# Patient Record
Sex: FEMALE | Race: ASIAN | NOT HISPANIC OR LATINO | Employment: FULL TIME | URBAN - METROPOLITAN AREA
[De-identification: names, ages, dates, MRNs, and addresses within clinical notes are randomized per-mention and may not be internally consistent; named-entity substitution may affect disease eponyms.]

---

## 2020-11-13 ENCOUNTER — HOSPITAL ENCOUNTER (INPATIENT)
Dept: INTENSIVE CARE | Facility: HOSPITAL | Age: 60
Discharge: NURSING, REHAB OR LONG-TERM CARE | End: 2020-12-29
Attending: INTERNAL MEDICINE | Admitting: INTERNAL MEDICINE

## 2020-11-13 DIAGNOSIS — U07.1 PNEUMONIA DUE TO 2019 NOVEL CORONAVIRUS: ICD-10-CM

## 2020-11-13 DIAGNOSIS — R09.02 HYPOXIA: ICD-10-CM

## 2020-11-13 DIAGNOSIS — J18.9 MULTIFOCAL PNEUMONIA: ICD-10-CM

## 2020-11-13 DIAGNOSIS — J12.82 PNEUMONIA DUE TO 2019 NOVEL CORONAVIRUS: ICD-10-CM

## 2020-11-13 DIAGNOSIS — R63.30 FEEDING DIFFICULTIES: ICD-10-CM

## 2020-11-13 DIAGNOSIS — J18.9 PNEUMONIA DUE TO INFECTIOUS ORGANISM, UNSPECIFIED LATERALITY, UNSPECIFIED PART OF LUNG: ICD-10-CM

## 2020-11-13 DIAGNOSIS — Z91.89 AT HIGH RISK FOR IMPAIRED SKIN INTEGRITY: ICD-10-CM

## 2020-11-13 DIAGNOSIS — E43 SEVERE PROTEIN-CALORIE MALNUTRITION (H): ICD-10-CM

## 2020-11-13 DIAGNOSIS — J96.01 ACUTE RESPIRATORY FAILURE WITH HYPOXIA (H): ICD-10-CM

## 2020-11-13 LAB
ABO/RH(D): NORMAL
ANION GAP SERPL CALCULATED.3IONS-SCNC: 12 MMOL/L (ref 5–18)
APTT PPP: 31 SECONDS (ref 24–37)
BASOPHILS # BLD AUTO: 0 THOU/UL (ref 0–0.2)
BASOPHILS NFR BLD AUTO: 0 % (ref 0–2)
BNP SERPL-MCNC: <10 PG/ML (ref 0–93)
BUN SERPL-MCNC: 14 MG/DL (ref 8–22)
C REACTIVE PROTEIN LHE: 6.9 MG/DL (ref 0–0.8)
CALCIUM SERPL-MCNC: 8.4 MG/DL (ref 8.5–10.5)
CHLORIDE BLD-SCNC: 100 MMOL/L (ref 98–107)
CK SERPL-CCNC: 120 U/L (ref 30–190)
CO2 SERPL-SCNC: 28 MMOL/L (ref 22–31)
CREAT SERPL-MCNC: 0.64 MG/DL (ref 0.6–1.1)
D DIMER PPP FEU-MCNC: 0.98 FEU UG/ML
EOSINOPHIL # BLD AUTO: 0 THOU/UL (ref 0–0.4)
EOSINOPHIL NFR BLD AUTO: 0 % (ref 0–6)
ERYTHROCYTE [DISTWIDTH] IN BLOOD BY AUTOMATED COUNT: 13 % (ref 11–14.5)
FIBRINOGEN PPP-MCNC: 667 MG/DL (ref 170–410)
GFR SERPL CREATININE-BSD FRML MDRD: >60 ML/MIN/1.73M2
GLUCOSE BLD-MCNC: 114 MG/DL (ref 70–125)
HCT VFR BLD AUTO: 47.4 % (ref 35–47)
HGB BLD-MCNC: 15.6 G/DL (ref 12–16)
IMM GRANULOCYTES # BLD: 0 THOU/UL
IMM GRANULOCYTES NFR BLD: 0 %
INR PPP: 0.95 (ref 0.9–1.1)
LDH SERPL L TO P-CCNC: 611 U/L (ref 125–220)
LYMPHOCYTES # BLD AUTO: 0.7 THOU/UL (ref 0.8–4.4)
LYMPHOCYTES NFR BLD AUTO: 13 % (ref 20–40)
MAGNESIUM SERPL-MCNC: 2 MG/DL (ref 1.8–2.6)
MCH RBC QN AUTO: 28.5 PG (ref 27–34)
MCHC RBC AUTO-ENTMCNC: 32.9 G/DL (ref 32–36)
MCV RBC AUTO: 87 FL (ref 80–100)
MONOCYTES # BLD AUTO: 0.1 THOU/UL (ref 0–0.9)
MONOCYTES NFR BLD AUTO: 2 % (ref 2–10)
NEUTROPHILS # BLD AUTO: 4.6 THOU/UL (ref 2–7.7)
NEUTROPHILS NFR BLD AUTO: 84 % (ref 50–70)
PLATELET # BLD AUTO: 258 THOU/UL (ref 140–440)
PMV BLD AUTO: 9.7 FL (ref 8.5–12.5)
POTASSIUM BLD-SCNC: 3.6 MMOL/L (ref 3.5–5)
PROCALCITONIN SERPL-MCNC: 0.26 NG/ML (ref 0–0.49)
RBC # BLD AUTO: 5.47 MILL/UL (ref 3.8–5.4)
RETICS # AUTO: 0.04 MILL/UL (ref 0.01–0.11)
RETICS/RBC NFR AUTO: 0.82 % (ref 0.8–2.7)
SODIUM SERPL-SCNC: 140 MMOL/L (ref 136–145)
TROPONIN I SERPL-MCNC: <0.01 NG/ML (ref 0–0.29)
WBC: 5.5 THOU/UL (ref 4–11)

## 2020-11-13 ASSESSMENT — MIFFLIN-ST. JEOR: SCORE: 1225.86

## 2020-11-14 LAB
ALBUMIN SERPL-MCNC: 2.7 G/DL (ref 3.5–5)
ALP SERPL-CCNC: 99 U/L (ref 45–120)
ALT SERPL W P-5'-P-CCNC: 43 U/L (ref 0–45)
ANION GAP SERPL CALCULATED.3IONS-SCNC: 12 MMOL/L (ref 5–18)
AST SERPL W P-5'-P-CCNC: 121 U/L (ref 0–40)
ATRIAL RATE - MUSE: 88 BPM
BASOPHILS # BLD AUTO: 0 THOU/UL (ref 0–0.2)
BASOPHILS NFR BLD AUTO: 0 % (ref 0–2)
BILIRUB DIRECT SERPL-MCNC: 0.1 MG/DL
BILIRUB SERPL-MCNC: 0.3 MG/DL (ref 0–1)
BUN SERPL-MCNC: 12 MG/DL (ref 8–22)
C DIFF TOX B STL QL: NEGATIVE
C REACTIVE PROTEIN LHE: 6.5 MG/DL (ref 0–0.8)
CALCIUM SERPL-MCNC: 8.1 MG/DL (ref 8.5–10.5)
CHLORIDE BLD-SCNC: 104 MMOL/L (ref 98–107)
CO2 SERPL-SCNC: 24 MMOL/L (ref 22–31)
CREAT SERPL-MCNC: 0.55 MG/DL (ref 0.6–1.1)
D DIMER PPP FEU-MCNC: 1.09 FEU UG/ML
DIASTOLIC BLOOD PRESSURE - MUSE: 71 MMHG
EOSINOPHIL # BLD AUTO: 0 THOU/UL (ref 0–0.4)
EOSINOPHIL NFR BLD AUTO: 0 % (ref 0–6)
ERYTHROCYTE [DISTWIDTH] IN BLOOD BY AUTOMATED COUNT: 13.1 % (ref 11–14.5)
FERRITIN SERPL-MCNC: 2663 NG/ML (ref 10–130)
FIBRINOGEN PPP-MCNC: 658 MG/DL (ref 170–410)
GFR SERPL CREATININE-BSD FRML MDRD: >60 ML/MIN/1.73M2
GLUCOSE BLD-MCNC: 141 MG/DL (ref 70–125)
GLUCOSE BLDC GLUCOMTR-MCNC: 133 MG/DL (ref 70–139)
GLUCOSE BLDC GLUCOMTR-MCNC: 142 MG/DL (ref 70–139)
GLUCOSE BLDC GLUCOMTR-MCNC: 161 MG/DL (ref 70–139)
GLUCOSE BLDC GLUCOMTR-MCNC: 176 MG/DL (ref 70–139)
HBA1C MFR BLD: 5.8 %
HCT VFR BLD AUTO: 46.2 % (ref 35–47)
HGB BLD-MCNC: 15.1 G/DL (ref 12–16)
IMM GRANULOCYTES # BLD: 0 THOU/UL
IMM GRANULOCYTES NFR BLD: 0 %
INR PPP: 0.92 (ref 0.9–1.1)
INTERPRETATION ECG - MUSE: NORMAL
LACTATE SERPL-SCNC: 2.8 MMOL/L (ref 0.7–2)
LACTATE SERPL-SCNC: 3.3 MMOL/L (ref 0.7–2)
LDH SERPL L TO P-CCNC: 638 U/L (ref 125–220)
LYMPHOCYTES # BLD AUTO: 0.5 THOU/UL (ref 0.8–4.4)
LYMPHOCYTES NFR BLD AUTO: 14 % (ref 20–40)
MCH RBC QN AUTO: 28.3 PG (ref 27–34)
MCHC RBC AUTO-ENTMCNC: 32.7 G/DL (ref 32–36)
MCV RBC AUTO: 87 FL (ref 80–100)
MONOCYTES # BLD AUTO: 0.1 THOU/UL (ref 0–0.9)
MONOCYTES NFR BLD AUTO: 2 % (ref 2–10)
NEUTROPHILS # BLD AUTO: 3.3 THOU/UL (ref 2–7.7)
NEUTROPHILS NFR BLD AUTO: 84 % (ref 50–70)
P AXIS - MUSE: 56 DEGREES
PLATELET # BLD AUTO: 281 THOU/UL (ref 140–440)
PMV BLD AUTO: 9.6 FL (ref 8.5–12.5)
POTASSIUM BLD-SCNC: 3.6 MMOL/L (ref 3.5–5)
PR INTERVAL - MUSE: 156 MS
PROT SERPL-MCNC: 6.3 G/DL (ref 6–8)
QRS DURATION - MUSE: 82 MS
QT - MUSE: 374 MS
QTC - MUSE: 452 MS
R AXIS - MUSE: 5 DEGREES
RBC # BLD AUTO: 5.33 MILL/UL (ref 3.8–5.4)
RETICS # AUTO: 0.03 MILL/UL (ref 0.01–0.11)
RETICS/RBC NFR AUTO: 0.56 % (ref 0.8–2.7)
RIBOTYPE 027/NAP1/BI: NORMAL
SARS-COV-2 PCR COMMENT: ABNORMAL
SARS-COV-2 RNA SPEC QL NAA+PROBE: POSITIVE
SARS-COV-2 VIRUS SPECIMEN SOURCE: ABNORMAL
SODIUM SERPL-SCNC: 140 MMOL/L (ref 136–145)
SYSTOLIC BLOOD PRESSURE - MUSE: 110 MMHG
T AXIS - MUSE: 65 DEGREES
TROPONIN I SERPL-MCNC: <0.01 NG/ML (ref 0–0.29)
VENTRICULAR RATE- MUSE: 88 BPM
WBC: 3.9 THOU/UL (ref 4–11)

## 2020-11-15 LAB
ANION GAP SERPL CALCULATED.3IONS-SCNC: 11 MMOL/L (ref 5–18)
AT III ACT/NOR PPP CHRO: 109 % (ref 85–135)
BASE EXCESS BLDA CALC-SCNC: 5.3 MMOL/L
BASOPHILS # BLD AUTO: 0 THOU/UL (ref 0–0.2)
BASOPHILS NFR BLD AUTO: 0 % (ref 0–2)
BUN SERPL-MCNC: 13 MG/DL (ref 8–22)
C REACTIVE PROTEIN LHE: 2.5 MG/DL (ref 0–0.8)
CALCIUM SERPL-MCNC: 7.9 MG/DL (ref 8.5–10.5)
CHLORIDE BLD-SCNC: 105 MMOL/L (ref 98–107)
CO2 SERPL-SCNC: 27 MMOL/L (ref 22–31)
COHGB MFR BLD: 92.3 % (ref 96–97)
CREAT SERPL-MCNC: 0.48 MG/DL (ref 0.6–1.1)
D DIMER PPP FEU-MCNC: 1.33 FEU UG/ML
EOSINOPHIL # BLD AUTO: 0 THOU/UL (ref 0–0.4)
EOSINOPHIL NFR BLD AUTO: 0 % (ref 0–6)
ERYTHROCYTE [DISTWIDTH] IN BLOOD BY AUTOMATED COUNT: 13.1 % (ref 11–14.5)
FIBRINOGEN PPP-MCNC: 875 MG/DL (ref 170–410)
FLOW: 15 LPM
GFR SERPL CREATININE-BSD FRML MDRD: >60 ML/MIN/1.73M2
GLUCOSE BLD-MCNC: 96 MG/DL (ref 70–125)
GLUCOSE BLDC GLUCOMTR-MCNC: 102 MG/DL (ref 70–139)
GLUCOSE BLDC GLUCOMTR-MCNC: 113 MG/DL (ref 70–139)
GLUCOSE BLDC GLUCOMTR-MCNC: 116 MG/DL (ref 70–139)
GLUCOSE BLDC GLUCOMTR-MCNC: 125 MG/DL (ref 70–139)
HCO3, ARTERIAL CALC - HISTORICAL: 28.6 MMOL/L (ref 23–29)
HCT VFR BLD AUTO: 42.7 % (ref 35–47)
HGB BLD-MCNC: 13.8 G/DL (ref 12–16)
IMM GRANULOCYTES # BLD: 0.1 THOU/UL
IMM GRANULOCYTES NFR BLD: 1 %
INR PPP: 0.91 (ref 0.9–1.1)
INTERLEUKIN 6 BLOOD: 23.13 PG/ML
LDH SERPL L TO P-CCNC: 895 U/L (ref 125–220)
LYMPHOCYTES # BLD AUTO: 1 THOU/UL (ref 0.8–4.4)
LYMPHOCYTES NFR BLD AUTO: 11 % (ref 20–40)
MAGNESIUM SERPL-MCNC: 2 MG/DL (ref 1.8–2.6)
MAGNESIUM SERPL-MCNC: 2.1 MG/DL (ref 1.8–2.6)
MCH RBC QN AUTO: 28.3 PG (ref 27–34)
MCHC RBC AUTO-ENTMCNC: 32.3 G/DL (ref 32–36)
MCV RBC AUTO: 88 FL (ref 80–100)
MONOCYTES # BLD AUTO: 0.3 THOU/UL (ref 0–0.9)
MONOCYTES NFR BLD AUTO: 4 % (ref 2–10)
NEUTROPHILS # BLD AUTO: 8.1 THOU/UL (ref 2–7.7)
NEUTROPHILS NFR BLD AUTO: 85 % (ref 50–70)
OXYHEMOGLOBIN - HISTORICAL: 92.3 % (ref 96–97)
PCO2 BLD: 40 MM HG (ref 35–45)
PH BLD: 7.47 [PH] (ref 7.37–7.44)
PLATELET # BLD AUTO: 302 THOU/UL (ref 140–440)
PMV BLD AUTO: 10 FL (ref 8.5–12.5)
PO2 BLD: 65 MM HG (ref 80–90)
POTASSIUM BLD-SCNC: 2.9 MMOL/L (ref 3.5–5)
POTASSIUM BLD-SCNC: 4.2 MMOL/L (ref 3.5–5)
RBC # BLD AUTO: 4.88 MILL/UL (ref 3.8–5.4)
RETICS # AUTO: 0.03 MILL/UL (ref 0.01–0.11)
RETICS/RBC NFR AUTO: 0.61 % (ref 0.8–2.7)
SODIUM SERPL-SCNC: 143 MMOL/L (ref 136–145)
TEMPERATURE: 37 DEGREES C
VENTILATION MODE: ABNORMAL
WBC: 9.5 THOU/UL (ref 4–11)

## 2020-11-16 ENCOUNTER — COMMUNICATION - HEALTHEAST (OUTPATIENT)
Dept: SCHEDULING | Facility: CLINIC | Age: 60
End: 2020-11-16

## 2020-11-16 LAB
ANION GAP SERPL CALCULATED.3IONS-SCNC: 13 MMOL/L (ref 5–18)
BASOPHILS # BLD AUTO: 0 THOU/UL (ref 0–0.2)
BASOPHILS NFR BLD AUTO: 0 % (ref 0–2)
BUN SERPL-MCNC: 14 MG/DL (ref 8–22)
C REACTIVE PROTEIN LHE: 1.5 MG/DL (ref 0–0.8)
CALCIUM SERPL-MCNC: 7.9 MG/DL (ref 8.5–10.5)
CHLORIDE BLD-SCNC: 103 MMOL/L (ref 98–107)
CO2 SERPL-SCNC: 27 MMOL/L (ref 22–31)
CREAT SERPL-MCNC: 0.53 MG/DL (ref 0.6–1.1)
D DIMER PPP FEU-MCNC: 1.33 FEU UG/ML
EOSINOPHIL COUNT (ABSOLUTE): 0 THOU/UL (ref 0–0.4)
EOSINOPHIL NFR BLD AUTO: 0 % (ref 0–6)
ERYTHROCYTE [DISTWIDTH] IN BLOOD BY AUTOMATED COUNT: 13 % (ref 11–14.5)
FIBRINOGEN PPP-MCNC: 505 MG/DL (ref 170–410)
GFR SERPL CREATININE-BSD FRML MDRD: >60 ML/MIN/1.73M2
GLUCOSE BLD-MCNC: 104 MG/DL (ref 70–125)
GLUCOSE BLDC GLUCOMTR-MCNC: 110 MG/DL (ref 70–139)
GLUCOSE BLDC GLUCOMTR-MCNC: 113 MG/DL (ref 70–139)
GLUCOSE BLDC GLUCOMTR-MCNC: 113 MG/DL (ref 70–139)
GLUCOSE BLDC GLUCOMTR-MCNC: 114 MG/DL (ref 70–139)
HCT VFR BLD AUTO: 43 % (ref 35–47)
HGB BLD-MCNC: 14.1 G/DL (ref 12–16)
IMMATURE GRANULOCYTE % - MAN (DIFF): 0 %
IMMATURE GRANULOCYTE ABSOLUTE - MAN (DIFF): 0 THOU/UL
INR PPP: 0.95 (ref 0.9–1.1)
LDH SERPL L TO P-CCNC: 812 U/L (ref 125–220)
LYMPHOCYTES # BLD AUTO: 0.6 THOU/UL (ref 0.8–4.4)
LYMPHOCYTES NFR BLD AUTO: 7 % (ref 20–40)
MAGNESIUM SERPL-MCNC: 2.1 MG/DL (ref 1.8–2.6)
MCH RBC QN AUTO: 28.5 PG (ref 27–34)
MCHC RBC AUTO-ENTMCNC: 32.8 G/DL (ref 32–36)
MCV RBC AUTO: 87 FL (ref 80–100)
MONOCYTES # BLD AUTO: 0.2 THOU/UL (ref 0–0.9)
MONOCYTES NFR BLD AUTO: 3 % (ref 2–10)
PLAT MORPH BLD: NORMAL
PLATELET # BLD AUTO: 324 THOU/UL (ref 140–440)
PMV BLD AUTO: 9.5 FL (ref 8.5–12.5)
POTASSIUM BLD-SCNC: 3.7 MMOL/L (ref 3.5–5)
RBC # BLD AUTO: 4.95 MILL/UL (ref 3.8–5.4)
REACTIVE LYMPHS: ABNORMAL
RETICS # AUTO: 0.04 MILL/UL (ref 0.01–0.11)
RETICS/RBC NFR AUTO: 0.84 % (ref 0.8–2.7)
SODIUM SERPL-SCNC: 143 MMOL/L (ref 136–145)
TOTAL NEUTROPHILS-ABS(DIFF): 7.3 THOU/UL (ref 2–7.7)
TOTAL NEUTROPHILS-REL(DIFF): 90 % (ref 50–70)
TROPONIN I SERPL-MCNC: <0.01 NG/ML (ref 0–0.29)
WBC: 8.1 THOU/UL (ref 4–11)

## 2020-11-17 LAB
ANION GAP SERPL CALCULATED.3IONS-SCNC: 9 MMOL/L (ref 5–18)
BASOPHILS # BLD AUTO: 0 THOU/UL (ref 0–0.2)
BASOPHILS NFR BLD AUTO: 0 % (ref 0–2)
BUN SERPL-MCNC: 20 MG/DL (ref 8–22)
C REACTIVE PROTEIN LHE: 0.6 MG/DL (ref 0–0.8)
CALCIUM SERPL-MCNC: 8 MG/DL (ref 8.5–10.5)
CHLORIDE BLD-SCNC: 105 MMOL/L (ref 98–107)
CO2 SERPL-SCNC: 28 MMOL/L (ref 22–31)
CREAT SERPL-MCNC: 0.56 MG/DL (ref 0.6–1.1)
D DIMER PPP FEU-MCNC: 1.41 FEU UG/ML
EOSINOPHIL COUNT (ABSOLUTE): 0 THOU/UL (ref 0–0.4)
EOSINOPHIL NFR BLD AUTO: 0 % (ref 0–6)
ERYTHROCYTE [DISTWIDTH] IN BLOOD BY AUTOMATED COUNT: 13.1 % (ref 11–14.5)
FIBRINOGEN PPP-MCNC: 445 MG/DL (ref 170–410)
GFR SERPL CREATININE-BSD FRML MDRD: >60 ML/MIN/1.73M2
GLUCOSE BLD-MCNC: 99 MG/DL (ref 70–125)
GLUCOSE BLDC GLUCOMTR-MCNC: 106 MG/DL (ref 70–139)
GLUCOSE BLDC GLUCOMTR-MCNC: 115 MG/DL (ref 70–139)
GLUCOSE BLDC GLUCOMTR-MCNC: 115 MG/DL (ref 70–139)
GLUCOSE BLDC GLUCOMTR-MCNC: 155 MG/DL (ref 70–139)
GLUCOSE BLDC GLUCOMTR-MCNC: 93 MG/DL (ref 70–139)
HCT VFR BLD AUTO: 43.9 % (ref 35–47)
HGB BLD-MCNC: 14.3 G/DL (ref 12–16)
IMMATURE GRANULOCYTE % - MAN (DIFF): 1 %
IMMATURE GRANULOCYTE ABSOLUTE - MAN (DIFF): 0.1 THOU/UL
INR PPP: 0.96 (ref 0.9–1.1)
LDH SERPL L TO P-CCNC: 820 U/L (ref 125–220)
LYMPHOCYTES # BLD AUTO: 0.5 THOU/UL (ref 0.8–4.4)
LYMPHOCYTES NFR BLD AUTO: 6 % (ref 20–40)
MAGNESIUM SERPL-MCNC: 2.3 MG/DL (ref 1.8–2.6)
MCH RBC QN AUTO: 28.3 PG (ref 27–34)
MCHC RBC AUTO-ENTMCNC: 32.6 G/DL (ref 32–36)
MCV RBC AUTO: 87 FL (ref 80–100)
METAMYELOCYTES (ABSOLUTE): 0 THOU/UL
METAMYELOCYTES NFR BLD MANUAL: 1 %
MONOCYTES # BLD AUTO: 0.6 THOU/UL (ref 0–0.9)
MONOCYTES NFR BLD AUTO: 7 % (ref 2–10)
MYELOCYTES (ABSOLUTE): 0 THOU/UL
MYELOCYTES NFR BLD MANUAL: 1 %
PLAT MORPH BLD: NORMAL
PLATELET # BLD AUTO: 407 THOU/UL (ref 140–440)
PMV BLD AUTO: 9.8 FL (ref 8.5–12.5)
POTASSIUM BLD-SCNC: 3.7 MMOL/L (ref 3.5–5)
RBC # BLD AUTO: 5.05 MILL/UL (ref 3.8–5.4)
REACTIVE LYMPHS: ABNORMAL
RETICS # AUTO: 0.05 MILL/UL (ref 0.01–0.11)
RETICS/RBC NFR AUTO: 1.05 % (ref 0.8–2.7)
SODIUM SERPL-SCNC: 142 MMOL/L (ref 136–145)
TOTAL NEUTROPHILS-ABS(DIFF): 7.4 THOU/UL (ref 2–7.7)
TOTAL NEUTROPHILS-REL(DIFF): 87 % (ref 50–70)
WBC: 8.6 THOU/UL (ref 4–11)

## 2020-11-18 LAB
ALBUMIN SERPL-MCNC: 2.9 G/DL (ref 3.5–5)
ALP SERPL-CCNC: 101 U/L (ref 45–120)
ALT SERPL W P-5'-P-CCNC: 74 U/L (ref 0–45)
ANION GAP SERPL CALCULATED.3IONS-SCNC: 11 MMOL/L (ref 5–18)
AST SERPL W P-5'-P-CCNC: 73 U/L (ref 0–40)
BILIRUB DIRECT SERPL-MCNC: 0.2 MG/DL
BILIRUB SERPL-MCNC: 0.6 MG/DL (ref 0–1)
BUN SERPL-MCNC: 16 MG/DL (ref 8–22)
CALCIUM SERPL-MCNC: 7.9 MG/DL (ref 8.5–10.5)
CHLORIDE BLD-SCNC: 104 MMOL/L (ref 98–107)
CO2 SERPL-SCNC: 24 MMOL/L (ref 22–31)
CREAT SERPL-MCNC: 0.54 MG/DL (ref 0.6–1.1)
GFR SERPL CREATININE-BSD FRML MDRD: >60 ML/MIN/1.73M2
GLUCOSE BLD-MCNC: 104 MG/DL (ref 70–125)
GLUCOSE BLDC GLUCOMTR-MCNC: 101 MG/DL (ref 70–139)
GLUCOSE BLDC GLUCOMTR-MCNC: 109 MG/DL (ref 70–139)
GLUCOSE BLDC GLUCOMTR-MCNC: 138 MG/DL (ref 70–139)
GLUCOSE BLDC GLUCOMTR-MCNC: 159 MG/DL (ref 70–139)
MAGNESIUM SERPL-MCNC: 2.3 MG/DL (ref 1.8–2.6)
PLATELET # BLD AUTO: 396 THOU/UL (ref 140–440)
POTASSIUM BLD-SCNC: 3.7 MMOL/L (ref 3.5–5)
PROT SERPL-MCNC: 6.4 G/DL (ref 6–8)
SODIUM SERPL-SCNC: 139 MMOL/L (ref 136–145)

## 2020-11-19 LAB
AEROBIC BLOOD CULTURE BOTTLE: NO GROWTH
AEROBIC BLOOD CULTURE BOTTLE: NO GROWTH
ALBUMIN SERPL-MCNC: 2.8 G/DL (ref 3.5–5)
ALP SERPL-CCNC: 84 U/L (ref 45–120)
ALT SERPL W P-5'-P-CCNC: 49 U/L (ref 0–45)
ANAEROBIC BLOOD CULTURE BOTTLE: NO GROWTH
ANAEROBIC BLOOD CULTURE BOTTLE: NO GROWTH
ANION GAP SERPL CALCULATED.3IONS-SCNC: 7 MMOL/L (ref 5–18)
AST SERPL W P-5'-P-CCNC: 49 U/L (ref 0–40)
BILIRUB DIRECT SERPL-MCNC: 0.2 MG/DL
BILIRUB SERPL-MCNC: 0.7 MG/DL (ref 0–1)
BUN SERPL-MCNC: 14 MG/DL (ref 8–22)
CALCIUM SERPL-MCNC: 7.9 MG/DL (ref 8.5–10.5)
CHLORIDE BLD-SCNC: 105 MMOL/L (ref 98–107)
CO2 SERPL-SCNC: 28 MMOL/L (ref 22–31)
CREAT SERPL-MCNC: 0.51 MG/DL (ref 0.6–1.1)
GFR SERPL CREATININE-BSD FRML MDRD: >60 ML/MIN/1.73M2
GLUCOSE BLD-MCNC: 105 MG/DL (ref 70–125)
GLUCOSE BLDC GLUCOMTR-MCNC: 105 MG/DL (ref 70–139)
GLUCOSE BLDC GLUCOMTR-MCNC: 107 MG/DL (ref 70–139)
GLUCOSE BLDC GLUCOMTR-MCNC: 128 MG/DL (ref 70–139)
GLUCOSE BLDC GLUCOMTR-MCNC: 128 MG/DL (ref 70–139)
PLATELET # BLD AUTO: 418 THOU/UL (ref 140–440)
POTASSIUM BLD-SCNC: 3.7 MMOL/L (ref 3.5–5)
PROT SERPL-MCNC: 6 G/DL (ref 6–8)
SODIUM SERPL-SCNC: 140 MMOL/L (ref 136–145)

## 2020-11-19 ASSESSMENT — MIFFLIN-ST. JEOR: SCORE: 1209.88

## 2020-11-20 LAB
ANION GAP SERPL CALCULATED.3IONS-SCNC: 9 MMOL/L (ref 5–18)
BUN SERPL-MCNC: 16 MG/DL (ref 8–22)
CALCIUM SERPL-MCNC: 8.3 MG/DL (ref 8.5–10.5)
CHLORIDE BLD-SCNC: 104 MMOL/L (ref 98–107)
CO2 SERPL-SCNC: 28 MMOL/L (ref 22–31)
CREAT SERPL-MCNC: 0.53 MG/DL (ref 0.6–1.1)
ERYTHROCYTE [DISTWIDTH] IN BLOOD BY AUTOMATED COUNT: 12.8 % (ref 11–14.5)
GFR SERPL CREATININE-BSD FRML MDRD: >60 ML/MIN/1.73M2
GLUCOSE BLD-MCNC: 102 MG/DL (ref 70–125)
GLUCOSE BLDC GLUCOMTR-MCNC: 104 MG/DL (ref 70–139)
GLUCOSE BLDC GLUCOMTR-MCNC: 111 MG/DL (ref 70–139)
GLUCOSE BLDC GLUCOMTR-MCNC: 119 MG/DL (ref 70–139)
GLUCOSE BLDC GLUCOMTR-MCNC: 131 MG/DL (ref 70–139)
HCT VFR BLD AUTO: 43.4 % (ref 35–47)
HGB BLD-MCNC: 14.3 G/DL (ref 12–16)
MCH RBC QN AUTO: 28.4 PG (ref 27–34)
MCHC RBC AUTO-ENTMCNC: 32.9 G/DL (ref 32–36)
MCV RBC AUTO: 86 FL (ref 80–100)
PLATELET # BLD AUTO: 426 THOU/UL (ref 140–440)
PMV BLD AUTO: 9.7 FL (ref 8.5–12.5)
POTASSIUM BLD-SCNC: 4 MMOL/L (ref 3.5–5)
RBC # BLD AUTO: 5.04 MILL/UL (ref 3.8–5.4)
SODIUM SERPL-SCNC: 141 MMOL/L (ref 136–145)
WBC: 11 THOU/UL (ref 4–11)

## 2020-11-20 ASSESSMENT — MIFFLIN-ST. JEOR: SCORE: 1191.88

## 2020-11-21 LAB
ANION GAP SERPL CALCULATED.3IONS-SCNC: 9 MMOL/L (ref 5–18)
BUN SERPL-MCNC: 19 MG/DL (ref 8–22)
CALCIUM SERPL-MCNC: 8.3 MG/DL (ref 8.5–10.5)
CHLORIDE BLD-SCNC: 103 MMOL/L (ref 98–107)
CO2 SERPL-SCNC: 27 MMOL/L (ref 22–31)
CREAT SERPL-MCNC: 0.51 MG/DL (ref 0.6–1.1)
ERYTHROCYTE [DISTWIDTH] IN BLOOD BY AUTOMATED COUNT: 12.9 % (ref 11–14.5)
GFR SERPL CREATININE-BSD FRML MDRD: >60 ML/MIN/1.73M2
GLUCOSE BLD-MCNC: 102 MG/DL (ref 70–125)
GLUCOSE BLDC GLUCOMTR-MCNC: 129 MG/DL (ref 70–139)
GLUCOSE BLDC GLUCOMTR-MCNC: 131 MG/DL (ref 70–139)
GLUCOSE BLDC GLUCOMTR-MCNC: 139 MG/DL (ref 70–139)
GLUCOSE BLDC GLUCOMTR-MCNC: 76 MG/DL (ref 70–139)
GLUCOSE BLDC GLUCOMTR-MCNC: 94 MG/DL (ref 70–139)
HCT VFR BLD AUTO: 44.1 % (ref 35–47)
HGB BLD-MCNC: 14.7 G/DL (ref 12–16)
MCH RBC QN AUTO: 28.1 PG (ref 27–34)
MCHC RBC AUTO-ENTMCNC: 33.3 G/DL (ref 32–36)
MCV RBC AUTO: 84 FL (ref 80–100)
PLATELET # BLD AUTO: 432 THOU/UL (ref 140–440)
PMV BLD AUTO: 9.7 FL (ref 8.5–12.5)
POTASSIUM BLD-SCNC: 3.8 MMOL/L (ref 3.5–5)
RBC # BLD AUTO: 5.24 MILL/UL (ref 3.8–5.4)
SODIUM SERPL-SCNC: 139 MMOL/L (ref 136–145)
WBC: 13.6 THOU/UL (ref 4–11)

## 2020-11-21 ASSESSMENT — MIFFLIN-ST. JEOR: SCORE: 1190.88

## 2020-11-22 LAB
ANION GAP SERPL CALCULATED.3IONS-SCNC: 12 MMOL/L (ref 5–18)
BUN SERPL-MCNC: 18 MG/DL (ref 8–22)
CALCIUM SERPL-MCNC: 8.3 MG/DL (ref 8.5–10.5)
CHLORIDE BLD-SCNC: 102 MMOL/L (ref 98–107)
CO2 SERPL-SCNC: 25 MMOL/L (ref 22–31)
CREAT SERPL-MCNC: 0.5 MG/DL (ref 0.6–1.1)
ERYTHROCYTE [DISTWIDTH] IN BLOOD BY AUTOMATED COUNT: 12.7 % (ref 11–14.5)
GFR SERPL CREATININE-BSD FRML MDRD: >60 ML/MIN/1.73M2
GLUCOSE BLD-MCNC: 123 MG/DL (ref 70–125)
GLUCOSE BLDC GLUCOMTR-MCNC: 161 MG/DL (ref 70–139)
GLUCOSE BLDC GLUCOMTR-MCNC: 180 MG/DL (ref 70–139)
HCT VFR BLD AUTO: 42.4 % (ref 35–47)
HGB BLD-MCNC: 14 G/DL (ref 12–16)
MCH RBC QN AUTO: 27.8 PG (ref 27–34)
MCHC RBC AUTO-ENTMCNC: 33 G/DL (ref 32–36)
MCV RBC AUTO: 84 FL (ref 80–100)
PLATELET # BLD AUTO: 417 THOU/UL (ref 140–440)
PMV BLD AUTO: 9.9 FL (ref 8.5–12.5)
POTASSIUM BLD-SCNC: 4.1 MMOL/L (ref 3.5–5)
RBC # BLD AUTO: 5.03 MILL/UL (ref 3.8–5.4)
SODIUM SERPL-SCNC: 139 MMOL/L (ref 136–145)
WBC: 13.1 THOU/UL (ref 4–11)

## 2020-11-22 ASSESSMENT — MIFFLIN-ST. JEOR: SCORE: 1185.88

## 2020-11-23 LAB
ANION GAP SERPL CALCULATED.3IONS-SCNC: 10 MMOL/L (ref 5–18)
BUN SERPL-MCNC: 18 MG/DL (ref 8–22)
CALCIUM SERPL-MCNC: 8.4 MG/DL (ref 8.5–10.5)
CHLORIDE BLD-SCNC: 100 MMOL/L (ref 98–107)
CO2 SERPL-SCNC: 26 MMOL/L (ref 22–31)
CREAT SERPL-MCNC: 0.5 MG/DL (ref 0.6–1.1)
ERYTHROCYTE [DISTWIDTH] IN BLOOD BY AUTOMATED COUNT: 12.8 % (ref 11–14.5)
GFR SERPL CREATININE-BSD FRML MDRD: >60 ML/MIN/1.73M2
GLUCOSE BLD-MCNC: 101 MG/DL (ref 70–125)
HCT VFR BLD AUTO: 42.3 % (ref 35–47)
HGB BLD-MCNC: 14 G/DL (ref 12–16)
MCH RBC QN AUTO: 28.4 PG (ref 27–34)
MCHC RBC AUTO-ENTMCNC: 33.1 G/DL (ref 32–36)
MCV RBC AUTO: 86 FL (ref 80–100)
PLATELET # BLD AUTO: 320 THOU/UL (ref 140–440)
PMV BLD AUTO: 9.9 FL (ref 8.5–12.5)
POTASSIUM BLD-SCNC: 3.9 MMOL/L (ref 3.5–5)
RBC # BLD AUTO: 4.93 MILL/UL (ref 3.8–5.4)
SODIUM SERPL-SCNC: 136 MMOL/L (ref 136–145)
WBC: 16.8 THOU/UL (ref 4–11)

## 2020-11-23 ASSESSMENT — MIFFLIN-ST. JEOR: SCORE: 1189.88

## 2020-11-24 LAB
ANION GAP SERPL CALCULATED.3IONS-SCNC: 9 MMOL/L (ref 5–18)
BUN SERPL-MCNC: 15 MG/DL (ref 8–22)
CALCIUM SERPL-MCNC: 8.6 MG/DL (ref 8.5–10.5)
CHLORIDE BLD-SCNC: 101 MMOL/L (ref 98–107)
CO2 SERPL-SCNC: 28 MMOL/L (ref 22–31)
CREAT SERPL-MCNC: 0.51 MG/DL (ref 0.6–1.1)
GFR SERPL CREATININE-BSD FRML MDRD: >60 ML/MIN/1.73M2
GLUCOSE BLD-MCNC: 104 MG/DL (ref 70–125)
PLATELET # BLD AUTO: 419 THOU/UL (ref 140–440)
POTASSIUM BLD-SCNC: 3.8 MMOL/L (ref 3.5–5)
SODIUM SERPL-SCNC: 138 MMOL/L (ref 136–145)

## 2020-11-24 ASSESSMENT — MIFFLIN-ST. JEOR: SCORE: 1189.88

## 2020-11-25 LAB
ANION GAP SERPL CALCULATED.3IONS-SCNC: 8 MMOL/L (ref 5–18)
BASE EXCESS BLDA CALC-SCNC: 1.9 MMOL/L
BUN SERPL-MCNC: 13 MG/DL (ref 8–22)
CALCIUM SERPL-MCNC: 8.4 MG/DL (ref 8.5–10.5)
CHLORIDE BLD-SCNC: 101 MMOL/L (ref 98–107)
CO2 SERPL-SCNC: 26 MMOL/L (ref 22–31)
COHGB MFR BLD: 98.1 % (ref 96–97)
CREAT SERPL-MCNC: 0.48 MG/DL (ref 0.6–1.1)
FLOW: 60 LPM
GFR SERPL CREATININE-BSD FRML MDRD: >60 ML/MIN/1.73M2
GLUCOSE BLD-MCNC: 98 MG/DL (ref 70–125)
HCO3, ARTERIAL CALC - HISTORICAL: 26.1 MMOL/L (ref 23–29)
OXYHEMOGLOBIN - HISTORICAL: 97.5 % (ref 96–97)
PCO2 BLD: 38 MM HG (ref 35–45)
PH BLD: 7.44 [PH] (ref 7.37–7.44)
PLATELET # BLD AUTO: 362 THOU/UL (ref 140–440)
PO2 BLD: 154 MM HG (ref 80–90)
POTASSIUM BLD-SCNC: 3.5 MMOL/L (ref 3.5–5)
POTASSIUM BLD-SCNC: 4.2 MMOL/L (ref 3.5–5)
SODIUM SERPL-SCNC: 135 MMOL/L (ref 136–145)
TEMPERATURE: 37 DEGREES C
VENTILATION MODE: ABNORMAL

## 2020-11-26 LAB
ANION GAP SERPL CALCULATED.3IONS-SCNC: 9 MMOL/L (ref 5–18)
BUN SERPL-MCNC: 10 MG/DL (ref 8–22)
CALCIUM SERPL-MCNC: 8.7 MG/DL (ref 8.5–10.5)
CHLORIDE BLD-SCNC: 98 MMOL/L (ref 98–107)
CO2 SERPL-SCNC: 28 MMOL/L (ref 22–31)
CREAT SERPL-MCNC: 0.45 MG/DL (ref 0.6–1.1)
ERYTHROCYTE [DISTWIDTH] IN BLOOD BY AUTOMATED COUNT: 13 % (ref 11–14.5)
GFR SERPL CREATININE-BSD FRML MDRD: >60 ML/MIN/1.73M2
GLUCOSE BLD-MCNC: 103 MG/DL (ref 70–125)
HCT VFR BLD AUTO: 39.3 % (ref 35–47)
HGB BLD-MCNC: 13 G/DL (ref 12–16)
MCH RBC QN AUTO: 28.5 PG (ref 27–34)
MCHC RBC AUTO-ENTMCNC: 33.1 G/DL (ref 32–36)
MCV RBC AUTO: 86 FL (ref 80–100)
PLATELET # BLD AUTO: 266 THOU/UL (ref 140–440)
PLATELET # BLD AUTO: 266 THOU/UL (ref 140–440)
PMV BLD AUTO: 10.2 FL (ref 8.5–12.5)
POTASSIUM BLD-SCNC: 3.8 MMOL/L (ref 3.5–5)
RBC # BLD AUTO: 4.56 MILL/UL (ref 3.8–5.4)
SODIUM SERPL-SCNC: 135 MMOL/L (ref 136–145)
WBC: 15.5 THOU/UL (ref 4–11)

## 2020-11-26 ASSESSMENT — MIFFLIN-ST. JEOR
SCORE: 1184.88
SCORE: 1188.88

## 2020-11-27 LAB
ANION GAP SERPL CALCULATED.3IONS-SCNC: 11 MMOL/L (ref 5–18)
BUN SERPL-MCNC: 10 MG/DL (ref 8–22)
CALCIUM SERPL-MCNC: 7.7 MG/DL (ref 8.5–10.5)
CHLORIDE BLD-SCNC: 95 MMOL/L (ref 98–107)
CO2 SERPL-SCNC: 28 MMOL/L (ref 22–31)
CREAT SERPL-MCNC: 0.48 MG/DL (ref 0.6–1.1)
GFR SERPL CREATININE-BSD FRML MDRD: >60 ML/MIN/1.73M2
GLUCOSE BLD-MCNC: 113 MG/DL (ref 70–125)
PLATELET # BLD AUTO: 314 THOU/UL (ref 140–440)
POTASSIUM BLD-SCNC: 4.2 MMOL/L (ref 3.5–5)
POTASSIUM BLD-SCNC: 5.2 MMOL/L (ref 3.5–5)
SODIUM SERPL-SCNC: 134 MMOL/L (ref 136–145)

## 2020-11-27 ASSESSMENT — MIFFLIN-ST. JEOR: SCORE: 1184.88

## 2020-11-28 LAB
ANION GAP SERPL CALCULATED.3IONS-SCNC: 9 MMOL/L (ref 5–18)
BUN SERPL-MCNC: 7 MG/DL (ref 8–22)
CALCIUM SERPL-MCNC: 8.8 MG/DL (ref 8.5–10.5)
CHLORIDE BLD-SCNC: 96 MMOL/L (ref 98–107)
CO2 SERPL-SCNC: 29 MMOL/L (ref 22–31)
CREAT SERPL-MCNC: 0.47 MG/DL (ref 0.6–1.1)
GFR SERPL CREATININE-BSD FRML MDRD: >60 ML/MIN/1.73M2
GLUCOSE BLD-MCNC: 120 MG/DL (ref 70–125)
PLATELET # BLD AUTO: 353 THOU/UL (ref 140–440)
POTASSIUM BLD-SCNC: 3.8 MMOL/L (ref 3.5–5)
SODIUM SERPL-SCNC: 134 MMOL/L (ref 136–145)

## 2020-11-28 ASSESSMENT — MIFFLIN-ST. JEOR: SCORE: 1175.51

## 2020-11-29 LAB
ALBUMIN SERPL-MCNC: 2.3 G/DL (ref 3.5–5)
ALP SERPL-CCNC: 146 U/L (ref 45–120)
ALT SERPL W P-5'-P-CCNC: 41 U/L (ref 0–45)
ANION GAP SERPL CALCULATED.3IONS-SCNC: 9 MMOL/L (ref 5–18)
AST SERPL W P-5'-P-CCNC: 36 U/L (ref 0–40)
BILIRUB DIRECT SERPL-MCNC: 0.2 MG/DL
BILIRUB SERPL-MCNC: 0.5 MG/DL (ref 0–1)
BUN SERPL-MCNC: 9 MG/DL (ref 8–22)
CALCIUM SERPL-MCNC: 8.8 MG/DL (ref 8.5–10.5)
CHLORIDE BLD-SCNC: 97 MMOL/L (ref 98–107)
CO2 SERPL-SCNC: 30 MMOL/L (ref 22–31)
CREAT SERPL-MCNC: 0.5 MG/DL (ref 0.6–1.1)
GFR SERPL CREATININE-BSD FRML MDRD: >60 ML/MIN/1.73M2
GLUCOSE BLD-MCNC: 109 MG/DL (ref 70–125)
LACTATE SERPL-SCNC: 1 MMOL/L (ref 0.7–2)
PLATELET # BLD AUTO: 355 THOU/UL (ref 140–440)
POTASSIUM BLD-SCNC: 4.1 MMOL/L (ref 3.5–5)
PROT SERPL-MCNC: 7.1 G/DL (ref 6–8)
SODIUM SERPL-SCNC: 136 MMOL/L (ref 136–145)
WBC: 9.5 THOU/UL (ref 4–11)

## 2020-11-30 LAB
ANION GAP SERPL CALCULATED.3IONS-SCNC: 8 MMOL/L (ref 5–18)
BASE EXCESS BLDA CALC-SCNC: 6.7 MMOL/L
BUN SERPL-MCNC: 8 MG/DL (ref 8–22)
C REACTIVE PROTEIN LHE: 7.8 MG/DL (ref 0–0.8)
CALCIUM SERPL-MCNC: 8.8 MG/DL (ref 8.5–10.5)
CHLORIDE BLD-SCNC: 99 MMOL/L (ref 98–107)
CO2 SERPL-SCNC: 29 MMOL/L (ref 22–31)
COHGB MFR BLD: 98.7 % (ref 96–97)
CREAT SERPL-MCNC: 0.5 MG/DL (ref 0.6–1.1)
GFR SERPL CREATININE-BSD FRML MDRD: >60 ML/MIN/1.73M2
GLUCOSE BLD-MCNC: 108 MG/DL (ref 70–125)
HCO3, ARTERIAL CALC - HISTORICAL: 29.6 MMOL/L (ref 23–29)
HGB BLD-MCNC: 13.5 G/DL (ref 12–16)
LACTATE SERPL-SCNC: 1.4 MMOL/L (ref 0.7–2)
O2/TOTAL GAS SETTING VFR VENT: 100 %
OXYHEMOGLOBIN - HISTORICAL: 97.7 % (ref 96–97)
PCO2 BLD: 47 MM HG (ref 35–45)
PH BLD: 7.43 [PH] (ref 7.37–7.44)
PLATELET # BLD AUTO: 374 THOU/UL (ref 140–440)
PO2 BLD: 126 MM HG (ref 80–90)
POTASSIUM BLD-SCNC: 4.2 MMOL/L (ref 3.5–5)
PROCALCITONIN SERPL-MCNC: 0.08 NG/ML (ref 0–0.49)
SODIUM SERPL-SCNC: 136 MMOL/L (ref 136–145)
TEMPERATURE: 37 DEGREES C
WBC: 10.4 THOU/UL (ref 4–11)

## 2020-12-01 ENCOUNTER — RECORDS - HEALTHEAST (OUTPATIENT)
Dept: INTENSIVE CARE | Facility: HOSPITAL | Age: 60
End: 2020-12-01

## 2020-12-01 ENCOUNTER — ANESTHESIA - HEALTHEAST (OUTPATIENT)
Dept: INTENSIVE CARE | Facility: HOSPITAL | Age: 60
End: 2020-12-01

## 2020-12-01 LAB
ANION GAP SERPL CALCULATED.3IONS-SCNC: 12 MMOL/L (ref 5–18)
BASE EXCESS BLDA CALC-SCNC: 5.7 MMOL/L
BASE EXCESS BLDA CALC-SCNC: 5.8 MMOL/L
BASE EXCESS BLDA CALC-SCNC: 6 MMOL/L
BASE EXCESS BLDA CALC-SCNC: 6.3 MMOL/L
BASE EXCESS BLDA CALC-SCNC: 7.1 MMOL/L
BUN SERPL-MCNC: 12 MG/DL (ref 8–22)
CALCIUM SERPL-MCNC: 9.4 MG/DL (ref 8.5–10.5)
CHLORIDE BLD-SCNC: 96 MMOL/L (ref 98–107)
CO2 SERPL-SCNC: 29 MMOL/L (ref 22–31)
COHGB MFR BLD: 92.2 % (ref 96–97)
COHGB MFR BLD: 92.4 % (ref 96–97)
COHGB MFR BLD: 96.5 % (ref 96–97)
COHGB MFR BLD: 99.1 % (ref 96–97)
COHGB MFR BLD: 99.3 % (ref 96–97)
CREAT SERPL-MCNC: 0.54 MG/DL (ref 0.6–1.1)
ERYTHROCYTE [DISTWIDTH] IN BLOOD BY AUTOMATED COUNT: 12.8 % (ref 11–14.5)
GFR SERPL CREATININE-BSD FRML MDRD: >60 ML/MIN/1.73M2
GLUCOSE BLD-MCNC: 139 MG/DL (ref 70–125)
GLUCOSE BLDC GLUCOMTR-MCNC: 115 MG/DL (ref 70–139)
GLUCOSE BLDC GLUCOMTR-MCNC: 129 MG/DL (ref 70–139)
GLUCOSE BLDC GLUCOMTR-MCNC: 138 MG/DL (ref 70–139)
GLUCOSE BLDC GLUCOMTR-MCNC: 139 MG/DL (ref 70–139)
HCO3, ARTERIAL CALC - HISTORICAL: 27.1 MMOL/L (ref 23–29)
HCO3, ARTERIAL CALC - HISTORICAL: 28.1 MMOL/L (ref 23–29)
HCO3, ARTERIAL CALC - HISTORICAL: 28.1 MMOL/L (ref 23–29)
HCO3, ARTERIAL CALC - HISTORICAL: 28.4 MMOL/L (ref 23–29)
HCO3, ARTERIAL CALC - HISTORICAL: 29.3 MMOL/L (ref 23–29)
HCT VFR BLD AUTO: 40.1 % (ref 35–47)
HGB BLD-MCNC: 12.8 G/DL (ref 12–16)
MAGNESIUM SERPL-MCNC: 2.6 MG/DL (ref 1.8–2.6)
MCH RBC QN AUTO: 28.1 PG (ref 27–34)
MCHC RBC AUTO-ENTMCNC: 31.9 G/DL (ref 32–36)
MCV RBC AUTO: 88 FL (ref 80–100)
MRSA DNA SPEC QL NAA+PROBE: NEGATIVE
O2/TOTAL GAS SETTING VFR VENT: 100 %
OXYHEMOGLOBIN - HISTORICAL: 91.3 % (ref 96–97)
OXYHEMOGLOBIN - HISTORICAL: 91.4 % (ref 96–97)
OXYHEMOGLOBIN - HISTORICAL: 95.4 % (ref 96–97)
OXYHEMOGLOBIN - HISTORICAL: 98.3 % (ref 96–97)
OXYHEMOGLOBIN - HISTORICAL: 98.4 % (ref 96–97)
PCO2 BLD: 54 MM HG (ref 35–45)
PCO2 BLD: 56 MM HG (ref 35–45)
PCO2 BLD: 62 MM HG (ref 35–45)
PCO2 BLD: 66 MM HG (ref 35–45)
PCO2 BLD: 81 MM HG (ref 35–45)
PEEP: 10 CM H2O
PEEP: 14 CM H2O
PH BLD: 7.22 [PH] (ref 7.37–7.44)
PH BLD: 7.3 [PH] (ref 7.37–7.44)
PH BLD: 7.32 [PH] (ref 7.37–7.44)
PH BLD: 7.36 [PH] (ref 7.37–7.44)
PH BLD: 7.38 [PH] (ref 7.37–7.44)
PHOSPHATE SERPL-MCNC: 4.3 MG/DL (ref 2.5–4.5)
PLATELET # BLD AUTO: 406 THOU/UL (ref 140–440)
PLATELET # BLD AUTO: 423 THOU/UL (ref 140–440)
PMV BLD AUTO: 9.8 FL (ref 8.5–12.5)
PO2 BLD: 104 MM HG (ref 80–90)
PO2 BLD: 148 MM HG (ref 80–90)
PO2 BLD: 177 MM HG (ref 80–90)
PO2 BLD: 67 MM HG (ref 80–90)
PO2 BLD: 69 MM HG (ref 80–90)
POTASSIUM BLD-SCNC: 3.7 MMOL/L (ref 3.5–5)
RATE: 20 RR/MIN
RATE: 20 RR/MIN
RATE: 30 RR/MIN
RBC # BLD AUTO: 4.56 MILL/UL (ref 3.8–5.4)
SODIUM SERPL-SCNC: 137 MMOL/L (ref 136–145)
SPECIMEN DESCRIPTION: NORMAL
TEMPERATURE: 37 DEGREES C
VENTILATION MODE: ABNORMAL
VENTILATOR TIDAL VOLUME: 250 ML
WBC: 15.5 THOU/UL (ref 4–11)

## 2020-12-01 ASSESSMENT — MIFFLIN-ST. JEOR: SCORE: 1159.18

## 2020-12-02 LAB
ANION GAP SERPL CALCULATED.3IONS-SCNC: 10 MMOL/L (ref 5–18)
BASE EXCESS BLDA CALC-SCNC: 6.3 MMOL/L
BASE EXCESS BLDA CALC-SCNC: 7.3 MMOL/L
BASE EXCESS BLDA CALC-SCNC: 7.4 MMOL/L
BASE EXCESS BLDA CALC-SCNC: 8.9 MMOL/L
BUN SERPL-MCNC: 25 MG/DL (ref 8–22)
CALCIUM SERPL-MCNC: 8.9 MG/DL (ref 8.5–10.5)
CHLORIDE BLD-SCNC: 100 MMOL/L (ref 98–107)
CO2 SERPL-SCNC: 29 MMOL/L (ref 22–31)
COHGB MFR BLD: 96.7 % (ref 96–97)
COHGB MFR BLD: 96.8 % (ref 96–97)
COHGB MFR BLD: 98.2 % (ref 96–97)
COHGB MFR BLD: 98.4 % (ref 96–97)
CREAT SERPL-MCNC: 0.54 MG/DL (ref 0.6–1.1)
ERYTHROCYTE [DISTWIDTH] IN BLOOD BY AUTOMATED COUNT: 12.9 % (ref 11–14.5)
GFR SERPL CREATININE-BSD FRML MDRD: >60 ML/MIN/1.73M2
GLUCOSE BLD-MCNC: 129 MG/DL (ref 70–125)
GLUCOSE BLDC GLUCOMTR-MCNC: 113 MG/DL (ref 70–139)
GLUCOSE BLDC GLUCOMTR-MCNC: 147 MG/DL (ref 70–139)
GLUCOSE BLDC GLUCOMTR-MCNC: 162 MG/DL (ref 70–139)
HCO3, ARTERIAL CALC - HISTORICAL: 28.3 MMOL/L (ref 23–29)
HCO3, ARTERIAL CALC - HISTORICAL: 28.5 MMOL/L (ref 23–29)
HCO3, ARTERIAL CALC - HISTORICAL: 28.6 MMOL/L (ref 23–29)
HCO3, ARTERIAL CALC - HISTORICAL: 29.7 MMOL/L (ref 23–29)
HCT VFR BLD AUTO: 39.4 % (ref 35–47)
HGB BLD-MCNC: 12.6 G/DL (ref 12–16)
MAGNESIUM SERPL-MCNC: 2.6 MG/DL (ref 1.8–2.6)
MCH RBC QN AUTO: 28.2 PG (ref 27–34)
MCHC RBC AUTO-ENTMCNC: 32 G/DL (ref 32–36)
MCV RBC AUTO: 88 FL (ref 80–100)
O2/TOTAL GAS SETTING VFR VENT: 50 %
O2/TOTAL GAS SETTING VFR VENT: 60 %
OXYHEMOGLOBIN - HISTORICAL: 95.6 % (ref 96–97)
OXYHEMOGLOBIN - HISTORICAL: 95.7 % (ref 96–97)
OXYHEMOGLOBIN - HISTORICAL: 97 % (ref 96–97)
OXYHEMOGLOBIN - HISTORICAL: 97.4 % (ref 96–97)
PCO2 BLD: 59 MM HG (ref 35–45)
PCO2 BLD: 78 MM HG (ref 35–45)
PCO2 BLD: 81 MM HG (ref 35–45)
PCO2 BLD: 84 MM HG (ref 35–45)
PEEP: 12 CM H2O
PEEP: 14 CM H2O
PH BLD: 7.24 [PH] (ref 7.37–7.44)
PH BLD: 7.24 [PH] (ref 7.37–7.44)
PH BLD: 7.26 [PH] (ref 7.37–7.44)
PH BLD: 7.34 [PH] (ref 7.37–7.44)
PHOSPHATE SERPL-MCNC: 3.5 MG/DL (ref 2.5–4.5)
PLATELET # BLD AUTO: 477 THOU/UL (ref 140–440)
PMV BLD AUTO: 9.9 FL (ref 8.5–12.5)
PO2 BLD: 102 MM HG (ref 80–90)
PO2 BLD: 122 MM HG (ref 80–90)
PO2 BLD: 126 MM HG (ref 80–90)
PO2 BLD: 95 MM HG (ref 80–90)
POTASSIUM BLD-SCNC: 4 MMOL/L (ref 3.5–5)
RATE: 30 RR/MIN
RATE: 32 RR/MIN
RATE: 35 RR/MIN
RATE: 35 RR/MIN
RBC # BLD AUTO: 4.47 MILL/UL (ref 3.8–5.4)
SODIUM SERPL-SCNC: 139 MMOL/L (ref 136–145)
TEMPERATURE: 37 DEGREES C
VANCOMYCIN SERPL-MCNC: 7.3 UG/ML
VENTILATION MODE: ABNORMAL
VENTILATOR TIDAL VOLUME: 230 ML
VENTILATOR TIDAL VOLUME: 250 ML
WBC: 17.9 THOU/UL (ref 4–11)

## 2020-12-03 LAB
ANION GAP SERPL CALCULATED.3IONS-SCNC: 10 MMOL/L (ref 5–18)
BACTERIA SPEC CULT: NORMAL
BASE EXCESS BLDA CALC-SCNC: 12.9 MMOL/L
BASE EXCESS BLDA CALC-SCNC: 13.8 MMOL/L
BASE EXCESS BLDA CALC-SCNC: 16.8 MMOL/L
BASE EXCESS BLDA CALC-SCNC: 17.1 MMOL/L
BASE EXCESS BLDA CALC-SCNC: 19.4 MMOL/L
BUN SERPL-MCNC: 35 MG/DL (ref 8–22)
CALCIUM SERPL-MCNC: 8.9 MG/DL (ref 8.5–10.5)
CHLORIDE BLD-SCNC: 100 MMOL/L (ref 98–107)
CO2 SERPL-SCNC: 35 MMOL/L (ref 22–31)
COHGB MFR BLD: 93.5 % (ref 96–97)
COHGB MFR BLD: 95.8 % (ref 96–97)
COHGB MFR BLD: 96 % (ref 96–97)
COHGB MFR BLD: 96.6 % (ref 96–97)
COHGB MFR BLD: 96.7 % (ref 96–97)
CREAT SERPL-MCNC: 0.57 MG/DL (ref 0.6–1.1)
ERYTHROCYTE [DISTWIDTH] IN BLOOD BY AUTOMATED COUNT: 13 % (ref 11–14.5)
GFR SERPL CREATININE-BSD FRML MDRD: >60 ML/MIN/1.73M2
GLUCOSE BLD-MCNC: 179 MG/DL (ref 70–125)
GLUCOSE BLDC GLUCOMTR-MCNC: 129 MG/DL (ref 70–139)
GLUCOSE BLDC GLUCOMTR-MCNC: 138 MG/DL (ref 70–139)
GLUCOSE BLDC GLUCOMTR-MCNC: 146 MG/DL (ref 70–139)
GLUCOSE BLDC GLUCOMTR-MCNC: 148 MG/DL (ref 70–139)
GLUCOSE BLDC GLUCOMTR-MCNC: 168 MG/DL (ref 70–139)
GRAM STAIN RESULT: NORMAL
GRAM STAIN RESULT: NORMAL
HCO3, ARTERIAL CALC - HISTORICAL: 33 MMOL/L (ref 23–29)
HCO3, ARTERIAL CALC - HISTORICAL: 34.9 MMOL/L (ref 23–29)
HCO3, ARTERIAL CALC - HISTORICAL: 38 MMOL/L (ref 23–29)
HCO3, ARTERIAL CALC - HISTORICAL: 38.3 MMOL/L (ref 23–29)
HCO3, ARTERIAL CALC - HISTORICAL: 40.8 MMOL/L (ref 23–29)
HCT VFR BLD AUTO: 39.8 % (ref 35–47)
HGB BLD-MCNC: 12.2 G/DL (ref 12–16)
MAGNESIUM SERPL-MCNC: 2.7 MG/DL (ref 1.8–2.6)
MCH RBC QN AUTO: 28 PG (ref 27–34)
MCHC RBC AUTO-ENTMCNC: 30.7 G/DL (ref 32–36)
MCV RBC AUTO: 91 FL (ref 80–100)
O2/TOTAL GAS SETTING VFR VENT: 40 %
O2/TOTAL GAS SETTING VFR VENT: 50 %
OXYHEMOGLOBIN - HISTORICAL: 92.2 % (ref 96–97)
OXYHEMOGLOBIN - HISTORICAL: 94.6 % (ref 96–97)
OXYHEMOGLOBIN - HISTORICAL: 95 % (ref 96–97)
OXYHEMOGLOBIN - HISTORICAL: 95.3 % (ref 96–97)
OXYHEMOGLOBIN - HISTORICAL: 95.7 % (ref 96–97)
PCO2 BLD: 58 MM HG (ref 35–45)
PCO2 BLD: 60 MM HG (ref 35–45)
PCO2 BLD: 64 MM HG (ref 35–45)
PCO2 BLD: 67 MM HG (ref 35–45)
PCO2 BLD: 88 MM HG (ref 35–45)
PEEP: 12 CM H2O
PH BLD: 7.26 [PH] (ref 7.37–7.44)
PH BLD: 7.38 [PH] (ref 7.37–7.44)
PH BLD: 7.42 [PH] (ref 7.37–7.44)
PH BLD: 7.44 [PH] (ref 7.37–7.44)
PH BLD: 7.48 [PH] (ref 7.37–7.44)
PHOSPHATE SERPL-MCNC: 2.8 MG/DL (ref 2.5–4.5)
PLATELET # BLD AUTO: 468 THOU/UL (ref 140–440)
PMV BLD AUTO: 9.8 FL (ref 8.5–12.5)
PO2 BLD: 65 MM HG (ref 80–90)
PO2 BLD: 73 MM HG (ref 80–90)
PO2 BLD: 81 MM HG (ref 80–90)
PO2 BLD: 81 MM HG (ref 80–90)
PO2 BLD: 82 MM HG (ref 80–90)
POTASSIUM BLD-SCNC: 4.4 MMOL/L (ref 3.5–5)
RATE: 35 RR/MIN
RBC # BLD AUTO: 4.36 MILL/UL (ref 3.8–5.4)
SODIUM SERPL-SCNC: 145 MMOL/L (ref 136–145)
TEMPERATURE: 37 DEGREES C
VENTILATION MODE: ABNORMAL
VENTILATOR TIDAL VOLUME: 230 ML
VENTILATOR TIDAL VOLUME: 250 ML
WBC: 14 THOU/UL (ref 4–11)

## 2020-12-03 ASSESSMENT — MIFFLIN-ST. JEOR: SCORE: 1186.88

## 2020-12-04 LAB
ANION GAP SERPL CALCULATED.3IONS-SCNC: 8 MMOL/L (ref 5–18)
BASE EXCESS BLDA CALC-SCNC: 17.7 MMOL/L
BASE EXCESS BLDA CALC-SCNC: 18.9 MMOL/L
BUN SERPL-MCNC: 36 MG/DL (ref 8–22)
CALCIUM SERPL-MCNC: 8.4 MG/DL (ref 8.5–10.5)
CHLORIDE BLD-SCNC: 99 MMOL/L (ref 98–107)
CO2 SERPL-SCNC: 40 MMOL/L (ref 22–31)
COHGB MFR BLD: 91.4 % (ref 96–97)
COHGB MFR BLD: 97.1 % (ref 96–97)
CREAT SERPL-MCNC: 0.54 MG/DL (ref 0.6–1.1)
ERYTHROCYTE [DISTWIDTH] IN BLOOD BY AUTOMATED COUNT: 13 % (ref 11–14.5)
GFR SERPL CREATININE-BSD FRML MDRD: >60 ML/MIN/1.73M2
GLUCOSE BLD-MCNC: 149 MG/DL (ref 70–125)
GLUCOSE BLDC GLUCOMTR-MCNC: 111 MG/DL (ref 70–139)
GLUCOSE BLDC GLUCOMTR-MCNC: 114 MG/DL (ref 70–139)
GLUCOSE BLDC GLUCOMTR-MCNC: 143 MG/DL (ref 70–139)
GLUCOSE BLDC GLUCOMTR-MCNC: 156 MG/DL (ref 70–139)
HCO3, ARTERIAL CALC - HISTORICAL: 38.8 MMOL/L (ref 23–29)
HCO3, ARTERIAL CALC - HISTORICAL: 40.2 MMOL/L (ref 23–29)
HCT VFR BLD AUTO: 38.1 % (ref 35–47)
HGB BLD-MCNC: 11.8 G/DL (ref 12–16)
MAGNESIUM SERPL-MCNC: 3.7 MG/DL (ref 1.8–2.6)
MCH RBC QN AUTO: 28.4 PG (ref 27–34)
MCHC RBC AUTO-ENTMCNC: 31 G/DL (ref 32–36)
MCV RBC AUTO: 92 FL (ref 80–100)
O2/TOTAL GAS SETTING VFR VENT: 40 %
O2/TOTAL GAS SETTING VFR VENT: 40 %
OXYHEMOGLOBIN - HISTORICAL: 89.9 % (ref 96–97)
OXYHEMOGLOBIN - HISTORICAL: 97 % (ref 96–97)
PCO2 BLD: 58 MM HG (ref 35–45)
PCO2 BLD: 60 MM HG (ref 35–45)
PEEP: 12 CM H2O
PEEP: 8 CM H2O
PH BLD: 7.45 [PH] (ref 7.37–7.44)
PH BLD: 7.47 [PH] (ref 7.37–7.44)
PHOSPHATE SERPL-MCNC: 2.6 MG/DL (ref 2.5–4.5)
PLATELET # BLD AUTO: 344 THOU/UL (ref 140–440)
PMV BLD AUTO: 9.9 FL (ref 8.5–12.5)
PO2 BLD: 59 MM HG (ref 80–90)
PO2 BLD: 98 MM HG (ref 80–90)
POTASSIUM BLD-SCNC: 3.2 MMOL/L (ref 3.5–5)
POTASSIUM BLD-SCNC: 4.5 MMOL/L (ref 3.5–5)
RATE: 32 RR/MIN
RATE: 35 RR/MIN
RBC # BLD AUTO: 4.15 MILL/UL (ref 3.8–5.4)
SODIUM SERPL-SCNC: 147 MMOL/L (ref 136–145)
TEMPERATURE: 37 DEGREES C
TEMPERATURE: 37 DEGREES C
VENTILATION MODE: ABNORMAL
VENTILATION MODE: ABNORMAL
VENTILATOR TIDAL VOLUME: 250 ML
VENTILATOR TIDAL VOLUME: 250 ML
WBC: 9.7 THOU/UL (ref 4–11)

## 2020-12-04 ASSESSMENT — MIFFLIN-ST. JEOR: SCORE: 1182.88

## 2020-12-05 LAB
ANION GAP SERPL CALCULATED.3IONS-SCNC: 8 MMOL/L (ref 5–18)
BASE EXCESS BLDA CALC-SCNC: 16.8 MMOL/L
BUN SERPL-MCNC: 34 MG/DL (ref 8–22)
CALCIUM SERPL-MCNC: 8.5 MG/DL (ref 8.5–10.5)
CHLORIDE BLD-SCNC: 100 MMOL/L (ref 98–107)
CO2 SERPL-SCNC: 36 MMOL/L (ref 22–31)
COHGB MFR BLD: 96.8 % (ref 96–97)
CREAT SERPL-MCNC: 0.52 MG/DL (ref 0.6–1.1)
ERYTHROCYTE [DISTWIDTH] IN BLOOD BY AUTOMATED COUNT: 13.2 % (ref 11–14.5)
GFR SERPL CREATININE-BSD FRML MDRD: >60 ML/MIN/1.73M2
GLUCOSE BLD-MCNC: 153 MG/DL (ref 70–125)
GLUCOSE BLDC GLUCOMTR-MCNC: 117 MG/DL (ref 70–139)
GLUCOSE BLDC GLUCOMTR-MCNC: 118 MG/DL (ref 70–139)
GLUCOSE BLDC GLUCOMTR-MCNC: 124 MG/DL (ref 70–139)
HCO3, ARTERIAL CALC - HISTORICAL: 38.2 MMOL/L (ref 23–29)
HCT VFR BLD AUTO: 37.6 % (ref 35–47)
HGB BLD-MCNC: 11.5 G/DL (ref 12–16)
MAGNESIUM SERPL-MCNC: 2.4 MG/DL (ref 1.8–2.6)
MCH RBC QN AUTO: 28 PG (ref 27–34)
MCHC RBC AUTO-ENTMCNC: 30.6 G/DL (ref 32–36)
MCV RBC AUTO: 92 FL (ref 80–100)
O2/TOTAL GAS SETTING VFR VENT: 40 %
OXYHEMOGLOBIN - HISTORICAL: 95.1 % (ref 96–97)
PCO2 BLD: 58 MM HG (ref 35–45)
PEEP: 10 CM H2O
PH BLD: 7.45 [PH] (ref 7.37–7.44)
PHOSPHATE SERPL-MCNC: 3.3 MG/DL (ref 2.5–4.5)
PLATELET # BLD AUTO: 400 THOU/UL (ref 140–440)
PMV BLD AUTO: 9.7 FL (ref 8.5–12.5)
PO2 BLD: 83 MM HG (ref 80–90)
POTASSIUM BLD-SCNC: 3.3 MMOL/L (ref 3.5–5)
POTASSIUM BLD-SCNC: 4.4 MMOL/L (ref 3.5–5)
RATE: 32 RR/MIN
RBC # BLD AUTO: 4.11 MILL/UL (ref 3.8–5.4)
SODIUM SERPL-SCNC: 144 MMOL/L (ref 136–145)
TEMPERATURE: 37 DEGREES C
VENTILATOR TIDAL VOLUME: 250 ML
WBC: 12.8 THOU/UL (ref 4–11)

## 2020-12-05 ASSESSMENT — MIFFLIN-ST. JEOR: SCORE: 1184.88

## 2020-12-06 LAB
AEROBIC BLOOD CULTURE BOTTLE: NO GROWTH
AEROBIC BLOOD CULTURE BOTTLE: NO GROWTH
ANAEROBIC BLOOD CULTURE BOTTLE: NO GROWTH
ANAEROBIC BLOOD CULTURE BOTTLE: NO GROWTH
ANION GAP SERPL CALCULATED.3IONS-SCNC: 8 MMOL/L (ref 5–18)
BASE EXCESS BLDA CALC-SCNC: 16.9 MMOL/L
BASE EXCESS BLDA CALC-SCNC: 16.9 MMOL/L
BUN SERPL-MCNC: 25 MG/DL (ref 8–22)
CALCIUM SERPL-MCNC: 8.6 MG/DL (ref 8.5–10.5)
CHLORIDE BLD-SCNC: 96 MMOL/L (ref 98–107)
CO2 SERPL-SCNC: 37 MMOL/L (ref 22–31)
COHGB MFR BLD: 97.5 % (ref 96–97)
COHGB MFR BLD: 99.1 % (ref 96–97)
CREAT SERPL-MCNC: 0.46 MG/DL (ref 0.6–1.1)
ERYTHROCYTE [DISTWIDTH] IN BLOOD BY AUTOMATED COUNT: 13.2 % (ref 11–14.5)
GFR SERPL CREATININE-BSD FRML MDRD: >60 ML/MIN/1.73M2
GLUCOSE BLD-MCNC: 104 MG/DL (ref 70–125)
GLUCOSE BLDC GLUCOMTR-MCNC: 112 MG/DL (ref 70–139)
GLUCOSE BLDC GLUCOMTR-MCNC: 120 MG/DL (ref 70–139)
GLUCOSE BLDC GLUCOMTR-MCNC: 144 MG/DL (ref 70–139)
GLUCOSE BLDC GLUCOMTR-MCNC: 96 MG/DL (ref 70–139)
HCO3, ARTERIAL CALC - HISTORICAL: 38.5 MMOL/L (ref 23–29)
HCO3, ARTERIAL CALC - HISTORICAL: 39 MMOL/L (ref 23–29)
HCT VFR BLD AUTO: 35.9 % (ref 35–47)
HGB BLD-MCNC: 11.4 G/DL (ref 12–16)
MAGNESIUM SERPL-MCNC: 2.3 MG/DL (ref 1.8–2.6)
MCH RBC QN AUTO: 28.2 PG (ref 27–34)
MCHC RBC AUTO-ENTMCNC: 31.8 G/DL (ref 32–36)
MCV RBC AUTO: 89 FL (ref 80–100)
O2/TOTAL GAS SETTING VFR VENT: 40 %
O2/TOTAL GAS SETTING VFR VENT: 50 %
OXYHEMOGLOBIN - HISTORICAL: 96.3 % (ref 96–97)
OXYHEMOGLOBIN - HISTORICAL: 97.9 % (ref 96–97)
PCO2 BLD: 46 MM HG (ref 35–45)
PCO2 BLD: 58 MM HG (ref 35–45)
PEEP: 8 CM H2O
PEEP: 8 CM H2O
PH BLD: 7.46 [PH] (ref 7.37–7.44)
PH BLD: 7.54 [PH] (ref 7.37–7.44)
PHOSPHATE SERPL-MCNC: 3.4 MG/DL (ref 2.5–4.5)
PLATELET # BLD AUTO: 418 THOU/UL (ref 140–440)
PMV BLD AUTO: 9.7 FL (ref 8.5–12.5)
PO2 BLD: 131 MM HG (ref 80–90)
PO2 BLD: 84 MM HG (ref 80–90)
POTASSIUM BLD-SCNC: 3.4 MMOL/L (ref 3.5–5)
POTASSIUM BLD-SCNC: 3.6 MMOL/L (ref 3.5–5)
RATE: 24 RR/MIN
RATE: 32 RR/MIN
RBC # BLD AUTO: 4.04 MILL/UL (ref 3.8–5.4)
SODIUM SERPL-SCNC: 141 MMOL/L (ref 136–145)
TEMPERATURE: 37 DEGREES C
TEMPERATURE: 37 DEGREES C
VENTILATION MODE: ABNORMAL
VENTILATION MODE: ABNORMAL
VENTILATOR TIDAL VOLUME: 250 ML
VENTILATOR TIDAL VOLUME: 350 ML
WBC: 12.7 THOU/UL (ref 4–11)

## 2020-12-06 ASSESSMENT — MIFFLIN-ST. JEOR: SCORE: 1179.88

## 2020-12-07 LAB
ANION GAP SERPL CALCULATED.3IONS-SCNC: 7 MMOL/L (ref 5–18)
ATRIAL RATE - MUSE: 70 BPM
BASE EXCESS BLDA CALC-SCNC: 9.3 MMOL/L
BUN SERPL-MCNC: 16 MG/DL (ref 8–22)
CALCIUM SERPL-MCNC: 8.3 MG/DL (ref 8.5–10.5)
CHLORIDE BLD-SCNC: 102 MMOL/L (ref 98–107)
CO2 SERPL-SCNC: 31 MMOL/L (ref 22–31)
COHGB MFR BLD: 98.1 % (ref 96–97)
CREAT SERPL-MCNC: 0.45 MG/DL (ref 0.6–1.1)
DIASTOLIC BLOOD PRESSURE - MUSE: NORMAL
ERYTHROCYTE [DISTWIDTH] IN BLOOD BY AUTOMATED COUNT: 13.6 % (ref 11–14.5)
GFR SERPL CREATININE-BSD FRML MDRD: >60 ML/MIN/1.73M2
GLUCOSE BLD-MCNC: 144 MG/DL (ref 70–125)
GLUCOSE BLDC GLUCOMTR-MCNC: 105 MG/DL (ref 70–139)
GLUCOSE BLDC GLUCOMTR-MCNC: 108 MG/DL (ref 70–139)
GLUCOSE BLDC GLUCOMTR-MCNC: 112 MG/DL (ref 70–139)
GLUCOSE BLDC GLUCOMTR-MCNC: 124 MG/DL (ref 70–139)
GLUCOSE BLDC GLUCOMTR-MCNC: 137 MG/DL (ref 70–139)
GLUCOSE BLDC GLUCOMTR-MCNC: 98 MG/DL (ref 70–139)
HCO3, ARTERIAL CALC - HISTORICAL: 31.8 MMOL/L (ref 23–29)
HCT VFR BLD AUTO: 35.8 % (ref 35–47)
HGB BLD-MCNC: 11.1 G/DL (ref 12–16)
INTERPRETATION ECG - MUSE: NORMAL
MAGNESIUM SERPL-MCNC: 2.2 MG/DL (ref 1.8–2.6)
MCH RBC QN AUTO: 28 PG (ref 27–34)
MCHC RBC AUTO-ENTMCNC: 31 G/DL (ref 32–36)
MCV RBC AUTO: 90 FL (ref 80–100)
O2/TOTAL GAS SETTING VFR VENT: 40 %
OXYHEMOGLOBIN - HISTORICAL: 96.8 % (ref 96–97)
P AXIS - MUSE: 62 DEGREES
PCO2 BLD: 52 MM HG (ref 35–45)
PEEP: 8 CM H2O
PH BLD: 7.42 [PH] (ref 7.37–7.44)
PHOSPHATE SERPL-MCNC: 3.1 MG/DL (ref 2.5–4.5)
PLATELET # BLD AUTO: 435 THOU/UL (ref 140–440)
PMV BLD AUTO: 9.6 FL (ref 8.5–12.5)
PO2 BLD: 101 MM HG (ref 80–90)
POTASSIUM BLD-SCNC: 4.3 MMOL/L (ref 3.5–5)
PR INTERVAL - MUSE: 154 MS
QRS DURATION - MUSE: 84 MS
QT - MUSE: 412 MS
QTC - MUSE: 444 MS
R AXIS - MUSE: 75 DEGREES
RATE: 18 RR/MIN
RBC # BLD AUTO: 3.96 MILL/UL (ref 3.8–5.4)
SODIUM SERPL-SCNC: 140 MMOL/L (ref 136–145)
SYSTOLIC BLOOD PRESSURE - MUSE: NORMAL
T AXIS - MUSE: 74 DEGREES
TEMPERATURE: 37 DEGREES C
VENTILATION MODE: ABNORMAL
VENTILATOR TIDAL VOLUME: 350 ML
VENTRICULAR RATE- MUSE: 70 BPM
WBC: 10.3 THOU/UL (ref 4–11)

## 2020-12-08 LAB
ANION GAP SERPL CALCULATED.3IONS-SCNC: 6 MMOL/L (ref 5–18)
BUN SERPL-MCNC: 14 MG/DL (ref 8–22)
CALCIUM SERPL-MCNC: 8.1 MG/DL (ref 8.5–10.5)
CHLORIDE BLD-SCNC: 101 MMOL/L (ref 98–107)
CO2 SERPL-SCNC: 31 MMOL/L (ref 22–31)
CREAT SERPL-MCNC: 0.48 MG/DL (ref 0.6–1.1)
ERYTHROCYTE [DISTWIDTH] IN BLOOD BY AUTOMATED COUNT: 13.8 % (ref 11–14.5)
GFR SERPL CREATININE-BSD FRML MDRD: >60 ML/MIN/1.73M2
GLUCOSE BLD-MCNC: 182 MG/DL (ref 70–125)
GLUCOSE BLDC GLUCOMTR-MCNC: 125 MG/DL (ref 70–139)
GLUCOSE BLDC GLUCOMTR-MCNC: 125 MG/DL (ref 70–139)
GLUCOSE BLDC GLUCOMTR-MCNC: 134 MG/DL (ref 70–139)
GLUCOSE BLDC GLUCOMTR-MCNC: 147 MG/DL (ref 70–139)
HCT VFR BLD AUTO: 35.9 % (ref 35–47)
HGB BLD-MCNC: 11.3 G/DL (ref 12–16)
MAGNESIUM SERPL-MCNC: 2.2 MG/DL (ref 1.8–2.6)
MCH RBC QN AUTO: 28.3 PG (ref 27–34)
MCHC RBC AUTO-ENTMCNC: 31.5 G/DL (ref 32–36)
MCV RBC AUTO: 90 FL (ref 80–100)
PHOSPHATE SERPL-MCNC: 3.2 MG/DL (ref 2.5–4.5)
PLATELET # BLD AUTO: 443 THOU/UL (ref 140–440)
PMV BLD AUTO: 9.7 FL (ref 8.5–12.5)
POTASSIUM BLD-SCNC: 3.9 MMOL/L (ref 3.5–5)
RBC # BLD AUTO: 4 MILL/UL (ref 3.8–5.4)
SODIUM SERPL-SCNC: 138 MMOL/L (ref 136–145)
WBC: 12 THOU/UL (ref 4–11)

## 2020-12-08 ASSESSMENT — MIFFLIN-ST. JEOR: SCORE: 1171.88

## 2020-12-09 LAB
ANION GAP SERPL CALCULATED.3IONS-SCNC: 8 MMOL/L (ref 5–18)
BUN SERPL-MCNC: 10 MG/DL (ref 8–22)
CALCIUM SERPL-MCNC: 8.2 MG/DL (ref 8.5–10.5)
CHLORIDE BLD-SCNC: 101 MMOL/L (ref 98–107)
CO2 SERPL-SCNC: 29 MMOL/L (ref 22–31)
CREAT SERPL-MCNC: 0.44 MG/DL (ref 0.6–1.1)
ERYTHROCYTE [DISTWIDTH] IN BLOOD BY AUTOMATED COUNT: 13.7 % (ref 11–14.5)
GFR SERPL CREATININE-BSD FRML MDRD: >60 ML/MIN/1.73M2
GLUCOSE BLD-MCNC: 147 MG/DL (ref 70–125)
GLUCOSE BLDC GLUCOMTR-MCNC: 123 MG/DL (ref 70–139)
GLUCOSE BLDC GLUCOMTR-MCNC: 128 MG/DL (ref 70–139)
GLUCOSE BLDC GLUCOMTR-MCNC: 136 MG/DL (ref 70–139)
GLUCOSE BLDC GLUCOMTR-MCNC: 157 MG/DL (ref 70–139)
HCT VFR BLD AUTO: 34.3 % (ref 35–47)
HGB BLD-MCNC: 11 G/DL (ref 12–16)
MCH RBC QN AUTO: 28 PG (ref 27–34)
MCHC RBC AUTO-ENTMCNC: 32.1 G/DL (ref 32–36)
MCV RBC AUTO: 87 FL (ref 80–100)
PLATELET # BLD AUTO: 439 THOU/UL (ref 140–440)
PMV BLD AUTO: 9.8 FL (ref 8.5–12.5)
POTASSIUM BLD-SCNC: 3.7 MMOL/L (ref 3.5–5)
RBC # BLD AUTO: 3.93 MILL/UL (ref 3.8–5.4)
SODIUM SERPL-SCNC: 138 MMOL/L (ref 136–145)
WBC: 12.6 THOU/UL (ref 4–11)

## 2020-12-09 ASSESSMENT — MIFFLIN-ST. JEOR: SCORE: 1151.02

## 2020-12-10 LAB
ANION GAP SERPL CALCULATED.3IONS-SCNC: 6 MMOL/L (ref 5–18)
BUN SERPL-MCNC: 12 MG/DL (ref 8–22)
CALCIUM SERPL-MCNC: 8.4 MG/DL (ref 8.5–10.5)
CHLORIDE BLD-SCNC: 106 MMOL/L (ref 98–107)
CO2 SERPL-SCNC: 26 MMOL/L (ref 22–31)
CREAT SERPL-MCNC: 0.44 MG/DL (ref 0.6–1.1)
ERYTHROCYTE [DISTWIDTH] IN BLOOD BY AUTOMATED COUNT: 13.7 % (ref 11–14.5)
GFR SERPL CREATININE-BSD FRML MDRD: >60 ML/MIN/1.73M2
GLUCOSE BLD-MCNC: 125 MG/DL (ref 70–125)
GLUCOSE BLDC GLUCOMTR-MCNC: 121 MG/DL (ref 70–139)
GLUCOSE BLDC GLUCOMTR-MCNC: 129 MG/DL (ref 70–139)
GLUCOSE BLDC GLUCOMTR-MCNC: 132 MG/DL (ref 70–139)
GLUCOSE BLDC GLUCOMTR-MCNC: 139 MG/DL (ref 70–139)
HCT VFR BLD AUTO: 34.8 % (ref 35–47)
HGB BLD-MCNC: 11.1 G/DL (ref 12–16)
MCH RBC QN AUTO: 27.8 PG (ref 27–34)
MCHC RBC AUTO-ENTMCNC: 31.9 G/DL (ref 32–36)
MCV RBC AUTO: 87 FL (ref 80–100)
PLATELET # BLD AUTO: 470 THOU/UL (ref 140–440)
PMV BLD AUTO: 9.8 FL (ref 8.5–12.5)
POTASSIUM BLD-SCNC: 3.8 MMOL/L (ref 3.5–5)
RBC # BLD AUTO: 4 MILL/UL (ref 3.8–5.4)
SODIUM SERPL-SCNC: 138 MMOL/L (ref 136–145)
WBC: 10.9 THOU/UL (ref 4–11)

## 2020-12-10 ASSESSMENT — MIFFLIN-ST. JEOR: SCORE: 1182.77

## 2020-12-11 LAB
ALBUMIN UR-MCNC: ABNORMAL MG/DL
ANION GAP SERPL CALCULATED.3IONS-SCNC: 10 MMOL/L (ref 5–18)
APPEARANCE UR: ABNORMAL
BACTERIA #/AREA URNS HPF: ABNORMAL HPF
BILIRUB UR QL STRIP: NEGATIVE
BUN SERPL-MCNC: 11 MG/DL (ref 8–22)
CALCIUM SERPL-MCNC: 8.7 MG/DL (ref 8.5–10.5)
CHLORIDE BLD-SCNC: 105 MMOL/L (ref 98–107)
CO2 SERPL-SCNC: 23 MMOL/L (ref 22–31)
COLOR UR AUTO: ABNORMAL
CREAT SERPL-MCNC: 0.48 MG/DL (ref 0.6–1.1)
ERYTHROCYTE [DISTWIDTH] IN BLOOD BY AUTOMATED COUNT: 13.9 % (ref 11–14.5)
GFR SERPL CREATININE-BSD FRML MDRD: >60 ML/MIN/1.73M2
GLUCOSE BLD-MCNC: 181 MG/DL (ref 70–125)
GLUCOSE BLDC GLUCOMTR-MCNC: 110 MG/DL (ref 70–139)
GLUCOSE BLDC GLUCOMTR-MCNC: 120 MG/DL (ref 70–139)
GLUCOSE BLDC GLUCOMTR-MCNC: 130 MG/DL (ref 70–139)
GLUCOSE BLDC GLUCOMTR-MCNC: 152 MG/DL (ref 70–139)
GLUCOSE BLDC GLUCOMTR-MCNC: 174 MG/DL (ref 70–139)
GLUCOSE UR STRIP-MCNC: NEGATIVE MG/DL
HCT VFR BLD AUTO: 35.3 % (ref 35–47)
HGB BLD-MCNC: 11.4 G/DL (ref 12–16)
HGB UR QL STRIP: ABNORMAL
KETONES UR STRIP-MCNC: NEGATIVE MG/DL
LEUKOCYTE ESTERASE UR QL STRIP: ABNORMAL
MCH RBC QN AUTO: 28.1 PG (ref 27–34)
MCHC RBC AUTO-ENTMCNC: 32.3 G/DL (ref 32–36)
MCV RBC AUTO: 87 FL (ref 80–100)
MUCOUS THREADS #/AREA URNS LPF: ABNORMAL LPF
NITRATE UR QL: NEGATIVE
PH UR STRIP: 8 [PH] (ref 4.5–8)
PLATELET # BLD AUTO: 452 THOU/UL (ref 140–440)
PMV BLD AUTO: 10 FL (ref 8.5–12.5)
POTASSIUM BLD-SCNC: 3.5 MMOL/L (ref 3.5–5)
POTASSIUM BLD-SCNC: 4.2 MMOL/L (ref 3.5–5)
RBC # BLD AUTO: 4.05 MILL/UL (ref 3.8–5.4)
RBC #/AREA URNS AUTO: >100 HPF
SODIUM SERPL-SCNC: 138 MMOL/L (ref 136–145)
SP GR UR STRIP: 1.01 (ref 1–1.03)
SQUAMOUS #/AREA URNS AUTO: ABNORMAL LPF
UROBILINOGEN UR STRIP-ACNC: ABNORMAL
WBC #/AREA URNS AUTO: ABNORMAL HPF
WBC: 10.7 THOU/UL (ref 4–11)

## 2020-12-11 ASSESSMENT — MIFFLIN-ST. JEOR: SCORE: 1183.68

## 2020-12-12 LAB
ANION GAP SERPL CALCULATED.3IONS-SCNC: 11 MMOL/L (ref 5–18)
BASE EXCESS BLDA CALC-SCNC: 4.9 MMOL/L
BUN SERPL-MCNC: 15 MG/DL (ref 8–22)
CALCIUM SERPL-MCNC: 8.3 MG/DL (ref 8.5–10.5)
CHLORIDE BLD-SCNC: 104 MMOL/L (ref 98–107)
CO2 SERPL-SCNC: 23 MMOL/L (ref 22–31)
COHGB MFR BLD: 96.7 % (ref 96–97)
CREAT SERPL-MCNC: 0.52 MG/DL (ref 0.6–1.1)
ERYTHROCYTE [DISTWIDTH] IN BLOOD BY AUTOMATED COUNT: 13.9 % (ref 11–14.5)
GFR SERPL CREATININE-BSD FRML MDRD: >60 ML/MIN/1.73M2
GLUCOSE BLD-MCNC: 192 MG/DL (ref 70–125)
GLUCOSE BLDC GLUCOMTR-MCNC: 109 MG/DL (ref 70–139)
GLUCOSE BLDC GLUCOMTR-MCNC: 126 MG/DL (ref 70–139)
GLUCOSE BLDC GLUCOMTR-MCNC: 136 MG/DL (ref 70–139)
GLUCOSE BLDC GLUCOMTR-MCNC: 154 MG/DL (ref 70–139)
HCO3, ARTERIAL CALC - HISTORICAL: 28.5 MMOL/L (ref 23–29)
HCT VFR BLD AUTO: 35.6 % (ref 35–47)
HGB BLD-MCNC: 11.4 G/DL (ref 12–16)
HGB BLD-MCNC: 11.7 G/DL (ref 12–16)
LACTATE SERPL-SCNC: 0.4 MMOL/L (ref 0.7–2)
MAGNESIUM SERPL-MCNC: 2 MG/DL (ref 1.8–2.6)
MCH RBC QN AUTO: 28 PG (ref 27–34)
MCHC RBC AUTO-ENTMCNC: 32 G/DL (ref 32–36)
MCV RBC AUTO: 88 FL (ref 80–100)
O2/TOTAL GAS SETTING VFR VENT: 35 %
OXYHEMOGLOBIN - HISTORICAL: 95.1 % (ref 96–97)
PCO2 BLD: 40 MM HG (ref 35–45)
PEEP: 5 CM H2O
PH BLD: 7.46 [PH] (ref 7.37–7.44)
PLATELET # BLD AUTO: 513 THOU/UL (ref 140–440)
PMV BLD AUTO: 9.9 FL (ref 8.5–12.5)
PO2 BLD: 80 MM HG (ref 80–90)
POTASSIUM BLD-SCNC: 3.6 MMOL/L (ref 3.5–5)
RATE: 20 RR/MIN
RBC # BLD AUTO: 4.07 MILL/UL (ref 3.8–5.4)
SODIUM SERPL-SCNC: 138 MMOL/L (ref 136–145)
TEMPERATURE: 37 DEGREES C
VENTILATION MODE: ABNORMAL
VENTILATOR TIDAL VOLUME: 400 ML
WBC: 11.9 THOU/UL (ref 4–11)

## 2020-12-13 LAB
ANION GAP SERPL CALCULATED.3IONS-SCNC: 8 MMOL/L (ref 5–18)
BACTERIA SPEC CULT: ABNORMAL
BUN SERPL-MCNC: 12 MG/DL (ref 8–22)
CALCIUM SERPL-MCNC: 8.5 MG/DL (ref 8.5–10.5)
CHLORIDE BLD-SCNC: 102 MMOL/L (ref 98–107)
CO2 SERPL-SCNC: 27 MMOL/L (ref 22–31)
CREAT SERPL-MCNC: 0.5 MG/DL (ref 0.6–1.1)
ERYTHROCYTE [DISTWIDTH] IN BLOOD BY AUTOMATED COUNT: 14.1 % (ref 11–14.5)
GFR SERPL CREATININE-BSD FRML MDRD: >60 ML/MIN/1.73M2
GLUCOSE BLD-MCNC: 166 MG/DL (ref 70–125)
GLUCOSE BLDC GLUCOMTR-MCNC: 127 MG/DL (ref 70–139)
GLUCOSE BLDC GLUCOMTR-MCNC: 128 MG/DL (ref 70–139)
GLUCOSE BLDC GLUCOMTR-MCNC: 134 MG/DL (ref 70–139)
GLUCOSE BLDC GLUCOMTR-MCNC: 151 MG/DL (ref 70–139)
HCT VFR BLD AUTO: 36.1 % (ref 35–47)
HGB BLD-MCNC: 11.7 G/DL (ref 12–16)
MCH RBC QN AUTO: 28.2 PG (ref 27–34)
MCHC RBC AUTO-ENTMCNC: 32.4 G/DL (ref 32–36)
MCV RBC AUTO: 87 FL (ref 80–100)
PLATELET # BLD AUTO: 547 THOU/UL (ref 140–440)
PMV BLD AUTO: 9.7 FL (ref 8.5–12.5)
POTASSIUM BLD-SCNC: 4.1 MMOL/L (ref 3.5–5)
RBC # BLD AUTO: 4.15 MILL/UL (ref 3.8–5.4)
SODIUM SERPL-SCNC: 137 MMOL/L (ref 136–145)
WBC: 11.1 THOU/UL (ref 4–11)

## 2020-12-13 ASSESSMENT — MIFFLIN-ST. JEOR: SCORE: 1185.49

## 2020-12-14 LAB
ANION GAP SERPL CALCULATED.3IONS-SCNC: 10 MMOL/L (ref 5–18)
BUN SERPL-MCNC: 12 MG/DL (ref 8–22)
CALCIUM SERPL-MCNC: 8.3 MG/DL (ref 8.5–10.5)
CHLORIDE BLD-SCNC: 101 MMOL/L (ref 98–107)
CO2 SERPL-SCNC: 26 MMOL/L (ref 22–31)
CREAT SERPL-MCNC: 0.52 MG/DL (ref 0.6–1.1)
ERYTHROCYTE [DISTWIDTH] IN BLOOD BY AUTOMATED COUNT: 14.2 % (ref 11–14.5)
GFR SERPL CREATININE-BSD FRML MDRD: >60 ML/MIN/1.73M2
GLUCOSE BLD-MCNC: 189 MG/DL (ref 70–125)
GLUCOSE BLDC GLUCOMTR-MCNC: 110 MG/DL (ref 70–139)
GLUCOSE BLDC GLUCOMTR-MCNC: 135 MG/DL (ref 70–139)
GLUCOSE BLDC GLUCOMTR-MCNC: 152 MG/DL (ref 70–139)
HCT VFR BLD AUTO: 35 % (ref 35–47)
HGB BLD-MCNC: 11.3 G/DL (ref 12–16)
MCH RBC QN AUTO: 27.9 PG (ref 27–34)
MCHC RBC AUTO-ENTMCNC: 32.3 G/DL (ref 32–36)
MCV RBC AUTO: 86 FL (ref 80–100)
PLATELET # BLD AUTO: 553 THOU/UL (ref 140–440)
PMV BLD AUTO: 9.9 FL (ref 8.5–12.5)
POTASSIUM BLD-SCNC: 3.7 MMOL/L (ref 3.5–5)
RBC # BLD AUTO: 4.05 MILL/UL (ref 3.8–5.4)
SODIUM SERPL-SCNC: 137 MMOL/L (ref 136–145)
WBC: 12.6 THOU/UL (ref 4–11)

## 2020-12-14 ASSESSMENT — MIFFLIN-ST. JEOR: SCORE: 1169.16

## 2020-12-15 LAB
ALBUMIN UR-MCNC: ABNORMAL MG/DL
ANION GAP SERPL CALCULATED.3IONS-SCNC: 9 MMOL/L (ref 5–18)
APPEARANCE UR: ABNORMAL
BACTERIA #/AREA URNS HPF: ABNORMAL HPF
BILIRUB UR QL STRIP: NEGATIVE
BUN SERPL-MCNC: 11 MG/DL (ref 8–22)
CALCIUM SERPL-MCNC: 8.1 MG/DL (ref 8.5–10.5)
CHLORIDE BLD-SCNC: 103 MMOL/L (ref 98–107)
CO2 SERPL-SCNC: 25 MMOL/L (ref 22–31)
COLOR UR AUTO: YELLOW
CREAT SERPL-MCNC: 0.46 MG/DL (ref 0.6–1.1)
ERYTHROCYTE [DISTWIDTH] IN BLOOD BY AUTOMATED COUNT: 14.3 % (ref 11–14.5)
GFR SERPL CREATININE-BSD FRML MDRD: >60 ML/MIN/1.73M2
GLUCOSE BLD-MCNC: 175 MG/DL (ref 70–125)
GLUCOSE BLDC GLUCOMTR-MCNC: 116 MG/DL (ref 70–139)
GLUCOSE BLDC GLUCOMTR-MCNC: 125 MG/DL (ref 70–139)
GLUCOSE BLDC GLUCOMTR-MCNC: 137 MG/DL (ref 70–139)
GLUCOSE BLDC GLUCOMTR-MCNC: 138 MG/DL (ref 70–139)
GLUCOSE BLDC GLUCOMTR-MCNC: 169 MG/DL (ref 70–139)
GLUCOSE UR STRIP-MCNC: NEGATIVE MG/DL
HCT VFR BLD AUTO: 32.9 % (ref 35–47)
HGB BLD-MCNC: 10.3 G/DL (ref 12–16)
HGB UR QL STRIP: ABNORMAL
HYALINE CASTS #/AREA URNS LPF: ABNORMAL LPF
KETONES UR STRIP-MCNC: NEGATIVE MG/DL
LEUKOCYTE ESTERASE UR QL STRIP: ABNORMAL
MCH RBC QN AUTO: 27.8 PG (ref 27–34)
MCHC RBC AUTO-ENTMCNC: 31.3 G/DL (ref 32–36)
MCV RBC AUTO: 89 FL (ref 80–100)
MUCOUS THREADS #/AREA URNS LPF: ABNORMAL LPF
NITRATE UR QL: NEGATIVE
PH UR STRIP: 7 [PH] (ref 4.5–8)
PLATELET # BLD AUTO: 496 THOU/UL (ref 140–440)
PMV BLD AUTO: 9.6 FL (ref 8.5–12.5)
POTASSIUM BLD-SCNC: 3.9 MMOL/L (ref 3.5–5)
PROCALCITONIN SERPL-MCNC: 0.38 NG/ML (ref 0–0.49)
RBC # BLD AUTO: 3.71 MILL/UL (ref 3.8–5.4)
RBC #/AREA URNS AUTO: >100 HPF
SODIUM SERPL-SCNC: 137 MMOL/L (ref 136–145)
SP GR UR STRIP: 1.01 (ref 1–1.03)
SQUAMOUS #/AREA URNS AUTO: ABNORMAL LPF
UROBILINOGEN UR STRIP-ACNC: ABNORMAL
WBC #/AREA URNS AUTO: ABNORMAL HPF
WBC: 11.3 THOU/UL (ref 4–11)

## 2020-12-15 ASSESSMENT — MIFFLIN-ST. JEOR: SCORE: 1181.41

## 2020-12-16 LAB
GLUCOSE BLDC GLUCOMTR-MCNC: 131 MG/DL (ref 70–139)
GLUCOSE BLDC GLUCOMTR-MCNC: 140 MG/DL (ref 70–139)
GLUCOSE BLDC GLUCOMTR-MCNC: 156 MG/DL (ref 70–139)
GLUCOSE BLDC GLUCOMTR-MCNC: 163 MG/DL (ref 70–139)
LACTATE SERPL-SCNC: 1.2 MMOL/L (ref 0.7–2)
PLATELET # BLD AUTO: 466 THOU/UL (ref 140–440)
POTASSIUM BLD-SCNC: 3.9 MMOL/L (ref 3.5–5)

## 2020-12-17 LAB
ANION GAP SERPL CALCULATED.3IONS-SCNC: 8 MMOL/L (ref 5–18)
BACTERIA SPEC CULT: ABNORMAL
BACTERIA SPEC CULT: NORMAL
BUN SERPL-MCNC: 11 MG/DL (ref 8–22)
CALCIUM SERPL-MCNC: 8.1 MG/DL (ref 8.5–10.5)
CHLORIDE BLD-SCNC: 104 MMOL/L (ref 98–107)
CO2 SERPL-SCNC: 29 MMOL/L (ref 22–31)
CREAT SERPL-MCNC: 0.42 MG/DL (ref 0.6–1.1)
ERYTHROCYTE [DISTWIDTH] IN BLOOD BY AUTOMATED COUNT: 14.3 % (ref 11–14.5)
GFR SERPL CREATININE-BSD FRML MDRD: >60 ML/MIN/1.73M2
GLUCOSE BLD-MCNC: 142 MG/DL (ref 70–125)
GLUCOSE BLDC GLUCOMTR-MCNC: 118 MG/DL (ref 70–139)
GLUCOSE BLDC GLUCOMTR-MCNC: 119 MG/DL (ref 70–139)
GLUCOSE BLDC GLUCOMTR-MCNC: 131 MG/DL (ref 70–139)
GLUCOSE BLDC GLUCOMTR-MCNC: 138 MG/DL (ref 70–139)
GRAM STAIN RESULT: NORMAL
GRAM STAIN RESULT: NORMAL
HCT VFR BLD AUTO: 29.8 % (ref 35–47)
HGB BLD-MCNC: 9.2 G/DL (ref 12–16)
MCH RBC QN AUTO: 27.8 PG (ref 27–34)
MCHC RBC AUTO-ENTMCNC: 30.9 G/DL (ref 32–36)
MCV RBC AUTO: 90 FL (ref 80–100)
PLATELET # BLD AUTO: 425 THOU/UL (ref 140–440)
PMV BLD AUTO: 9.7 FL (ref 8.5–12.5)
POTASSIUM BLD-SCNC: 3.8 MMOL/L (ref 3.5–5)
RBC # BLD AUTO: 3.31 MILL/UL (ref 3.8–5.4)
SODIUM SERPL-SCNC: 141 MMOL/L (ref 136–145)
VANCOMYCIN SERPL-MCNC: 7.4 UG/ML
WBC: 7.9 THOU/UL (ref 4–11)

## 2020-12-17 ASSESSMENT — MIFFLIN-ST. JEOR: SCORE: 1190.88

## 2020-12-18 LAB
ANION GAP SERPL CALCULATED.3IONS-SCNC: 9 MMOL/L (ref 5–18)
BASE EXCESS BLDA CALC-SCNC: 8.6 MMOL/L
BUN SERPL-MCNC: 16 MG/DL (ref 8–22)
CALCIUM SERPL-MCNC: 8.7 MG/DL (ref 8.5–10.5)
CHLORIDE BLD-SCNC: 100 MMOL/L (ref 98–107)
CO2 SERPL-SCNC: 28 MMOL/L (ref 22–31)
COHGB MFR BLD: 94.8 % (ref 96–97)
CREAT SERPL-MCNC: 0.46 MG/DL (ref 0.6–1.1)
ERYTHROCYTE [DISTWIDTH] IN BLOOD BY AUTOMATED COUNT: 14.3 % (ref 11–14.5)
GFR SERPL CREATININE-BSD FRML MDRD: >60 ML/MIN/1.73M2
GLUCOSE BLD-MCNC: 127 MG/DL (ref 70–125)
GLUCOSE BLDC GLUCOMTR-MCNC: 117 MG/DL (ref 70–139)
GLUCOSE BLDC GLUCOMTR-MCNC: 128 MG/DL (ref 70–139)
GLUCOSE BLDC GLUCOMTR-MCNC: 128 MG/DL (ref 70–139)
HCO3, ARTERIAL CALC - HISTORICAL: 31.6 MMOL/L (ref 23–29)
HCT VFR BLD AUTO: 30.9 % (ref 35–47)
HGB BLD-MCNC: 9.8 G/DL (ref 12–16)
MAGNESIUM SERPL-MCNC: 2.1 MG/DL (ref 1.8–2.6)
MCH RBC QN AUTO: 27.9 PG (ref 27–34)
MCHC RBC AUTO-ENTMCNC: 31.7 G/DL (ref 32–36)
MCV RBC AUTO: 88 FL (ref 80–100)
O2/TOTAL GAS SETTING VFR VENT: 0.4 %
OXYHEMOGLOBIN - HISTORICAL: 93.1 % (ref 96–97)
PCO2 BLD: 41 MM HG (ref 35–45)
PEEP: 5 CM H2O
PH BLD: 7.5 [PH] (ref 7.37–7.44)
PLATELET # BLD AUTO: 443 THOU/UL (ref 140–440)
PMV BLD AUTO: 9.6 FL (ref 8.5–12.5)
PO2 BLD: 65 MM HG (ref 80–90)
POTASSIUM BLD-SCNC: 4 MMOL/L (ref 3.5–5)
RATE: 20 RR/MIN
RBC # BLD AUTO: 3.51 MILL/UL (ref 3.8–5.4)
SODIUM SERPL-SCNC: 137 MMOL/L (ref 136–145)
TEMPERATURE: 37 DEGREES C
VENTILATOR TIDAL VOLUME: 350 ML
WBC: 12.5 THOU/UL (ref 4–11)

## 2020-12-18 ASSESSMENT — MIFFLIN-ST. JEOR: SCORE: 1193.88

## 2020-12-19 LAB
ANION GAP SERPL CALCULATED.3IONS-SCNC: 8 MMOL/L (ref 5–18)
BASE EXCESS BLDV CALC-SCNC: 9.5 MMOL/L
BUN SERPL-MCNC: 12 MG/DL (ref 8–22)
CALCIUM SERPL-MCNC: 8.8 MG/DL (ref 8.5–10.5)
CHLORIDE BLD-SCNC: 102 MMOL/L (ref 98–107)
CO2 SERPL-SCNC: 31 MMOL/L (ref 22–31)
CREAT SERPL-MCNC: 0.44 MG/DL (ref 0.6–1.1)
ERYTHROCYTE [DISTWIDTH] IN BLOOD BY AUTOMATED COUNT: 14.1 % (ref 11–14.5)
GFR SERPL CREATININE-BSD FRML MDRD: >60 ML/MIN/1.73M2
GLUCOSE BLD-MCNC: 122 MG/DL (ref 70–125)
GLUCOSE BLDC GLUCOMTR-MCNC: 102 MG/DL (ref 70–139)
GLUCOSE BLDC GLUCOMTR-MCNC: 137 MG/DL (ref 70–139)
GLUCOSE BLDC GLUCOMTR-MCNC: 140 MG/DL (ref 70–139)
GLUCOSE BLDC GLUCOMTR-MCNC: 152 MG/DL (ref 70–139)
HCO3, VENOUS, CALC - HISTORICAL: 32.3 MMOL/L (ref 24–30)
HCT VFR BLD AUTO: 32.4 % (ref 35–47)
HGB BLD-MCNC: 10.1 G/DL (ref 12–16)
MCH RBC QN AUTO: 27.2 PG (ref 27–34)
MCHC RBC AUTO-ENTMCNC: 31.2 G/DL (ref 32–36)
MCV RBC AUTO: 87 FL (ref 80–100)
OXYHEMOGLOBIN (VBG) - HISTORICAL: 94.3 % (ref 70–75)
OXYHGB MFR BLDV: 95.8 % (ref 70–75)
PCO2 BLDV: 46 MM HG (ref 35–50)
PH BLDV: 7.47 [PH] (ref 7.35–7.45)
PLATELET # BLD AUTO: 430 THOU/UL (ref 140–440)
PMV BLD AUTO: 9.8 FL (ref 8.5–12.5)
PO2 BLDV: 75 MM HG (ref 25–47)
POTASSIUM BLD-SCNC: 3.6 MMOL/L (ref 3.5–5)
RBC # BLD AUTO: 3.71 MILL/UL (ref 3.8–5.4)
SODIUM SERPL-SCNC: 141 MMOL/L (ref 136–145)
VANCOMYCIN SERPL-MCNC: 14.9 UG/ML
WBC: 9.9 THOU/UL (ref 4–11)

## 2020-12-19 ASSESSMENT — MIFFLIN-ST. JEOR: SCORE: 1177.88

## 2020-12-20 LAB
AEROBIC BLOOD CULTURE BOTTLE: NO GROWTH
AEROBIC BLOOD CULTURE BOTTLE: NO GROWTH
ANAEROBIC BLOOD CULTURE BOTTLE: NO GROWTH
ANAEROBIC BLOOD CULTURE BOTTLE: NO GROWTH
ANION GAP SERPL CALCULATED.3IONS-SCNC: 5 MMOL/L (ref 5–18)
BUN SERPL-MCNC: 16 MG/DL (ref 8–22)
CALCIUM SERPL-MCNC: 8.4 MG/DL (ref 8.5–10.5)
CHLORIDE BLD-SCNC: 102 MMOL/L (ref 98–107)
CO2 SERPL-SCNC: 32 MMOL/L (ref 22–31)
CREAT SERPL-MCNC: 0.46 MG/DL (ref 0.6–1.1)
ERYTHROCYTE [DISTWIDTH] IN BLOOD BY AUTOMATED COUNT: 14.2 % (ref 11–14.5)
GFR SERPL CREATININE-BSD FRML MDRD: >60 ML/MIN/1.73M2
GLUCOSE BLD-MCNC: 121 MG/DL (ref 70–125)
GLUCOSE BLDC GLUCOMTR-MCNC: 126 MG/DL (ref 70–139)
GLUCOSE BLDC GLUCOMTR-MCNC: 127 MG/DL (ref 70–139)
GLUCOSE BLDC GLUCOMTR-MCNC: 164 MG/DL (ref 70–139)
GLUCOSE BLDC GLUCOMTR-MCNC: 85 MG/DL (ref 70–139)
HCT VFR BLD AUTO: 33 % (ref 35–47)
HGB BLD-MCNC: 10.6 G/DL (ref 12–16)
MCH RBC QN AUTO: 28.1 PG (ref 27–34)
MCHC RBC AUTO-ENTMCNC: 32.1 G/DL (ref 32–36)
MCV RBC AUTO: 88 FL (ref 80–100)
PLATELET # BLD AUTO: 438 THOU/UL (ref 140–440)
PMV BLD AUTO: 9.8 FL (ref 8.5–12.5)
POTASSIUM BLD-SCNC: 3.7 MMOL/L (ref 3.5–5)
RBC # BLD AUTO: 3.77 MILL/UL (ref 3.8–5.4)
SODIUM SERPL-SCNC: 139 MMOL/L (ref 136–145)
WBC: 7.6 THOU/UL (ref 4–11)

## 2020-12-20 ASSESSMENT — MIFFLIN-ST. JEOR: SCORE: 1177.88

## 2020-12-21 LAB
ANION GAP SERPL CALCULATED.3IONS-SCNC: 9 MMOL/L (ref 5–18)
BUN SERPL-MCNC: 16 MG/DL (ref 8–22)
CALCIUM SERPL-MCNC: 8.3 MG/DL (ref 8.5–10.5)
CHLORIDE BLD-SCNC: 101 MMOL/L (ref 98–107)
CO2 SERPL-SCNC: 30 MMOL/L (ref 22–31)
CREAT SERPL-MCNC: 0.43 MG/DL (ref 0.6–1.1)
ERYTHROCYTE [DISTWIDTH] IN BLOOD BY AUTOMATED COUNT: 14.3 % (ref 11–14.5)
GFR SERPL CREATININE-BSD FRML MDRD: >60 ML/MIN/1.73M2
GLUCOSE BLD-MCNC: 118 MG/DL (ref 70–125)
GLUCOSE BLDC GLUCOMTR-MCNC: 100 MG/DL (ref 70–139)
GLUCOSE BLDC GLUCOMTR-MCNC: 103 MG/DL (ref 70–139)
GLUCOSE BLDC GLUCOMTR-MCNC: 129 MG/DL (ref 70–139)
HCT VFR BLD AUTO: 32.2 % (ref 35–47)
HGB BLD-MCNC: 10.2 G/DL (ref 12–16)
MCH RBC QN AUTO: 27.8 PG (ref 27–34)
MCHC RBC AUTO-ENTMCNC: 31.7 G/DL (ref 32–36)
MCV RBC AUTO: 88 FL (ref 80–100)
PLATELET # BLD AUTO: 467 THOU/UL (ref 140–440)
PMV BLD AUTO: 10.1 FL (ref 8.5–12.5)
POTASSIUM BLD-SCNC: 3.8 MMOL/L (ref 3.5–5)
RBC # BLD AUTO: 3.67 MILL/UL (ref 3.8–5.4)
SODIUM SERPL-SCNC: 140 MMOL/L (ref 136–145)
WBC: 7.8 THOU/UL (ref 4–11)

## 2020-12-22 LAB
ANION GAP SERPL CALCULATED.3IONS-SCNC: 9 MMOL/L (ref 5–18)
BUN SERPL-MCNC: 15 MG/DL (ref 8–22)
CALCIUM SERPL-MCNC: 8.7 MG/DL (ref 8.5–10.5)
CHLORIDE BLD-SCNC: 99 MMOL/L (ref 98–107)
CO2 SERPL-SCNC: 30 MMOL/L (ref 22–31)
CREAT SERPL-MCNC: 0.49 MG/DL (ref 0.6–1.1)
ERYTHROCYTE [DISTWIDTH] IN BLOOD BY AUTOMATED COUNT: 14.5 % (ref 11–14.5)
GFR SERPL CREATININE-BSD FRML MDRD: >60 ML/MIN/1.73M2
GLUCOSE BLD-MCNC: 122 MG/DL (ref 70–125)
GLUCOSE BLDC GLUCOMTR-MCNC: 110 MG/DL (ref 70–139)
GLUCOSE BLDC GLUCOMTR-MCNC: 113 MG/DL (ref 70–139)
GLUCOSE BLDC GLUCOMTR-MCNC: 114 MG/DL (ref 70–139)
GLUCOSE BLDC GLUCOMTR-MCNC: 120 MG/DL (ref 70–139)
GLUCOSE BLDC GLUCOMTR-MCNC: 130 MG/DL (ref 70–139)
HCT VFR BLD AUTO: 34.5 % (ref 35–47)
HGB BLD-MCNC: 10.9 G/DL (ref 12–16)
INR PPP: 0.99 (ref 0.9–1.1)
MCH RBC QN AUTO: 27.5 PG (ref 27–34)
MCHC RBC AUTO-ENTMCNC: 31.6 G/DL (ref 32–36)
MCV RBC AUTO: 87 FL (ref 80–100)
PLATELET # BLD AUTO: 498 THOU/UL (ref 140–440)
PMV BLD AUTO: 9.6 FL (ref 8.5–12.5)
POTASSIUM BLD-SCNC: 3.9 MMOL/L (ref 3.5–5)
RBC # BLD AUTO: 3.96 MILL/UL (ref 3.8–5.4)
SODIUM SERPL-SCNC: 138 MMOL/L (ref 136–145)
WBC: 11.3 THOU/UL (ref 4–11)

## 2020-12-22 ASSESSMENT — MIFFLIN-ST. JEOR: SCORE: 1186.88

## 2020-12-23 LAB
ANION GAP SERPL CALCULATED.3IONS-SCNC: 10 MMOL/L (ref 5–18)
BUN SERPL-MCNC: 17 MG/DL (ref 8–22)
CALCIUM SERPL-MCNC: 8.6 MG/DL (ref 8.5–10.5)
CHLORIDE BLD-SCNC: 98 MMOL/L (ref 98–107)
CO2 SERPL-SCNC: 30 MMOL/L (ref 22–31)
CREAT SERPL-MCNC: 0.48 MG/DL (ref 0.6–1.1)
ERYTHROCYTE [DISTWIDTH] IN BLOOD BY AUTOMATED COUNT: 14.6 % (ref 11–14.5)
GFR SERPL CREATININE-BSD FRML MDRD: >60 ML/MIN/1.73M2
GLUCOSE BLD-MCNC: 137 MG/DL (ref 70–125)
GLUCOSE BLDC GLUCOMTR-MCNC: 112 MG/DL (ref 70–139)
GLUCOSE BLDC GLUCOMTR-MCNC: 126 MG/DL (ref 70–139)
GLUCOSE BLDC GLUCOMTR-MCNC: 129 MG/DL (ref 70–139)
GLUCOSE BLDC GLUCOMTR-MCNC: 133 MG/DL (ref 70–139)
GLUCOSE BLDC GLUCOMTR-MCNC: 140 MG/DL (ref 70–139)
HCT VFR BLD AUTO: 34.2 % (ref 35–47)
HGB BLD-MCNC: 10.8 G/DL (ref 12–16)
MCH RBC QN AUTO: 27.6 PG (ref 27–34)
MCHC RBC AUTO-ENTMCNC: 31.6 G/DL (ref 32–36)
MCV RBC AUTO: 88 FL (ref 80–100)
PLATELET # BLD AUTO: 505 THOU/UL (ref 140–440)
PMV BLD AUTO: 9.5 FL (ref 8.5–12.5)
POTASSIUM BLD-SCNC: 3.5 MMOL/L (ref 3.5–5)
POTASSIUM BLD-SCNC: 4 MMOL/L (ref 3.5–5)
RBC # BLD AUTO: 3.91 MILL/UL (ref 3.8–5.4)
SODIUM SERPL-SCNC: 138 MMOL/L (ref 136–145)
WBC: 8.4 THOU/UL (ref 4–11)

## 2020-12-23 ASSESSMENT — MIFFLIN-ST. JEOR: SCORE: 1169.88

## 2020-12-24 LAB
GLUCOSE BLDC GLUCOMTR-MCNC: 122 MG/DL (ref 70–139)
GLUCOSE BLDC GLUCOMTR-MCNC: 123 MG/DL (ref 70–139)
GLUCOSE BLDC GLUCOMTR-MCNC: 134 MG/DL (ref 70–139)
PLATELET # BLD AUTO: 461 THOU/UL (ref 140–440)
POTASSIUM BLD-SCNC: 3.7 MMOL/L (ref 3.5–5)

## 2020-12-25 LAB
ANION GAP SERPL CALCULATED.3IONS-SCNC: 12 MMOL/L (ref 5–18)
BUN SERPL-MCNC: 15 MG/DL (ref 8–22)
CALCIUM SERPL-MCNC: 8.8 MG/DL (ref 8.5–10.5)
CHLORIDE BLD-SCNC: 99 MMOL/L (ref 98–107)
CO2 SERPL-SCNC: 29 MMOL/L (ref 22–31)
CREAT SERPL-MCNC: 0.5 MG/DL (ref 0.6–1.1)
GFR SERPL CREATININE-BSD FRML MDRD: >60 ML/MIN/1.73M2
GLUCOSE BLD-MCNC: 123 MG/DL (ref 70–125)
GLUCOSE BLDC GLUCOMTR-MCNC: 101 MG/DL (ref 70–139)
GLUCOSE BLDC GLUCOMTR-MCNC: 124 MG/DL (ref 70–139)
GLUCOSE BLDC GLUCOMTR-MCNC: 126 MG/DL (ref 70–139)
GLUCOSE BLDC GLUCOMTR-MCNC: 126 MG/DL (ref 70–139)
GLUCOSE BLDC GLUCOMTR-MCNC: 128 MG/DL (ref 70–139)
PLATELET # BLD AUTO: 495 THOU/UL (ref 140–440)
POTASSIUM BLD-SCNC: 4 MMOL/L (ref 3.5–5)
POTASSIUM BLD-SCNC: 4 MMOL/L (ref 3.5–5)
SODIUM SERPL-SCNC: 140 MMOL/L (ref 136–145)

## 2020-12-26 LAB
ANION GAP SERPL CALCULATED.3IONS-SCNC: 13 MMOL/L (ref 5–18)
BUN SERPL-MCNC: 20 MG/DL (ref 8–22)
CALCIUM SERPL-MCNC: 8.9 MG/DL (ref 8.5–10.5)
CHLORIDE BLD-SCNC: 97 MMOL/L (ref 98–107)
CO2 SERPL-SCNC: 32 MMOL/L (ref 22–31)
CREAT SERPL-MCNC: 0.55 MG/DL (ref 0.6–1.1)
ERYTHROCYTE [DISTWIDTH] IN BLOOD BY AUTOMATED COUNT: 14.8 % (ref 11–14.5)
GFR SERPL CREATININE-BSD FRML MDRD: >60 ML/MIN/1.73M2
GLUCOSE BLD-MCNC: 130 MG/DL (ref 70–125)
GLUCOSE BLDC GLUCOMTR-MCNC: 110 MG/DL (ref 70–139)
GLUCOSE BLDC GLUCOMTR-MCNC: 123 MG/DL (ref 70–139)
GLUCOSE BLDC GLUCOMTR-MCNC: 125 MG/DL (ref 70–139)
GLUCOSE BLDC GLUCOMTR-MCNC: 133 MG/DL (ref 70–139)
HCT VFR BLD AUTO: 37.5 % (ref 35–47)
HGB BLD-MCNC: 11.5 G/DL (ref 12–16)
MCH RBC QN AUTO: 27.4 PG (ref 27–34)
MCHC RBC AUTO-ENTMCNC: 30.7 G/DL (ref 32–36)
MCV RBC AUTO: 89 FL (ref 80–100)
PLATELET # BLD AUTO: 503 THOU/UL (ref 140–440)
PMV BLD AUTO: 9.8 FL (ref 8.5–12.5)
POTASSIUM BLD-SCNC: 3.7 MMOL/L (ref 3.5–5)
RBC # BLD AUTO: 4.2 MILL/UL (ref 3.8–5.4)
SODIUM SERPL-SCNC: 142 MMOL/L (ref 136–145)
WBC: 7.6 THOU/UL (ref 4–11)

## 2020-12-26 ASSESSMENT — MIFFLIN-ST. JEOR: SCORE: 1176.88

## 2020-12-27 LAB
CORTIS SERPL-MCNC: 3.9 UG/DL
GLUCOSE BLDC GLUCOMTR-MCNC: 116 MG/DL (ref 70–139)
PLATELET # BLD AUTO: 495 THOU/UL (ref 140–440)
POTASSIUM BLD-SCNC: 3.4 MMOL/L (ref 3.5–5)
POTASSIUM BLD-SCNC: 4.3 MMOL/L (ref 3.5–5)

## 2020-12-27 ASSESSMENT — MIFFLIN-ST. JEOR: SCORE: 1166.88

## 2020-12-28 LAB
PLATELET # BLD AUTO: 510 THOU/UL (ref 140–440)
POTASSIUM BLD-SCNC: 3.6 MMOL/L (ref 3.5–5)

## 2020-12-29 LAB
ANION GAP SERPL CALCULATED.3IONS-SCNC: 14 MMOL/L (ref 5–18)
BUN SERPL-MCNC: 21 MG/DL (ref 8–22)
CALCIUM SERPL-MCNC: 8.9 MG/DL (ref 8.5–10.5)
CHLORIDE BLD-SCNC: 95 MMOL/L (ref 98–107)
CO2 SERPL-SCNC: 30 MMOL/L (ref 22–31)
CREAT SERPL-MCNC: 0.56 MG/DL (ref 0.6–1.1)
ERYTHROCYTE [DISTWIDTH] IN BLOOD BY AUTOMATED COUNT: 14.9 % (ref 11–14.5)
GFR SERPL CREATININE-BSD FRML MDRD: >60 ML/MIN/1.73M2
GLUCOSE BLD-MCNC: 129 MG/DL (ref 70–125)
GLUCOSE BLDC GLUCOMTR-MCNC: 126 MG/DL (ref 70–139)
HCT VFR BLD AUTO: 35.8 % (ref 35–47)
HGB BLD-MCNC: 11.1 G/DL (ref 12–16)
MCH RBC QN AUTO: 27.5 PG (ref 27–34)
MCHC RBC AUTO-ENTMCNC: 31 G/DL (ref 32–36)
MCV RBC AUTO: 89 FL (ref 80–100)
PLATELET # BLD AUTO: 462 THOU/UL (ref 140–440)
PMV BLD AUTO: 10 FL (ref 8.5–12.5)
POTASSIUM BLD-SCNC: 3.6 MMOL/L (ref 3.5–5)
RBC # BLD AUTO: 4.04 MILL/UL (ref 3.8–5.4)
SODIUM SERPL-SCNC: 139 MMOL/L (ref 136–145)
VALPROATE SERPL-MCNC: 50.7 UG/ML (ref 50–150)
WBC: 7 THOU/UL (ref 4–11)

## 2021-01-19 ENCOUNTER — AMBULATORY - HEALTHEAST (OUTPATIENT)
Dept: MEDSURG UNIT | Facility: CLINIC | Age: 61
End: 2021-01-19

## 2021-01-19 ENCOUNTER — HOME CARE/HOSPICE - HEALTHEAST (OUTPATIENT)
Dept: HOME HEALTH SERVICES | Facility: HOME HEALTH | Age: 61
End: 2021-01-19

## 2021-02-26 ENCOUNTER — OFFICE VISIT - HEALTHEAST (OUTPATIENT)
Dept: BEHAVIORAL HEALTH | Facility: HOSPITAL | Age: 61
End: 2021-02-26

## 2021-02-26 ENCOUNTER — OFFICE VISIT - HEALTHEAST (OUTPATIENT)
Dept: PULMONOLOGY | Facility: OTHER | Age: 61
End: 2021-02-26

## 2021-02-26 DIAGNOSIS — J80 ACUTE RESPIRATORY DISTRESS SYNDROME (ARDS) DUE TO COVID-19 VIRUS (H): ICD-10-CM

## 2021-02-26 DIAGNOSIS — U07.1 ACUTE RESPIRATORY DISTRESS SYNDROME (ARDS) DUE TO COVID-19 VIRUS (H): ICD-10-CM

## 2021-05-01 ENCOUNTER — HEALTH MAINTENANCE LETTER (OUTPATIENT)
Age: 61
End: 2021-05-01

## 2021-05-26 ENCOUNTER — RECORDS - HEALTHEAST (OUTPATIENT)
Dept: ADMINISTRATIVE | Facility: CLINIC | Age: 61
End: 2021-05-26

## 2021-06-01 ENCOUNTER — RECORDS - HEALTHEAST (OUTPATIENT)
Dept: ADMINISTRATIVE | Facility: CLINIC | Age: 61
End: 2021-06-01

## 2021-06-05 VITALS — WEIGHT: 132.5 LBS | BODY MASS INDEX: 22.08 KG/M2 | HEIGHT: 65 IN

## 2021-06-13 NOTE — PROGRESS NOTES
Pharmacy Note - Admission Medication History    Pertinent Provider Information: None     ______________________________________________________________________    Prior To Admission (PTA) med list completed and updated in EMR.       PTA Med List   Medication Sig Last Dose     benzonatate (TESSALON) 100 MG capsule Take 100 mg by mouth 3 (three) times a day as needed for cough. 11/13/2020 at Unknown time       Information source(s): Family member  Method of interview communication: iPad    Summary of Changes to PTA Med List  New: Benzonatate  Discontinued: N/A  Changed: N/A    Patient was asked about OTC/herbal products specifically.  PTA med list reflects this.    In the past week, patient estimated taking medication this percent of the time:  greater than 90%.    Allergies were reviewed, assessed, and updated with the patient.      Patient does not use any multi-dose medications prior to admission.    The information provided in this note is only as accurate as the sources available at the time of the update(s).    Thank you for the opportunity to participate in the care of this patient.    Nerissa Al, PharmD  11/13/2020 7:18 PM

## 2021-06-13 NOTE — PROGRESS NOTES
PULMONARY / CRITICAL CARE PROGRESS NOTE    Date / Time of Admission:  11/13/2020  5:17 PM    Assessment:   Principal Problem:    Pneumonia  Active Problems:    Hypokalemia    Acute respiratory failure with hypoxia (H)    Pneumonia due to 2019 novel coronavirus    Severe protein-calorie malnutrition (H)    Acute respiratory failure (H)    ARDS (adult respiratory distress syndrome) (H)    Encephalopathy        Advance Directives: Full code      Plan:   Neuro:  Sedated with Precedex and fentanyl.  0.3 and 125mcg/h    Cardiovascular:  Overnight on norepinephrine.  Now off.  3.3 L negative.  Lasix on hold since 12/6.  Due to hypotension she got 1 L bolus on 12/6 as well.  Hypotension probably due to vasoplegia from sedation    Respiratory:  Acute hypoxic respiratory failure secondary to COVID-19 pneumonia.  Developed ARDS requiring intubation.  Completed dexamethasone and remdesivir.  Completed 7 days of empiric Zosyn.  Sputum culture negative    First symptoms beginning of November.  Admitted to the hospital with hypoxia on 11/13.  Intubated 12/1    Paralyzed until 12/4.  Tidal volume increased yesterday.  Currently on 350 mL, rate of 18 and PEEP of 8.  FiO2 of 40%.  Plateau is 24.    ABG 7.42/52/101    I am okay with letting him tidal volume go to 400 which for her is 7 mL/kg.  Drop rate to 14.  Drop PEEP to 5 and see how she does.  Okay to start weaning trials    GI:  Loose bowel movements.  Otherwise tolerating tube feeds.    :  Lasix on hold.  3.3 L negative.  Monitor and replace electrolytes.    ID:  COVID-19 pneumonia complicated by ARDS.  Completed remdesivir and dexamethasone  Completed 7 days of empiric Zosyn.  Cultures remain negative    Endocrine:  Finding scale insulin          ICU DAILY CHECKLIST                           Can patient transfer out of MICU? no    FAST HUG:    Feeding:  Feeding: Yes.  Patient is receiving TUBE FEEDS    Arteaga:Yes  Analgesia/Sedation:Yes, see above  Thromboembolic prophylaxis:  Patient Education     Well-Child Checkup: 18 Mo [facility]     Put latches on cabinet doors to help keep your child safe.      At the 18-month checkup, your healthcare provider will examine your child and ask how it’s going at home. This sheet describes some of what you can expect.  Development and milestones  The healthcare provider will ask questions about your child. He or she will observe your toddler to get an idea of the child’s development. By this visit, your child is likely doing some of the following:  · Pointing at things so you know what he or she wants. Shaking head to mean \"no\"  · Using a spoon  · Drinking from a cup  · Following 1-step commands (such as \"please bring me a toy\")  · Walking alone; may be running  · Becoming more stubborn (for example, crying for no apparent reason, getting angry, or acting out)  · Being afraid of strangers  Feeding tips  You may have noticed your child becoming pickier about food. This is normal. How much your child eats at one meal or in one day is less important than the pattern over a few days or weeks. It’s also normal for a child of this age to thin out and look leaner, as long as he or she isn’t losing weight. If you have concerns about your child’s weight or eating habits, bring these up with the healthcare provider. Here are some tips for feeding your child:  · Keep serving a variety of finger foods at meals. Be persistent with offering new foods. It often takes several tries before a child starts to like a new taste.  · If your child is hungry between meals, offer healthy foods. Cut-up vegetables and fruit, cheese, peanut butter, and crackers are good choices. Save snack foods, such as chips or cookies, for a special treat.  · Your child may prefer to eat small amounts often throughout the day instead of sitting down for a full meal. This is normal.  · Don’t force your child to eat. A child of this age will eat when hungry. He or she will likely eat more some days  than others.  · Your child should drink less of whole milk each day. Most calories should be from solid foods.  · Besides drinking milk, water is best. Limit fruit juice. It should be 100% juice. You can also add water to the juice. And, don’t give your toddler soda.  · Don’t let your child walk around with food or bottles. This is a choking risk and can also lead to overeating as your child gets older.  Hygiene tips  · Brush your child’s teeth at least once a day. Twice a day is ideal (such as after breakfast and before bed). Use a small amount of fluoride toothpaste (no larger than a grain of rice)and a baby’s toothbrush with soft bristles.  · Ask the healthcare provider when your child should have his or her first dental visit. Most pediatric dentists recommend that the first dental visit happen within 6 months after the first tooth erupts above the gums, but no later than the child's first birthday.   Sleeping tips  By 18 months of age, your child may be down to 1 nap and is likely sleeping about 10 to 12 hours at night. If he or she sleeps more or less than this but seems healthy, it’s not a concern. To help your child sleep:  · Make sure your child gets enough physical activity during the day. This helps your child sleep well. Talk to the healthcare provider if you need ideas for active types of play.  · Follow a bedtime routine each night, such as brushing teeth followed by reading a book. Try to stick to the same bedtime each night.  · Do not put your child to bed with anything to drink.  · If getting your child to sleep through the night is a problem, ask the healthcare provider for tips.  Safety tips  Recommendations for keeping your child safe include the following:   · Don’t let your child play outdoors without supervision. Teach caution around cars. Your child should always hold an adult’s hand when crossing the street or in a parking lot.  · Protect your toddler from falls with sturdy screens on  yes; Mode:  Lovenox  HOB>30:  Yes  Stress Ulcer Protocol Active: yes; Mode: PPI  Glycemic Control: Any glucose > 180 no; Mode of Insulin Therapy: Sliding Scale Insulin    INTUBATED:  Can patient have daily waking:  yes  Can patient have spontaneous breathing trial:  yes    Restraints? yes    PHYSICAL THERAPY AND MOBILITY:  Can patient have PT and mobility trial: no  Activity: Bed Rest    Critical Care Time greater than: 45 Minutes  Total time spent with patient greater than: 45 Minutes        Subjective:   HPI:  Hafsa Robles is a 60 y.o. lady with a past medical history significant for breast cancer s/p partial mastectomy and adjuvant radiation in 2017, who presented to the hospital on 11/13/2020  5:17 PM for acute hypoxemic respiratory failure.  She was subsequently diagnosed with Covid-19 infection and spent several days in the intensive care unit alternating between high flow and BiPAP.  On 11/30 she was transferred back to the intensive care unit worsening hypoxia and was emergently intubated on the morning of 12/1.    Principal Problem:    Pneumonia  Active Problems:    Hypokalemia    Acute respiratory failure with hypoxia (H)    Pneumonia due to 2019 novel coronavirus    Severe protein-calorie malnutrition (H)    Acute respiratory failure (H)    ARDS (adult respiratory distress syndrome) (H)    Encephalopathy      Allergies: Patient has no known allergies.     MEDS:  Scheduled Meds:    [Held by provider] benzonatate  200 mg Oral QHS     chlorhexidine  15 mL Topical Q12H     enoxaparin ANTICOAGULANT  0.5 mg/kg Subcutaneous Q12H     [Held by provider] furosemide  20 mg Intravenous BID - diuretic     insulin aspart (NovoLOG) injection   Subcutaneous Q6H FIXED TIMES     omeprazole  20 mg Oral QAM AC    Or     omeprazole  20 mg Enteral Tube QAM AC     senna-docusate  1 tablet Oral BID    Or     senna (SENOKOT) syrup  8.8 mg Enteral Tube BID     sodium chloride  10-30 mL Intravenous Q8H FIXED TIMES     Continuous  "Infusions:    dexmedetomidine infusion orderable (PRECEDEX) 0.3 mcg/kg/hr (12/07/20 0800)     fentaNYL citrate (PF) 125 mcg/hr (12/07/20 0800)     insulin infusion (1 unit/mL)       norepinephrine Stopped (12/06/20 2145)     sodium chloride 0.9% 10 mL/hr (12/07/20 0800)     PRN Meds:.acetaminophen, acetaminophen, bacitracin, benzocaine-menthoL, benzonatate, bisacodyL, calcium (as carbonate), dextrose 50 % (D50W), diphenhydrAMINE, fentaNYL - BOLUS DOSE from infusion, fentaNYL - BOLUS DOSE from infusion, glucagon (human recombinant), hydrALAZINE, insulin infusion (1 unit/mL), lipase-protease-amylase **AND** sodium bicarbonate, LORazepam, magnesium hydroxide, melatonin, midazolam, naloxone **OR** naloxone **OR** naloxone **OR** naloxone, ondansetron **OR** ondansetron, polyethylene glycol, polyvinyl alcohol, sodium chloride bacteriostatic, sodium chloride bacteriostatic, sodium chloride, sodium chloride, sodium chloride, traZODone      Objective:   VITALS:  /56 (Patient Position: Lying)   Pulse 75   Temp 99.8  F (37.7  C) (Oral)   Resp 21   Ht 5' 5\" (1.651 m)   Wt 135 lb 5.8 oz (61.4 kg)   SpO2 96%   BMI 22.53 kg/m    EXAM:  General appearance: Intubated and sedated  Lungs: clear to auscultation bilaterally  Heart: regular rate and rhythm, S1, S2 normal, no murmur, click, rub or gallop  Abdomen: soft, non-tender; bowel sounds normal; no masses,  no organomegaly  Extremities: extremities normal, atraumatic, no cyanosis or edema  Neurologic: Sedated    I&O:      Intake/Output Summary (Last 24 hours) at 12/7/2020 1105  Last data filed at 12/7/2020 0800  Gross per 24 hour   Intake 2312.37 ml   Output 1580 ml   Net 732.37 ml       Data Review:  Chest X-Ray: Bilateral infiltrates    CBC:   Lab Results   Component Value Date    WBC 10.3 12/07/2020    WBC 4.8 12/16/2014    RBC 3.96 12/07/2020     Serum Glucose range:Invalid input(s): GLUCOSEPOCT    By:  Jeramie Crespo, 12/7/2020, 11:05 AM    Primary Care " windows and saunders at the tops and bottoms of staircases. Supervise the child on the stairs.  · If you have a swimming pool, it should be fenced. Saunders or doors leading to the pool should be closed and locked.  · At this age, children are very curious. They are likely to get into items that can be dangerous. Keep latches on cabinets and make sure products like cleansers and medicines are out of reach.  · Watch out for items that are small enough to choke on. As a rule, an item small enough to fit inside a toilet paper tube can cause a child to choke.  · In the car, always put the child in a rear-facing child safety car seat in the back seat. Be sure to check the weight and height limits of your child's seat to make sure of proper use. Ask the healthcare provider if you have questions.  · Teach your child to be gentle and cautious with dogs, cats, and other animals. Always supervise your child around animals, even familiar family pets.  · Keep this Poison Control phone number in an easy-to-see place, such as on the refrigerator: 676.323.7262.  Vaccines  Based on recommendations from the CDC, at this visit your child may receive the following vaccines:  · Diphtheria, tetanus, and pertussis  · Hepatitis A  · Hepatitis B  · Influenza (flu)  · Polio  Get ready for the “terrible twos”  You’ve probably heard stories about the “terrible twos.” Many children become fussier and harder to handle at around age 2. In fact, you may have started to notice behavior changes already. Here’s some of what you can expect, and tips for coping:  · Your child will become more independent and more stubborn. It’s common to test limits, to see just how much he or she can get away with. You may hear the word “no” a lot--even when the child seems to mean yes! Be clear and consistent. Keep in mind that you’re the parent, and you make the rules. Remember, you're the adult, so try to maintain a calm temper even when your child is having a  Physician:  Provider, No Primary Care                        tantrum.  · This is an age when children often don’t have the words to ask for what they want. Instead, they may respond with frustration. Your child may whine, cry, scream, kick, bite, or hit. Depending on the child’s personality, tantrums may be rare or frequent. Tantrums happen less as children learn how to express themselves with words. Most tantrums last only a few minutes. (If your child’s tantrums last much longer than this, talk to the healthcare provider.)  · Do your best to ignore a tantrum. Make sure the child is in a safe place and keep an eye on him or her, but don’t interact until the tantrum is over. This teaches the child that throwing a tantrum is not the way to get attention. Often, moving your child to a private area away from the attention of others will help resolve the tantrum.   · Keep your cool and avoid getting angry. Remember, you’re the adult. Set a good example of how to behave when frustrated. Never hit or yell at your child during or after a tantrum.  · When you want your child to stop what he or she is doing, try distracting him or her with a new activity or object. You could also  the child and move him or her to another place.  · Choose your battles. Not everything is worth a fight. An issue is most important if the health or safety of your child or another child is at risk.  · Talk to the healthcare provider for other tips on dealing with your child’s behavior.      Next checkup at: _______________________________     PARENT NOTES:  Date Last Reviewed: 12/1/2016  © 8853-2920 [employer]. [address] [place] [address], [place] [address]. All rights reserved. This information is not intended as a substitute for professional medical care. Always follow your healthcare professional's instructions.

## 2021-06-13 NOTE — PLAN OF CARE
Pt on bipap all of the night, did receive trazadone to help with sleep per patient's request. BG WNL. Pt c/o being hungry.       Problem: Inadequate Gas Exchange  Goal: Patient will achieve/maintain normal respiratory rate/effort  Outcome: Progressing     Problem: Daily Care  Goal: Daily care needs are met  Outcome: Progressing

## 2021-06-13 NOTE — PROGRESS NOTES
"  Clinical Nutrition Therapy Reassessment Note      Reason:  RD managing tube feeding per consult    Pt intubated.  Attended team rounds.  Pt with low grade fever, swollen lips.  Possible trache and G-J tomorrow.    Nutrition Prescription:   Diet: NPO.  Tube feeding: Replete with fiber at 65 ml/hr (started 12/2)  Flush Orders: 200 ml water every 4 hrs  Propofol:   IV dextrose or Fluids:     dexmedetomidine infusion orderable (PRECEDEX), Last Rate: 0.502 mcg/kg/hr (12/21/20 0600)       norepinephrine, Last Rate: 0.03 mcg/kg/min (12/21/20 0601)       sodium chloride 0.9%, Last Rate: 10 mL/hr (12/21/20 0600)    IV fluids:  1457.4 ml in past 24 hrs    Nutrition Intake:  NPO  FT is an OG placed 12/1/20.  X-ray of 12/2 confirms gastric.  TF provides: 1560 calories, 100 g protein, 193 g carb, 23 g fiber, 2498 ml free water     Nutrition needs met.      Anthropometrics:  Height: 5' 5\" (165.1 cm)  Admission weight: 145 lb 11/13/20  Weight: 134 lb 14.7 oz (61.2 kg)  BMI (Calculated): 24.2  Nonpitting all extremities noted per chart  Wt down w/previous diuresis    Physical Findings:  Unable as pt in COVID isolation precautions       GI Status/Output:   GI symptoms per nursing: WDL, constipation  Last BM recorded: 12/21, loose  Gastric residuals:  Minimal    Skin/Wound:   Jeffry Scale Score: 13    Left lip PU noted per chart    Medications:  Lantus and SS Insulin, lasix-IV, vanco-IV  Medications reviewed.    Labs:  Lab Results   Component Value Date/Time    ALBUMIN 2.3 (L) 11/29/2020 12:46 PM     12/21/2020 03:57 AM    K 3.8 12/21/2020 03:57 AM    BUN 16 12/21/2020 03:57 AM    CREATININE 0.43 (L) 12/21/2020 03:57 AM     12/21/2020 03:57 AM    PHOS 3.2 12/08/2020 03:48 AM    MG 2.1 12/18/2020 05:41 AM   fsbg   Labs reviewed    Estimated Nutrition Needs:  Using current weight (12/2) of 59.3 kg.     Energy Needs: 4626-3000 kcals daily per 25-30 kcal/kg   Protein Needs:  g daily, 1.2-2 g/kg.  Fluid Needs: " 3015-8757 mls daily, 25-30 mls/kg    Malnutrition: Noted previously-severe    Nutrition Risk Level: stable-high risk    Nutrition DX: Inadequate oral intake r/t acute respiratory failure as evidenced by Bipap/HFNC dependence     Malnutrition r/t COVID-19 as evidenced by prolonged poor intake of < 50% estimated nutrition needs for 2 weeks and 5.2% wt loss in 1 week.    Goals:  Maintain weight-wt down with diuresis  Meet estimated nutrition needs-met  Electrolytes WNL-met  Tolerate tube feeding-met    Plan:  Continue current TF for pt's nutrition needs.    Monitor:   Tube feeding tolerance, wt, labs, hydration    See Care Plan for Problems, Goals, and Interventions.

## 2021-06-13 NOTE — PROGRESS NOTES
Vent Mode: VCV  FiO2 (%):  [40 %] 40 %  S RR:  [14] 14  S VT:  [400 mL] 400 mL  PEEP/CPAP (cm H2O):  [5 cm H2O] 5 cm H2O  Minute Ventilation (L/min):  [8.1 L/min-15.3 L/min] 8.2 L/min  PIP:  [22 cm H2O-48 cm H2O] 40 cm H2O  MAP (cm H2O):  [9-25] 9   PLAT: 33

## 2021-06-13 NOTE — PLAN OF CARE
Problem: Inadequate Gas Exchange  Goal: Patient will achieve/maintain normal respiratory rate/effort  Outcome: Progressing     Problem: Excessive Fluid Volume  Goal: Patient will achieve/maintain normal respiratory rate/effort  Outcome: Progressing     BIPAP/CPAP NOTE       UNIT TYPE: V60   MASK TYPE: OTN small   SETTINGS:      BIPAP - Ipap 15, Epap 8 RR 20  FI02 - 70      TIME OF MASK PLACEMENT - on overnight   SKIN CHECK DONE AT 0800 -     RT to RN communication done at- 0800    Skin in good condition     INTERVENTION INITIATED - jose    PATIENT'S TOLERANCE OF DEVICE - good   Tolerating mask on with proning

## 2021-06-13 NOTE — PROGRESS NOTES
Chart reviewed. Patient is now intubated. [facility] will follow peripherally and resume care when appropriate.      [facility] Medicine [facility]  Patsy Waters MD  Pager: [phone]

## 2021-06-13 NOTE — PLAN OF CARE
Problem: Discharge Barriers  Goal: Patient's discharge needs are met  Outcome: Not Applicable this Shift  Patient still on high flow nasal cannula currently at 95% 60L flow. Denies any pain, had given a dose of Acetaminophen due to low grade temp of 99.6. Mostly tachycardic throughout the shift but after patient had acetaminophen and lay down on her bed, heart rate was 80-90's and temp down to 97.7. Will continue to monitor.     Problem: Knowlegde Deficit  Goal: Demonstrate technique for CPAP/BiPAP  Outcome: Not Applicable this Shift  Patient refused to wear BIPAP-CNP aware.    Problem: Impaired Gas Exchange  Goal: Demonstrate improved ventilation and adequate oxygenation of tissues as evidenced by absence of respiratory distress  Outcome: Not Progressing  Patient still gets short of breath & dyspneic with activity and with episodes of coughing. Encouraged deep breathing and use of Incentive Spirometer every 2 hours. Given cough meds to help patient with her breathing.

## 2021-06-13 NOTE — PROGRESS NOTES
"60yr F, admitted on 11/13/20 for low SaO2%.  She transferred to ICU and intubated on 12/1/20 for failed BiPAP, patient remains intubated, also was proned and on Veletri.  Patient remains intubated, BS decreased.  Vent Day #11.  Patient weaned this AM from 11:15 to 11:46.  Suctioning pale and tan sputum moderate amount.  RR increased by intensivist 14:30    BP 91/55   Pulse 71   Temp 99.5  F (37.5  C) (Oral)   Resp (!) 29   Ht 5' 5\" (1.651 m)   Wt 136 lb (61.7 kg)   SpO2 99%   BMI 22.63 kg/m      Weaning CPAP+5, PS+15, FiO2 40% 31 minutes  RR 37, Vt 294 ml, VE 11.3L, HR 76, SaO2 96%, BP 95/56    Vent Mode: VCV  FiO2 (%):  [40 %-100 %] 40 %  S RR:  [14] 14  S VT:  [400 mL] 400 mL  PEEP/CPAP (cm H2O):  [5 cm H2O] 5 cm H2O  Minute Ventilation (L/min):  [8.2 L/min-13 L/min] 13 L/min  PIP:  [21 cm H2O-41 cm H2O] 35 cm H2O  MD SUP:  [15 cm H20] 15 cm H20  MAP (cm H2O):  [9-17] 11  ETT # 7.5 23 cm at the teeth    Continue current treatment and wean daily.    "

## 2021-06-13 NOTE — PLAN OF CARE
Pt febrile this shift, tmax 101.2F, tylenol administered. Adequate urine output, with >1L out with lasix admin at 1800. Pt responds to commands well, and appeared to sleep well tonight also. Pt does desat with excessive coughing fits and will suction and 100% on vent for 2 min and then recovers well after coughing subsides. Continues with minimal vent settings, pressor weaned down to .01 with maps right at 65. Precedex up to 0.4 during the night, will try to wean back down this AM in hopes for SBT trial?      Problem: Inadequate Gas Exchange  Goal: Patient will achieve/maintain normal respiratory rate/effort  Outcome: Progressing     Problem: Daily Care  Goal: Daily care needs are met  Outcome: Progressing     Problem: Psychosocial Needs  Goal: Demonstrates ability to cope with hospitalization/illness  Outcome: Progressing

## 2021-06-13 NOTE — PLAN OF CARE
Problem: Safety  Goal: Patient will be injury free during hospitalization  Outcome: Progressing     Problem: Daily Care  Goal: Daily care needs are met  Outcome: Progressing   MAP fluctuates. Current RASS -2. Titrated Levophed to 0.04  mcg/kg/m to keep MAP > 65. Precedex was titrated to 0.4 mcg/kg/h.  Pt has three loose stools during the day. All daily needs met.  Will continue to monitor.

## 2021-06-13 NOTE — PLAN OF CARE
Problem: Safety  Goal: Patient will be injury free during hospitalization  Outcome: Progressing:  Has remained safe on this shift.  Bed alarm on at all times and call light within reach at all times.  Problem: Daily Care  Goal: Daily care needs are met  Outcome: Progressing:  Tolerating turning and repositioning self well.  Problem: Psychosocial Needs  Goal: Demonstrates ability to cope with hospitalization/illness  Outcome: Progressing:  Pleasant and cooperative with plan of care.

## 2021-06-13 NOTE — PLAN OF CARE
Problem: Excessive Fluid Volume  Goal: Patient will achieve/maintain normal respiratory rate/effort  Outcome: Progressing   Tolerating vent with paralytic infusing.   Repositioned head q2hrs. No skin breakdown noted this shift.   OG to LIS per orders.   , Barry, updated via phone and given iPad log in information to visualize the patient.   Continue with plan of care.   Kerrie Valdez RN

## 2021-06-13 NOTE — PROGRESS NOTES
12/14/20 0256   Patient Data   Vt Exp (mL) 380 mL   Minute Ventilation (L/min) 9.8 L/min   Total Resp Rate  26 BPM   PIP Observed (cm H2O) 39 cm H2O   MAP (cm H2O) 13   Auto/Intrinsic PEEP Observed (cm H2O) 1 cm H2O   Plateau Pressure (cm H2O) 32 cm H2O     ETT # 7.5 is secured 23 cm at the teeth. Pt remains on the vent/fully supported with the following settings: 20 375 40% 5. sats mid 90s.BS clear/diminished. Pt is synchronizing with the vent. RT will monitor.    GUSTAVO Matos

## 2021-06-13 NOTE — PROGRESS NOTES
PT attempted second wean. Began 15/5, gradually stepped down to 5/5.  VT was not affected by changes in pressure support. PT remains tachypneic, but blood pressures improve when PT is weaning. PT appears sensitive to the ventilator pressures and appeared more comfortable on 5/5 compared to 15/5. Total RSB remained in 150. PT sat 94%.      Migel Calderon, LRT

## 2021-06-13 NOTE — PLAN OF CARE
Problem: Impaired Gas Exchange  Goal: Demonstrate improved ventilation and adequate oxygenation of tissues as evidenced by absence of respiratory distress  Outcome: Progressing   Patient has been titrating down on oxygen requirements. Patient was 50 L and 60% FIO2. Now at 40L and 60% Fio2. Continues to desaturate when she is moving around but able to rebound fairly quick. Will continue to monitor.  Kristel Chapman RN

## 2021-06-13 NOTE — PROGRESS NOTES
Critical Care Progress Note  11/17/2020   3:15 PM     Admit Date: 11/13/2020  Moved to ICU on 11/15/20    Code Status: full code      Problem List:   Principal Problem:    Pneumonia  Active Problems:    Hypokalemia    Acute respiratory failure with hypoxia (H)    Pneumonia due to 2019 novel coronavirus      Major changes for today:    - Try to increase activity to chair   - continue to encourage self proning      Plan by System:     Neuro/Psych: no acute issues; at risk for developing ICU delirium    - try to maintain day/night schedule.    - avoid benzos     Pulmonary: Acute respiratory failure with hypoxia due to COVID19 viral pneumonia;    - BiPAP alternating with HFNC   - Encourage self proning   - High risk for needing intubation.     CV: No acute issues    Tele monitoring.     GI/: NPO    - Keep NPO for now; ok for sips/ice. High risk for intubation    - Bowel protocol.     Renal: no acute issues    - monitor     ID: COVID19 viral pneumonia    - Dexamethasone x 10 days   - Remdesivir extend to 10 days     Heme/Coag:  COVID19 coagulopathy    - LMWH 0.5mg/kg twice daily     Endocrine: Steroid induced hyperglycemia    -  No coverage needed. Follow          Dispo: ICU     Mellisa Abebe , [facility]  [facility] Pulmonary/Critical Care     Total  CCT:35 min   _______________________________________________________________    HPI: Hafsa Robles  is a 60 y.o. year old female with PMH Significant for breast cancer s/p radiation. Not currently under treatment. No history of heart, lung, liver, kidney disease. No diabetes. Presented to ER Nov 13 with one week history of cough, fever, fatigue. Diagnosed with Covid 19 and treated for possible bacterial superinfection. She had rapid escalation in her oxygen needs  from 4L NC to HFNC 80% 60 L/min and so she was transferred to ICU for further monitoring.     ROS: dyspneic when moves around    Events over last 24-hours: BiPAP overnight.     Objective:     Vitals:     11/17/20 1200 11/17/20 1210 11/17/20 1300 11/17/20 1400   BP:  109/58 102/59 102/58   Pulse: 80 69 64 68   Resp: (!) 34 16 26 23   Temp: 98.4  F (36.9  C)      TempSrc: Oral      SpO2: 93% 92% 97% 92%   Weight:       Height:          I/O:     Intake/Output Summary (Last 24 hours) at 11/17/2020 1515  Last data filed at 11/17/2020 0900  Gross per 24 hour   Intake 530 ml   Output 250 ml   Net 280 ml     Wt Readings from Last 3 Encounters:   11/13/20 145 lb 8 oz (66 kg)   12/16/14 130 lb (59 kg)      Weight change:     Physical Exam:  Gen: awake in chair  Looks dyspneic on HFNC  HEENMT:PERRLA and extra ocular movement intact  NEURO: grossly non-focal  CARDIOVASCULAR: RRR  PULMONARY: Lungs are diminished and coarse.   GASTROINTESTINAL: soft nt + bs  INTEGUMENT: visible skin intact.   PSYCH: oriented to time, place and person    Labs:   Results from last 7 days   Lab Units 11/17/20  0555 11/14/20  1504 11/14/20  1504   LN-SODIUM mmol/L 142   < >  --    LN-POTASSIUM mmol/L 3.7   < >  --    LN-CHLORIDE mmol/L 105   < >  --    LN-CO2 mmol/L 28   < >  --    LN-BLOOD UREA NITROGEN mg/dL 20   < >  --    LN-CREATININE mg/dL 0.56*   < >  --    LN-CALCIUM mg/dL 8.0*   < >  --    LN-PROTEIN TOTAL g/dL  --   --  6.3   LN-BILIRUBIN TOTAL mg/dL  --   --  0.3   LN-ALKALINE PHOSPHATASE U/L  --   --  99   LN-ALT (SGPT) U/L  --   --  43   LN-AST (SGOT) U/L  --   --  121*    < > = values in this interval not displayed.       Results from last 7 days   Lab Units 11/17/20  0555   LN-WHITE BLOOD CELL COUNT thou/uL 8.6   LN-HEMOGLOBIN g/dL 14.3   LN-HEMATOCRIT % 43.9   LN-PLATELET COUNT thou/uL 407       Micro:   No growth     Imaging: all imaging personalized reviewed   11/13 CXR - No pleural fluid or pneumothorax. Heterogeneous pulmonary opacities are concerning for multifocal atypical infection and have increased from the prior study. Normal size of the heart

## 2021-06-13 NOTE — PROGRESS NOTES
"RESPIRATORY CARE NOTE      Vent Mode: VCV  FiO2 (%):  [40 %-100 %] 40 %  S RR:  [18-32] 18  S VT:  [250 mL-350 mL] 350 mL  PEEP/CPAP (cm H2O):  [8 cm H2O] 8 cm H2O  Minute Ventilation (L/min):  [7.1 L/min-8.9 L/min] 7.7 L/min  PIP:  [26 cm H2O-37 cm H2O] 26 cm H2O  MAP (cm H2O):  [10-13] 10     BP 94/60   Pulse 90   Temp 99.1  F (37.3  C) (Oral)   Resp 22   Ht 5' 5\" (1.651 m)   Wt 135 lb 5.8 oz (61.4 kg)   SpO2 96%   BMI 22.53 kg/m      ETT 7.5, secure 21 cm at teeth, tolerating above ventilator settings.     BS clear and diminished, Sxn small tan thick, Pt tolerated well.  RT will follow per protocol.      Sridhar Lopez, LRT    "

## 2021-06-13 NOTE — PROGRESS NOTES
CRITICAL CARE PROGRESS NOTE:    Assessment/Plan:  Hafsa Robles is a 60 y.o. lady with a past medical history significant for breast cancer s/p partial mastectomy and adjuvant radiation in 2017, who presented to the hospital on 11/13/2020  5:17 PM for acute hypoxemic respiratory failure.  She was subsequently diagnosed with Covid-19 infection and spent several days in the intensive care unit alternating between high flow and BiPAP.  On 11/30 she was transferred back to the intensive care unit worsening hypoxia and was emergently intubated on the morning of 12/1.    RESP:  ARDS, resp failure, worsened after initial improvement from covid with NIPPV, HFNC. Intubated 12/1. Pf: ratio >200 this AM in prone position. On very low Vt ventilation with high airway pressures c/w severe ARDS.     Cont LPV, Vt is 230cc ~4cc/kg IBW    Pplat <30, last was 28 with lower VT, DP 15 at goal    Titrate FiO2 for goal O2 sat 88-92% or PaO2 55 mm Hg or higher    ABG q6h, will decide on proning based on next p:f ratio.    Cont FS veletri for now wean as able    F/u ABG 1 hour after supine (~1pm)    CV:  Shock, circulatory likely vasoplegic from sedation, vasodilatory from severe infection with capillary leak. No echo on file.     MAP >65, wean NE as able    lacttate normal no need to trend further    Hold any home anti-hypertensives    NEURO:  Sedated while on vent, paralyzed    Cont fent, midaz for RASS goal -3 to -4    Cont paralytic plan to wean depending on next ABG, plans for reporning    Tylenol prn pain    GI:  No issues, LFTs OK on 11/29    Cont TF and advance as tolerated.     Bowel regimen    IV PPI    RENAL:  Low UOP last 24 hours, normal SCr    Lasix 20mg IV two times a day for now to keep on dry side    Avoid nephrotoxins    Maintain Arteaga    ID:  Severe covid-19 infection, initially improved then worsend 12/1 with ARDS requiring intubation.    10 days IV dex restarted 11/30 for unclear reasons    Completed 5 days of  remdesivir    Cont zosyn empirically    F/u culture data    completex CTX + azithro earlier in hospitalization    HEMATOLOGIC:  Leukocytosis likely 2/2 steroids. No anemia    hgb >7    ENDOCRINE:  No issues    FSBG checks, insulin sliding scale/drip per ICU protocol    ICU PROPHYLAXIS:    LMWH two times a day per covid cat B a/c guidelines    PPI    peridex    CODE STATUS, DISPOSITION, FAMILY COMMUNICATION: full code    Lines/Drains/Tubes:  PICC  Art line 12/1  Arteaga  OG tube  Arteaga  ETT 7.5mm    Restraints  Progress Note  Restraint Application    I recognize that restraints are physical and/or chemical interventions intended to restrict a person's movements. Restraints are currently needed to ensure the safety of this patient and/or others. My clinical rationale appears below.    Category/Type of Restraint     Non Violent:  Soft limb restraint x2  --  Behavior  Pulling at tubes/lines  --  Root Cause of the Behavior  Sedation/intubation  --  Less-Restrictive Measures that Failed  Non Violent Measures:  Close Observation  --  Response to the Restraint  Patient unable to pull at tubes/lines  --  Criteria for Release from the Restraint  Patient calm and off sedation    Bashir (Sharan) MD Britt  [facility] Pulmonary & Critical Care  Pager [phone]  Clinic [phone]      Overnight events:  No major events  Tolerated proning.  Pplat was 30 this AM, dropped Vt to 230cc (from 250), incr RR to 32 bpm, ABG slightly worse after this.  Pplat was 27 after this, DP 15.   P;f 210 this AM in prone position, on FiO2 0.6, PEEP 14. PEEP was decr to 12 after rounds  UOP marginal with Lasix.    Subjective:  Unable to assess    Objective:  Physical Exam:  Vent settings for last 24 hours:  Vent Mode: VCV  FiO2 (%):  [50 %-100 %] 50 %  S RR:  [30-32] 32  S VT:  [230 mL-250 mL] 230 mL  PEEP/CPAP (cm H2O):  [12 cm H2O-14 cm H2O] 12 cm H2O  Minute Ventilation (L/min):  [8.1 L/min-9 L/min] 8.1 L/min  PIP:  [40 cm  "H2O-53 cm H2O] 40 cm H2O  MAP (cm H2O):  [17-20] 17    /76   Pulse (!) 104   Temp 97.7  F (36.5  C)   Resp (!) 32   Ht 5' 5\" (1.651 m)   Wt 130 lb 12.8 oz (59.3 kg)   SpO2 95%   BMI 21.77 kg/m      Intake/Output last 3 shifts:  I/O last 3 completed shifts:  In: 1891.2 [I.V.:544.2; IV Piggyback:1347]  Out: 835 [Urine:685; Emesis/NG output:150]  Intake/Output this shift:  I/O this shift:  In: 349.1 [I.V.:151.1; IV Piggyback:198]  Out: 100 [Urine:100]    Physical Exam  Gen: intubated, paralyzed, sedated  HEENT: no OP lesions, no DOT  CV: RRR, no m/g/r  Resp: clear posteriorly no wheezing or rhonchi.   Abd: soft, nontender, BS+  Neuro: PERRL, nonfocal  Ext: no edema    LAB:  Results from last 7 days   Lab Units 12/02/20  0425   LN-WHITE BLOOD CELL COUNT thou/uL 17.9*   LN-HEMOGLOBIN g/dL 12.6   LN-HEMATOCRIT % 39.4   LN-PLATELET COUNT thou/uL 477*     Results from last 7 days   Lab Units 12/02/20  0425 12/01/20  0412 11/30/20  0607 11/29/20  1246   LN-SODIUM mmol/L 139 137 136  --    LN-POTASSIUM mmol/L 4.0 3.7 4.2  --    LN-CHLORIDE mmol/L 100 96* 99  --    LN-CO2 mmol/L 29 29 29  --    LN-BLOOD UREA NITROGEN mg/dL 25* 12 8  --    LN-CREATININE mg/dL 0.54* 0.54* 0.50*  --    LN-CALCIUM mg/dL 8.9 9.4 8.8  --    LN-PROTEIN TOTAL g/dL  --   --   --  7.1   LN-BILIRUBIN TOTAL mg/dL  --   --   --  0.5   LN-ALKALINE PHOSPHATASE U/L  --   --   --  146*   LN-ALT (SGPT) U/L  --   --   --  41   LN-AST (SGOT) U/L  --   --   --  36     Micro  Sputum 12/1 pending  Blood pending  C diff neg  Blood neg from 11/14       Current Facility-Administered Medications   Medication Dose Route Frequency Provider Last Rate Last Admin     acetaminophen solution 650 mg (TYLENOL)  650 mg Enteral Tube Q4H PRN Tish Adkins CNP         acetaminophen tablet 650 mg (TYLENOL)  650 mg Oral Q4H PRN Ramon Haro MD   650 mg at 11/30/20 0058     amino acids-protein hydrolys 15-60 gram-kcal/30 mL packet 1 packet (ProSource No Carb)  1 " packet Enteral Tube BID Eben Butterfield MD         bacitracin ointment packet 1 packet  1 packet Topical Once PRN Tish Adkins CNP         benzocaine-menthoL lozenge 1 lozenge (CEPACOL)  1 lozenge Oral Q1H PRN Eben Butterfield MD         benzonatate capsule 200 mg (TESSALON)  200 mg Oral Q6H PRN Ramon Haro MD   200 mg at 11/30/20 0845     benzonatate capsule 200 mg (TESSALON)  200 mg Oral QHS Seng Up DO   Stopped at 11/30/20 2100     bisacodyL suppository 10 mg (DULCOLAX)  10 mg Rectal Daily PRN Eben Butterfield MD         calcium (as carbonate) chewable tablet 400 mg (TUMS)  400 mg Oral TID PRN Ramon Haro MD   400 mg at 11/18/20 1222     chlorhexidine 0.12 % solution 15 mL (PERIDEX)  15 mL Topical Q12H Tish Adkins CNP   15 mL at 12/02/20 0517     cisatracurium (NIMBEX) 200 mg/100 mL (2 mg/mL) infusion 100 mL  0.5-8 mcg/kg/min Intravenous Continuous Eben Butterfield MD 4.4 mL/hr at 12/02/20 0905 2.5 mcg/kg/min at 12/02/20 0905     dexAMETHasone (PF) injection 6 mg (DECADRON)  6 mg Intravenous Q24H Eben Butterfield MD   6 mg at 12/02/20 0800     dextrose 50 % (D50W) syringe 20-50 mL  20-50 mL Intravenous Q15 Min PRN Ramon Haro MD         diphenhydrAMINE tablet 25 mg (BENADRYL)  25 mg Oral Bedtime PRN Ramon Haor MD   25 mg at 11/27/20 0349     enoxaparin ANTICOAGULANT syringe 30 mg (LOVENOX)  0.5 mg/kg Subcutaneous Q12H Sherri Garcia MD   30 mg at 12/02/20 0800     epoprostenol 0.5 mg/50 mL (Full Strength) (VELETRI) inhalation solution   Nebulization Continuous Radha, Sherri Craft MD 8 mL/hr at 12/02/20 0942 New Bag at 12/02/20 0942     fentaNYL - BOLUS DOSE from infusion 25-50 mcg  25-50 mcg Intravenous Q1H PRN Tish Adkins, CNP   50 mcg at 12/02/20 0950     fentaNYL - BOLUS DOSE from infusion  mcg   mcg Intravenous Q1H PRN Tish Adkins CNP   50 mcg at 12/01/20 2010      fentaNYL 2500 mcg/50 mL CADD infusion (50 mcg/mL)   mcg/hr Intravenous Continuous Tish Adkins CNP 4 mL/hr at 12/02/20 0849 200 mcg/hr at 12/02/20 0849     furosemide injection 20 mg (LASIX)  20 mg Intravenous BID - diuretic Bashir De La Torre MD   20 mg at 12/02/20 1046     glucagon (human recombinant) injection 1 mg  1 mg Subcutaneous Q15 Min PRN Ramon Haro MD         hydrALAZINE injection 10 mg (APRESOLINE)  10 mg Intravenous Q4H PRN Ramon Haro MD         insulin aspart U-100 injection pen (NovoLOG)   Subcutaneous Q6H FIXED TIMES Eben Butterfield MD   2 Units at 12/02/20 1148     insulin regular 1 Units/mL in sodium chloride 0.9% 250 mL  0-24 Units/hr Intravenous Continuous PRN Eben Butterfield MD         lipase-protease-amylase 5,000-17,000- 24,000 unit capsule 2 capsule (ZENPEP)  2 capsule Enteral Tube PRN Eben Butterfield MD        And     sodium bicarbonate tablet 325 mg  325 mg Enteral Tube PRN Eben Butterfield MD         LORazepam tablet 0.5 mg (ATIVAN)  0.5 mg Oral Q4H PRN Seng Up DO         magnesium hydroxide suspension 30 mL (MILK OF MAG)  30 mL Oral Daily PRN Eben Butterfield MD         melatonin tablet 3 mg  3 mg Oral Bedtime PRN Ramon Haro MD   3 mg at 11/29/20 2034     midazolam (PF) injection 1 mg (VERSED)  1 mg Intravenous Q2H PRN Tish Adkins CNP   1 mg at 12/01/20 1138     midazolam 100 mg/100 mL (1 mg/mL) infusion (VERSED)  0.25-15 mg/hr Intravenous Continuous Tish Adkins CNP 9 mL/hr at 12/02/20 0849 9 mg/hr at 12/02/20 0849     naloxone injection 0.2 mg (NARCAN)  0.2 mg Intravenous Q2 Min PRN Ramon Haro MD        Or     naloxone injection 0.2 mg (NARCAN)  0.2 mg Intramuscular Q2 Min PRN Ramon Haro MD        Or     naloxone injection 0.4 mg (NARCAN)  0.4 mg Intravenous Q2 Min PRN Ramon Haro MD        Or     naloxone injection 0.4 mg (NARCAN)  0.4 mg  Intramuscular Q2 Min PRN Ramon Haro MD         norepinephrine 4 mg/250 mL in NS (16 mcg/mL) - CENTRAL  0.01-0.4 mcg/kg/min Intravenous Continuous Radha, Sherri Craft MD 11.1 mL/hr at 12/02/20 0802 0.05 mcg/kg/min at 12/02/20 0802     omeprazole capsule 20 mg (PriLOSEC)  20 mg Oral QAM Tish Gonzalez CNP        Or     omeprazole suspension 20 mg (PriLOSEC)  20 mg Enteral Tube QAM Tish Gonzalez CNP        Or     pantoprazole 40 mg injection  40 mg Intravenous QAM Tish Gonzalez CNP   40 mg at 12/02/20 0611     ondansetron injection 4 mg (ZOFRAN)  4 mg Intravenous Q4H PRN Eben Butterfield MD        Or     ondansetron tablet 8 mg (ZOFRAN)  8 mg Oral Q8H PRN Eben Butterfield MD         piperacillin-tazobactam 3.375 g in NaCl 0.9 % 50 mL (MINI-BAG Plus) (ZOSYN)  3.375 g Intravenous Q8H Seng Up DO 12.5 mL/hr at 12/02/20 0610 3.375 g at 12/02/20 0610     polyethylene glycol packet 17 g (MIRALAX)  17 g Oral Daily PRN Ramon Haro MD         polyvinyl alcohol 1.4 % ophthalmic solution 1-2 drop (LIQUIFILM TEARS)  1-2 drop Both Eyes Q1H PRN Eben Butterfield MD         senna-docusate 8.6-50 mg tablet 1 tablet (PERICOLACE)  1 tablet Oral BID Eben Butterfield MD   Stopped at 11/30/20 2100    Or     sennosides syrup 8.8 mg (for SENOKOT)  8.8 mg Enteral Tube BID Eben Butterfield MD         sodium chloride 0.9%  10 mL/hr Intravenous Continuous Eben Butterfield MD 10 mL/hr at 12/02/20 0850 10 mL/hr at 12/02/20 0850     sodium chloride bacteriostatic 0.9 % injection 0.1-0.3 mL  0.1-0.3 mL Subcutaneous PRN Ramon Haro MD         sodium chloride bacteriostatic 0.9 % injection 1-5 mL  1-5 mL Intradermal Once PRN Ramon Haro MD         sodium chloride flush 10-20 mL (NS)  10-20 mL Intravenous PRN Tish Adkins, CNP         sodium chloride flush 10-30 mL (NS)  10-30 mL Intravenous PRN Tish Adkins, CNP          sodium chloride flush 10-30 mL (NS)  10-30 mL Intravenous Q8H FIXED TIMES Tish Adkins CNP   10 mL at 12/02/20 0519     sodium chloride flush 20 mL (NS)  20 mL Intravenous PRN Tish Adkins CNP         traZODone tablet 25 mg (DESYREL)  25 mg Oral Bedtime PRN Ramon Haro MD   25 mg at 11/25/20 2113     white petrolatum-mineral oiL 83-15 % ophthalmic ointment 1 application (LUBRIFRESH PM)  1 application Both Eyes Q8H Eben Butterfield MD   1 application at 12/02/20 1148       Critical care attestation: 45 minutes spent managing the following issues: acute respiratory failure requiring intubation/IMV, circulatory shock requiring continuous vasopressor infusions, severe covid-19 infection with ARDS, hypoxemic resp failure, encephalopathy. High risk for organ deterioration and death requiring ICU level care.

## 2021-06-13 NOTE — PLAN OF CARE
"  Problem: Breathing  Goal: Patient will maintain patent airway  Outcome: Progressing  Goal: Patient performs or directs assisted coughing  Outcome: Progressing  Patient remained on HFNC throughout the night on 60L 90% titrated down from 100%. BS lear/ diminished. No complications during night. Saturations remained stable. RT will continue to follow.    /65   Pulse 78   Temp 98.3  F (36.8  C) (Oral)   Resp 28   Ht 5' 5\" (1.651 m)   Wt 137 lb 5.6 oz (62.3 kg)   SpO2 97%   BMI 22.86 kg/m      "

## 2021-06-13 NOTE — PLAN OF CARE
"  Problem: Ineffective Airway Clearance  Goal: Maintain airway patency  Outcome: Progressing     Problem: Impaired Gas Exchange  Goal: Demonstrate improved ventilation and adequate oxygenation of tissues as evidenced by absence of respiratory distress  Outcome: Progressing     Problem: Breathing  Goal: Patient will maintain patent airway  Outcome: Progressing     Patient switched from HFNC 50L 100% to a BIPAP for NOC. BIPAP settings are ST 15/8 70% with no complications.   BS Diminished bilaterally. Vitals are below. RT will continue to follow.    /58   Pulse 63   Temp 97.5  F (36.4  C) (Axillary)   Resp 23   Ht 5' 5\" (1.651 m)   Wt 137 lb 12.6 oz (62.5 kg)   SpO2 93%   BMI 22.93 kg/m     "

## 2021-06-13 NOTE — PROGRESS NOTES
"[facility] PROGRESS NOTE      ASSESSMENT AND PLAN:  60 y.o. old female with COVID pneumonia and worsening respiratory failure.     1. COVID-19 pneumonia  - Cont dexamethasone and Remdesivir   - On admission, lactic acid was elevated, was given fluid bolus although half the dose given COVID risk for pulmonary edema. On 11/15 and 11/16- 20mg IV lasix given   - Antibiotics for pneumonia for now  - Cont supplemental O2  - Cont Lovenox for DVT prophylaxis  - Continue covid labs      2. Acute hypoxic respiratory failure  - Rapid escalation of O2 need noted on 11/15 requiring the patient to get transferred to ICU. ABG noted. Currently on BiPaP. Appreciate pulm consult  - Treating above  - RT following      3. Hypokalemia  - Replacement protocol      4.  Diarrhea  - Cdiff negative      VTE prophylaxis: Lovenox   GI Prophylaxis: IV protonix   Full Code    Discharge barriers:  - Acute hypoxic respiratory failure due to COVID-19 pneumonia    , Barry Robles called and updated. I informed him that Hafsa is in ICU in critical condition. This can go either way. He seems to understand it.    SUBJECTIVE:  Patient is new to me. Chart reviewed. Patient seen and examined. Patient continued to be on BiPaP. Continues to have shortness of breath. No nausea. No chest pain.    ROS:  12 Points review of systems reviewed and is negative except for what has already been mentioned above    OBJECTIVE:  /65   Pulse 81   Temp 98.6  F (37  C) (Axillary)   Resp (!) 32   Ht 5' 5\" (1.651 m)   Wt 145 lb 8 oz (66 kg)   SpO2 92%   BMI 24.21 kg/m    I/O last 3 completed shifts:  In: 1578 [P.O.:660; IV Piggyback:918]  Out: 1500 [Urine:1500]  I/O this shift:  In: 136 [I.V.:86; IV Piggyback:50]  Out: -   GENRL: On BiPaP  HEENT: NC/AT      Pupils- round and reactive to light bilaterally      Neck- supple, no JVP elevation, no lymphadenopathy or thyromegaly      Sclera- anicteric      Mucous membrane- moist and pink  CHEST: Coarse breath sounds " on auscultation bilaterally  HEART: S1S2 regular. No murmurs, rubs or gallops  ABDMN: Soft. Non-tender, non-distended. No organomegaly. No guarding or rigidity. Bowel sounds- active  EXTRM: No pedal oedema  NEURO: Cranial nerves II-XII grossly intact. No focal neurological deficit. No involuntary movements  INTGM: No skin rash, no cyanosis or clubbing    DIAGNOSTIC LABS:     Results from last 7 days   Lab Units 11/16/20  0512 11/15/20  1941 11/15/20  0555 11/14/20  1504 11/14/20  0636   LN-SODIUM mmol/L 143  --  143  --  140   LN-POTASSIUM mmol/L 3.7 4.2 2.9*  --  3.6   LN-CHLORIDE mmol/L 103  --  105  --  104   LN-CO2 mmol/L 27  --  27  --  24   LN-BLOOD UREA NITROGEN mg/dL 14  --  13  --  12   LN-CREATININE mg/dL 0.53*  --  0.48*  --  0.55*   LN-CALCIUM mg/dL 7.9*  --  7.9*  --  8.1*   LN-ALBUMIN g/dL  --   --   --  2.7*  --    LN-PROTEIN TOTAL g/dL  --   --   --  6.3  --    LN-BILIRUBIN TOTAL mg/dL  --   --   --  0.3  --    LN-ALKALINE PHOSPHATASE U/L  --   --   --  99  --    LN-ALT (SGPT) U/L  --   --   --  43  --    LN-AST (SGOT) U/L  --   --   --  121*  --      Results from last 7 days   Lab Units 11/16/20  0602 11/15/20  0555 11/14/20  0636   LN-WHITE BLOOD CELL COUNT thou/uL 8.1 9.5 3.9*   LN-HEMOGLOBIN g/dL 14.1 13.8 15.1   LN-HEMATOCRIT % 43.0 42.7 46.2   LN-PLATELET COUNT thou/uL 324 302 281       All recent labs reviewed personally  Radiology report reviewed    The total unit/floor time spent on today's visit is 35minutes, the time consisted of the following- examination of the patient, reviewing the record and lab results, and completing the documentation. Counseling and c0-ordination of care with nursing staff, care manager and other healthcare providers is 18 minutes, the time consisted of the following- updating and discussing care plan with Imtiaz

## 2021-06-13 NOTE — PROGRESS NOTES
RESPIRATORY CARE NOTE     Patient Name: Hafsa Robles  Today's Date: 12/15/2020     Pt continues on the following settings:  Vent Mode: VCV  FiO2 (%):  [40 %] 40 %  S RR:  [20] 20  S VT:  [375 mL] 375 mL  PEEP/CPAP (cm H2O):  [5 cm H2O] 5 cm H2O  Minute Ventilation (L/min):  [9.2 L/min-12.3 L/min] 12.3 L/min  PIP:  [16 cm H2O-61 cm H2O] 31 cm H2O  DE SUP:  [8 cm H20] 8 cm H20  MAP (cm H2O):  [8-21] 12      Pt is intubated with  # 7.5 ETT secured 23 at the teeth. BS are coarse rhonchi and decreased @ bases. RT suctioned pt for mod white secretions. Pt's respiratory status is stable. RT will continue to monitor and assess as needed.         12/15/20 1539   Vent Information   Interface Invasive   Vent ID [id number]   Vent Mode VCV   Patient Ventilator Status On   Flowmeter at Bedside Yes   Ambu-Bag With Mask at Bedside Yes   Respiratory Assessment   Respiratory Pattern Regular   Chest Assessment Chest expansion symmetrical   Bilateral Breath Sounds Coarse;Diminished   R Breath Sounds Coarse;Diminished   L Breath Sounds Diminished   Vent Settings   FiO2 (%) 40 %   Heater Temperature 98.6  F (37  C)   Resp Rate (Set) 20   Vt (Set, mL) 375 mL   Waveform Square   PEEP/CPAP (cm H2O) 5 cm H2O   Insp Flow (L/min) 30 L/min   PIP Set (cm H2O) 65 cm H2O   Trigger Sensitivity Flow (L/min) 2 L/min   Bias Flow (lpm)  5 L/min   Humidification BTPS   Patient Data   Vt (observed, mL) 365 mL   Vt Exp (mL) 236 mL   Minute Ventilation (L/min) 12.3 L/min   Total Resp Rate  34 BPM   PIP Observed (cm H2O) 31 cm H2O   MAP (cm H2O) 12   Plateau Pressure (cm H2O) 26 cm H2O   Static Compliance (L/cm H2O) 10   Airway Resistance 11   SpO2 94 %   Heart Rate (!) 128   Alarms   High Resp Rate 45   Low Resp Rate 12   Low PEEP 1 cm H2O   Insp Pressure High (cm H2O) 65 cm H2O   Insp Pressure Low (cm H2O) 14 cm H2O   MV High (L/min) 16 L/min   MV Low (L/min) 3 L/min   Vt High (mL) 900 mL   Vt Low (mL) 120 mL   Apnea Interval (sec) 30 seconds   Apnea Rate  20   Apnea Volume (mL) 375 mL   Alarm Functional and On Yes   Backup Mode Set Yes   Airways   Airway LDA Subglottic Suction endotracheal tube   Subglottic Suction ET Tube 7.5   Placement Date/Time: 12/01/20 0535   Subglottic Suction ETT Entry Site: Oral  Size : 7.5   Secured at (cm) 23 cm   Measured from Teeth   Secured Location Center   Secured with Commercial tube bateman   Cuff Pressure (cm H2O)   (No value collected.)   Site Condition Dry   Subglottic Secretions Scant   Subglottic Suction Frequency Intermittent suction   Subglottic Suction Pressure 120 mmHg   Subglottic Suction Lumen Intervention Cleaned   Weaning Assessment    RASS Score -1 Drowsy   IHI Ventilator Associated Pneumonia Bundle   Daily Awakening Trials Performed Yes   Daily Assessment of Readiness to Extubate Yes   Oral Care Mouth swabbed;Suction toothette         Sunny Jimenez, LRT

## 2021-06-13 NOTE — PROGRESS NOTES
Supplemental oxygen has been titrated down to 70%, pt still able to maintain acceptable saturations. Pt will remains on high flow system as able. Bipap machine remains standby as needed. RT will continue to monitor and assess as needed.       11/17/20 1943   Oxygen Therapy/Pulse Ox   O2 Device High-flow NC   O2 Flow Rate (L/min) 55 L/min   FiO2 (%) 70 %   O2 Therapy Oxygen humidified   SpO2 96 %   SpO2 Activity  O2 at rest   Oximeter Probe Skin Inspection Skin Inspected   Nasal Cannula  Skin Inspected     Sunny Jimenez RRT

## 2021-06-13 NOTE — PROGRESS NOTES
[facility] COVID RT PROGRESS NOTE    DATA:    VENT DAY#  6    CURRENT SETTINGS:  VENT SETTINGS   VCV 24, 350, 8        FIO2: 40%    PATIENT PARAMETERS:    PIP 36  Pplat:  29  Pmean:  12  SBT: No  SECRETIONS:  Small amount thick white  02 SATS:  98%    ETT SIZE 7.5  Secured at  21 cm at teeth    Securing device changed / tube re-taped date 2020    Respiratory Medications: None    AB 7.54/46/84/39/98%    NOTE / PLAN:   Uneventful day from a respiratory care stand point. Patient remains intubated and on full ventilatory support. No changes or weaning today. Attempt to wean as tolerated.

## 2021-06-13 NOTE — PLAN OF CARE
Problem: Inadequate Gas Exchange  Goal: Patient will achieve/maintain normal respiratory rate/effort  Outcome: Progressing     Problem: Ineffective Airway Clearance  Goal: Maintain airway patency  Outcome: Progressing     Problem: Mechanical Ventilation  Goal: Patient will maintain patent airway  Outcome: Progressing    The patient's Levophed was restarted because the MAP was 55mmHg. Still with Precedex and Fentanyl infusion. Afebrile.

## 2021-06-13 NOTE — PROGRESS NOTES
[facility] Progress Note       ASSESSMENT:    Principal Problem:    Pneumonia  Active Problems:    Hypokalemia    Acute respiratory failure with hypoxia (H)    Pneumonia due to 2019 novel coronavirus    Severe protein-calorie malnutrition (H)      PLAN:    60-year-old female with history of breast cancer status post radiation presented 11/13/2020 with 1 week of cough fever and fatigue.  Diagnosed with COVID-19 11/10/2020.  She had rapid escalation in oxygen needs requiring transfer to the ICU for high flow oxygen therapy.  Transferred out of ICU 11/28/20  Has had difficult weaning course last few days with increased 02 requirement 11/30      COVID-19 pneumonia: Completed course of remdesivir and 10 days dexamethasone.  Remains on high flow oxygen and on and off BiPAP. Extremely slow to wean O2.    -- CXR 11/29 showed increased infiltrates  --Had increased oxygen requirements 11/30/2020.    --Given increased oxygen requirements will attempt to obtain CTA of the chest once oxygenation can stay stable  -- start abx to cover for possible HCAP however nontoxic appearing  -- check procalcitonin, CRP  -- try 1 dose IV lasix  -- restart course of dexamethasone  --ABG shows adequate oxygenation on BiPAP support  --Given suspicion for possible PE, will change from prophylactic Lovenox dosing to therapeutic while CTA of the chest is pending  --discussed with intensivist, he will see patient   -- appreciate RT working with patietnt   --PT/OT      Lower abdominal pain: I think this is most likely muscle strain from coughing.  --Symptoms improved with lidocaine patch  --Check lactate, LFTs, white blood cell count all unrevealing         updated on plan of care 11/30/2020      DVT PPX:  Lovenox      Needs for Discharge: slow clinical improvement, oxygen needs, family communication and mobility progress     ESTIMATED DISCHARGE:  >2D           Seng Up D.O.  [phone]          "    -------------------------------------------------------------------------------------------------------------  SUBJECTIVE: NAD.  Remains with intermittent cough.  Denies fevers, chills, chest pain, headache or confusion. Denies increased subjective SOB    Exam:  /68 (Patient Position: Semi-cedeño)   Pulse (!) 105   Temp 98.3  F (36.8  C) (Axillary)   Resp 22   Ht 5' 5\" (1.651 m)   Wt 134 lb 6.4 oz (61 kg)   SpO2 (!) 89%   BMI 22.37 kg/m    General: NAD  RESPIRATORY: Breathing rapid, breath sounds diminished  Abdomen: Soft, nontender  CARDIOVASCULAR:  No le edema bilat.   NEUROLOGIC: Motor and sensory intact, speech clear      Diagnostics Reviewed:      Recent Results (from the past 24 hour(s))   Basic Metabolic Panel    Collection Time: 11/30/20  6:07 AM   Result Value Ref Range    Sodium 136 136 - 145 mmol/L    Potassium 4.2 3.5 - 5.0 mmol/L    Chloride 99 98 - 107 mmol/L    CO2 29 22 - 31 mmol/L    Anion Gap, Calculation 8 5 - 18 mmol/L    Glucose 108 70 - 125 mg/dL    Calcium 8.8 8.5 - 10.5 mg/dL    BUN 8 8 - 22 mg/dL    Creatinine 0.50 (L) 0.60 - 1.10 mg/dL    GFR MDRD Af Amer >60 >60 mL/min/1.73m2    GFR MDRD Non Af Amer >60 >60 mL/min/1.73m2   Platelet Count    Collection Time: 11/30/20  6:07 AM   Result Value Ref Range    Platelets 374 140 - 440 thou/uL   Hemoglobin    Collection Time: 11/30/20  6:07 AM   Result Value Ref Range    Hemoglobin 13.5 12.0 - 16.0 g/dL   White Blood Count (WBC)    Collection Time: 11/30/20  6:07 AM   Result Value Ref Range    WBC 10.4 4.0 - 11.0 thou/uL   Lactic Acid    Collection Time: 11/30/20  2:43 PM   Result Value Ref Range    Lactic Acid 1.4 0.7 - 2.0 mmol/L   Blood Gases, Arterial    Collection Time: 11/30/20  2:46 PM   Result Value Ref Range    pH, Arterial 7.43 7.37 - 7.44    pCO2, Arterial 47 (H) 35 - 45 mm Hg    pO2, Arterial 126 (H) 80 - 90 mm Hg    Bicarbonate, Arterial Calc 29.6 (H) 23.0 - 29.0 mmol/L    O2 Sat, Arterial 98.7 (H) 96.0 - 97.0 %    " Oxyhemoglobin 97.7 (H) 96.0 - 97.0 %    Base Excess, Arterial Calc 6.7 mmol/L    FIO2 100.00     Sample Stabilized Temperature 37.0 degrees C

## 2021-06-13 NOTE — PROCEDURES
ARTERIAL LINE INSERTION PROCEDURE NOTE  (NON-OR)    Procedure Date:  12/1/2020   Performing Physician:  Sherri Gracia    Pre-Procedure Diagnosis:     SEPTIC SHOCK and RESPIRATORY FAILURE  Post-Procedure Diagnosis:  Same as Pre-Procedure Diagnosis    Procedure:  Arterial line insertion  Left Radial  Indications:  RESPIRATORY FAILURE      Estimated Blood Loss: Minimal   Complications: none      Procedure Details:   The risks, benefits, complications, treatment options, and expected outcomes were discussed with the patient. The risks and potential complications of their problem and purposed procedure include but are not limited to infection, bleeding, pain,  the need for additional procedures, and nerve and vessel injury. The patient/alternate (see above) concurred with the proposed plan, giving informed consent.  The site of the procedure was properly noted/marked. The patient was identified as Hafsa Robles with Date of Birth 1960 and the procedure verified as Arterial Line Insertion.  A Time Out was held and the above information confirmed.      In sterile fashion, the line site was prepped with Chlorhexidine.  Strict sterile conditions were maintained,  Cap, mask, and sterile gloves were worn by all participants.  The arterial line was placed in the Left Radial artery percutaneously,  without  difficulty.  The linewas  sutured in place  and an occlusive sterile dressing was applied.  The total number of needle stick attempts was 1.      Condition: unstable    Sherri Garcia, 12/1/2020, 12:37 PM

## 2021-06-13 NOTE — PLAN OF CARE
Problem: Inadequate Gas Exchange  Goal: Patient will achieve/maintain normal respiratory rate/effort  Outcome: Progressing     Follows command.  Able to lift all extremities. Low BP when asleep. Tried to wean off levophed but unable.

## 2021-06-13 NOTE — CONSULTS
HPI  60 y.o. year old female who I have been consulted for tracheostomy. Prolonged ventilation, ARDS with Covid.  H of breast cancer.  Family aware of status from ICU team.     Allergies:Patient has no known allergies.    Past Medical History:   Diagnosis Date     Malignant neoplasm of right female breast (H)        Past Surgical History:   Procedure Laterality Date     BREAST LUMPECTOMY Right 2017     PICC INSERTION - DOUBLE LUMEN  11/16/2020          PICC INSERTION - TRIPLE LUMEN  12/1/2020              CURRENT MEDS:    Current Facility-Administered Medications:      acetaminophen solution 650 mg (TYLENOL), 650 mg, Enteral Tube, Q4H PRN, Tish Adkins CNP, 650 mg at 12/18/20 0648     acetaminophen tablet 650 mg (TYLENOL), 650 mg, Oral, Q4H PRN, Ramon Haro MD, 650 mg at 11/30/20 0058     bacitracin ointment packet 1 packet, 1 packet, Topical, Once PRN, Tish Adkins CNP     benzocaine-menthoL lozenge 1 lozenge (CEPACOL), 1 lozenge, Oral, Q1H PRN, Eben Butterfield MD     bisacodyL suppository 10 mg (DULCOLAX), 10 mg, Rectal, Daily PRN, Eben Butterfield MD, 10 mg at 12/16/20 1631     calcium (as carbonate) chewable tablet 400 mg (TUMS), 400 mg, Oral, TID PRN, Ramon Haro MD, 400 mg at 11/18/20 1222     chlorhexidine 0.12 % solution 15 mL (PERIDEX), 15 mL, Topical, Q12H, Tish Adkins CNP, 15 mL at 12/22/20 0523     codeine-guaiFENesin  mg/5 mL liquid 5 mL (GUAIFENESIN AC), 5 mL, Oral, Q4H PRN, Bashir De La Torre MD, 5 mL at 12/17/20 1435     dexmedetomidine (PRECEDEX) 1000 mcg/250 mL in NS (4 mcg/mL) infusion, 0.1-1.5 mcg/kg/hr, Intravenous, Continuous, IvJeramie bruner MD, Last Rate: 4.6 mL/hr at 12/22/20 0950, 0.3 mcg/kg/hr at 12/22/20 0950     dextrose 50 % (D50W) syringe 20-50 mL, 20-50 mL, Intravenous, Q15 Min PRN, Ramon Haro MD     enoxaparin ANTICOAGULANT syringe 30 mg (LOVENOX), 30 mg, Subcutaneous, BID, Gregg Sarabia MD     fentaNYL pf  injection 100-200 mcg (SUBLIMAZE), 100-200 mcg, Intravenous, Once PRN, Gregg Sarabia MD     fentaNYL pf injection 25-50 mcg (SUBLIMAZE), 25-50 mcg, Intravenous, Q1H PRN, Jennifer Gunter CNP, 50 mcg at 12/22/20 0543     furosemide injection 20 mg (LASIX), 20 mg, Intravenous, BID - diuretic, Bashir De La Torre MD, 20 mg at 12/22/20 0846     glucagon (human recombinant) injection 1 mg, 1 mg, Subcutaneous, Q15 Min PRN, Ramon Haro MD     hydrALAZINE injection 10 mg (APRESOLINE), 10 mg, Intravenous, Q4H PRN, Ramon Haro MD     influenza vaccine quad (PF) (age 50-64 YR) 2020-21 injection 0.5 mL (FLUBLOK), 0.5 mL, Intramuscular, Prior to Discharge, Jeramie Crespo MD     insulin aspart U-100 injection pen (NovoLOG), , Subcutaneous, Q6H FIXED TIMES, Gregg Sarabia MD     [Held by provider] insulin glargine injection 6 Units (LANTUS), 6 Units, Subcutaneous, QAM, Oren Vásquez MD, 6 Units at 12/21/20 0812     lipase-protease-amylase 5,000-17,000- 24,000 unit capsule 2 capsule (ZENPEP), 2 capsule, Enteral Tube, PRN **AND** sodium bicarbonate tablet 325 mg, 325 mg, Enteral Tube, PRN, Eben Butterfield MD     LORazepam 1 mg/0.5 mL concentrated solution 1 mg (ATIVAN), 1 mg, Oral, Q6H, Oren Vásquez MD, 1 mg at 12/22/20 0518     magnesium hydroxide suspension 30 mL (MILK OF MAG), 30 mL, Oral, Daily PRN, Eben Butterfield MD     midazolam (PF) injection 5-10 mg (VERSED), 5-10 mg, Intravenous, Once PRN, Gregg Sarabia MD     naloxone injection 0.2 mg (NARCAN), 0.2 mg, Intravenous, Q2 Min PRN **OR** naloxone injection 0.2 mg (NARCAN), 0.2 mg, Intramuscular, Q2 Min PRN **OR** naloxone injection 0.4 mg (NARCAN), 0.4 mg, Intravenous, Q2 Min PRN **OR** naloxone injection 0.4 mg (NARCAN), 0.4 mg, Intramuscular, Q2 Min PRN, Ramon Haro MD     norepinephrine 4 mg/250 mL in NS (16 mcg/mL) - CENTRAL, 0.01-0.4 mcg/kg/min, Intravenous, Continuous, Bashir De La Torre MD, Last  Rate: 4.6 mL/hr at 12/22/20 1118, 0.02 mcg/kg/min at 12/22/20 1118     omeprazole capsule 20 mg (PriLOSEC), 20 mg, Oral, QAM AC **OR** omeprazole suspension 20 mg (PriLOSEC), 20 mg, Enteral Tube, QAM AC, 20 mg at 12/22/20 0735 **OR** [DISCONTINUED] pantoprazole 40 mg injection, 40 mg, Intravenous, QAM AC, Tish Adkins CNP, 40 mg at 12/04/20 0605     ondansetron injection 4 mg (ZOFRAN), 4 mg, Intravenous, Q4H PRN, 4 mg at 12/06/20 2219 **OR** ondansetron tablet 8 mg (ZOFRAN), 8 mg, Oral, Q8H PRN, Eben Butterfield MD     polyethylene glycol packet 17 g (MIRALAX), 17 g, Oral, Daily PRN, Ramon Haro MD, 17 g at 12/16/20 1630     polyvinyl alcohol 1.4 % ophthalmic solution 1-2 drop (LIQUIFILM TEARS), 1-2 drop, Both Eyes, Q1H PRN, Eben Butterfield MD     QUEtiapine tablet 25 mg (SEROquel), 25 mg, Oral, Q6H PRN, Mykel, Sweetie, DO     QUEtiapine tablet 50 mg (SEROquel), 50 mg, Oral, Daily PRN, Alma Mcmillan CNP, 50 mg at 12/15/20 1004     QUEtiapine tablet 75 mg (SEROquel), 75 mg, Oral, Q6H, Mykel, Sweetie, DO, 75 mg at 12/22/20 0950     rocuronium injection 50 mg, 50 mg, Intravenous, Once PRN, Gregg Sarabia MD     senna-docusate 8.6-50 mg tablet 1 tablet (PERICOLACE), 1 tablet, Oral, BID, Stopped at 11/30/20 2100 **OR** sennosides syrup 8.8 mg (for SENOKOT), 8.8 mg, Enteral Tube, BID, Eben Butterfiedl MD, 8.8 mg at 12/21/20 0811     sodium chloride 0.9%, 10 mL/hr, Intravenous, Continuous, ZeballEben Peñaloza MD, Last Rate: 10 mL/hr at 12/22/20 1101, 10 mL/hr at 12/22/20 1101     sodium chloride bacteriostatic 0.9 % injection 0.1-0.3 mL, 0.1-0.3 mL, Subcutaneous, PRN, Ramon Haro MD     sodium chloride flush 10-20 mL (NS), 10-20 mL, Intravenous, PRN, Tish Adkins, CNP, 10 mL at 12/04/20 1736     sodium chloride flush 10-30 mL (NS), 10-30 mL, Intravenous, PRN, Tish Adkins, TOMMIE     sodium chloride flush 10-30 mL (NS), 10-30 mL, Intravenous,  "Q8H FIXED TIMES, Tish Adkins CNP, 10 mL at 12/22/20 0524     sodium chloride flush 20 mL (NS), 20 mL, Intravenous, PRN, Tish Adkins CNP     traZODone tablet 25 mg (DESYREL), 25 mg, Oral, Q6H PRN, Sweetie Hogue DO     valproic acid (as sodium salt) solution 250 mg (DEPAKENE), 250 mg, Oral, Q8H FIXED TIMES, Alma Mcmillan CNP, 250 mg at 12/22/20 0518    FAMHX-reviewed and non contributory     reports that she has never smoked. She does not have any smokeless tobacco history on file. She reports that she does not drink alcohol or use drugs.    Review of Systems:  The 12 point review of systems  is within normal limits except for as mentioned above in the HPI.  General ROS: No complaints or constitutional symptoms  Ophthalmic ROS: No complaints of visual changes  Skin: No complaints or symptoms   Endocrine: No complaints or symptoms  Hematologic/Lymphatic: No symptoms or complaints  Psychiatric: No symptoms or complaints  Respiratory ROS: no cough, shortness of breath, or wheezing  Cardiovascular ROS: no chest pain or dyspnea on exertion  Gastrointestinal ROS: As per HPI  Genito-Urinary ROS: no dysuria, trouble voiding, or hematuria  Musculoskeletal ROS: no joint or muscle pain  Neurological ROS: no TIA or stroke symptoms      EXAM:  /75 (Patient Position: Lying)   Pulse 72   Temp (P) 98  F (36.7  C) (Axillary)   Resp 25   Ht 5' 5\" (1.651 m)   Wt 136 lb 14.5 oz (62.1 kg)   SpO2 100%   BMI 22.78 kg/m    GENERAL: Well developed female, intubated 3t  Lungs- bilateral breath sounds  CV- RRR  Abd- soft, non tender      LABS:  Lab Results   Component Value Date    WBC 11.3 (H) 12/22/2020    WBC 4.8 12/16/2014    HGB 10.9 (L) 12/22/2020    HCT 34.5 (L) 12/22/2020    MCV 87 12/22/2020     (H) 12/22/2020     INR/Prothrombin Time  Results from last 7 days   Lab Units 12/22/20  0417   LN-SODIUM mmol/L 138   LN-POTASSIUM mmol/L 3.9   LN-CHLORIDE mmol/L 99   LN-CO2 mmol/L 30   LN-BLOOD UREA " NITROGEN mg/dL 15   LN-CREATININE mg/dL 0.49*   LN-CALCIUM mg/dL 8.7     Lab Results   Component Value Date    ALT 41 11/29/2020    AST 36 11/29/2020    ALKPHOS 146 (H) 11/29/2020    BILITOT 0.5 11/29/2020         Assessment/Plan:   Hafsa Robles is a 60 y.o. female with COVID, prolonged intubation, ARDS  Plan for tracheostomy  Family aware and consent obtained per ICU team      Diego Issa D.O. FACS  [phone]  [facility] Department of Surgery

## 2021-06-13 NOTE — PROGRESS NOTES
Vent Mode: VCV  FiO2 (%):  [40 %] 40 %  S RR:  [14] 14  S VT:  [400 mL] 400 mL  PEEP/CPAP (cm H2O):  [5 cm H2O] 5 cm H2O  Minute Ventilation (L/min):  [8.1 L/min-15.3 L/min] 8.2 L/min  PIP:  [22 cm H2O-48 cm H2O] 38 cm H2O  MAP (cm H2O):  [9-25] 10   PLAT: 28

## 2021-06-13 NOTE — PLAN OF CARE
"  Problem: Inadequate Gas Exchange  Goal: Patient will achieve/maintain normal respiratory rate/effort  Outcome: Progressing     Problem: Excessive Fluid Volume  Goal: Patient will achieve/maintain normal respiratory rate/effort  Outcome: Progressing   Patient remained on Bipap 15/8 RR 20, 60%. Tolerating well. Sat's high 90's.  /62   Pulse 81   Temp 98.4  F (36.9  C) (Axillary)   Resp (!) 32   Ht 5' 5\" (1.651 m)   Wt 145 lb 8 oz (66 kg)   SpO2 92%   BMI 24.21 kg/m     "

## 2021-06-13 NOTE — PLAN OF CARE
Problem: Ineffective Airway Clearance  Goal: Maintain airway patency  Outcome: Progressing     Problem: Impaired Gas Exchange  Goal: Demonstrate improved ventilation and adequate oxygenation of tissues as evidenced by absence of respiratory distress  Outcome: Progressing     Problem: Breathing  Goal: Patient will maintain patent airway  Outcome: Progressing     Problem: Mechanical Ventilation  Goal: Patient will maintain patent airway  Outcome: Progressing     Problem: Mechanical Ventilation  Goal: Respiratory status - ventilation  Description: Movement of air in and out of the lungs.    Liberate from ventilator  Outcome: Progressing     Problem: Mechanical Ventilation  Goal: ET tube will be managed safely  Outcome: Progressing   Pt on ventilator with PeeP of 12 and FiO2 of 40% to maintain SPO2 >90%, Monitor ABGs, No prone this evening per Dr Kwan.    Problem: Potential for Compromised Skin Integrity  Goal: Nutritional status is improving  Outcome: Progressing   Pt on tube feeds with rate of 20cc/hr. Residuals 60cc and 20cc, hypoactive BS, continue to monitor.

## 2021-06-13 NOTE — PLAN OF CARE
Problem: Ineffective Airway Clearance  Goal: Maintain airway patency  Outcome: Progressing     Problem: Impaired Gas Exchange  Goal: Demonstrate improved ventilation and adequate oxygenation of tissues as evidenced by absence of respiratory distress  Outcome: Progressing     BIPAP/CPAP NOTE       UNIT TYPE: V60   MASK TYPE: OTN medium   SETTINGS:      BIPAP - 15/ 8 RR 20    FI02 - 80      TIME OF MASK PLACEMENT - on overnight   SKIN CHECK DONE AT 0730 -     RT to RN communication done at- 0730    Skin in good condition     INTERVENTION INITIATED - jose    PATIENT'S TOLERANCE OF DEVICE - good    0725: pt placed  on HFNC at 55L  70%, increased to 80% by RN for desat, back to  75% now.  IS to 500 ml, poor use of FV.

## 2021-06-13 NOTE — PROGRESS NOTES
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 She was on high flow NC and BiPAP initially in the ICU, then transferred to the floor. On 11/30 she was transferred back to ICU for worsening hypoxia and was intubated on 12/01/20.    On 12/20, intensivist consulted general surgery for tracheostomy.  Possible PEG/GJ tube and tracheostomy tube tomorrow    Intensivist primarily managing patient.    Hospitalist service will follow patient peripherally while patient in ICU.      Note created using dragon voice recognition software.  Errors in spelling or words which seems out of context are unintentional.  Sounds alike errors may have escaped editing.    12/21/2020  VIKTOR JOHNSON MD  HOSPITALIST, [facility]  PAGER NO. [phone]

## 2021-06-13 NOTE — PROGRESS NOTES
"RESPIRATORY CARE NOTE     Patient Name: Hafsa Robles  Today's Date: 12/18/2020     Pt continues on the following settings:  Vent Mode: PCV/VG  FiO2 (%):  [40 %-100 %] 40 %  S RR:  [20] 20  S VT:  [350 mL-375 mL] 350 mL  PEEP/CPAP (cm H2O):  [5 cm H2O] 5 cm H2O  Minute Ventilation (L/min):  [7.1 L/min-11.7 L/min] 11.7 L/min  PIP:  [18 cm H2O-37 cm H2O] 33 cm H2O  AK SUP:  [8 cm H20] 8 cm H20  MAP (cm H2O):  [9-17] 17        Pt is intubated with  # 7.5 ETT secured  23 at the teeth. Suctioning small clear secretion. RT will continue to follow per MD's orders.     BP 94/57 (Patient Position: Semi-cedeño)   Pulse 92   Temp 99.5  F (37.5  C) (Axillary)   Resp 24   Ht 5' 5\" (1.651 m)   Wt 138 lb 7.2 oz (62.8 kg)   SpO2 92%   BMI 23.04 kg/m        Wilson Armando, LRT  "

## 2021-06-13 NOTE — PROGRESS NOTES
RESPIRATORY CARE NOTE     Patient Name: Hafsa Robles  Today's Date: 11/19/2020     Pt off bipap this morning at 07:58, placed on HFNC heated and humidified.  Settings 55 lpm and 70% fi02.  Took some time to come above 90% sat.  Currently 95%.  Pt alert.        Carol Ho

## 2021-06-13 NOTE — PLAN OF CARE
Problem: Inadequate Gas Exchange  Goal: Patient will achieve/maintain normal respiratory rate/effort  Outcome: Progressing     Problem: Daily Care  Goal: Daily care needs are met  Outcome: Progressing     Pt continues to have prolonged episodes of coughing and agitation on ventilator.  PRN fentanyl given x1.  PRN tylenol given x1 for fever of 100.5 F, temperature now 99.4 F.  Turned Q2H as pt was able to tolerate, oral cares as pt able to tolerate, however pt is very sensitive and becomes anxious and tachypnic with oral cares, causing prolonged episodes of coughing and agitation requiring PRN medications.  Continue to monitor

## 2021-06-13 NOTE — PROGRESS NOTES
Vent Mode: PCV/VG  FiO2 (%):  [40 %-50 %] 40 %  S RR:  [0-20] 20  S VT:  [0 mL-350 mL] 350 mL  PEEP/CPAP (cm H2O):  [5 cm H2O] 5 cm H2O  Minute Ventilation (L/min):  [7.8 L/min-11.3 L/min] 7.8 L/min  PIP:  [24 cm H2O-47 cm H2O] 33 cm H2O  [place] SUP:  [12 cm H20] 12 cm H20  MAP (cm H2O):  [8-24] 13      Hafsa continues on the ventilator with the above settings. She is tolerating well with HR 71-84, oxygen saturation % on 40-50% FIO2 tonight. Hafsa had an episode of desaturation with turning when she became very agitated. FIO2 had been weaned down to 40% but she required bumps of 100% so FIO2 was increased to 50% with oxygen saturation eventually returning to 93%. BS coarse. She was suctioned for moderate thin white secretions. Plateau's 21-29, compliance 22-28, and intrinsic peep 0. # 7.5 subglottic ETT remains in place and secured at 23 @ the teeth. ETT was repositioned throughout this shift per protocol. Continue to monitor and wean as able.

## 2021-06-13 NOTE — PROGRESS NOTES
12/09/20 1155   Patient Data   PIP Observed (cm H2O) 35 cm H2O   MAP (cm H2O) 12   Auto/Intrinsic PEEP Observed (cm H2O)   (Fail)   Plateau Pressure (cm H2O) 34 cm H2O   Static Compliance (L/cm H2O) 24   Dynamic Compliance (L/cm H2O) 14 L/cm H2O   SpO2 92 %

## 2021-06-13 NOTE — CONSULTS
Pulmonary / Critical Care Consultation Note      Hafsa Robles,  1960, MRN [id number]  Date / Time of Hospital Admission  2020  5:17 PM    Admitting Dx: Hypoxia [R09.02]  Multifocal pneumonia [J18.9]    PCP: [facility], [phone]   Code status:  Full Code       Extended Emergency Contact Information  Primary Emergency Contact: Barry Robles  Address: [address] [place] [id number] [facility]  Home Phone: [phone]  Relation: Spouse  Secondary Emergency Contact: Danilo Robles   [facility]  Home Phone: [phone]  Mobile Phone: [phone]  Relation: Parent-In-Law       Assessment    1) Acute respiratory failure with hypoxia  2) Covid pneumonia. Being treated for possible bacterial superinfection       Plan   Systems to Assess:     Pulmonary: Rapidly increasing oxygen requirements over the last 12 hours.   o Wean supplemental O2 as tolerated. Goal O2 sat >92%  HOB > 30 degrees to limit aspiration risk  Continue HFNC 20-60 L/min. Titrate as tolerated. May need to trial BiPAP.   Low threshold for intubation if she tires or is unable to maintain saturations.   Continue dexamethasone, remdesivir.   Repeat furosemide x1     Cardiovasular:  o Cardiac monitoring.   Goal MAP > 65, SBP > 90  IVF per clinical assessment    Neurological: Anxious. High risk for delirium with steroid, hypoxia.   o Avoid benzos, minimize narcotics  Environmental manipulation to promote sleep as able.    GI/: High risk for intubation  o NPO except for sips with meds  PPI with steroid    Renal:   o Monitor intake and output.  May require potter catheter if dyspnea with exertion worsens.   Trend electrolytes and replace as needed    Heme/Coag: Covid associated coagulopathy Ddimer 1.33  o enoxaparin 0.5 mg/kg two times a day     ID: Covid viral infection. No leukocytosis. Procalcitonin flat.   o Continue antibiotics for 5 day course  Blood cultures NGTD  C  Diff negative    Endocrine: Steroid induced hyperglycemia. Mild   o SSI. FSG q6h. Goal glucose < 180     DVT prophylaxis Enoxaparin, SCDs  The above plans and care have been discussed with patient and . All questions and concerns were addressed.   I spent a total of 30 minutes (excluding procedure time) personally providing and directing critical care services at the bedside and on the critical care unit.   The patient is critically ill due to Covid pneumonia with rapidly escalating oxygen requirements and remains at risk for further deterioration, organ failure and/or death     Chief Complaint Fatigue, cough       HPI    We have been requested by Dr. Sweet to evaluate Hafsa Robles who is a 60 y.o. year old female for acute respiratory failure with hypoxia and rapidly escalating oxygen requirements in patient with Covid 19. PMH from chart review and patient discussion. Significant for breast cancer s/p radiation. Not currently under treatment. No history of heart, lung, liver, kidney disease. No diabetes. Presented to ER Nov 13 with one week history of cough, fever, fatigue. Diagnosed with Covid 19 and treated for possible bacterial superinfection. Over the last 12 hours her oxygen requirements have progressed from 4L NC to HFNC 80% 60 L/min. She is transferred to ICU for further management.   She describes progressive cough, fevers, chills. No sore throat. Mild nausea, no vomiting. +diarrhea. No one else sick at home. She is focused on frustration at worsening illness and what she sees as a delay to care from her Primary MD.        Medical History  Neoplasm R breast 2017   Surgical History  She  has a past surgical history that includes Breast lumpectomy (Right, 2017).   Social History  Reviewed, and she  reports that she has never smoked. She does not have any smokeless tobacco history on file. She reports that she does not drink alcohol or use drugs.   Allergies  No Known Allergies Family  History  Reviewed, and family history is not on file.   Psychosocial Needs  Social History     Social History Narrative     Not on file     Additional psychosocial needs reviewed per nursing assessment.     Prior to Admission Medications   Medications Prior to Admission   Medication Sig Dispense Refill Last Dose     benzonatate (TESSALON) 100 MG capsule Take 100 mg by mouth 3 (three) times a day as needed for cough.   11/13/2020 at Unknown time          Currently Scheduled Medications    azithromycin  250 mg Intravenous Q24H     cefTRIAXone  2 g Intravenous Q24H     dexamethasone  6 mg Intravenous DAILY     enoxaparin ANTICOAGULANT  35 mg Subcutaneous BID     [START ON 11/16/2020] insulin aspart (NovoLOG) injection   Subcutaneous Q6H FIXED TIMES     ipratropium-albuteroL  2 puff Inhalation QID     pantoprazole  40 mg Intravenous Q24H     remdesivir  100 mg Intravenous Q24H     sodium chloride  10 mL Intravenous Line Care     sodium chloride  2.5 mL Intravenous Line Care     sodium chloride  3 mL Intravenous Line Care       Review of Systems:  A 12 point comprehensive review of systems was negative except as noted. Physical Exam:  Temp:  [97.7  F (36.5  C)-99.2  F (37.3  C)] 98.2  F (36.8  C)  Heart Rate:  [66-89] 79  Resp:  [20-33] 31  BP: (107-122)/(55-78) 108/59  FiO2 (%):  [55 %-90 %] 80 %    General appearance: Awake, pleasant. Anxious. Diaphoretic. No acute distress.   Eyes: Conjunctiva clear. Sclera anicteric. No exudate. LINDA.  Throat: Mucus membranes moist. No oropharyngeal erythema or exudate  Neck: no adenopathy, no JVD and supple, symmetrical, trachea midline  Lungs: Fine bibasilar crackles. No wheezes. No accessory muscles. Speaking in full sentences. Congested cough  Heart: regular rate and rhythm and S1, S2 normal  Abdomen: soft, non-tender; bowel sounds normal; no masses,  no organomegaly  Extremities: Warm, well perfused. No peripheral edema.  Pulses: 2+ and symmetric       Pertinent Labs    Lab  Results: personally reviewed.     ABG:  Recent Labs     11/15/20  0623   PHART 7.47*   WOX2RRK 40   PO2ART 65*   OXYHB 92.3*   BEARTCALC 5.3   TEMP 37.0   POCFLOW 15.0     CBC:  Results from last 7 days   Lab Units 11/15/20  0555 11/14/20  0636 11/13/20  1823   LN-WHITE BLOOD CELL COUNT thou/uL 9.5 3.9* 5.5   LN-HEMOGLOBIN g/dL 13.8 15.1 15.6   LN-HEMATOCRIT % 42.7 46.2 47.4*   LN-PLATELET COUNT thou/uL 302 281 258   LN-NEUTROPHILS RELATIVE PERCENT % 85* 84* 84*   LN-MONOCYTES RELATIVE PERCENT % 4 2 2     Chemistry:  Results from last 7 days   Lab Units 11/15/20  1941 11/15/20  1241 11/15/20  0555 11/14/20  1504 11/14/20  0636 11/13/20  1823   LN-SODIUM mmol/L  --   --  143  --  140 140   LN-POTASSIUM mmol/L 4.2  --  2.9*  --  3.6 3.6   LN-CHLORIDE mmol/L  --   --  105  --  104 100   LN-CO2 mmol/L  --   --  27  --  24 28   LN-BLOOD UREA NITROGEN mg/dL  --   --  13  --  12 14   LN-CREATININE mg/dL  --   --  0.48*  --  0.55* 0.64   LN-GLUCOSE mg/dL  --   --  96  --  141* 114   LN-CALCIUM mg/dL  --   --  7.9*  --  8.1* 8.4*   LN-MAGNESIUM mg/dL  --  2.1 2.0  --   --  2.0   LN-ALBUMIN g/dL  --   --   --  2.7*  --   --    LN-ALT (SGPT) U/L  --   --   --  43  --   --    LN-AST (SGOT) U/L  --   --   --  121*  --   --    LN-ALKALINE PHOSPHATASE U/L  --   --   --  99  --   --    LN-BILIRUBIN TOTAL mg/dL  --   --   --  0.3  --   --      Coags:  Lab Results   Component Value Date    INR 0.91 11/15/2020    INR 0.92 11/14/2020    INR 0.95 11/13/2020     Cardiac Markers:  Results from last 7 days   Lab Units 11/14/20  0636 11/13/20  1823   LN-CREATINE KINASE TOTAL U/L  --  120   LN-TROPONIN I ng/mL <0.01 <0.01              Pertinent Radiology and Cardiology Studies    XR CHEST 1 VIEW PORTABLE  LOCATION: [facility]  DATE/TIME: 11/13/2020 6:20 PM     INDICATION: Chest Pain. Suspected COVID.  COMPARISON: 11/11/2020      IMPRESSION:   No pleural fluid or pneumothorax. Heterogeneous pulmonary opacities are  concerning for multifocal atypical infection and have increased from the prior study. Normal size of the heart.        Outside reports reviewed: ER records and Historical medical records.      Jennifer Gunter, APRN, CNP  [phone]  Pulmonary Critical Care

## 2021-06-13 NOTE — PROGRESS NOTES
Patient on HF oxygen most of today, was up to the chair for several hours. Patient anxious and reports increased shortness of breath PRN morphine give for dyspnea. FIO2 at 80% with 55L on HF currently. Patient will go on bipap for the night. Report given to Mel SON.

## 2021-06-13 NOTE — H&P
ADMISSION HISTORY & PHYSICAL      Provider, No Primary Care, [phone]  ASSESSMENT AND PLAN:  60 y.o. female presenting with:    1.  Acute hypoxic respiratory failure: Highly suspicious for COVID-19.  Chest x-ray showed concern for multifocal pneumonia and was started on antibiotics in the ED.  We will check a serum procalcitonin and continue Rocephin and Zithromax for now.  She had Covid testing 4 days ago as an outpatient and the results are not back yet.  Testing was repeated on admission here but her symptoms are strongly suggestive of Covid and she is considered a high risk PUI and if Covid negative I would continue respiratory isolation.  Will start Covid order set including anticoagulation and steroid, which was given in the ED. Will start diabetic order set and initiate Lantus and scheduled insulin if needed.  If she confirms positive she would qualify for remdesivir.  She has approximately 11 days of symptoms.    2.  History of breast cancer    Barriers to discharge: Hypoxia      CHIEF COMPLAINT:  Cough/fever/chills    HISTORY OF PRESENTING ILLNESS:  Hafsa Robles is a 60 y.o. female who was sent to the ED from clinic today for hypoxia.  Today was her third visit to healthcare facility for symptoms which started 11 days ago and included fever and chills and cough and dyspnea.  She had a Covid test 4 days ago with results still unavailable.  She was in the clinic today and found to be hypoxic.  O2 sats have been in the low 90s and upper 80s and she is currently on 1 L.  Interview done through discussion with ED provider and nurse and review of past records.  Mainly she has had cough and fever and chills.  Denies dyspnea, body aches and headache.  Unable to obtain Belarusian  currently.    PMH/PSH:  Patient Active Problem List   Diagnosis     Pneumonia       ALLERGIES:  No Known Allergies    MEDICATIONS:  Reviewed.  No current facility-administered medications on file prior to encounter.   "    Current Outpatient Medications on File Prior to Encounter   Medication Sig Dispense Refill     benzonatate (TESSALON) 100 MG capsule Take 100 mg by mouth 3 (three) times a day as needed for cough.         SOCIAL HISTORY:  Social History     Socioeconomic History     Marital status:      Spouse name: Not on file     Number of children: Not on file     Years of education: Not on file     Highest education level: Not on file   Occupational History     Not on file   Social Needs     Financial resource strain: Not on file     Food insecurity     Worry: Not on file     Inability: Not on file     Transportation needs     Medical: Not on file     Non-medical: Not on file   Tobacco Use     Smoking status: Not on file   Substance and Sexual Activity     Alcohol use: Not on file     Drug use: Not on file     Sexual activity: Not on file   Lifestyle     Physical activity     Days per week: Not on file     Minutes per session: Not on file     Stress: Not on file   Relationships     Social connections     Talks on phone: Not on file     Gets together: Not on file     Attends [employer] service: Not on file     Active member of club or organization: Not on file     Attends meetings of clubs or organizations: Not on file     Relationship status: Not on file     Intimate partner violence     Fear of current or ex partner: Not on file     Emotionally abused: Not on file     Physically abused: Not on file     Forced sexual activity: Not on file   Other Topics Concern     Not on file   Social History Narrative     Not on file       FAMILY HISTORY:  Reviewed and found to be not pertinent to presenting complaint    ROS:  12 point ROS was performed and found to be negative except for the pertinent positives mentioned in the HPI.      PHYSICAL EXAM:  /60 (Patient Position: Lying)   Pulse 81   Temp 99.9  F (37.7  C) (Tympanic)   Resp (!) 34   Ht 5' 5\" (1.651 m)   Wt 145 lb 8 oz (66 kg)   SpO2 94%   BMI 24.21 kg/m  "   No intake/output data recorded.  No intake/output data recorded.     Review of ED exam revealed:  CONSTITUTIONAL: No apparent distress  HEENT: Atraumatic      Sclera- anicteric      Mucous membrane-mask in place  LUNGS: Right posterior crackles  CARDIOVASCULAR: S1S2 regular. No murmurs, rubs or gallops,no pedal edema  GI: Soft. Non-tender, non-distended. No organomegaly. No guarding or rigidity. Bowel sounds- active  NEURO: Cranial nerves II-XII grossly intact. No focal neurological deficit. No involuntary movements  INTGM: No skin rash, no cyanosis or clubbing  LYMPH: no adenopathy  MSK: no joint swelling or tenderness  PSYCH: alert no agitation      DIAGNOSTIC DATA:  Recent Results (from the past 24 hour(s))   Troponin I    Collection Time: 11/13/20  6:23 PM   Result Value Ref Range    Troponin I <0.01 0.00 - 0.29 ng/mL   Magnesium    Collection Time: 11/13/20  6:23 PM   Result Value Ref Range    Magnesium 2.0 1.8 - 2.6 mg/dL   BNP(B-type Natriuretic Peptide)    Collection Time: 11/13/20  6:23 PM   Result Value Ref Range    BNP <10 0 - 93 pg/mL   Basic Metabolic Panel    Collection Time: 11/13/20  6:23 PM   Result Value Ref Range    Sodium 140 136 - 145 mmol/L    Potassium 3.6 3.5 - 5.0 mmol/L    Chloride 100 98 - 107 mmol/L    CO2 28 22 - 31 mmol/L    Anion Gap, Calculation 12 5 - 18 mmol/L    Glucose 114 70 - 125 mg/dL    Calcium 8.4 (L) 8.5 - 10.5 mg/dL    BUN 14 8 - 22 mg/dL    Creatinine 0.64 0.60 - 1.10 mg/dL    GFR MDRD Af Amer >60 >60 mL/min/1.73m2    GFR MDRD Non Af Amer >60 >60 mL/min/1.73m2   HM1 (CBC with Diff)    Collection Time: 11/13/20  6:23 PM   Result Value Ref Range    WBC 5.5 4.0 - 11.0 thou/uL    RBC 5.47 (H) 3.80 - 5.40 mill/uL    Hemoglobin 15.6 12.0 - 16.0 g/dL    Hematocrit 47.4 (H) 35.0 - 47.0 %    MCV 87 80 - 100 fL    MCH 28.5 27.0 - 34.0 pg    MCHC 32.9 32.0 - 36.0 g/dL    RDW 13.0 11.0 - 14.5 %    Platelets 258 140 - 440 thou/uL    MPV 9.7 8.5 - 12.5 fL    Neutrophils % 84 (H) 50 -  70 %    Lymphocytes % 13 (L) 20 - 40 %    Monocytes % 2 2 - 10 %    Eosinophils % 0 0 - 6 %    Basophils % 0 0 - 2 %    Immature Granulocyte % 0 <=0 %    Neutrophils Absolute 4.6 2.0 - 7.7 thou/uL    Lymphocytes Absolute 0.7 (L) 0.8 - 4.4 thou/uL    Monocytes Absolute 0.1 0.0 - 0.9 thou/uL    Eosinophils Absolute 0.0 0.0 - 0.4 thou/uL    Basophils Absolute 0.0 0.0 - 0.2 thou/uL    Immature Granulocyte Absolute 0.0 <=0.0 thou/uL     All lab studies reviewed personally    Xr Chest 1 View Portable    Result Date: 11/13/2020  EXAM: XR CHEST 1 VIEW PORTABLE LOCATION: [facility] DATE/TIME: 11/13/2020 6:20 PM INDICATION: Chest Pain. Suspected COVID. COMPARISON: 11/11/2020     No pleural fluid or pneumothorax. Heterogeneous pulmonary opacities are concerning for multifocal atypical infection and have increased from the prior study. Normal size of the heart.     Radiology report reviewed.        Ramírez Haro MD  [facility] Medicine Service

## 2021-06-13 NOTE — PROGRESS NOTES
Pt is currently back on Bipap Machine due to desaturations and obvious tachypnea. Pt did desat on high flow system down to 85-87% even when FIO2 increased to 90%. RT will continue to monitor closely.        11/17/20 2206   Non-Invasive    Pt Owned Device No   Device V 60   Device Serial Number [id number]   Pt. Interface Over the nose   Over the nose Size Small   Mode ST   IPAP 15 cmH2O   EPAP 8 cmH2O   Resp Rate (Set) 20   Insp Time (sec) 1 sec   FiO2 (%) 80 %   Insp Rise Time (%) 3 %   Audible Alarm set at (Volume of Alarm) 7   Monitoring   Resp Rate Observed 22   Tidal (Observed) 462 mL   Minute Ventilation (L/min) 10.5 L/min   PIP Observed (cm H2O) 15 cm H2O   Ti/Ttot 33   Pt Leak 0   Alarms   Insp Pressure High (cm H2O) 20 cm H2O   Insp Pressure Low (cm H2O) 11 cm H2O   Tidal Volume High 1200   Tidal Volume Low 200   MV Low (L/min) 3 L/min   High Resp Rate 50   Low Resp Rate 10   Alarm Functional and On Yes   NPPV Other   SpO2 (!) 87 %   Heart Rate 68   Skin Under Mask No breakdown       Sunny Jimenez RRT

## 2021-06-13 NOTE — PROGRESS NOTES
"60yr F, admitted on 11/13/20 for low SaO2%.  She transferred to ICU and intubated on 12/1/20 for failed BiPAP, patient remains intubated, also was proned and on Veletri.  Patient remains intubated, BS decreased.  Vent Day #9.  Patient weaned this AM.  Suctioning pale and tan sputum moderate amount.    /56 (Patient Position: Lying)   Pulse 80   Temp 98.8  F (37.1  C) (Axillary)   Resp 28   Ht 5' 5\" (1.651 m)   Wt 129 lb (58.5 kg)   SpO2 93%   BMI 21.47 kg/m       11:20 - 11:51 31 minutes on CPAP+5, PS+15, PEEP +5 FiO2 40%.  Patient returned to full vent settings, see Vent Flow Sheet. RR 40, Vt 252 ml, VE 13.5 L, HR 86, /65, SaO2 90%.    Vent Mode: VCV  FiO2 (%):  [40 %] 40 %  S RR:  [14] 14  S VT:  [400 mL] 400 mL  PEEP/CPAP (cm H2O):  [5 cm H2O] 5 cm H2O  Minute Ventilation (L/min):  [6.6 L/min-13.5 L/min] 12.6 L/min  PIP:  [21 cm H2O-37 cm H2O] 35 cm H2O  IL SUP:  [15 cm H20] 15 cm H20  MAP (cm H2O):  [8-13] 12  ETT 7.5, 23 cm at the teeth.    12/7/20 AM ABGS  Vent settings VCV 18, Vt 350ml, FiO2 40$, PEEP +8     Ph 7.42, PCO2 52, PO2 101, HCO# 31.8,SO2 97%    Continue current treatment, wean daily, suction PRN, and titrate O2.                  "

## 2021-06-13 NOTE — PROGRESS NOTES
BiPAP:    Mode: ST  IPAP: 15  EPAP: 8  Set RR: 24  FiO2: 60%    Pt on Bipap throughout shift, tolerating well.will continue to monitor.

## 2021-06-13 NOTE — PROGRESS NOTES
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 She was on high flow NC and BiPAP initially in the ICU, then transferred to the floor. On 11/30 she was transferred back to ICU for worsening hypoxia and was intubated on 12/01/20.    Intensivist primarily managing patient.    Hospitalist service will follow patient peripherally while patient in ICU.      Note created using dragon voice recognition software.  Errors in spelling or words which seems out of context are unintentional.  Sounds alike errors may have escaped editing.    12/18/2020  VIKTOR JOHNSON MD  HOSPITALIST, [facility]  [facility] [phone]

## 2021-06-13 NOTE — PLAN OF CARE
Problem: Excessive Fluid Volume  Goal: Patient will achieve/maintain normal respiratory rate/effort  Outcome: Progressing   Patient receiving lasix two times a day, monitor I&O q 2 hours.    Problem: Knowledge Deficit  Goal: Patient/family/caregiver demonstrates understanding of disease process, treatment plan, medications, and discharge instructions  Outcome: Progressing   Family updated daily    Problem: Potential for Compromised Skin Integrity  Goal: Nutritional status is improving  Outcome: Progressing   Tolerating tube feeds will increase rate per orders.    Problem: Impaired Gas Exchange  Goal: Demonstrate improved ventilation and adequate oxygenation of tissues as evidenced by absence of respiratory distress  Outcome: Not Progressing   Paralytic was turned off approx 1 hour, patient alarming vent, sats dropped, paralytic restarted.

## 2021-06-13 NOTE — PLAN OF CARE
Problem: Inadequate Gas Exchange  Goal: Patient will achieve/maintain normal respiratory rate/effort  Outcome: Not Progressing       PT remains intubated with 7.5 ETT, secured 23 at the teeth. PT has had several weaning trials today, both at 15/5 and 5/5, see prior notes. Per MD discussion, PT placed back on CPAP to see how she continues to do with tachypnea and RSBI for 120 minutes or otherwise indicated. Vent settings below.  No acute incidents to report. RT to continue to monitor and assess.     Vent Mode: CPAP/PSV  FiO2 (%):  [35 %-100 %] 35 %  S RR:  [20] 20  S VT:  [400 mL] 400 mL  PEEP/CPAP (cm H2O):  [5 cm H2O] 5 cm H2O  Minute Ventilation (L/min):  [7.9 L/min-12.4 L/min] 8.1 L/min  PIP:  [13 cm H2O-40 cm H2O] 13 cm H2O  VA SUP:  [5 cm H20-15 cm H20] 5 cm H20  MAP (cm H2O):  [8-17] 8    GUSTAVO PinaT

## 2021-06-13 NOTE — PLAN OF CARE
"  Problem: Breathing  Goal: Patient will maintain patent airway  Outcome: Progressing     Problem: Mechanical Ventilation  Goal: Patient will maintain patent airway  Outcome: Progressing  Goal: Respiratory status - ventilation  Description: Movement of air in and out of the lungs.    Liberate from ventilator  Outcome: Progressing  Goal: ET tube will be managed safely  Outcome: Progressing     Patient remained on full vent support. Suctioned small amount of thick white secretions.Size 7.5 ETT remains in position at 23@T. ETT was moved from side-to-side to prevent skin breakdown on lips. HOB approx 30 degrees. BS coarse/ diminished bilaterally. Current vent settings are below: RT will continue to follow.    Vent Mode: VCV  FiO2 (%):  [40 %-100 %] 40 %  S RR:  [20] 20  S VT:  [375 mL] 375 mL  PEEP/CPAP (cm H2O):  [5 cm H2O] 5 cm H2O  Minute Ventilation (L/min):  [7.5 L/min-11.3 L/min] 10.6 L/min  PIP:  [35 cm H2O-39 cm H2O] 38 cm H2O  MAP (cm H2O):  [10-13] 12   PLAT: 30    BP 91/58   Pulse 82   Temp 99.8  F (37.7  C) (Oral)   Resp (!) 32   Ht 5' 5\" (1.651 m)   Wt 135 lb 11.2 oz (61.6 kg)   SpO2 100%   BMI 22.58 kg/m       "

## 2021-06-13 NOTE — PROGRESS NOTES
12/11/20 1152   Patient Data   MAP (cm H2O) 11   Auto/Intrinsic PEEP Observed (cm H2O)   (Failed)   Plateau Pressure (cm H2O) 28 cm H2O   Static Compliance (L/cm H2O) 18   Dynamic Compliance (L/cm H2O) 19 L/cm H2O   SpO2 93 %   Heart Rate 80

## 2021-06-13 NOTE — PLAN OF CARE
Problem: Inadequate Gas Exchange  Goal: Patient will achieve/maintain normal respiratory rate/effort  Outcome: Progressing     Problem: Excessive Fluid Volume  Goal: Patient will achieve/maintain normal respiratory rate/effort  Outcome: Progressing     Problem: Ineffective Airway Clearance  Goal: Maintain airway patency  Outcome: Progressing     Problem: Impaired Gas Exchange  Goal: Demonstrate improved ventilation and adequate oxygenation of tissues as evidenced by absence of respiratory distress  Outcome: Progressing

## 2021-06-13 NOTE — PROGRESS NOTES
"Problem: Inadequate Airway Clearance  Goal: Patient will maintain patent airway  Outcome: Progressing     Problem: Mechanical Ventilation  Goal: Patient will maintain patent airway  Outcome: Progressing  Goal: Respiratory status - ventilation  Description: Movement of air in and out of the lungs.    Liberate from ventilator  Outcome: Not progressing  Goal: ET tube will be managed safely  Outcome: Progressing     Uneventful night from resp care stand point. Remains intubated via #7.5/23cm at the teeth--secured at anchor fast and bite block; currently mechanically ventilated via: Vent Mode: PCV/VG  FiO2 (%):  [40 %] 40 %  S RR:  [20] 20  S VT:  [375 mL] 375 mL  PEEP/CPAP (cm H2O):  [5 cm H2O] 5 cm H2O  Minute Ventilation (L/min):  [8 L/min-12.1 L/min] 8 L/min  PIP:  [23 cm H2O-48 cm H2O] 39 cm H2O  OH SUP:  [10 cm H20] 10 cm H20  MAP (cm H2O):  [9-15] 15.   Properly sedated and in synch with system. BS=coarse/diminished; suctioning mod, thick, white secretions. Failed SBT yesterday; will try again today. No other issues noted overnight. Will continue to follow closely monitor and assist CCM with airway and ventilator management.     /63   Pulse 92   Temp 98.7  F (37.1  C) (Axillary)   Resp 19   Ht 5' 5\" (1.651 m)   Wt 137 lb 12.6 oz (62.5 kg)   SpO2 92%   BMI 22.93 kg/m       Kamala Celaya, RRT  "

## 2021-06-13 NOTE — PLAN OF CARE
Problem: Safety  Goal: Patient will be injury free during hospitalization  Outcome: Progressing     Problem: Daily Care  Goal: Daily care needs are met  Outcome: Progressing  RASS -2 to -3. Titrated Levophed to 0.10 mcg/kg/m to keep MAP > 65. Precedex was titrated to 0.2mcg/kg/h.  PRN Fentanyl given once for coughing spell. PRN Tylenol given once for low grade fever. All daily needs met. PRN suppository and Miralax given for constipation since last BM was 12/11, waiting for result. All daily needs met.  Will continue to monitor.

## 2021-06-13 NOTE — PLAN OF CARE
Problem: Inadequate Gas Exchange  Goal: Patient will achieve/maintain normal respiratory rate/effort  Outcome: Progressing     Problem: Excessive Fluid Volume  Goal: Patient will achieve/maintain normal respiratory rate/effort  Outcome: Progressing     Problem: Activity Intolerance/Impaired Mobility  Goal: Mobility/activity is maintained at optimum level for patient  Outcome: Progressing     Problem: Ineffective Airway Clearance  Goal: Maintain airway patency  Outcome: Progressing     Problem: Impaired Gas Exchange  Goal: Demonstrate improved ventilation and adequate oxygenation of tissues as evidenced by absence of respiratory distress  Outcome: Progressing     Patient weaned today for 31 minutes.

## 2021-06-13 NOTE — PROGRESS NOTES
CRITICAL CARE PROGRESS NOTE:    Assessment/Plan:  Hafsa Robles is a 60 y.o. lady with a past medical history significant for breast cancer s/p partial mastectomy and adjuvant radiation in 2017, who presented to the hospital on 11/13/2020  5:17 PM for acute hypoxemic respiratory failure.  She was subsequently diagnosed with Covid-19 infection and spent several days in the intensive care unit alternating between high flow and BiPAP.  On 11/30 she was transferred back to the intensive care unit worsening hypoxia and was emergently intubated on the morning of 12/1.    RESP:  ARDS, resp failure, worsened after initial improvement from covid with NIPPV, HFNC. Intubated 12/1. Pf: ratio 245 this AM iwhile supine. Tried dropping PEEP to 8 but didn't tolerate with marked worsening of PaO2. On very low Vt ventilation. Slight resp alkalosis today, decr RR     Cont LPV, Vt is 250cc 4-5cc/kg    Pplat <30, last was 28, DP 18 acceptable.    Titrate FiO2 for goal O2 sat 88-92% or PaO2 55 mm Hg or higher    Wean NMB today    Off veletri    Daily ABG's for now.     CV:  Shock, circulatory likely vasoplegic from sedation, vasodilatory from severe infection with capillary leak. No echo on file. She is off pressors today    MAP >65, wean NE as able    lactate normal no need to trend further    Hold any home anti-hypertensives    NEURO:  Sedated while on vent, paralyzed    Cont fent, midaz for RASS goal -3 to -4    Wean NMB today    Tylenol prn pain    GI:  No issues, LFTs OK on 11/29    Cont TF and advance as tolerated.     Bowel regimen    IV PPI    RENAL:  Low UOP last 24 hours, normal SCr. Compensated chronic resp acidosis with high serum bicarb     Cont Lasix 20mg IV two times a day for now to keep on dry side    Avoid nephrotoxins    Maintain Arteaga    ID:  Severe covid-19 infection, initially improved then worsend 12/1 with ARDS requiring intubation.    10 days IV dex restarted 11/30 for unclear reasons - continue for  now    Completed 5 days of remdesivir    Cont zosyn empirically - stop after 7 days, stop date placed.     F/u culture data    completed CTX + azithro earlier in hospitalization    HEMATOLOGIC:  Leukocytosis likely 2/2 steroids, acute stress and infectious processes. No anemia. Wbc improved and normal today.     hgb >7    ENDOCRINE:  No issues    FSBG checks, insulin sliding scale/drip per ICU protocol    ICU PROPHYLAXIS:    LMWH two times a day per covid cat B a/c guidelines    PPI    peridex    CODE STATUS, DISPOSITION, FAMILY COMMUNICATION: full code    Lines/Drains/Tubes:  PICC 12/1 RUE  NEW left ax art line 12/4  OG tube  Arteaga  ETT 7.5mm    Restraints  Progress Note  Restraint Application    I recognize that restraints are physical and/or chemical interventions intended to restrict a person's movements. Restraints are currently needed to ensure the safety of this patient and/or others. My clinical rationale appears below.    Category/Type of Restraint     Non Violent:  Soft limb restraint x2  --  Behavior  Pulling at tubes/lines  --  Root Cause of the Behavior  Sedation/intubation  --  Less-Restrictive Measures that Failed  Non Violent Measures:  Close Observation  --  Response to the Restraint  Patient unable to pull at tubes/lines  --  Criteria for Release from the Restraint  Patient calm and off sedation    Bashir (Sharan) MD Britt  [facility]  Pager [phone]  Clinic [phone]      Overnight events:  No major events  PEEP down to 8 today, FiO2 0.4  Remains supine. Off veletri.  Still on NMB  Art line replaced today (left radial wasn't drawing back).   Off NE    Subjective:  Unable to assess    Objective:  Physical Exam:  Vent settings for last 24 hours:  Vent Mode: VCV  FiO2 (%):  [40 %-50 %] 40 %  S RR:  [32-35] 32  S VT:  [250 mL] 250 mL  PEEP/CPAP (cm H2O):  [8 cm H2O-12 cm H2O] 8 cm H2O  Minute Ventilation (L/min):  [9.5 L/min-10.3 L/min] 9.5  "L/min  PIP:  [29 cm H2O-39 cm H2O] 29 cm H2O  MAP (cm H2O):  [12-17] 12    /65   Pulse 80   Temp 97.4  F (36.3  C) (Axillary)   Resp (!) 32   Ht 5' 5\" (1.651 m)   Wt 136 lb 0.4 oz (61.7 kg)   SpO2 93%   BMI 22.64 kg/m      Intake/Output last 3 shifts:  I/O last 3 completed shifts:  In: 1307.7 [I.V.:634.7; NG/GT:558; IV Piggyback:115]  Out: 2190 [Urine:2190]  Intake/Output this shift:  I/O this shift:  In: 325.3 [I.V.:75.3; NG/GT:200; IV Piggyback:50]  Out: 800 [Urine:800]    Physical Exam  Gen: intubated, paralyzed, sedated  HEENT: no OP lesions, no DOT  CV: RRR, no m/g/r  Resp: clear ant no w/r/r   Abd: soft, nontender, BS+  Neuro: PERRL, nonfocal  Ext: no edema    LAB:  Results from last 7 days   Lab Units 12/04/20  0408   LN-WHITE BLOOD CELL COUNT thou/uL 9.7   LN-HEMOGLOBIN g/dL 11.8*   LN-HEMATOCRIT % 38.1   LN-PLATELET COUNT thou/uL 344     Results from last 7 days   Lab Units 12/04/20  0408 12/03/20  0419 12/02/20  0425 11/29/20  1246 11/29/20  1246   LN-SODIUM mmol/L 147* 145 139   < >  --    LN-POTASSIUM mmol/L 3.2* 4.4 4.0   < >  --    LN-CHLORIDE mmol/L 99 100 100   < >  --    LN-CO2 mmol/L 40* 35* 29   < >  --    LN-BLOOD UREA NITROGEN mg/dL 36* 35* 25*   < >  --    LN-CREATININE mg/dL 0.54* 0.57* 0.54*   < >  --    LN-CALCIUM mg/dL 8.4* 8.9 8.9   < >  --    LN-PROTEIN TOTAL g/dL  --   --   --   --  7.1   LN-BILIRUBIN TOTAL mg/dL  --   --   --   --  0.5   LN-ALKALINE PHOSPHATASE U/L  --   --   --   --  146*   LN-ALT (SGPT) U/L  --   --   --   --  41   LN-AST (SGOT) U/L  --   --   --   --  36    < > = values in this interval not displayed.     Micro  Sputum 12/1 neg  Blood pending  C diff neg  Blood neg from 11/14       Current Facility-Administered Medications   Medication Dose Route Frequency Provider Last Rate Last Admin     acetaminophen solution 650 mg (TYLENOL)  650 mg Enteral Tube Q4H PRN Tish Adkins, TOMMIE         acetaminophen tablet 650 mg (TYLENOL)  650 mg Oral Q4H PRN Tahir, " Ramon HUYNH MD   650 mg at 11/30/20 0058     bacitracin ointment packet 1 packet  1 packet Topical Once PRN Tish Adkins CNP         benzocaine-menthoL lozenge 1 lozenge (CEPACOL)  1 lozenge Oral Q1H PRN Eben Butterfield MD         benzonatate capsule 200 mg (TESSALON)  200 mg Oral Q6H PRN Ramon Haro MD   200 mg at 11/30/20 0845     benzonatate capsule 200 mg (TESSALON)  200 mg Oral QHS Seng Up DO Stopped at 11/30/20 2100     bisacodyL suppository 10 mg (DULCOLAX)  10 mg Rectal Daily PRN Eben Butterfield MD         calcium (as carbonate) chewable tablet 400 mg (TUMS)  400 mg Oral TID PRN Ramon Haro MD   400 mg at 11/18/20 1222     chlorhexidine 0.12 % solution 15 mL (PERIDEX)  15 mL Topical Q12H Tish Adkins CNP   15 mL at 12/04/20 0529     cisatracurium (NIMBEX) 200 mg/100 mL (2 mg/mL) infusion 100 mL  0.5-8 mcg/kg/min Intravenous Continuous Eben Butterfield MD 3.6 mL/hr at 12/04/20 1006 2 mcg/kg/min at 12/04/20 1006     dexAMETHasone (PF) injection 6 mg (DECADRON)  6 mg Intravenous Q24H Eben Butterfield MD   6 mg at 12/04/20 0835     dextrose 50 % (D50W) syringe 20-50 mL  20-50 mL Intravenous Q15 Min PRN Ramon Haro MD         diphenhydrAMINE tablet 25 mg (BENADRYL)  25 mg Oral Bedtime PRN Ramon Haro MD   25 mg at 11/27/20 0349     enoxaparin ANTICOAGULANT syringe 30 mg (LOVENOX)  0.5 mg/kg Subcutaneous Q12H Sherri Garcia MD   30 mg at 12/04/20 0834     fentaNYL - BOLUS DOSE from infusion 25-50 mcg  25-50 mcg Intravenous Q1H PRN Tish Adkins CNP   50 mcg at 12/03/20 0957     fentaNYL - BOLUS DOSE from infusion  mcg   mcg Intravenous Q1H PRN Tish Adkins CNP   100 mcg at 12/03/20 0857     fentaNYL 2500 mcg/50 mL CADD infusion (50 mcg/mL)   mcg/hr Intravenous Continuous Tish Adkins CNP 3 mL/hr at 12/04/20 1139 150 mcg/hr at 12/04/20 1139     furosemide injection 20 mg (LASIX)  20 mg  Intravenous BID - diuretic Bashir De La Torre MD   20 mg at 12/04/20 0837     glucagon (human recombinant) injection 1 mg  1 mg Subcutaneous Q15 Min PRN Ramon Haro MD         hydrALAZINE injection 10 mg (APRESOLINE)  10 mg Intravenous Q4H PRN Ramon Haro MD         insulin aspart U-100 injection pen (NovoLOG)   Subcutaneous Q6H FIXED TIMES Eben Butterfield MD   2 Units at 12/04/20 1132     insulin regular 1 Units/mL in sodium chloride 0.9% 250 mL  0-24 Units/hr Intravenous Continuous PRN Eben Butterfield MD         lipase-protease-amylase 5,000-17,000- 24,000 unit capsule 2 capsule (ZENPEP)  2 capsule Enteral Tube PRN Eben Butterfield MD        And     sodium bicarbonate tablet 325 mg  325 mg Enteral Tube PRN Eben Butterfield MD         LORazepam tablet 0.5 mg (ATIVAN)  0.5 mg Oral Q4H PRN Seng Up DO         magnesium hydroxide suspension 30 mL (MILK OF MAG)  30 mL Oral Daily PRN Eben Butterfield MD         melatonin tablet 3 mg  3 mg Oral Bedtime PRN Ramon Haro MD   3 mg at 11/29/20 2034     midazolam (PF) injection 1 mg (VERSED)  1 mg Intravenous Q2H PRN Tish Adkins CNP   1 mg at 12/01/20 1138     midazolam 100 mg/100 mL (1 mg/mL) infusion (VERSED)  0.25-15 mg/hr Intravenous Continuous Tish Adkins CNP 6 mL/hr at 12/04/20 0846 6 mg/hr at 12/04/20 0846     naloxone injection 0.2 mg (NARCAN)  0.2 mg Intravenous Q2 Min PRN Ramon Haro MD        Or     naloxone injection 0.2 mg (NARCAN)  0.2 mg Intramuscular Q2 Min PRN Ramon Haro MD        Or     naloxone injection 0.4 mg (NARCAN)  0.4 mg Intravenous Q2 Min PRN Ramon Haro MD        Or     naloxone injection 0.4 mg (NARCAN)  0.4 mg Intramuscular Q2 Min PRN Ramon Haro MD         norepinephrine 4 mg/250 mL in NS (16 mcg/mL) - CENTRAL  0.01-0.4 mcg/kg/min Intravenous Continuous Radha, Sherri Craft MD   Stopped at 12/04/20 0100      omeprazole capsule 20 mg (PriLOSEC)  20 mg Oral QAM Tish Gonzalez CNP        Or     omeprazole suspension 20 mg (PriLOSEC)  20 mg Enteral Tube QAM Tish Gonzalez CNP        Or     pantoprazole 40 mg injection  40 mg Intravenous QAM Tish Gonzalez CNP   40 mg at 12/04/20 0605     ondansetron injection 4 mg (ZOFRAN)  4 mg Intravenous Q4H PRN Eben Butterfield MD        Or     ondansetron tablet 8 mg (ZOFRAN)  8 mg Oral Q8H PRN Eben Butterfield MD         piperacillin-tazobactam 3.375 g in NaCl 0.9 % 50 mL (MINI-BAG Plus) (ZOSYN)  3.375 g Intravenous Q8H Seng Up DO 12.5 mL/hr at 12/04/20 0605 3.375 g at 12/04/20 0605     polyethylene glycol packet 17 g (MIRALAX)  17 g Oral Daily PRN Ramon Haro MD   17 g at 12/04/20 0834     polyvinyl alcohol 1.4 % ophthalmic solution 1-2 drop (LIQUIFILM TEARS)  1-2 drop Both Eyes Q1H PRN Eben Butterfield MD         senna-docusate 8.6-50 mg tablet 1 tablet (PERICOLACE)  1 tablet Oral BID Eben Butterfield MD   Stopped at 11/30/20 2100    Or     sennosides syrup 8.8 mg (for SENOKOT)  8.8 mg Enteral Tube BID Ebne Butterfield MD   8.8 mg at 12/04/20 0834     sodium chloride 0.9%  10 mL/hr Intravenous Continuous Eben Butterfield MD 10 mL/hr at 12/04/20 0800 10 mL/hr at 12/04/20 0800     sodium chloride bacteriostatic 0.9 % injection 0.1-0.3 mL  0.1-0.3 mL Subcutaneous PRN Ramon Haro MD         sodium chloride bacteriostatic 0.9 % injection 1-5 mL  1-5 mL Intradermal Once PRN Ramon Haro MD         sodium chloride flush 10-20 mL (NS)  10-20 mL Intravenous PRN Tish Adkins CNP   10 mL at 12/04/20 0836     sodium chloride flush 10-30 mL (NS)  10-30 mL Intravenous PRN Tish Adkins CNP         sodium chloride flush 10-30 mL (NS)  10-30 mL Intravenous Q8H FIXED TIMES Tish Adkins CNP   10 mL at 12/04/20 0529     sodium chloride flush 20 mL (NS)  20 mL Intravenous PRN  Tish Adkins, CNP         traZODone tablet 25 mg (DESYREL)  25 mg Oral Bedtime PRN Ramon Haro MD   25 mg at 11/25/20 2113     white petrolatum-mineral oiL 83-15 % ophthalmic ointment 1 application (LUBRIFRESH PM)  1 application Both Eyes Q8H Eben Butterfield MD   1 application at 12/04/20 1133       Critical care attestation: 45 minutes spent managing the following issues: acute respiratory failure requiring intubation/IMV, volume overload, encephalopathy due to toxic-metabolic causes, severe covid-19 infection with ARDS, hypoxemic resp failure, . High risk for organ deterioration and death requiring ICU level care.

## 2021-06-13 NOTE — PROGRESS NOTES
RESPIRATORY CARE NOTE     Patient Name: Hafsa Robles  Today's Date: 12/16/2020     Pt continues on the following settings:  Vent Mode: VCV  FiO2 (%):  [40 %] 40 %  S RR:  [20] 20  S VT:  [375 mL] 375 mL  PEEP/CPAP (cm H2O):  [5 cm H2O] 5 cm H2O  Minute Ventilation (L/min):  [8.2 L/min-12.2 L/min] 8.7 L/min  PIP:  [9 cm H2O-48 cm H2O] 42 cm H2O  NH SUP:  [10 cm H20] 10 cm H20  MAP (cm H2O):  [5-13] 11      Pt is intubated with  # 7.5 ETT secured 23 at the teeth. BS are coarse rhonchi and decreased @ bases. RT suctioned pt for small thick white secretions. Pt's respiratory status is stable. RT will continue to follow per MD's orders.      12/16/20 1941   Vent Information   Interface Invasive   Vent ID [id number]   Vent Mode VCV   Patient Ventilator Status On   Flowmeter at Bedside Yes   Ambu-Bag With Mask at Bedside Yes   Vent Settings   FiO2 (%) 40 %   Heater Temperature 98.6  F (37  C)   Resp Rate (Set) 20   Vt (Set, mL) 375 mL   Waveform Square   PEEP/CPAP (cm H2O) 5 cm H2O   Insp Flow (L/min) 30 L/min   PIP Set (cm H2O) 65 cm H2O   Trigger Sensitivity Flow (L/min) 2 L/min   Bias Flow (lpm)  5 L/min   Humidification BTPS   Patient Data   Vt (observed, mL) 370 mL   Vt Exp (mL) 387 mL   Minute Ventilation (L/min) 8.7 L/min   Total Resp Rate  23 BPM   PIP Observed (cm H2O) 42 cm H2O   MAP (cm H2O) 11   Plateau Pressure (cm H2O) 36 cm H2O   Static Compliance (L/cm H2O) 11   Airway Resistance 13   SpO2 99 %   Heart Rate 89   Alarms   High Resp Rate 45   Low Resp Rate 12   Low PEEP 1 cm H2O   Insp Pressure High (cm H2O) 65 cm H2O   Insp Pressure Low (cm H2O) 14 cm H2O   MV High (L/min) 16 L/min   MV Low (L/min) 3 L/min   Vt High (mL) 900 mL   Vt Low (mL) 120 mL   Apnea Interval (sec) 30 seconds   Apnea Rate 20   Apnea Volume (mL) 375 mL   Alarm Functional and On Yes   Backup Mode Set Yes   Airways   Airway LDA Subglottic Suction endotracheal tube   Airway Suctioning/Secretions   Suction Device  Inline   Secretion Amount  Small   Secretion Color White   Secretion Consistency Thin   Suction Tolerance Tolerated well   Suctioning Adverse Effects None   Subglottic Suction ET Tube 7.5   Placement Date/Time: 12/01/20 0535   Subglottic Suction ETT Entry Site: Oral  Size : 7.5   Secured at (cm) 23 cm   Measured from Teeth   Secured Location Center   Secured with Commercial tube bateman   Cuff Pressure (cm H2O)   (No value checked. )   Site Condition Dry   Subglottic Secretions Small   Subglottic Suction Frequency Intermittent suction   Subglottic Suction Pressure 120 mmHg   Subglottic Suction Lumen Intervention Cleaned           Sunny Jimenez, LRT

## 2021-06-13 NOTE — PLAN OF CARE
Problem: Inadequate Gas Exchange  Goal: Patient will achieve/maintain normal respiratory rate/effort  Outcome: Progressing     Problem: Glucose Imbalance  Goal: Achieve optimal glucose control  Outcome: Progressing

## 2021-06-13 NOTE — PROGRESS NOTES
Pharmacy Consult: Vancomycin Dosing    Pharmacist consulted to dose vancomycin for Hafsa Robles, a 60 y.o. female.    Vanco level low 7.3 (goal 15-20) so vanco dose increase was neeeded.  With no current cultures to indicate need to continue, vanco was dc'd.    Thank you for the consult.  Marcia Santana, PharmD 12/2/2020 10:20 AM

## 2021-06-13 NOTE — PROCEDURES
Procedure note:  After written consent was obtained the patient was placed in a supine position.  The neck was hyperextended and prepped and draped in a sterile fashion.  Local anesthetic was injected above the sternal notch and a 2 cm longitudinal incision was made.  A Blue Rhino kit was used for the tracheostomy with the assistance of bronchoscopy by the ICU physician..  A standard Seldinger technique was used for access into the trachea.  Once the trachea was dilated with the included dilators attempts were made to place an 8 Shiley tracheostomy.  However because of the patient's size this was not possible.  I then switched out for a 6 Shiley nonfenestrated tracheostomy which was placed without difficulty into the trachea.  Once this was in place visual confirmation was done by the ICU physician.  The tracheostomy was then sutured in place to secure it.  The patient tolerated the procedure well without difficulty.    Diego Issa [facility]  [phone]

## 2021-06-13 NOTE — PROGRESS NOTES
RESPIRATORY CARE NOTE     Patient Name: Hafsa Robles  Today's Date: 12/13/2020     Pt continues on the following settings:  Vent Mode: VCV  FiO2 (%):  [30 %-50 %] 35 %  S RR:  [20] 20  S VT:  [375 mL-400 mL] 375 mL  PEEP/CPAP (cm H2O):  [5 cm H2O] 5 cm H2O  Minute Ventilation (L/min):  [8 L/min-13.9 L/min] 11.8 L/min  PIP:  [31 cm H2O-47 cm H2O] 35 cm H2O  MAP (cm H2O):  [10-15] 13   Plateau pressure: 24 cm H2O     Pt is intubated with  # 7.5 ETT secured  23 at the teeth. Weaning trial has been done x 2 briefly during day shift, pt could not tolerate weaning, pt did exhibit tachypnea, excessive muscle use, and high RSBI of 180-200.Pt is currently back on full support and resting comfortably. RT will continue to monitor closely and assess as needed.        12/13/20 1213   Vent Information   Interface Invasive   Vent ID [id number]   Vent Mode VCV   Patient Ventilator Status On   Flowmeter at Bedside Yes   Ambu-Bag With Mask at Bedside Yes   Vent Settings   FiO2 (%) 35 %   Heater Temperature 98.6  F (37  C)   Resp Rate (Set) 20   Vt (Set, mL) 375 mL   Waveform Decelerating ramp   PEEP/CPAP (cm H2O) 5 cm H2O   Insp Flow (L/min) 30 L/min   PIP Set (cm H2O) 60 cm H2O   Trigger Sensitivity Flow (L/min) 2 L/min   Bias Flow (lpm)  5 L/min   Humidification BTPS   Patient Data   Vt Exp (mL) 356 mL   Minute Ventilation (L/min) 11.8 L/min   Total Resp Rate  31 BPM   PIP Observed (cm H2O) 35 cm H2O   MAP (cm H2O) 13   Plateau Pressure (cm H2O) 39 cm H2O   Static Compliance (L/cm H2O) 15   SpO2 100 %   Heart Rate 81   Alarms   High Resp Rate 45   Low Resp Rate 12   Low PEEP 1 cm H2O   Insp Pressure High (cm H2O) 50 cm H2O   Insp Pressure Low (cm H2O) 14 cm H2O   MV High (L/min) 16 L/min   MV Low (L/min) 3 L/min   Vt High (mL) 900 mL   Vt Low (mL) 200 mL   Apnea Interval (sec) 30 seconds   Apnea Rate 20   Apnea Volume (mL) 375 mL   Alarm Functional and On Yes   Backup Mode Set Yes   Airways   Airway LDA Subglottic Suction  endotracheal tube   Subglottic Suction ET Tube 7.5   Placement Date/Time: 12/01/20 0535   Subglottic Suction ETT Entry Site: Oral  Size : 7.5   Secured at (cm) 23 cm   Measured from Teeth   Secured Location Center   Secured with Commercial tube bateman   Site Condition Dry         Sunny Jimenez, LRT

## 2021-06-13 NOTE — PLAN OF CARE
Problem: Inadequate Gas Exchange  Goal: Patient will achieve/maintain normal respiratory rate/effort  Outcome: Progressing     PEEP decreased to 10 per morning ABG. Jasmina was positional, kinked on the hub. Changed dressing but noted sluggish blood return. Waveform better. Manual BP and Jasmina BP correlate. CNP updated. Levophed weaned off at 01:00.  BIS and TOF within the goal range.

## 2021-06-13 NOTE — PROGRESS NOTES
"Pharmacy Consult: Vancomycin Dosing    Pharmacist consulted to dose vancomycin for Hafsa Robles, a 60 y.o. female.    Ordering provider: Seng Up MD    Indication for vancomycin therapy: Hospital Acquired Pneumonia    Goal Trough Range:  15-20 mcg/mL based on indication    Other current antimicrobials              piperacillin-tazobactam 3.375 g in NaCl 0.9 % 50 mL (MINI-BAG Plus) (ZOSYN)  Every 8 hours          vancomycin 1,000 mg in sodium chloride 0.9% 250 mL (VANCOCIN)  Every 12 hours          piperacillin-tazobactam 3.375 g in NaCl 0.9 % 50 mL (MINI-BAG Plus) (ZOSYN)  Once                   Subjective/Objective:    Patient was admitted for Pneumonia on 11/13/2020    Height: 5' 5\" (1.651 m)    Actual Body Weight (ABW): 61 kg (134 lb 6.4 oz)    Ideal body weight: 57 kg (125 lb 10.6 oz)  Adjusted ideal body weight: 58.6 kg (129 lb 2.5 oz)    BMI: Body mass index is 22.37 kg/m .    No Known Allergies    Patient Active Problem List   Diagnosis     Pneumonia     Hypokalemia     Acute respiratory failure with hypoxia (H)     Pneumonia due to 2019 novel coronavirus     Severe protein-calorie malnutrition (H)    Past Medical History:   Diagnosis Date     Malignant neoplasm of right female breast (H)         Temp Readings from Current Encounter:     11/30/20 0230 11/30/20 0749 11/30/20 1109   Temp: 98.1  F (36.7  C) 98.4  F (36.9  C) 98.3  F (36.8  C)     Net Intake/Output (last 24 hours):  I/O last 3 completed shifts:  In: 240 [P.O.:240]  Out: 850 [Urine:850]    Recent Labs     11/28/20  0354 11/29/20  0621 11/29/20  1246 11/30/20  0607 11/30/20  1443   WBC  --  9.5  --  10.4  --    LACTICACID  --   --  1.0  --  1.4   BUN 7* 9  --  8  --    CREATININE 0.47* 0.50*  --  0.50*  --      Estimated Creatinine Clearance: 115.2 mL/min (A) (by C-G formula based on SCr of 0.5 mg/dL (L)).    No results for input(s): CULTURE in the last 72 hours.    No results found for any visits on 11/13/20.    No results for input(s): " VANCOMYCIN in the last 168 hours.    Vancomycin administrations: (last 120 hours)     None          Assessment/Plan:    Pharmacist consulted to dose vancomycin for Hospital Acquired Pneumonia, goal trough range 15-20 mcg/mL.  1. Initiate vancomycin 1000 mg IV every 12 hours (16.4 mg/kg actual body weight).  2. No vancomycin level available for assessment.  3. Pharmacist will plan to check a vancomycin trough level prior to 5th dose or as clinically indicated.  4. Pharmacist will continue to follow.    Thank you for the consult.  Sofia Hatch, PharmD 11/30/2020 3:58 PM

## 2021-06-13 NOTE — PLAN OF CARE
"  Problem: Breathing  Goal: Patient will maintain patent airway  Outcome: Progressing     Problem: Mechanical Ventilation  Goal: Patient will maintain patent airway  Outcome: Progressing  Goal: ET tube will be managed safely  Outcome: Progressing  Goal: Ability to express needs and understand communication  Outcome: Progressing     Patient remained on full vent support. Suctioned moderate amount of thick secretions.Size 7.5  ETT remains in position at 21@T. ETT was moved from side-to-side to prevent skin breakdown on lips. HOB approx 30 degrees. Current vent settings are below: RT will continue to follow.    Vent Mode: VCV  FiO2 (%):  [40 %] 40 %  S RR:  [14-18] 14  S VT:  [350 mL-400 mL] 400 mL  PEEP/CPAP (cm H2O):  [5 cm H2O-8 cm H2O] 5 cm H2O  Minute Ventilation (L/min):  [5.8 L/min-12.6 L/min] 10.6 L/min  PIP:  [27 cm H2O-44 cm H2O] 44 cm H2O  MAP (cm H2O):  [8-13] 13   PLAT: 34    /56 (Patient Position: Lying)   Pulse 79   Temp 98.8  F (37.1  C) (Axillary)   Resp (!) 30   Ht 5' 5\" (1.651 m)   Wt 133 lb 9.6 oz (60.6 kg)   SpO2 97%   BMI 22.23 kg/m          "

## 2021-06-13 NOTE — PLAN OF CARE
Problem: Inadequate Gas Exchange  Goal: Patient will achieve/maintain normal respiratory rate/effort  Outcome: Progressing     Problem: Excessive Fluid Volume  Goal: Patient will achieve/maintain normal respiratory rate/effort  Outcome: Progressing     Problem: Activity Intolerance/Impaired Mobility  Goal: Mobility/activity is maintained at optimum level for patient  Outcome: Progressing     Problem: Ineffective Airway Clearance  Goal: Maintain airway patency  Outcome: Progressing     Problem: Impaired Gas Exchange  Goal: Demonstrate improved ventilation and adequate oxygenation of tissues as evidenced by absence of respiratory distress  Outcome: Progressing     Problem: Breathing  Goal: Patient will maintain patent airway  Outcome: Progressing     Problem: Mechanical Ventilation  Goal: Mobility/activity is maintained at optimum level for patient  Outcome: Progressing  Goal: Patient will maintain patent airway  Outcome: Progressing  Goal: Respiratory status - ventilation  Description: Movement of air in and out of the lungs.    Liberate from ventilator  Outcome: Progressing  Goal: ET tube will be managed safely  Outcome: Progressing     Started weaning today, CPAP+5, PS +15 , FiO2 40%, x 25 minutes

## 2021-06-13 NOTE — PROGRESS NOTES
CRITICAL CARE PROGRESS NOTE:    Assessment/Plan:  60 year old female never smoker with a history of right breast cancer s/p right lumpectomy and radiation, admitted 11/13/20 with acute hypoxemic respiratory failure due to COVID-19 infection, ARDS, intubated on 12/1/20, now with prolonged mechanical ventilation, failure to wean.    RESP:  Acute respiratory distress syndrome, COVID-19 infection, prolonged mechanical ventilation: Likely significant respiratory muscle atrophy, critical illness neuromyopathy. Even on sedation and opiates, RSBI increases to 200s within a couple of minutes on PS 12.    Wean down FiO2, currently 45%    PEEP down to 5    Plan for perc trach tomorrow; appreciate surgery consultation    CV:  Shock: Sedation, sepsis. On low-dose norepinephrine.    norepi to keep MAP >65    NEURO:  Acute encephalopathy: Sedated, will wake and respond off sedation. Nonfocal exam.    Monitor    Lighten sedation daily as able    On scheduled quetiapine and valproate    Wean off dexmedetomidine as able    GI:  Severe protein-calorie malnutrition:    Tube feeding; hold at midnight for planned perc trach 12/22    Bowel regimen: BM 12/20 and 12/21 documented    Free water 200 mL q 4 hrs; stable Na    IR consult for G-J tube placement; appreciated    RENAL:  Hypervolemia: Due to initial resuscitation. Responding well to furosemide.    Continue furosemide 20 mg IV two times a day and monitor response    ID:  COVID-19 infection, Klebsiella UTI: Through acute phase of COVID-19. Completing treatment of Klebsiella UTI. Also on empiric vanco.    Completed dexamethasone and remdesivir    Stop vanco    Continue cefepime through 12/22 (7 days)    HEMATOLOGIC:  Anemia, thrombocytosis: Anemia of acute inflammation, no evidence of active hemorrhage. Thrombocytosis likely reactive.    Monitor with restrictive transfusion threshold    ENDOCRINE:  No acute issues.    glargine 6 units daily; hold tomorrow with tube feeding on hold at  midnight    FSBG with sliding scale asart; change from resistant to standard sliding scale    ICU PROPHYLAXIS:    Enoxaparin 0.5 mg/kg two times a day; hold AM of 12/22 for planned perc trach    PPI    HOB elevation    chlorhexidine    CODE STATUS, DISPOSITION, FAMILY COMMUNICATION: Full code. Critically ill requiring invasive mechanical ventilation and continuous vasopressors. Communicated with the patient's , Arturo, by telephone; he is aware of the plan for trach and G-J placement.     Lines/Drains/Tubes:  Central line (RUE PICC) placed on 12/1/20  Arterial line (L axillary) placed on 12/4/20  Feeding tube (OG tube) placed on 12/1/20  Arteaga catheter placed on 12/15/20    Restraints  Indicated and ordered    Progress Note  Restraint Application    I recognize that restraints are physical and/or chemical interventions intended to restrict a person's movements. Restraints are currently needed to ensure the safety of this patient and/or others. My clinical rationale appears below.    Category/Type of Restraint     Non Violent:  Soft limb restraint x2  --  Behavior  Pulling at tubes/lines  --  Root Cause of the Behavior  Sedation/intubation  --  Less-Restrictive Measures that Failed  Non Violent Measures:  Close Observation  --  Response to the Restraint  Patient unable to pull at tubes/lines  --  Criteria for Release from the Restraint  Patient calm and off sedation    Gregg Saarbia MD  Pulmonary and Critical Care Medicine  [facility]  Cell [phone]  Office [phone]  Pager [phone]    Overnight events:  No new events. PS 12 today with RSBI immediately in 200s.    Subjective:  unable    Objective:  Physical Exam:  Vent settings for last 24 hours:  Vent Mode: PCV/VG  FiO2 (%):  [40 %-100 %] 40 %  S RR:  [20] 20  S VT:  [350 mL] 350 mL  PEEP/CPAP (cm H2O):  [5 cm H2O] 5 cm H2O  Minute Ventilation (L/min):  [6.8 L/min-11.2 L/min] 8 L/min  PIP:  [23 cm H2O-46 cm H2O] 35 cm H2O  ME SUP:  [12 cm  "H20] 12 cm H20  MAP (cm H2O):  [10-22] 13    BP 92/55 (Patient Position: Lying)   Pulse 73   Temp 98.2  F (36.8  C) (Axillary)   Resp (!) 36   Ht 5' 5\" (1.651 m)   Wt 134 lb 14.7 oz (61.2 kg)   SpO2 96%   BMI 22.45 kg/m      Intake/Output last 3 shifts:  I/O last 3 completed shifts:  In: 4399.4 [I.V.:1087.4; NG/GT:2942; IV Piggyback:370]  Out: 2750 [Urine:2750]  Intake/Output this shift:  I/O this shift:  In: 200 [NG/GT:200]  Out: 1250 [Urine:1250]    Physical Exam  Gen: intubated, sedated  HEENT: no OP lesions, no DOT  CV: RRR, no m/g/r  Resp: CTAB  Abd: soft, BS+  Neuro: PERRL, nonfocal, no movement or spontaneous eye opening during my exam  Ext: trace edema    LAB:  Results from last 7 days   Lab Units 12/21/20  0357   LN-WHITE BLOOD CELL COUNT thou/uL 7.8   LN-HEMOGLOBIN g/dL 10.2*   LN-HEMATOCRIT % 32.2*   LN-PLATELET COUNT thou/uL 467*     Results from last 7 days   Lab Units 12/21/20  0357 12/20/20  0549 12/19/20  0551   LN-SODIUM mmol/L 140 139 141   LN-POTASSIUM mmol/L 3.8 3.7 3.6   LN-CHLORIDE mmol/L 101 102 102   LN-CO2 mmol/L 30 32* 31   LN-BLOOD UREA NITROGEN mg/dL 16 16 12   LN-CREATININE mg/dL 0.43* 0.46* 0.44*   LN-CALCIUM mg/dL 8.3* 8.4* 8.8       Current Facility-Administered Medications   Medication Dose Route Frequency Provider Last Rate Last Admin     acetaminophen solution 650 mg (TYLENOL)  650 mg Enteral Tube Q4H PRN Tish Adkins CNP   650 mg at 12/18/20 0648     acetaminophen tablet 650 mg (TYLENOL)  650 mg Oral Q4H PRN Ramon Haro MD   650 mg at 11/30/20 0058     bacitracin ointment packet 1 packet  1 packet Topical Once PRN Tish Adkins CNP         benzocaine-menthoL lozenge 1 lozenge (CEPACOL)  1 lozenge Oral Q1H PRN Eben Butterfield MD         bisacodyL suppository 10 mg (DULCOLAX)  10 mg Rectal Daily PRN Eben Butterfield MD   10 mg at 12/16/20 1631     calcium (as carbonate) chewable tablet 400 mg (TUMS)  400 mg Oral TID PRN Ramon Haro " MD AMINA   400 mg at 11/18/20 1222     cefepime 1 g in NaCl 0.9 % (MINI-BAG Plus) 50 mL (MAXIPIME)  1 g Intravenous Q8H Bashir De La Torre  mL/hr at 12/21/20 0807 1 g at 12/21/20 0807     chlorhexidine 0.12 % solution 15 mL (PERIDEX)  15 mL Topical Q12H Tish Adkins CNP   15 mL at 12/21/20 0551     codeine-guaiFENesin  mg/5 mL liquid 5 mL (GUAIFENESIN AC)  5 mL Oral Q4H PRN Bashir De La Torre MD   5 mL at 12/17/20 1435     dexmedetomidine (PRECEDEX) 1000 mcg/250 mL in NS (4 mcg/mL) infusion  0.1-1.5 mcg/kg/hr Intravenous Continuous Jeramie Crespo MD 7.7 mL/hr at 12/21/20 0600 0.502 mcg/kg/hr at 12/21/20 0600     dextrose 50 % (D50W) syringe 20-50 mL  20-50 mL Intravenous Q15 Min PRN Ramon Haro MD         enoxaparin ANTICOAGULANT syringe 30 mg (LOVENOX)  0.5 mg/kg Subcutaneous BID Gregg Sarabia MD   30 mg at 12/21/20 0812     [START ON 12/22/2020] enoxaparin ANTICOAGULANT syringe 30 mg (LOVENOX)  30 mg Subcutaneous BID Gregg Sarabia MD         fentaNYL pf injection 25-50 mcg (SUBLIMAZE)  25-50 mcg Intravenous Q1H PRN Jennifer Gunter CNP   50 mcg at 12/21/20 0502     furosemide injection 20 mg (LASIX)  20 mg Intravenous BID - diuretic Bashir De La Torre MD   20 mg at 12/21/20 0812     glucagon (human recombinant) injection 1 mg  1 mg Subcutaneous Q15 Min PRN Ramon Haro MD         hydrALAZINE injection 10 mg (APRESOLINE)  10 mg Intravenous Q4H PRN Ramon Haro MD         influenza vaccine quad (PF) (age 50-64 YR) 2020-21 injection 0.5 mL (FLUBLOK)  0.5 mL Intramuscular Prior to Discharge Jeramie Crespo MD         insulin aspart U-100 injection pen (NovoLOG)   Subcutaneous Q6H FIXED TIMES Sweetie Hogue DO   3 Units at 12/20/20 1151     insulin glargine injection 6 Units (LANTUS)  6 Units Subcutaneous Oren Albert MD   6 Units at 12/21/20 0812     lipase-protease-amylase 5,000-17,000- 24,000 unit capsule 2 capsule (ZENPEP)  2 capsule Enteral Tube PRN  Eben Butterfield MD        And     sodium bicarbonate tablet 325 mg  325 mg Enteral Tube PRN Eben Butterfield MD         LORazepam 1 mg/0.5 mL concentrated solution 1 mg (ATIVAN)  1 mg Oral Q6H Oren Vásquez MD   1 mg at 12/21/20 1110     magnesium hydroxide suspension 30 mL (MILK OF MAG)  30 mL Oral Daily PRN Eben Butterfield MD         naloxone injection 0.2 mg (NARCAN)  0.2 mg Intravenous Q2 Min PRN Ramon Haro MD        Or     naloxone injection 0.2 mg (NARCAN)  0.2 mg Intramuscular Q2 Min PRN Ramon Haro MD        Or     naloxone injection 0.4 mg (NARCAN)  0.4 mg Intravenous Q2 Min PRN Ramon Haro MD        Or     naloxone injection 0.4 mg (NARCAN)  0.4 mg Intramuscular Q2 Min PRN Ramon Haro MD         norepinephrine 4 mg/250 mL in NS (16 mcg/mL) - CENTRAL  0.01-0.4 mcg/kg/min Intravenous Continuous Bashir De La Torre MD 6.9 mL/hr at 12/21/20 0601 0.03 mcg/kg/min at 12/21/20 0601     omeprazole capsule 20 mg (PriLOSEC)  20 mg Oral QAM  Tish Adkins CNP        Or     omeprazole suspension 20 mg (PriLOSEC)  20 mg Enteral Tube QAM  Tish Adkins CNP   20 mg at 12/21/20 0551     ondansetron injection 4 mg (ZOFRAN)  4 mg Intravenous Q4H PRN Eben Butterfield MD   4 mg at 12/06/20 2219    Or     ondansetron tablet 8 mg (ZOFRAN)  8 mg Oral Q8H PRN Eben Butterfield MD         polyethylene glycol packet 17 g (MIRALAX)  17 g Oral Daily PRN Ramon Haro MD   17 g at 12/16/20 1630     polyvinyl alcohol 1.4 % ophthalmic solution 1-2 drop (LIQUIFILM TEARS)  1-2 drop Both Eyes Q1H PRN Eben Butterfield MD         QUEtiapine tablet 25 mg (SEROquel)  25 mg Oral Q6H PRN Sweetie Hogue,          QUEtiapine tablet 50 mg (SEROquel)  50 mg Oral Daily PRN Alma Mcmillan CNP   50 mg at 12/15/20 1004     QUEtiapine tablet 75 mg (SEROquel)  75 mg Oral Q6H Sweetie Hogue DO   75 mg at 12/21/20 1100      senna-docusate 8.6-50 mg tablet 1 tablet (PERICOLACE)  1 tablet Oral BID Eben Butterfield MD   Stopped at 11/30/20 2100    Or     sennosides syrup 8.8 mg (for SENOKOT)  8.8 mg Enteral Tube BID Eben Butterfield MD   8.8 mg at 12/21/20 0811     sodium chloride 0.9%  10 mL/hr Intravenous Continuous Eben Butterfield MD 10 mL/hr at 12/21/20 0600 10 mL/hr at 12/21/20 0600     sodium chloride bacteriostatic 0.9 % injection 0.1-0.3 mL  0.1-0.3 mL Subcutaneous PRN Ramon Haro MD         sodium chloride flush 10-20 mL (NS)  10-20 mL Intravenous PRN Tish Adkins CNP   10 mL at 12/04/20 1736     sodium chloride flush 10-30 mL (NS)  10-30 mL Intravenous PRN Tish Adkins CNP         sodium chloride flush 10-30 mL (NS)  10-30 mL Intravenous Q8H FIXED TIMES Tish Adkins CNP   30 mL at 12/21/20 0508     sodium chloride flush 20 mL (NS)  20 mL Intravenous PRN Tish Adkins CNP         traZODone tablet 25 mg (DESYREL)  25 mg Oral Q6H PRN Sweetie Hogue DO         valproic acid (as sodium salt) solution 250 mg (DEPAKENE)  250 mg Oral Q8H FIXED TIMES Alma Mcmillan CNP   250 mg at 12/21/20 0508       Total Critical Care Time: 45 Minutes

## 2021-06-13 NOTE — PROGRESS NOTES
Progress Note    Assessment/Plan    Principal Problem:    Pneumonia  Active Problems:    Hypokalemia    Acute respiratory failure with hypoxia (H)    Pneumonia due to 2019 novel coronavirus    Severe protein-calorie malnutrition (H)    Acute respiratory failure (H)    ARDS (adult respiratory distress syndrome) (H)    Encephalopathy    61 y/o Welsh speaking female admitted for COVID and acute hypoxic respiratory failure on 11/13. She was on high flow NC and Bipap, then to the floor. On 11/30 she was transferred back to ICU for worsening hypoxia and was intubated on 12/1/20 and as of 12/7/20 remains intubated with ARDS     1. ARDS due to COVID infection  - Remains intubated/vent  -ICU managing  - weaning trials-slow improvement, not able to wean very long.  Holding further diuretic today, as volume neutral     2. COVID with septic and circulatory shock  - Weaned off pressors on 12/4, but back on again late 12/7/20, weaned again today.    - Has completed Remdesivir and Dexamethasone courses  - Lovenox two times a day for VTE proph  - Received Rocephin and Azithro earlier in stay, completed empiric course of Zosyn 12/6/20  - Blood and sputum cultures NGTD     3. Malnutrition  - Tube feeding and FW flushes per RD     4. Leukocytosis  Resolved     5. Hypokalemia  -- Replaced     6.DVT prevent-Lovenox q 12     7.GI proph- ppi     ICU managing her and HMS will continue to follow peripherally  Full code     subjective  Patient intubated, sedated    Past Medical History  Past Medical History:   Diagnosis Date     Malignant neoplasm of right female breast (H)        Current Medications  Scheduled Meds:    chlorhexidine  15 mL Topical Q12H     [Held by provider] enoxaparin ANTICOAGULANT  0.5 mg/kg Subcutaneous Q12H     [Held by provider] furosemide  20 mg Intravenous BID - diuretic     influenza vaccine quad (PF) (age 50-64 YR) 2020-21  0.5 mL Intramuscular Prior to Discharge     insulin aspart (NovoLOG) injection    Subcutaneous Q6H FIXED TIMES     omeprazole  20 mg Oral QAM AC    Or     omeprazole  20 mg Enteral Tube QAM AC     senna-docusate  1 tablet Oral BID    Or     senna (SENOKOT) syrup  8.8 mg Enteral Tube BID     sodium chloride  10-30 mL Intravenous Q8H FIXED TIMES     Continuous Infusions:    dexmedetomidine infusion orderable (PRECEDEX) 1 mcg/kg/hr (12/10/20 1651)     norepinephrine Stopped (12/10/20 0653)     sodium chloride 0.9% 10 mL/hr (12/10/20 1600)     PRN Meds:.acetaminophen, acetaminophen, bacitracin, benzocaine-menthoL, bisacodyL, calcium (as carbonate), dextrose 50 % (D50W), fentaNYL - BOLUS DOSE from infusion, fentaNYL - BOLUS DOSE from infusion, glucagon (human recombinant), hydrALAZINE, lipase-protease-amylase **AND** sodium bicarbonate, LORazepam, magnesium hydroxide, midazolam, naloxone **OR** naloxone **OR** naloxone **OR** naloxone, ondansetron **OR** ondansetron, polyethylene glycol, polyvinyl alcohol, sodium chloride bacteriostatic, sodium chloride, sodium chloride, sodium chloride, traZODone  No Known Allergies    Objective  Vital signs in last 24 hours  Temp:  [97.9  F (36.6  C)-99.1  F (37.3  C)] 98.2  F (36.8  C)  Heart Rate:  [61-84] 74  Resp:  [15-38] 31  BP: (105)/(62) 105/62  Arterial Line BP: ()/(46-82) 88/48  FiO2 (%):  [40 %] 40 %  Weight:   136 lb (61.7 kg)      Wt Readings from Last 2 Encounters:   12/10/20 0000 136 lb (61.7 kg)   12/09/20 0000 129 lb (58.5 kg)   12/08/20 0100 133 lb 9.6 oz (60.6 kg)   12/06/20 0230 135 lb 5.8 oz (61.4 kg)   12/05/20 0028 136 lb 7.4 oz (61.9 kg)   12/04/20 0000 136 lb 0.4 oz (61.7 kg)   12/03/20 0030 136 lb 14.5 oz (62.1 kg)   12/01/20 0400 130 lb 12.8 oz (59.3 kg)   11/28/20 0602 134 lb 6.4 oz (61 kg)   11/27/20 0000 136 lb 7.4 oz (61.9 kg)   11/26/20 2013 136 lb 7.4 oz (61.9 kg)   11/26/20 0030 137 lb 5.6 oz (62.3 kg)   11/24/20 0400 137 lb 9.1 oz (62.4 kg)   11/23/20 0400 137 lb 9.1 oz (62.4 kg)   11/22/20 0425 136 lb 11 oz (62 kg)  "  11/21/20 0452 137 lb 12.6 oz (62.5 kg)   11/20/20 0627 138 lb 0.1 oz (62.6 kg)   11/19/20 0733 141 lb 15.6 oz (64.4 kg)   11/13/20 2013 145 lb 8 oz (66 kg)   12/16/14 1223 130 lb (59 kg)   Weight change: 7 lb (3.175 kg)    Intake/Output last 3 shifts  I/O last 3 completed shifts:  In: 3467.3 [I.V.:707.3; NG/GT:2760]  Out: 2600 [Urine:2600]  Intake/Output this shift:  I/O this shift:  In: -   Out: 300 [Urine:300]    Review of Systems   Review of systems not obtained due to inability to communicate with the patient.     Physical Exam    Temp:  [97.9  F (36.6  C)-99.1  F (37.3  C)] 98.2  F (36.8  C)  Heart Rate:  [61-84] 74  Resp:  [15-38] 31  BP: (105)/(62) 105/62  Arterial Line BP: ()/(46-82) 88/48  FiO2 (%):  [40 %] 40 %    /62   Pulse 74   Temp 98.2  F (36.8  C) (Axillary)   Resp (!) 31   Ht 5' 5\" (1.651 m)   Wt 136 lb (61.7 kg)   SpO2 91%   BMI 22.63 kg/m    General appearance: no distress    Pertinent Labs   Lab Results: personally reviewed.   Labs from the previous 24 hours  Recent Results (from the past 24 hour(s))   POCT Glucose    Specimen: Blood   Result Value Ref Range    Glucose 123 70 - 139 mg/dL   POCT Glucose    Specimen: Blood   Result Value Ref Range    Glucose 157 (H) 70 - 139 mg/dL   Basic Metabolic Panel   Result Value Ref Range    Sodium 138 136 - 145 mmol/L    Potassium 3.8 3.5 - 5.0 mmol/L    Chloride 106 98 - 107 mmol/L    CO2 26 22 - 31 mmol/L    Anion Gap, Calculation 6 5 - 18 mmol/L    Glucose 125 70 - 125 mg/dL    Calcium 8.4 (L) 8.5 - 10.5 mg/dL    BUN 12 8 - 22 mg/dL    Creatinine 0.44 (L) 0.60 - 1.10 mg/dL    GFR MDRD Af Amer >60 >60 mL/min/1.73m2    GFR MDRD Non Af Amer >60 >60 mL/min/1.73m2   HM2(CBC W/O DIFF)   Result Value Ref Range    WBC 10.9 4.0 - 11.0 thou/uL    RBC 4.00 3.80 - 5.40 mill/uL    Hemoglobin 11.1 (L) 12.0 - 16.0 g/dL    Hematocrit 34.8 (L) 35.0 - 47.0 %    MCV 87 80 - 100 fL    MCH 27.8 27.0 - 34.0 pg    MCHC 31.9 (L) 32.0 - 36.0 g/dL    RDW 13.7 " 11.0 - 14.5 %    Platelets 470 (H) 140 - 440 thou/uL    MPV 9.8 8.5 - 12.5 fL   POCT Glucose    Specimen: Blood   Result Value Ref Range    Glucose 139 70 - 139 mg/dL   POCT Glucose    Specimen: Blood   Result Value Ref Range    Glucose 121 70 - 139 mg/dL   POCT Glucose    Specimen: Blood   Result Value Ref Range    Glucose 129 70 - 139 mg/dL     Labs from the previous 7 days:   Results from last 7 days   Lab Units 12/10/20  0216 12/09/20  0257 12/08/20  0348   LN-WHITE BLOOD CELL COUNT thou/uL 10.9 12.6* 12.0*   LN-HEMOGLOBIN g/dL 11.1* 11.0* 11.3*   LN-PLATELET COUNT thou/uL 470* 439 443*     Results from last 7 days   Lab Units 12/10/20  0216 12/09/20  0257 12/08/20  0348 12/07/20  0642 12/06/20  1625 12/06/20  0610 12/05/20  1526 12/05/20  0435 12/04/20  0408 12/04/20  0408   LN-SODIUM mmol/L 138 138 138 140  --  141  --  144  --  147*   LN-POTASSIUM mmol/L 3.8 3.7 3.9 4.3 3.6 3.4* 4.4 3.3*   < > 3.2*   LN-CHLORIDE mmol/L 106 101 101 102  --  96*  --  100  --  99   LN-CO2 mmol/L 26 29 31 31  --  37*  --  36*  --  40*   LN-BLOOD UREA NITROGEN mg/dL 12 10 14 16  --  25*  --  34*  --  36*   LN-CREATININE mg/dL 0.44* 0.44* 0.48* 0.45*  --  0.46*  --  0.52*  --  0.54*   LN-CALCIUM mg/dL 8.4* 8.2* 8.1* 8.3*  --  8.6  --  8.5  --  8.4*    < > = values in this interval not displayed.               Invalid input(s): LABALBU        Other labs:   Lab Results   Component Value Date    INR 0.96 11/17/2020    INR 0.95 11/16/2020    INR 0.91 11/15/2020       Pertinent Radiology   Personally reviewed radiologists report and impressions  Xr Chest 1 View Portable    Result Date: 12/4/2020  EXAM: XR CHEST 1 VIEW PORTABLE LOCATION: [facility] DATE/TIME: 12/4/2020 1:24 PM INDICATION: desaturation, covid ARDS eval for interval change COMPARISON: 12/01/2020 and older studies.     Endotracheal tube is in good position. NG tube is coursing in the stomach. Right upper extremity PICC line catheter overlies the mid  SVC. There has been slight decrease in the diffuse, asymmetric interstitial opacities. No hydropneumothorax. Heart and pulmonary vascularity are normal.    Xr Chest 1 View Portable    Result Date: 12/1/2020  EXAM: XR CHEST 1 VIEW PORTABLE LOCATION: [facility] DATE/TIME: 12/1/2020 12:36 PM INDICATION: ETT placement, worsening condition. COMPARISON: 12/01/2020.     Extensive bilateral opacities are present, similar to same-day radiograph. No substantial pleural effusion. No pneumothorax. Normal heart size. ET tube tip appropriately positioned, roughly 23 mm above the fede. Enteric tube courses into the stomach and off the field of view. Right PICC tip over the distal SVC.    Xr Chest 1 View Portable    Result Date: 12/1/2020  EXAM: XR CHEST 1 VIEW PORTABLE LOCATION: [facility] DATE/TIME: 12/1/2020 6:26 AM INDICATION: confirm ET Tube position COMPARISON: 11/29/2020.     The patient has been intubated. The endotracheal tube terminates 1.9 cm above the fede. Nasogastric tube passes below the diaphragm and the inferior margin of the radiograph. Extensive bilateral pulmonary infiltrates are little changed from  11/29/2020 and consistent with infectious/inflammatory pneumonitis. No significant pleural effusion. No pneumothorax. Normal heart size.    Xr Chest 1 View Portable    Result Date: 11/29/2020  EXAM: XR CHEST 1 VIEW PORTABLE LOCATION: [facility] DATE/TIME: 11/29/2020 3:44 PM INDICATION: Hypoxia COMPARISON: 11/19/2020     Previously seen PICC line has been removed. Bilateral pulmonary infiltrates have significantly increased when compared to previous. No pleural effusion or pneumothorax.    Xr Chest 1 View Portable    Result Date: 11/19/2020  EXAM: XR CHEST 1 VIEW PORTABLE LOCATION: [facility] DATE/TIME: 11/19/2020 8:30 AM INDICATION: covid ARDS COMPARISON: Portable AP view of the chest 11/13/2020     Right PICC  "terminates at the SVC right atrial junction. Cardiac silhouette is normal in size. Unchanged mediastinal interfaces. Patchy bilateral lung airspace opacities are present which are slightly better defined compared to 6 days earlier suggesting evolving organizing pneumonia, the typical pattern of lung injury in the setting of COVID pneumonia. No pleural fluid or pneumothorax is present.    Xr Chest 1 View Portable    Result Date: 11/13/2020  EXAM: XR CHEST 1 VIEW PORTABLE LOCATION: [facility] DATE/TIME: 11/13/2020 6:20 PM INDICATION: Chest Pain. Suspected COVID. COMPARISON: 11/11/2020     No pleural fluid or pneumothorax. Heterogeneous pulmonary opacities are concerning for multifocal atypical infection and have increased from the prior study. Normal size of the heart.     Xr Abdomen Ap Portable    Result Date: 12/2/2020  EXAM: XR ABDOMEN AP PORTABLE LOCATION: [facility] DATE/TIME: 12/2/2020 1:05 PM INDICATION: og placement COMPARISON: Chest x-ray 12/01/2020     Enteric tube with the tip in satisfactory position in the gastric antrum. Nonobstructive bowel gas pattern. No definite free air. Stable bilateral pulmonary airspace opacities.     Poc Us 3cg Picc Placement Guidance    Result Date: 12/1/2020  Exam was performed as guidance for PICC line insertion.  Click \"PACS images\" hyperlink below to view any stored images.  For specific procedure details, view procedure note authored by PICC/Vascular Access Nurse.    Poc Us 3cg Picc Placement Guidance    Result Date: 11/15/2020  Exam was performed as guidance for PICC line insertion.  Click \"PACS images\" hyperlink below to view any stored images.  For specific procedure details, view procedure note authored by PICC/Vascular Access Nurse.          Advanced Care Planning  Discharge Planning : Anticipate discharge in days to weeks  Factors affecting discharge: Hypoxia/respiratory failure  Discussed care with intensivist, " staff, for total time 10 minutes with greater than 50% of total time spent in counseling and coordination of care.    Austyn Ricardo MD  [facility] Hospitalist    Office: [phone]

## 2021-06-13 NOTE — CONSULTS
PULMONARY / CRITICAL CARE CONSULT NOTE    Date / Time of Admission:  11/13/2020  5:17 PM    Assessment:     1. Acute respiratory failure with hypoxia  2. Pneumonia due to 2019 novel coronavirus  3. Hx breast cancer s/p partial mastectomy and adjuvant radiation therapy 2017  4. Severe protein-calorie malnutrition (H)    Advance Directives: Full code    Plan:   1. Titrate FiO2  2. Continuous BiPAP   3. Heplock IV fluids  4. Titrate BP meds  5. IV diuresis  6. Sputum Cx  7. Continue broad Abx zosyn and vancomycin  8. Start IV dexamethasone  9. NPO  10. PPI for GI prophylaxis   11. Glucose level monitoring  12. DVT prophylaxis lovenox SQ      Please contact me if you have any questions.  Total critical care time, not including separately billable procedure time: 45 minutes  This patient had a high probability of imminent or life threatening deterioration due to acute respiratory failure which required my direct attention, intervention and personal management.     Eben Elam  Pulmonary / Critical Care  11/30/2020   6:52 PM      ICU DAILY CHECKLIST                           Can patient transfer out of MICU? no    FAST HUG:    Feeding:  Feeding: No.  Patient is receiving NPO    Arteaga:Yes  Analgesia/Sedation:Not Indicated   Thromboembolic prophylaxis: yes; Mode:  Lovenox  HOB>30:  Yes  Stress Ulcer Protocol Active: yes; Mode: PPI  Glycemic Control: Any glucose > 180 no; Mode of Insulin Therapy: Sliding Scale Insulin    INTUBATED:  Can patient have daily waking:  not applicable  Can patient have spontaneous breathing trial:  not applicable    Restraints? no    PHYSICAL THERAPY AND MOBILITY:  Can patient have PT and mobility trial: no  Activity: Bed Rest        Reason for consult :  Acute respiratory failure    HPI:  Hafsa Robles is a 60 y.o. female with hx breast cancer s/p partial mastectomy and radiation therapy 2017.  Presented to ER on Nov 13 for evaluation of shortness of breath, cough, fever and  fatigue for one week.   Diagnosed with Covid 19 and started on treatment for possible bacterial superinfection.   Patient required ICU admission due to increase O2 requirements. Patient stabilized from the respiratory point of view and she was transferred to medical floor on 11/27.  Unfortunately, patient had increased O2 requirements, from high flow nasal cannula, she was started on BiPAP and FiO2 was titrated to 100%.  ICU service was consulted.    Past Medical History:   Diagnosis Date     Malignant neoplasm of right female breast (H)      Allergies: Patient has no known allergies.     MEDS:  Scheduled Meds:    benzonatate  200 mg Oral QHS     codeine-guaiFENesin  10 mL Oral QHS     codeine-guaiFENesin  10 mL Oral QID     dexamethasone  6 mg Intravenous Q24H     enoxaparin ANTICOAGULANT  1 mg/kg Subcutaneous Q12H     lidocaine  1 patch Transdermal DAILY     piperacillin-tazobactam  3.375 g Intravenous Q8H     sodium chloride  10-30 mL Intravenous Q8H FIXED TIMES     vancomycin  1,000 mg Intravenous Q12H     Continuous Infusions:  PRN Meds:.acetaminophen, benzocaine-menthoL, benzonatate, bisacodyL, calcium (as carbonate), dextrose 50 % (D50W), diphenhydrAMINE, glucagon (human recombinant), hydrALAZINE, LORazepam, magnesium hydroxide, melatonin, naloxone **OR** naloxone **OR** naloxone **OR** naloxone, ondansetron **OR** ondansetron, polyethylene glycol, sodium chloride bacteriostatic, sodium chloride bacteriostatic, traZODone    Social History     Socioeconomic History     Marital status:      Spouse name: Not on file     Number of children: Not on file     Years of education: Not on file     Highest education level: Not on file   Occupational History     Not on file   Social Needs     Financial resource strain: Not on file     Food insecurity     Worry: Not on file     Inability: Not on file     Transportation needs     Medical: Not on file     Non-medical: Not on file   Tobacco Use     Smoking status:  "Never Smoker   Substance and Sexual Activity     Alcohol use: Never     Frequency: Never     Binge frequency: Never     Drug use: Never     Sexual activity: Not on file   Lifestyle     Physical activity     Days per week: Not on file     Minutes per session: Not on file     Stress: Not on file   Relationships     Social connections     Talks on phone: Not on file     Gets together: Not on file     Attends Christianity service: Not on file     Active member of club or organization: Not on file     Attends meetings of clubs or organizations: Not on file     Relationship status: Not on file     Intimate partner violence     Fear of current or ex partner: Not on file     Emotionally abused: Not on file     Physically abused: Not on file     Forced sexual activity: Not on file   Other Topics Concern     Not on file   Social History Narrative     Not on file     No family history on file.    ROS  - twelve point review of systems were discussed with patient, positive findings in HPI.     Objective:   VITALS:  /67 (Patient Position: Semi-cedeño)   Pulse (!) 120   Temp 99.3  F (37.4  C)   Resp (!) 38   Ht 5' 5\" (1.651 m)   Wt 134 lb 6.4 oz (61 kg)   SpO2 94%   BMI 22.37 kg/m    EXAM:   Gen: awake, alert, mild respiratory distress on BiPAP  HEENT: pink conjunctiva, moist mucosa  Neck: no thyromegaly, masses or JVD  Lungs: crackles both HT, discrete ronchi  CV: regular, no murmurs or gallops appreciated  Abdomen: soft, NT, BS wnl  Ext: no edema  Neuro: CN II-XII intact, non focal     I&O:      Intake/Output Summary (Last 24 hours) at 11/30/2020 1852  Last data filed at 11/30/2020 1450  Gross per 24 hour   Intake 240 ml   Output 850 ml   Net -610 ml       Data Review:  Results from last 7 days   Lab Units 11/30/20  0607 11/26/20  0454 11/26/20  0454   LN-WHITE BLOOD CELL COUNT thou/uL 10.4   < > 15.5*   LN-HEMOGLOBIN g/dL 13.5  --  13.0   LN-HEMATOCRIT %  --   --  39.3   LN-PLATELET COUNT thou/uL 374   < > 266  266 "    < > = values in this interval not displayed.     Results from last 7 days   Lab Units 11/30/20  0607   LN-SODIUM mmol/L 136   LN-POTASSIUM mmol/L 4.2   LN-CHLORIDE mmol/L 99   LN-CO2 mmol/L 29   LN-BLOOD UREA NITROGEN mg/dL 8   LN-CREATININE mg/dL 0.50*   LN-CALCIUM mg/dL 8.8      11/30/2020 14:46   pH, Arterial 7.43   pCO2, Arterial 47 (H)   pO2, Arterial 126 (H)   Bicarbonate, Arterial Calc 29.6 (H)   O2 Sat, Arterial 98.7 (H)   Oxyhemoglobin 97.7 (H)   POC Base Excess Calc 6.7   Sample Stabilized Temperature 37.0   FIO2 100.00     XR CHEST 1 VIEW PORTABLE  LOCATION: [facility]  DATE/TIME: 11/29/2020 3:44 PM  INDICATION: Hypoxia  COMPARISON: 11/19/2020  IMPRESSION:   Previously seen PICC line has been removed. Bilateral pulmonary infiltrates have significantly increased when compared to previous. No pleural effusion or pneumothorax.    By:  Eben Elam, 11/30/2020, 6:52 PM    Primary Care Physician:  Provider, No Primary Care

## 2021-06-13 NOTE — PROGRESS NOTES
12/15/20 0734   Patient Data   PIP Observed (cm H2O) 61 cm H2O   MAP (cm H2O) 21   Auto/Intrinsic PEEP Observed (cm H2O) 2 cm H2O   Plateau Pressure (cm H2O) 32 cm H2O   Static Compliance (L/cm H2O) 14   Dynamic Compliance (L/cm H2O) 13 L/cm H2O   Airway Resistance 13   SpO2 94 %   Heart Rate 92     Vent Mode: VCV  FiO2 (%):  [40 %-100 %] 40 %  S RR:  [20] 20  S VT:  [375 mL] 375 mL  PEEP/CPAP (cm H2O):  [5 cm H2O] 5 cm H2O  Minute Ventilation (L/min):  [7.5 L/min-12.2 L/min] 12.2 L/min  PIP:  [35 cm H2O-61 cm H2O] 61 cm H2O  MAP (cm H2O):  [10-21] 21     ETT 7.5 23 at teeth, day 15.  SBT done on cpap 5 ps 8 for 7minutes, rsbi 229-330, RR 53-56, back to full support, MD aware  Breath sounds diminished and coarse, lots of coughing.  Suctioning white secretions.  Plan: continue daily sbt's

## 2021-06-13 NOTE — PLAN OF CARE
Problem: Safety  Goal: Patient will be injury free during hospitalization  Outcome: Progressing     Problem: Daily Care  Goal: Daily care needs are met  Outcome: Progressing     Problem: Impaired Gas Exchange  Goal: Demonstrate improved ventilation and adequate oxygenation of tissues as evidenced by absence of respiratory distress  Outcome: Not Progressing   Pt was admitted to ICU around 2045. Vitals stable, afebrile. Pt is on Bipap with FiO2 at 100%. Pt frequently coughs over Bipap and desats quickly. Pt was kept NPO, all PO meds held due to NPO and Bipap status. NP [facility] was updated about pt's current respiratory status. Will continue to  monitor.

## 2021-06-13 NOTE — PROGRESS NOTES
"[facility] Internal Medicine Progress Note       ASSESSMENT:    Principal Problem:    Pneumonia  Active Problems:    Hypokalemia    Acute respiratory failure with hypoxia (H)    Pneumonia due to 2019 novel coronavirus    Severe protein-calorie malnutrition (H)      PLAN:    60-year-old female with history of breast cancer status post radiation presented 11/13/2020 with 1 week of cough fever and fatigue.  Diagnosed with COVID-19 11/10/2020.  She had rapid escalation in oxygen needs requiring transfer to the ICU for high flow oxygen therapy.      COVID-19 pneumonia: Completed course of remdesivir and 10 days dexamethasone.  Remains on high flow oxygen.  --Wean high flow oxygen as tolerated  --Continue Lovenox 0.5 mg/kg twice daily  --BiPAP as needed  --Continue current supportive cares  --PT/OT      DVT PPX:  Lovenox      Needs for Discharge: slow clinical improvement, oxygen needs, family communication and mobility progress     ESTIMATED DISCHARGE:  >2D           Seng Up D.O.  [phone]             -------------------------------------------------------------------------------------------------------------  SUBJECTIVE: NAD.  Patient states her breathing continues to improve.  She has intermittent cough but otherwise feeling well overall.  Denies fevers, chills, abdominal pain, nausea, vomiting, chest pain, headache or confusion.    Exam:  /60 (Patient Position: Semi-cedeño)   Pulse 95   Temp 98.3  F (36.8  C) (Oral)   Resp 24   Ht 5' 5\" (1.651 m)   Wt 134 lb 6.4 oz (61 kg)   SpO2 94%   BMI 22.37 kg/m    General: NAD  RESPIRATORY: Breathing nonlabored  CARDIOVASCULAR:  No le edema bilat.   NEUROLOGIC: Motor and sensory intact, speech clear      Diagnostics Reviewed:      Recent Results (from the past 24 hour(s))   Potassium    Collection Time: 11/27/20  1:35 PM   Result Value Ref Range    Potassium 4.2 3.5 - 5.0 mmol/L   Basic Metabolic Panel    Collection Time: 11/28/20  3:54 AM   Result Value Ref " Range    Sodium 134 (L) 136 - 145 mmol/L    Potassium 3.8 3.5 - 5.0 mmol/L    Chloride 96 (L) 98 - 107 mmol/L    CO2 29 22 - 31 mmol/L    Anion Gap, Calculation 9 5 - 18 mmol/L    Glucose 120 70 - 125 mg/dL    Calcium 8.8 8.5 - 10.5 mg/dL    BUN 7 (L) 8 - 22 mg/dL    Creatinine 0.47 (L) 0.60 - 1.10 mg/dL    GFR MDRD Af Amer >60 >60 mL/min/1.73m2    GFR MDRD Non Af Amer >60 >60 mL/min/1.73m2   Platelet Count    Collection Time: 11/28/20  3:54 AM   Result Value Ref Range    Platelets 353 140 - 440 thou/uL

## 2021-06-13 NOTE — PROGRESS NOTES
61 y/o Maori speaking female admitted for COVID and acute hypoxic respiratory failure on 11/13. She spent several days in ICU requiring high flow NC and Bipap. On 11/30 she was transferred back to ICU for worsening hypoxia and was intubated on 12/1/20 and as of 12/7/20 remains intubated     1. ARDS due to COVID infection  - Remains intubated/vent  -ICU managing  -suspect weaning trials soon     2. COVID with septic and circulatory shock  - Weaned off pressors on 12/4  - Has completed Remdesivir and Dexamethasone courses  - Lovenox two times a day for VTE proph  - Received Rocephin and Azithro earlier in stay, completed empiric course of Zosyn 12/6/20  - Blood and sputum cultures NGTD     3. Malnutrition  - Tube feeding and FW flushes per RD     4. Leukocytosis  - Follow, today 10     5. Hypokalemia  -- Replaced     6.DVT prevent-Lovenox q 12  ICU managing her and [facility] will continue to follow peripherally

## 2021-06-13 NOTE — PROGRESS NOTES
Patient was seen and examine by marielena carroll and TOMMIE Abebe utilizing Chinese  services. Patient is A/Ox3 and responds to all commands. Patient denies shortness of breath, chest tightness and pain at this time. Patient was educated on the importance of keeping HFNC in nares, utilization of the flutter valves / incentive spirometer, and sitting up to chair. Patient is able to achieve 750 mL at this time.

## 2021-06-13 NOTE — PROGRESS NOTES
12/10/20 1143   Patient Data   PIP Observed (cm H2O) 48 cm H2O   MAP (cm H2O) 21   Auto/Intrinsic PEEP Observed (cm H2O) 1 cm H2O   Plateau Pressure (cm H2O) 34 cm H2O   Static Compliance (L/cm H2O) 13   Dynamic Compliance (L/cm H2O) 14 L/cm H2O   SpO2 99 %   Heart Rate 80

## 2021-06-13 NOTE — PLAN OF CARE
Problem: Impaired Gas Exchange  Goal: Demonstrate improved ventilation and adequate oxygenation of tissues as evidenced by absence of respiratory distress  Outcome: Progressing  Patient coughs when she wakes up and it triggers the ventilator and it makes her anxious. PRN fentanyl 50 mcg given for discomfort when she cough.      Problem: Glucose Imbalance  Goal: Achieve optimal glucose control  Outcome: Progressing   Blood sugar was 151 at 5:00 am, covered with insulin as scheduled.

## 2021-06-13 NOTE — PLAN OF CARE
Problem: Potential for Compromised Skin Integrity  Goal: Skin integrity is maintained or improved  Outcome: Progressing     Problem: Mechanical Ventilation  Goal: Ability to express needs and understand communication  Outcome: Progressing   Manisha Brooke RN

## 2021-06-13 NOTE — PROGRESS NOTES
12/05/20 0854   Patient Data   Vt Exp (mL) 290 mL   Minute Ventilation (L/min) 8.3 L/min   Total Resp Rate  29 BPM   PIP Observed (cm H2O) 34 cm H2O   MAP (cm H2O) 13   Auto/Intrinsic PEEP Observed (cm H2O) 0 cm H2O   Plateau Pressure (cm H2O) 26 cm H2O

## 2021-06-13 NOTE — SIGNIFICANT EVENT
"House staff was called by RT due to increased O2 needs. Briefly, the patient was admitted for COVID-19 pneumonia.    Per RT, pt exhibited drastic increase in oxygen needs this evening, saturating in low 90s on 90%FiO2, 60L HFNC. Previously was on 40L 90%FiO2 this am. Per hospital guidelines, pts with greater than 60%FiO2 and greater than 40L on HFNC need transfer to ICU.     I went to evaluate the patient and found her resting comfortably in bed in no acute distress on HFNC. Pt appeared in no acute distress. No increased work of breathing, with slight cough.   No apparent edema.    /68   Pulse 74   Temp 97.7  F (36.5  C) (Oral)   Resp 22   Ht 5' 5\" (1.651 m)   Wt 145 lb 8 oz (66 kg)   SpO2 93%   BMI 24.21 kg/m      A/P  Increased O2 needs, COVID positive  Given these findings, I ordered an ABG and spoke to the on-call NP in the ICU to discuss possibility of transfer, likely for BiPAP. She agreed to transfer, as pt has new O2 need with high possibility of decompensation.     This note was routed to the hospitalist service.    Low Beard,   [facility] Medicine Resident PGY-1  Pager: [phone]     "

## 2021-06-13 NOTE — PLAN OF CARE
Problem: Daily Care  Goal: Daily care needs are met  Outcome: Progressing    Attempted to wean off vent.  Was off ventilator for about 10 minutes.  Respiration rate was in the 50's, would alternate a shallow breath with a deep breath.  SATs stayed in the mid 90's.   was updated.  Was able to titrate the Levophed down to 0.03.  Patient is drowsy, able to follow some commands.    Daniel Villagran RN

## 2021-06-13 NOTE — PROGRESS NOTES
"Critical Care Progress Note  11/23/2020     Admit Date: 11/13/2020  Moved to ICU on 11/15/20    Code Status: full code      Problem List:   Principal Problem:    Pneumonia  Active Problems:    Hypokalemia    Acute respiratory failure with hypoxia (H)    Pneumonia due to 2019 novel coronavirus  anxiety     Overnight Events  On 50 L/min 90%, ovenight was on BiPAP 15/8 65%. Self-proning, eating well. Despite having Iraqi  on phone, patient attempts only English with brief English phrases. States she feels \"happy, breathe good.\" Did encourage use of  to obtain more subjective history but she continued in English.    Plan by System:     Neuro/Psych: no acute issues; at risk for developing ICU delirium    - try to maintain day/night schedule.    - avoid benzos     Pulmonary: Acute respiratory failure with hypoxia due to COVID19 viral pneumonia;   - HFNC 50 L/min 80% alternating with BiPAP 15/8 65%   - Encourage self proning   - Remains high risk for needing intubation.     CV: No acute issues    Tele monitoring.     GI/: E.ating when not on bipap   - Bowel protocol.     Renal: no acute issues    - monitor     ID: COVID19 viral pneumonia    - Dexamethasone x 10 days   - Remdesivir course completed    Heme/Coag:  COVID19 coagulopathy    - LMWH 0.5mg/kg twice daily     Endocrine: Steroid induced hyperglycemia    -  No coverage needed.   - discontinue FSBG and sliding scale insulin    ICU PROPHYLAXIS:    Enoxaparin 0.5 mg/kg two times a day    PPI    CODE STATUS, DISPOSITION, FAMILY COMMUNICATION: Full code. Critically ill. Communicated directly with the patient.    Lines/Drains/Tubes:  Central line (RUE PICC) placed on 11/16/20  External urinary catheter placed on 11/16/20    Restraints  Indicated and ordered    Total CCT: 35 minutes     Mellisa Abebe [facility] Pulmonary and Critical Care Medicine      _______________________________________________________________    HPI: Hafsa P " Travis is a 60 y.o. year old female with PMH Significant for breast cancer s/p radiation. Not currently under treatment. No history of heart, lung, liver, kidney disease. No diabetes. Presented to ER Nov 13 with one week history of cough, fever, fatigue. Diagnosed with Covid 19 and treated for possible bacterial superinfection. She had rapid escalation in her oxygen needs  from 4L NC to HFNC 80% 60 L/min and so she was transferred to ICU for further monitoring.     Objective:     Vitals:    11/23/20 1200 11/23/20 1300 11/23/20 1335 11/23/20 1400   BP: 114/60 117/64  96/56   Patient Position: Sitting      Pulse: 69 70 71 83   Resp: (!) 29 25 20 (!) 29   Temp: 98.6  F (37  C)   98.6  F (37  C)   TempSrc: Oral   Oral   SpO2: 96% 100% 100% 96%   Weight:       Height:          I/O:     Intake/Output Summary (Last 24 hours) at 11/23/2020 1445  Last data filed at 11/23/2020 1400  Gross per 24 hour   Intake 1110 ml   Output 1150 ml   Net -40 ml     Wt Readings from Last 3 Encounters:   11/23/20 137 lb 9.1 oz (62.4 kg)   12/16/14 130 lb (59 kg)      Weight change: 14.1 oz (0.4 kg)    Physical Exam:  Gen: awake, up in chair  HEENT: no DOT, on HFNC  NEURO: PERRL, alert, nonfocal moving all extremities  CARDIOVASCULAR: RRR  PULMONARY: diminished, no crackles or wheezes  GASTROINTESTINAL: soft nt + bs  SKIN: no rashes on visible skin    Labs:   Results from last 7 days   Lab Units 11/23/20  0427 11/19/20  0630 11/19/20  0630   LN-SODIUM mmol/L 136   < > 140   LN-POTASSIUM mmol/L 3.9   < > 3.7   LN-CHLORIDE mmol/L 100   < > 105   LN-CO2 mmol/L 26   < > 28   LN-BLOOD UREA NITROGEN mg/dL 18   < > 14   LN-CREATININE mg/dL 0.50*   < > 0.51*   LN-CALCIUM mg/dL 8.4*   < > 7.9*   LN-PROTEIN TOTAL g/dL  --   --  6.0   LN-BILIRUBIN TOTAL mg/dL  --   --  0.7   LN-ALKALINE PHOSPHATASE U/L  --   --  84   LN-ALT (SGPT) U/L  --   --  49*   LN-AST (SGOT) U/L  --   --  49*    < > = values in this interval not displayed.       Results from last  7 days   Lab Units 11/23/20  0427   LN-WHITE BLOOD CELL COUNT thou/uL 16.8*   LN-HEMOGLOBIN g/dL 14.0   LN-HEMATOCRIT % 42.3   LN-PLATELET COUNT thou/uL 320

## 2021-06-13 NOTE — PLAN OF CARE
Problem: Physical Therapy  Goal: PT Goals  Description: Patient will demonstrate the following by 12/10/20, in order to maximize independence with functional mobility to facilitate safe discharge:   -Sit<>stand with no assistive device, indep  -Ambulate 60 feet with no assistive device, indep    Goals entered on 11/29/2020 by Jessika Lerner, PT     Outcome: Completed     Physical Therapy Discharge Summary    Date of PT Discharge: 12/1/2020  Recommended Equipment:   Discharge Destination: Remains in hospital  Discharge Comments: Pt now intubated. Will discharge patient from physical therapy due to change in medical status. Please re-order physical therapy as appropriate.    12/1/2020 by Sabrina Lara, PT

## 2021-06-13 NOTE — PLAN OF CARE
Problem: Inadequate Gas Exchange  Goal: Patient will achieve/maintain normal respiratory rate/effort  Outcome: Progressing     Problem: Excessive Fluid Volume  Goal: Patient will achieve/maintain normal respiratory rate/effort  Outcome: Progressing     Problem: Ineffective Airway Clearance  Goal: Maintain airway patency  Outcome: Progressing     Problem: Impaired Gas Exchange  Goal: Demonstrate improved ventilation and adequate oxygenation of tissues as evidenced by absence of respiratory distress  Outcome: Progressing     Problem: Breathing  Goal: Patient will maintain patent airway  Outcome: Progressing  Patient progressing towards weaning and intubation.

## 2021-06-13 NOTE — PROGRESS NOTES
"BiPAP:  Mode: ST  IPAP: 15  EPAP: 8  Set RR: 20  FiO2: 70%    /59   Pulse 80   Temp 98  F (36.7  C) (Oral)   Resp (!) 29   Ht 5' 5\" (1.651 m)   Wt 145 lb 8 oz (66 kg)   SpO2 94%   BMI 24.21 kg/m       Tolerating fairly well.  "

## 2021-06-13 NOTE — PLAN OF CARE
Problem: Inadequate Gas Exchange  Goal: Patient will achieve/maintain normal respiratory rate/effort  Outcome: Progressing     Problem: Daily Care  Goal: Daily care needs are met  Outcome: Progressing     Pt maintained on ventilator, antibiotics started d/t new fever.  Arteaga replaced per MD order.  PT continues to have difficulty with anxiety and coughing, PRN fentanyl given x1 for agitation and pain.

## 2021-06-13 NOTE — PROGRESS NOTES
12/21/20 1153   Patient Data   PIP Observed (cm H2O) 35 cm H2O   MAP (cm H2O) 13   Auto/Intrinsic PEEP Observed (cm H2O) 2 cm H2O   Plateau Pressure (cm H2O) 30 cm H2O   Static Compliance (L/cm H2O) 20   Dynamic Compliance (L/cm H2O) 18 L/cm H2O   Airway Resistance 14   SpO2 96 %   Heart Rate 73     GUSTAVO JjT

## 2021-06-13 NOTE — PLAN OF CARE
Problem: Inadequate Gas Exchange  Goal: Patient will achieve/maintain normal respiratory rate/effort  Outcome: Progressing     Problem: Impaired Gas Exchange  Goal: Demonstrate improved ventilation and adequate oxygenation of tissues as evidenced by absence of respiratory distress  Outcome: Progressing     Problem: Breathing  Goal: Patient will utilize incentive spirometer  Outcome: Progressing

## 2021-06-13 NOTE — PLAN OF CARE
Problem: Mechanical Ventilation  Goal: Respiratory status - ventilation  Description: Movement of air in and out of the lungs.    Liberate from ventilator  Outcome: Progressing  Goal: ET tube will be managed safely  Outcome: Progressing

## 2021-06-13 NOTE — PLAN OF CARE
"  Problem: Mechanical Ventilation  Goal: Mobility/activity is maintained at optimum level for patient  Outcome: Progressing  Goal: Patient will maintain patent airway  Outcome: Progressing   Patient remained on full vent support. Breath sounds coarse. Suctioned small to moderate amount of thick cream secretions. Prone and start veletri full strength per MD order.Q2 head turns done.    Vent Mode: VCV  FiO2 (%):  [100 %] 100 %  S RR:  [20-30] 30  S VT:  [250 mL] 250 mL  PEEP/CPAP (cm H2O):  [10 cm H2O-14 cm H2O] 14 cm H2O  Minute Ventilation (L/min):  [5.6 L/min-12.2 L/min] 8.7 L/min  PIP:  [20 cm H2O-53 cm H2O] 48 cm H2O  MAP (cm H2O):  [11-20] 19     /76   Pulse 80   Temp 98.5  F (36.9  C) (Oral)   Resp (!) 30   Ht 5' 5\" (1.651 m)   Wt 130 lb 12.8 oz (59.3 kg)   SpO2 98%   BMI 21.77 kg/m       "

## 2021-06-13 NOTE — PLAN OF CARE
Problem: Inadequate Gas Exchange  Goal: Patient will achieve/maintain normal respiratory rate/effort  Outcome: Progressing     Problem: Excessive Fluid Volume  Goal: Patient will achieve/maintain normal respiratory rate/effort  Outcome: Progressing     Problem: Ineffective Airway Clearance  Goal: Maintain airway patency  Outcome: Progressing     Problem: Impaired Gas Exchange  Goal: Demonstrate improved ventilation and adequate oxygenation of tissues as evidenced by absence of respiratory distress  Outcome: Progressing     Problem: Breathing  Goal: Patient will maintain patent airway  Outcome: Progressing

## 2021-06-13 NOTE — PROGRESS NOTES
"RESPIRATORY CARE NOTE     Patient Name: Hafsa Robles  Today's Date: 11/30/2020     Pt transported from 329 to ICU room 342 on 15 L non-rebreather mask and than placed back on Bipap setting adjusted to IPAP 14, EPAP 6, RR 16, 100%O2. RT will continue to monitor and reassess as needed.    /69 (Patient Position: Semi-cedeño)   Pulse 94   Temp 98.6  F (37  C)   Resp (!) 36   Ht 5' 5\" (1.651 m)   Wt 134 lb 6.4 oz (61 kg)   SpO2 94%   BMI 22.37 kg/m        Kia Lion, LRT    "

## 2021-06-13 NOTE — PLAN OF CARE
"RESPIRATORY CARE NOTE     Patient Name: Hafsa Robles  Today's Date: 12/2/2020       Problem: Inadequate Gas Exchange  Goal: Patient will achieve/maintain normal respiratory rate/effort  Outcome: Progressing     Problem: Ineffective Airway Clearance  Goal: Maintain airway patency  Outcome: Progressing     Problem: Impaired Gas Exchange  Goal: Demonstrate improved ventilation and adequate oxygenation of tissues as evidenced by absence of respiratory distress  Outcome: Progressing       Pt continues on the following settings:  Vent Mode: VCV  FiO2 (%):  [60 %-100 %] 60 %  S RR:  [20-30] 30  S VT:  [250 mL] 250 mL  PEEP/CPAP (cm H2O):  [10 cm H2O-14 cm H2O] 14 cm H2O  Minute Ventilation (L/min):  [5.6 L/min-12.2 L/min] 9 L/min  PIP:  [20 cm H2O-53 cm H2O] 40 cm H2O  MAP (cm H2O):  [11-20] 18   Plateau pressure: 35 cm H2O     Pt is intubated with  # 7.5 ETT secured  21 at the teeth. Pt in prone position RN and RT rotated Q2. Pt is on full strength veletri. Pt's respiratory status is stable. RT will continue to follow per MD's orders.     /76   Pulse 84   Temp 98.6  F (37  C) (Oral)   Resp (!) 30   Ht 5' 5\" (1.651 m)   Wt 130 lb 12.8 oz (59.3 kg)   SpO2 96%   BMI 21.77 kg/m        Kia Lion, LRT   "

## 2021-06-13 NOTE — PROGRESS NOTES
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 She was on high flow NC and BiPAP initially in the ICU, then transferred to the floor. On 11/30 she was transferred back to ICU for worsening hypoxia and was intubated on 12/01/20.    On 12/20, intensivist consulted general surgery for tracheostomy.    Intensivist primarily managing patient.    Hospitalist service will follow patient peripherally while patient in ICU.      Note created using dragon voice recognition software.  Errors in spelling or words which seems out of context are unintentional.  Sounds alike errors may have escaped editing.    12/20/2020  VIKTOR JOHNSON MD  HOSPITALIST, [facility]  PAGER NO. [phone]

## 2021-06-13 NOTE — PROGRESS NOTES
12/06/20 1410   Patient Data   Vt (observed, mL) 361 mL   Vt Exp (mL) 362 mL   Minute Ventilation (L/min) 7.1 L/min   Total Resp Rate  20 BPM   PIP Observed (cm H2O) 36 cm H2O   MAP (cm H2O) 12   Auto/Intrinsic PEEP Observed (cm H2O) 1 cm H2O   Plateau Pressure (cm H2O) 29 cm H2O   Dynamic Compliance (L/cm H2O) 17 L/cm H2O

## 2021-06-13 NOTE — PROGRESS NOTES
PULMONARY / CRITICAL CARE PROGRESS NOTE    Date / Time of Admission:  11/13/2020  5:17 PM    Assessment:   Principal Problem:    Pneumonia  Active Problems:    Hypokalemia    Acute respiratory failure with hypoxia (H)    Pneumonia due to 2019 novel coronavirus    Severe protein-calorie malnutrition (H)    Acute respiratory failure (H)    ARDS (adult respiratory distress syndrome) (H)    Encephalopathy        Advance Directives: Full code      Plan:   Neuro:  Sedated with Precedex and fentanyl.  0.5 and 75mcg/h    Cardiovascular:  Low-dose norepinephrine 0.01.  Probably vasoplegia from sedation  1.8 L negative.  Lasix on hold since 12/6.  Due to hypotension she got 1 L bolus on 12/6 as well.      Respiratory:  Acute hypoxic respiratory failure secondary to COVID-19 pneumonia.  Developed ARDS requiring intubation.  Completed dexamethasone and remdesivir.  Completed 7 days of empiric Zosyn.  Sputum culture negative    First symptoms beginning of November.  Admitted to the hospital with hypoxia on 11/13.  Intubated 12/1    Paralyzed until 12/4.  Tidal volume increased yesterday.  Currently on 400 mL, rate of 14, PEEP 5, FiO2 40%    SBT 15/5 today tolerated for 30 minutes.  Does not tolerate lower pressures.  Plan to do SBT's daily.    GI:  Loose bowel movements.  Otherwise tolerating tube feeds.    :  Lasix on hold.  1.8 L negative.  Monitor and replace electrolytes.    ID:  COVID-19 pneumonia complicated by ARDS.  Completed remdesivir and dexamethasone  Completed 7 days of empiric Zosyn.  Cultures remain negative    Endocrine:  Sliding scale insulin          ICU DAILY CHECKLIST                           Can patient transfer out of MICU? no    FAST HUG:    Feeding:  Feeding: Yes.  Patient is receiving TUBE FEEDS    Arteaga:Yes  Analgesia/Sedation:Yes, see above  Thromboembolic prophylaxis: yes; Mode:  Lovenox  HOB>30:  Yes  Stress Ulcer Protocol Active: yes; Mode: PPI  Glycemic Control: Any glucose > 180 no; Mode of  Insulin Therapy: Sliding Scale Insulin    INTUBATED:  Can patient have daily waking:  yes  Can patient have spontaneous breathing trial:  yes    Restraints? yes    PHYSICAL THERAPY AND MOBILITY:  Can patient have PT and mobility trial: no  Activity: Bed Rest    Critical Care Time greater than: 37min        Subjective:   HPI:  Hafsa Robles is a 60 y.o. lady with a past medical history significant for breast cancer s/p partial mastectomy and adjuvant radiation in 2017, who presented to the hospital on 11/13/2020  5:17 PM for acute hypoxemic respiratory failure.  She was subsequently diagnosed with Covid-19 infection and spent several days in the intensive care unit alternating between high flow and BiPAP.  On 11/30 she was transferred back to the intensive care unit worsening hypoxia and was emergently intubated on the morning of 12/1.    Principal Problem:    Pneumonia  Active Problems:    Hypokalemia    Acute respiratory failure with hypoxia (H)    Pneumonia due to 2019 novel coronavirus    Severe protein-calorie malnutrition (H)    Acute respiratory failure (H)    ARDS (adult respiratory distress syndrome) (H)    Encephalopathy      Allergies: Patient has no known allergies.     MEDS:  Scheduled Meds:    chlorhexidine  15 mL Topical Q12H     enoxaparin ANTICOAGULANT  0.5 mg/kg Subcutaneous Q12H     [Held by provider] furosemide  20 mg Intravenous BID - diuretic     [START ON 12/9/2020] influenza vaccine quad (PF) (age 50-64 YR) 2020-21  0.5 mL Intramuscular Prior to Discharge     insulin aspart (NovoLOG) injection   Subcutaneous Q6H FIXED TIMES     omeprazole  20 mg Oral QAM AC    Or     omeprazole  20 mg Enteral Tube QAM AC     senna-docusate  1 tablet Oral BID    Or     senna (SENOKOT) syrup  8.8 mg Enteral Tube BID     sodium chloride  10-30 mL Intravenous Q8H FIXED TIMES     Continuous Infusions:    dexmedetomidine infusion orderable (PRECEDEX) 0.5 mcg/kg/hr (12/08/20 0756)     fentaNYL citrate (PF) 125  "mcg/hr (12/08/20 0400)     insulin infusion (1 unit/mL)       norepinephrine 0.03 mcg/kg/min (12/08/20 0400)     sodium chloride 0.9% 10 mL/hr (12/08/20 0113)     PRN Meds:.acetaminophen, acetaminophen, bacitracin, benzocaine-menthoL, bisacodyL, calcium (as carbonate), dextrose 50 % (D50W), fentaNYL - BOLUS DOSE from infusion, fentaNYL - BOLUS DOSE from infusion, glucagon (human recombinant), hydrALAZINE, insulin infusion (1 unit/mL), lipase-protease-amylase **AND** sodium bicarbonate, LORazepam, magnesium hydroxide, midazolam, naloxone **OR** naloxone **OR** naloxone **OR** naloxone, ondansetron **OR** ondansetron, polyethylene glycol, polyvinyl alcohol, sodium chloride bacteriostatic, sodium chloride, sodium chloride, sodium chloride, traZODone      Objective:   VITALS:  /56 (Patient Position: Lying)   Pulse 91   Temp 99.5  F (37.5  C) (Axillary)   Resp 15   Ht 5' 5\" (1.651 m)   Wt 133 lb 9.6 oz (60.6 kg)   SpO2 96%   BMI 22.23 kg/m    EXAM:  General appearance: Intubated   Lungs: clear to auscultation bilaterally  Heart: regular rate and rhythm, S1, S2 normal, no murmur, click, rub or gallop  Abdomen: soft, non-tender; bowel sounds normal; no masses,  no organomegaly  Extremities: extremities normal, atraumatic, no cyanosis or edema  Neurologic: Awake, moves all extremities    I&O:      Intake/Output Summary (Last 24 hours) at 12/8/2020 1137  Last data filed at 12/8/2020 0800  Gross per 24 hour   Intake 2376.5 ml   Output 2040 ml   Net 336.5 ml       Data Review:  Chest X-Ray: Bilateral infiltrates    CBC:   Lab Results   Component Value Date    WBC 12.0 (H) 12/08/2020    WBC 4.8 12/16/2014    RBC 4.00 12/08/2020     Serum Glucose range:Invalid input(s): GLUCOSEPOCT    By:  Jeramie Crespo, 12/8/2020, 11:05 AM    Primary Care Physician:  Provider, No Primary Care                     "

## 2021-06-13 NOTE — PROGRESS NOTES
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 She was on high flow NC and BiPAP initially in the ICU, then transferred to the floor. On 11/30 she was transferred back to ICU for worsening hypoxia and was intubated on 12/01/20.    Intensivist primarily managing patient.    Hospitalist service will follow patient peripherally while patient in ICU.      Note created using dragon voice recognition software.  Errors in spelling or words which seems out of context are unintentional.  Sounds alike errors may have escaped editing.    12/17/2020  VIKTOR JOHNSON MD  HOSPITALIST, [facility]  PAGER NO. [phone]

## 2021-06-13 NOTE — PROGRESS NOTES
[facility] COVID RT PROGRESS NOTE    DATA:    VENT DAY# 5    CURRENT SETTINGS:  VENT SETTINGS   VCV 32,250, 8        FIO2:   40%    PATIENT PARAMETERS:    PIP 33  Pplat:  26  Pmean:  12  SBT: No  SECRETIONS:  Small amount thick white secretions  02 SATS:  97%     ETT SIZE 7.5  Secured at  21 cm at teeth    Securing device changed / tube re-taped date 2020    Respiratory Medications:      AB 7.45/58/83/38/97%    NOTE / PLAN:   Patient continues on full ventilator support. No changes or weaning today. Continue to wean FiO2 as tolerated. Continue to follow closely.

## 2021-06-13 NOTE — PLAN OF CARE
Pt transferred from ICU around 0330, Pt belongings on chair. Pt is on High-flow nasal cannula. Pt denies any pain. PRN Cough medication given.

## 2021-06-13 NOTE — PLAN OF CARE
Problem: Mechanical Ventilation  Goal: Patient will maintain patent airway  Outcome: Progressing     Problem: Mechanical Ventilation  Goal: Respiratory status - ventilation  Description: Movement of air in and out of the lungs.    Liberate from ventilator  Outcome: Progressing     Problem: Mechanical Ventilation  Goal: ET tube will be managed safely  Outcome: Progressing     GUSTAVO JjT

## 2021-06-13 NOTE — CONSULTS
"    INITIAL PSYCHIATRIC CONSULTATION  This note was created with the help of Dragon dictation system. Grammatical and typing errors are not intentional.              REASON FOR REQUEST:  persistent agitation,  on vent        ASSESSMENT/RECOMMENDATIONS/PLAN :   Mood disorder due to medical condition, acute hypoxic respiratory failure, COVID-19.  Anxiety reaction exacerbated due to hospitalization, vent, ICU.  Mild intermittent metabolic encephalopathy likely exacerbated in the context of ICU, intubation, COVID-19.  Behavioral agitation requiring ICU sedation protocol.    Recommendations:  Seroquel 50 mg four times a day.  Discontinue Olanzapine.  Trazodone 25 mg four times a day PRN for agitation.  Seroquel 25 mg four times a day PRN for agitation and anxiety  Depakote sprinkles 125 mg 3 times a day.    MENTAL STATUS EXAMINATION:   General Appearance: Patient is resting comfortably in bed, not in acute distress.  Currently on vent.  Speech:   By nodding yes or no, slow.  Thought Process: Increased latency  Thought content: No psychosis, no evidence of hallucinations.  Thought Formation: No loosening of associations  Judgment: Depressed  Insight : Depressed  Attention : Distracted  Memory: Depressed  Fund Of Knowledge: Depressed  Affect: Blunted  Mood: Anxious when awake  Alert : Arousable  Orientation: X 2  Comprehension: Depressed  Generative thought content: Slow  Language: Unassessable patient response to short direct questions by nodding  Gait and Ambulation: Not tested, gait and ambulation requires assist  Musculoskeletal: Psychomotor slow, no tonal abnormalities  Vital Signs: /67 (Patient Position: Lying)   Pulse 82   Temp 99.3  F (37.4  C) (Oral)   Resp 25   Ht 5' 5\" (1.651 m)   Wt 133 lb (60.3 kg)   SpO2 100%   BMI 22.13 kg/m      HISTORY OF PRESENT ILLNESS:   Presenting history to include: Per [facility]/Specialists:   Hasfa Robles is a 60 y.o. female who was sent to the ED from clinic today for " "hypoxia.  Today was her third visit to healthcare facility for symptoms which started 11 days ago and included fever and chills and cough and dyspnea.  She had a Covid test 4 days ago with results still unavailable.  She was in the clinic today and found to be hypoxic.  O2 sats have been in the low 90s and upper 80s and she is currently on 1 L.  Interview done through discussion with ED provider and nurse and review of past records.  Mainly she has had cough and fever and chills.  Denies dyspnea, body aches and headache.  Unable to obtain Polish  currently.  Upon assessment this morning patient was noted to be resting in bed comfortably with eyes closed.  She was arousable to verbal and physical stimuli.  Open her eyes, and nodded to questions yes or no.  She had a difficulty keeping her eyes open for more than a couple seconds.  Information obtained from reviewing chart.   Patient has been requiring restraints.  She becomes very anxious and restless when ICU sedation is decreased.  She has been on multiple sedating medications at present to keep her calm.  She is on scheduled lorazepam, Seroquel, oxycodone and olanzapine.  In addition to that she is also receiving Precedex, fentanyl and Ativan on as needed basis.  Anytime cares are done patient becomes anxious.  Review of Systems:As per HPI. Remainders of 12 point review of systems negative.  Psychiatric ROS:  Patient is falling asleep in mid sentence unable to provide any reliable psychiatric review of system.      PFSH reviewed  and not pertinent to chief complaint/reason for visit  /67 (Patient Position: Lying)   Pulse 82   Temp 99.3  F (37.4  C) (Oral)   Resp 25   Ht 5' 5\" (1.651 m)   Wt 133 lb (60.3 kg)   SpO2 100%   BMI 22.13 kg/m    No results found for: AMPHET, PCP, BARBIT, COCAINEMETAB, OXYCODONE, CREAUR, THC, ETOH  Recent Results (from the past 24 hour(s))   POCT Glucose    Specimen: Blood   Result Value Ref Range    Glucose " 127 70 - 139 mg/dL   POCT Glucose    Specimen: Blood   Result Value Ref Range    Glucose 134 70 - 139 mg/dL   Basic Metabolic Panel   Result Value Ref Range    Sodium 137 136 - 145 mmol/L    Potassium 3.7 3.5 - 5.0 mmol/L    Chloride 101 98 - 107 mmol/L    CO2 26 22 - 31 mmol/L    Anion Gap, Calculation 10 5 - 18 mmol/L    Glucose 189 (H) 70 - 125 mg/dL    Calcium 8.3 (L) 8.5 - 10.5 mg/dL    BUN 12 8 - 22 mg/dL    Creatinine 0.52 (L) 0.60 - 1.10 mg/dL    GFR MDRD Af Amer >60 >60 mL/min/1.73m2    GFR MDRD Non Af Amer >60 >60 mL/min/1.73m2   HM2(CBC W/O DIFF)   Result Value Ref Range    WBC 12.6 (H) 4.0 - 11.0 thou/uL    RBC 4.05 3.80 - 5.40 mill/uL    Hemoglobin 11.3 (L) 12.0 - 16.0 g/dL    Hematocrit 35.0 35.0 - 47.0 %    MCV 86 80 - 100 fL    MCH 27.9 27.0 - 34.0 pg    MCHC 32.3 32.0 - 36.0 g/dL    RDW 14.2 11.0 - 14.5 %    Platelets 553 (H) 140 - 440 thou/uL    MPV 9.9 8.5 - 12.5 fL   POCT Glucose    Specimen: Blood   Result Value Ref Range    Glucose 152 (H) 70 - 139 mg/dL   POCT Glucose    Specimen: Blood   Result Value Ref Range    Glucose 135 70 - 139 mg/dL     Recent Results (from the past 72 hour(s))   Potassium    Collection Time: 12/11/20  2:15 PM   Result Value Ref Range    Potassium 4.2 3.5 - 5.0 mmol/L   Urinalysis-UC if Indicated    Collection Time: 12/11/20  2:15 PM   Result Value Ref Range    Color, UA Brown (!) Colorless, Yellow, Straw, Light Yellow    Clarity, UA Cloudy (!) Clear    Glucose, UA Negative Negative    Bilirubin, UA Negative Negative    Ketones, UA Negative Negative, 60 mg/dL    Specific Gravity, UA 1.010 1.001 - 1.030    Blood, UA Large (!) Negative    pH, UA 8.0 4.5 - 8.0    Protein, UA 30 mg/dL (!) Negative mg/dL    Urobilinogen, UA <2.0 E.U./dL <2.0 E.U./dL, 2.0 E.U./dL    Nitrite, UA Negative Negative    Leukocytes, UA Moderate (!) Negative    Bacteria, UA None Seen None Seen hpf    RBC, UA >100 (!) None Seen, 0-2 hpf    WBC, UA  (!) None Seen, 0-5 hpf    Squam Epithel, UA  10-25 (!) None Seen, 0-5 lpf    Mucus, UA Few (!) None Seen lpf   Culture, Urine    Collection Time: 12/11/20  2:15 PM    Specimen: Urine, Catheter   Result Value Ref Range    Culture 10,000-50,000 col/ml Yeast species (!)    POCT Glucose    Collection Time: 12/11/20  5:49 PM    Specimen: Capillary; Blood   Result Value Ref Range    Glucose 152 (H) 70 - 139 mg/dL   POCT Glucose    Collection Time: 12/11/20 11:52 PM    Specimen: Blood   Result Value Ref Range    Glucose 110 70 - 139 mg/dL   Basic Metabolic Panel    Collection Time: 12/12/20  4:18 AM   Result Value Ref Range    Sodium 138 136 - 145 mmol/L    Potassium 3.6 3.5 - 5.0 mmol/L    Chloride 104 98 - 107 mmol/L    CO2 23 22 - 31 mmol/L    Anion Gap, Calculation 11 5 - 18 mmol/L    Glucose 192 (H) 70 - 125 mg/dL    Calcium 8.3 (L) 8.5 - 10.5 mg/dL    BUN 15 8 - 22 mg/dL    Creatinine 0.52 (L) 0.60 - 1.10 mg/dL    GFR MDRD Af Amer >60 >60 mL/min/1.73m2    GFR MDRD Non Af Amer >60 >60 mL/min/1.73m2   Magnesium    Collection Time: 12/12/20  4:18 AM   Result Value Ref Range    Magnesium 2.0 1.8 - 2.6 mg/dL   HM2(CBC W/O DIFF)    Collection Time: 12/12/20  4:18 AM   Result Value Ref Range    WBC 11.9 (H) 4.0 - 11.0 thou/uL    RBC 4.07 3.80 - 5.40 mill/uL    Hemoglobin 11.4 (L) 12.0 - 16.0 g/dL    Hematocrit 35.6 35.0 - 47.0 %    MCV 88 80 - 100 fL    MCH 28.0 27.0 - 34.0 pg    MCHC 32.0 32.0 - 36.0 g/dL    RDW 13.9 11.0 - 14.5 %    Platelets 513 (H) 140 - 440 thou/uL    MPV 9.9 8.5 - 12.5 fL   POCT Glucose    Collection Time: 12/12/20  5:44 AM    Specimen: Capillary; Blood   Result Value Ref Range    Glucose 136 70 - 139 mg/dL   POCT Glucose    Collection Time: 12/12/20 12:08 PM    Specimen: Blood   Result Value Ref Range    Glucose 126 70 - 139 mg/dL   Blood Gases, Arterial    Collection Time: 12/12/20  3:57 PM   Result Value Ref Range    pH, Arterial 7.46 (H) 7.37 - 7.44    pCO2, Arterial 40 35 - 45 mm Hg    pO2, Arterial 80 80 - 90 mm Hg    Bicarbonate, Arterial  Calc 28.5 23.0 - 29.0 mmol/L    O2 Sat, Arterial 96.7 96.0 - 97.0 %    Oxyhemoglobin 95.1 (L) 96.0 - 97.0 %    Base Excess, Arterial Calc 4.9 mmol/L    Ventilation Mode CPAP/PS VC     Rate 20 rr/min    FIO2 35.00     Peep 5 cm H2O    Sample Stabilized Temperature 37.0 degrees C    Ventilator Tidal Volume 400 mL   POCT Glucose    Collection Time: 12/12/20  6:32 PM    Specimen: Blood   Result Value Ref Range    Glucose 154 (H) 70 - 139 mg/dL   Hemoglobin    Collection Time: 12/12/20 10:24 PM   Result Value Ref Range    Hemoglobin 11.7 (L) 12.0 - 16.0 g/dL   Lactic Acid    Collection Time: 12/12/20 10:24 PM   Result Value Ref Range    Lactic Acid 0.4 (L) 0.7 - 2.0 mmol/L   POCT Glucose    Collection Time: 12/12/20 11:45 PM    Specimen: Blood   Result Value Ref Range    Glucose 109 70 - 139 mg/dL   Basic Metabolic Panel    Collection Time: 12/13/20  4:06 AM   Result Value Ref Range    Sodium 137 136 - 145 mmol/L    Potassium 4.1 3.5 - 5.0 mmol/L    Chloride 102 98 - 107 mmol/L    CO2 27 22 - 31 mmol/L    Anion Gap, Calculation 8 5 - 18 mmol/L    Glucose 166 (H) 70 - 125 mg/dL    Calcium 8.5 8.5 - 10.5 mg/dL    BUN 12 8 - 22 mg/dL    Creatinine 0.50 (L) 0.60 - 1.10 mg/dL    GFR MDRD Af Amer >60 >60 mL/min/1.73m2    GFR MDRD Non Af Amer >60 >60 mL/min/1.73m2   HM2(CBC W/O DIFF)    Collection Time: 12/13/20  4:06 AM   Result Value Ref Range    WBC 11.1 (H) 4.0 - 11.0 thou/uL    RBC 4.15 3.80 - 5.40 mill/uL    Hemoglobin 11.7 (L) 12.0 - 16.0 g/dL    Hematocrit 36.1 35.0 - 47.0 %    MCV 87 80 - 100 fL    MCH 28.2 27.0 - 34.0 pg    MCHC 32.4 32.0 - 36.0 g/dL    RDW 14.1 11.0 - 14.5 %    Platelets 547 (H) 140 - 440 thou/uL    MPV 9.7 8.5 - 12.5 fL   POCT Glucose    Collection Time: 12/13/20  5:08 AM    Specimen: Blood   Result Value Ref Range    Glucose 151 (H) 70 - 139 mg/dL   POCT Glucose    Collection Time: 12/13/20 12:35 PM    Specimen: Blood   Result Value Ref Range    Glucose 128 70 - 139 mg/dL   POCT Glucose     Collection Time: 12/13/20  5:50 PM    Specimen: Blood   Result Value Ref Range    Glucose 127 70 - 139 mg/dL   POCT Glucose    Collection Time: 12/13/20 11:41 PM    Specimen: Blood   Result Value Ref Range    Glucose 134 70 - 139 mg/dL   Basic Metabolic Panel    Collection Time: 12/14/20  4:29 AM   Result Value Ref Range    Sodium 137 136 - 145 mmol/L    Potassium 3.7 3.5 - 5.0 mmol/L    Chloride 101 98 - 107 mmol/L    CO2 26 22 - 31 mmol/L    Anion Gap, Calculation 10 5 - 18 mmol/L    Glucose 189 (H) 70 - 125 mg/dL    Calcium 8.3 (L) 8.5 - 10.5 mg/dL    BUN 12 8 - 22 mg/dL    Creatinine 0.52 (L) 0.60 - 1.10 mg/dL    GFR MDRD Af Amer >60 >60 mL/min/1.73m2    GFR MDRD Non Af Amer >60 >60 mL/min/1.73m2   HM2(CBC W/O DIFF)    Collection Time: 12/14/20  4:29 AM   Result Value Ref Range    WBC 12.6 (H) 4.0 - 11.0 thou/uL    RBC 4.05 3.80 - 5.40 mill/uL    Hemoglobin 11.3 (L) 12.0 - 16.0 g/dL    Hematocrit 35.0 35.0 - 47.0 %    MCV 86 80 - 100 fL    MCH 27.9 27.0 - 34.0 pg    MCHC 32.3 32.0 - 36.0 g/dL    RDW 14.2 11.0 - 14.5 %    Platelets 553 (H) 140 - 440 thou/uL    MPV 9.9 8.5 - 12.5 fL   POCT Glucose    Collection Time: 12/14/20  5:44 AM    Specimen: Blood   Result Value Ref Range    Glucose 152 (H) 70 - 139 mg/dL   POCT Glucose    Collection Time: 12/14/20 12:00 PM    Specimen: Blood   Result Value Ref Range    Glucose 135 70 - 139 mg/dL       PMH:   Past Medical History:   Diagnosis Date     Malignant neoplasm of right female breast (H)            Current Medications:Scheduled Meds:    chlorhexidine  15 mL Topical Q12H     enoxaparin ANTICOAGULANT  0.5 mg/kg Subcutaneous BID     influenza vaccine quad (PF) (age 50-64 YR) 2020-21  0.5 mL Intramuscular Prior to Discharge     insulin aspart (NovoLOG) injection   Subcutaneous Q6H FIXED TIMES     insulin glargine  6 Units Subcutaneous QAM     LORazepam (ATIVAN) conc solution 2 mg/mL  1 mg Oral Q6H     OLANZapine  5 mg Oral Q6H     omeprazole  20 mg Oral QAM AC    Or      omeprazole  20 mg Enteral Tube QAM AC     QUEtiapine  25 mg Oral BID     senna-docusate  1 tablet Oral BID    Or     senna (SENOKOT) syrup  8.8 mg Enteral Tube BID     sodium chloride  10-30 mL Intravenous Q8H FIXED TIMES     Continuous Infusions:    dexmedetomidine infusion orderable (PRECEDEX) 0.6 mcg/kg/hr (12/14/20 1200)     norepinephrine 0.08 mcg/kg/min (12/14/20 1200)     sodium chloride 0.9% 10 mL/hr (12/14/20 1200)     PRN Meds:.acetaminophen, acetaminophen, bacitracin, benzocaine-menthoL, bisacodyL, calcium (as carbonate), dextrose 50 % (D50W), fentaNYL pf, glucagon (human recombinant), hydrALAZINE, lipase-protease-amylase **AND** sodium bicarbonate, LORazepam, magnesium hydroxide, midazolam, naloxone **OR** naloxone **OR** naloxone **OR** naloxone, ondansetron **OR** ondansetron, polyethylene glycol, polyvinyl alcohol, sodium chloride bacteriostatic, sodium chloride, sodium chloride, sodium chloride, traZODone                Family History: PERSONALLY REVIEWED.No family history on file.  Pertinent Family hx not pertinent to Chief Complaint or reason for visit.     Social History:  PERSONALLY REVIEWED.  Social History     Socioeconomic History     Marital status:      Spouse name: Not on file     Number of children: Not on file     Years of education: Not on file     Highest education level: Not on file   Occupational History     Not on file   Social Needs     Financial resource strain: Not on file     Food insecurity     Worry: Not on file     Inability: Not on file     Transportation needs     Medical: Not on file     Non-medical: Not on file   Tobacco Use     Smoking status: Never Smoker   Substance and Sexual Activity     Alcohol use: Never     Frequency: Never     Binge frequency: Never     Drug use: Never     Sexual activity: Not on file   Lifestyle     Physical activity     Days per week: Not on file     Minutes per session: Not on file     Stress: Not on file   Relationships     Social  connections     Talks on phone: Not on file     Gets together: Not on file     Attends Mu-ism service: Not on file     Active member of club or organization: Not on file     Attends meetings of clubs or organizations: Not on file     Relationship status: Not on file     Intimate partner violence     Fear of current or ex partner: Not on file     Emotionally abused: Not on file     Physically abused: Not on file     Forced sexual activity: Not on file   Other Topics Concern     Not on file   Social History Narrative     Not on file    not pertinent to Chief Complaint or reason for visit.             Allergies as of 06/01/2014 Reviewed     Review of Systems:As per HPI. Remainders of 12 point review of systems negative.    Review of Pertinent Laboratory:      PERSONALLY REVIEWED.    Physical Exam: Temp:  [98.4  F (36.9  C)-100.5  F (38.1  C)] 99.3  F (37.4  C)  Heart Rate:  [] 82  Resp:  [20-47] 25  BP: ()/(51-67) 112/67  Arterial Line BP: ()/(36-73) 112/64  FiO2 (%):  [30 %-100 %] 50 %   Vitals: reviewed in chart     Physical exam as per medical team: reviewed in chart      diagnoses, risk and benefits of medications discussed with staff. Care coordination with care management team.   Thank you for this consultation.       Alma Mcmillan; NP  [facility]        This note was created with the help of Dragon dictation system. Grammatical and typing errors are not intentional.

## 2021-06-13 NOTE — PROGRESS NOTES
PROVIDER RESTRAINT FOR NON-VIOLENT BEHAVIOR FACE TO FACE EVALUATION    Patient's Immediate Situation:  Patient demonstrated the following behaviors: Pulling/tugging at invasive lines or tubes and does not respond to verbal/non-verbal redirection    Patient's Reaction to the intervention:  Does patient understand the reason for restraint/seclusion? Unable to Express    Medical Condition:  Is there any evidence of compromise of Skin integrity, Respiratory, Cardiovascular, Musculoskeletal, Hydration? No    Behavioral Condition:  In consultation with the RN, is there a need to continue this restraint or seclusion? Yes    See Restraint Flowsheet for complete restraint documentation and assessment.    Mellisa Abebe, CNP

## 2021-06-13 NOTE — PROGRESS NOTES
Care Management Follow Up Note    Length of Stay (days) 6     Patient plan of care discussed at Interdisciplinary Rounds: yes  Pt/Rep Education / Interventions: Review d/c options, Discuss d/c, Discussed readmission risk    Expected Discharge Date: 11/21/20  Concerns to be Addressed / Barriers to Discharge: COVID Progression  Anticipated Discharge Disposition: Home vs TCU  Anticipated Discharge Services:  Stretcher transportation if TCU with COVID   Selected Continued Care - Admitted Since 11/13/2020    No services have been selected for the patient.        Plan: Chart Reviewed. COVID progression. Pt still under critical care observation and utilizing high-flow nasal oxygen for COVID pneumonia. CM to monitor for discharge needs as symptoms improves.     DIEUDONNE England  11/19/20  12:56 PM

## 2021-06-13 NOTE — PROGRESS NOTES
"[facility] PROGRESS NOTE      ASSESSMENT AND PLAN:  60 y.o. old female with COVID pneumonia and worsening respiratory failure.     1. COVID-19 pneumonia  - Cont dexamethasone and Remdesivir   - On admission, lactic acid was elevated, was given fluid bolus although half the dose given COVID risk for pulmonary edema. On 11/15 and 11/16- 20mg IV lasix given   - Antibiotics for pneumonia for now  - Cont Lovenox for DVT prophylaxis  - Continue covid labs      2. Acute hypoxic respiratory failure  - Rapid escalation of O2 need noted on 11/15 requiring the patient to get transferred to ICU. ABG noted. Currently on BiPaP 15/8, FIO2 60%. Appreciate pulm consult  - Treating above  - RT following      3. Hypokalemia  - Replacement protocol      4.  Diarrhea  - Cdiff negative   - Resolved     VTE prophylaxis: Lovenox   GI Prophylaxis: IV protonix   Full Code    Discharge barriers:  - Acute hypoxic respiratory failure due to COVID-19 pneumonia    Discussed the care plan with Dr De La Torre. Patient is still critical; not leaving ICU anytime soon. [facility] will follow peripherally from now until the patient comes to the floor care.    SUBJECTIVE:  Patient seen and examined. Discussed with ICU staff. Patient had an uneventful night. Had been on BiPaP, now on HFNC    ROS:  12 Points review of systems reviewed and is negative except for what has already been mentioned above    OBJECTIVE:  /71   Pulse 62   Temp 98.4  F (36.9  C) (Axillary)   Resp 27   Ht 5' 5\" (1.651 m)   Wt 145 lb 8 oz (66 kg)   SpO2 96%   BMI 24.21 kg/m    I/O last 3 completed shifts:  In: 634 [P.O.:200; I.V.:134; IV Piggyback:300]  Out: 250 [Urine:250]  No intake/output data recorded.  GENRL: On BiPaP  HEENT: NC/AT      Pupils- round and reactive to light bilaterally      Neck- supple, no JVP elevation, no lymphadenopathy or thyromegaly      Sclera- anicteric      Mucous membrane- moist and pink  CHEST: Coarse breath sounds on auscultation bilaterally  HEART: S1S2 " regular. No murmurs, rubs or gallops  ABDMN: Soft. Non-tender, non-distended. No organomegaly. No guarding or rigidity. Bowel sounds- active  EXTRM: No pedal oedema  NEURO: Cranial nerves II-XII grossly intact. No focal neurological deficit. No involuntary movements  INTGM: No skin rash, no cyanosis or clubbing    DIAGNOSTIC LABS:     Results from last 7 days   Lab Units 11/17/20  0555 11/16/20  0512 11/15/20  1941 11/15/20  0555 11/14/20  1504   LN-SODIUM mmol/L 142 143  --  143  --    LN-POTASSIUM mmol/L 3.7 3.7 4.2 2.9*  --    LN-CHLORIDE mmol/L 105 103  --  105  --    LN-CO2 mmol/L 28 27  --  27  --    LN-BLOOD UREA NITROGEN mg/dL 20 14  --  13  --    LN-CREATININE mg/dL 0.56* 0.53*  --  0.48*  --    LN-CALCIUM mg/dL 8.0* 7.9*  --  7.9*  --    LN-ALBUMIN g/dL  --   --   --   --  2.7*   LN-PROTEIN TOTAL g/dL  --   --   --   --  6.3   LN-BILIRUBIN TOTAL mg/dL  --   --   --   --  0.3   LN-ALKALINE PHOSPHATASE U/L  --   --   --   --  99   LN-ALT (SGPT) U/L  --   --   --   --  43   LN-AST (SGOT) U/L  --   --   --   --  121*     Results from last 7 days   Lab Units 11/17/20  0555 11/16/20  0602 11/15/20  0555   LN-WHITE BLOOD CELL COUNT thou/uL 8.6 8.1 9.5   LN-HEMOGLOBIN g/dL 14.3 14.1 13.8   LN-HEMATOCRIT % 43.9 43.0 42.7   LN-PLATELET COUNT thou/uL 407 324 302       All recent labs reviewed personally  Radiology report reviewed

## 2021-06-13 NOTE — PLAN OF CARE
Problem: Breathing  Goal: Patient will maintain patent airway  Outcome: Progressing     Problem: Mechanical Ventilation  Goal: Patient will maintain patent airway  Outcome: Progressing  Goal: Respiratory status - ventilation  Description: Movement of air in and out of the lungs.    Liberate from ventilator  Outcome: Progressing  Goal: ET tube will be managed safely  Outcome: Progressing     GUSTAVO JjT

## 2021-06-13 NOTE — PLAN OF CARE
Problem: Inadequate Gas Exchange  Goal: Patient will achieve/maintain normal respiratory rate/effort  Outcome: Not Progressing     Problem: Excessive Fluid Volume  Goal: Patient will achieve/maintain normal respiratory rate/effort  Outcome: Not Progressing     Problem: Impaired Gas Exchange  Goal: Demonstrate improved ventilation and adequate oxygenation of tissues as evidenced by absence of respiratory distress  Outcome: Not Progressing   Pt requiring increased flow and O2 needs on HFNC. Pt continues to be agitated by constant cough. PRN meds given per MAR. Pt appears tired, states she is irritated by her cough. Continue supportive cares.   Kerrie Valdez RN

## 2021-06-13 NOTE — PROGRESS NOTES
"  Clinical Nutrition Therapy Reassessment Note      Reason:  RD managing tube feeding per consult    Nutrition Prescription:   Diet: NPO.  Tube feeding: Replete with fiber at 65 ml/hr (started 12/2)  Flush Orders: 200 ml water every 4 hrs  Propofol:   IV dextrose or Fluids:     dexmedetomidine infusion orderable (PRECEDEX), Last Rate: 0.6 mcg/kg/hr (12/14/20 1200)       norepinephrine, Last Rate: 0.08 mcg/kg/min (12/14/20 1200)       sodium chloride 0.9%, Last Rate: 10 mL/hr (12/14/20 1200)    IV fluids:  1133 ml in past 24 hrs    Nutrition Intake:  NPO  FT is an OG placed 12/1/20.  X-ray of 12/2 confirms gastric.  TF provides: 1560 calories, 100 g protein, 193 g carb, 23 g fiber, 2498 ml free water     Nutrition needs met.      Anthropometrics:  Height: 5' 5\" (165.1 cm)  Admission weight: 145 lb 11/13/20  Weight: 133 lb (60.3 kg)  BMI (Calculated): 24.2  Nonpitting all extremities documented 12/14.  Wt trending down w/previous diuresis    Physical Findings:  Unable as pt in COVID isolation precautions       GI Status/Output:   GI symptoms per nursing: WDL, constipation  Last BM recorded: 12/11  Gastric residuals:  Minimal    Skin/Wound:   Jeffry Scale Score: 12    Left lip PU noted per chart    Medications:  Medications reviewed.    Labs:  Lab Results   Component Value Date/Time    ALBUMIN 2.3 (L) 11/29/2020 12:46 PM     12/14/2020 04:29 AM    K 3.7 12/14/2020 04:29 AM    BUN 12 12/14/2020 04:29 AM    CREATININE 0.52 (L) 12/14/2020 04:29 AM     (H) 12/14/2020 04:29 AM    PHOS 3.2 12/08/2020 03:48 AM    MG 2.0 12/12/2020 04:18 AM   fsbg 126-152  Labs reviewed    Estimated Nutrition Needs:  Using current weight (12/2) of 59.3 kg.     Energy Needs: 9672-1340 kcals daily per 25-30 kcal/kg   Protein Needs:  g daily, 1.2-2 g/kg.  Fluid Needs: 2080-9848 mls daily, 25-30 mls/kg    Malnutrition: Noted previously-severe    Nutrition Risk Level: stable-high risk    Nutrition DX: Inadequate oral intake " r/t acute respiratory failure as evidenced by Bipap/HFNC dependence     Malnutrition r/t COVID-19 as evidenced by prolonged poor intake of < 50% estimated nutrition needs for 2 weeks and 5.2% wt loss in 1 week.    Goals:  Maintain weight-wt down with diuresis  Meet estimated nutrition needs-met  Electrolytes WNL-met  Tolerate tube feeding-met    Plan:  Continue current TF for pt's nutrition needs.    Monitor:   Tube feeding tolerance, wt, labs, hydration    See Care Plan for Problems, Goals, and Interventions.

## 2021-06-13 NOTE — PROGRESS NOTES
Patient VSS and afebrile throughout duration of shift. Patient diet advanced from clear liquid to regular. Tolerating new diet well has eaten 100% of meals. Patient had 1 medium BM. Patient easy to exert with activity. Patient very productive with IS.

## 2021-06-13 NOTE — SIGNIFICANT EVENT
House officer called about increasing oxygen requirements due to COVID-19 pneumonia.    Saturations in the upper 80s with 6L NC.    RT called, started on 15L nonrebreather.    Patient endorsing increased work of breathing.    -ABG ordered.    Sweetie Lomax MD  Pager # [phone]  [facility]    Addendum 7:13 AM    ABG shows hypoxia O2 65.  Patient on 15L nonrebreather, now satting 93-95%.   Looks more comfortable and states she is feeling more comfortable.    RT aware, will start high flow as soon as negative pressure can be started in the room.    Day team hospitalist will be made aware of current situation.

## 2021-06-13 NOTE — PLAN OF CARE
Problem: Inadequate Gas Exchange  Goal: Patient will achieve/maintain normal respiratory rate/effort  Outcome: Progressing     Problem: Ineffective Airway Clearance  Goal: Maintain airway patency  Outcome: Progressing     Problem: Mechanical Ventilation  Goal: Oral health is maintained or improved  Outcome: Progressing     Problem: Mechanical Ventilation  Goal: ET tube will be managed safely  Outcome: Progressing     Fentanyl was placed on hold at 2044H and titrated Precedex as per RASS goal. Able to nod when asked with a question with yes or no, squeezes provider's hands when instructed.

## 2021-06-13 NOTE — PROGRESS NOTES
Pt had an uneventful night, stayed on bipap the whole whole shift and her oxygen sats stayed above 92%, occasionally they would dip down while she was coughing.  Pt given sips of water throughout the evening.  Placed on bedpan and was able to urinate but now BM, night time medications given, no insulin coverage needed, pt seems in good spirits.

## 2021-06-13 NOTE — PROGRESS NOTES
Vent Mode: VCV  FiO2 (%):  [40 %-100 %] 40 %  S RR:  [20] 20  S VT:  [375 mL] 375 mL  PEEP/CPAP (cm H2O):  [5 cm H2O] 5 cm H2O  Minute Ventilation (L/min):  [7.5 L/min-11.3 L/min] 9.2 L/min  PIP:  [35 cm H2O-39 cm H2O] 36 cm H2O  MAP (cm H2O):  [10-13] 10   PLAT: 32

## 2021-06-13 NOTE — PLAN OF CARE
O2 sats upper 90's to 100% overnight. Vent settings currently FIO2 at 50%, , Rate 20 and PEEP 5. Suctioned from ETT and mouth for mod amts clear secretions. Lungs clear/diminished bases. Pts RR mid 20's. Precedex remains at 0.6. Levo at 0.2(MAP down to 59 when turned off)  Problem: Inadequate Gas Exchange  Goal: Patient will achieve/maintain normal respiratory rate/effort  Outcome: Progressing   Problem: Excessive Fluid Volume  Goal: Patient will achieve/maintain normal respiratory rate/effort  Outcome: Progressing   Problem: Breathing  Goal: Patient will maintain patent airway  Outcome: Progressing   Problem: Mechanical Ventilation  Goal: Patient will maintain patent airway  Outcome: Progressing   Problem: Mechanical Ventilation   Goal: Respiratory status - ventilation  Description: Movement of air in and out of the lungs.  Liberate from ventilator  Outcome: Progressing     Blood sugars WNL overnight. Accucheck 128 and 102.   Kyara Hoosk RN

## 2021-06-13 NOTE — PLAN OF CARE
Problem: Inadequate Gas Exchange  Goal: Patient will achieve/maintain normal respiratory rate/effort  Outcome: Progressing     Problem: Mechanical Ventilation  Goal: ET tube will be managed safely  Outcome: Progressing

## 2021-06-13 NOTE — PLAN OF CARE
See Vitals and assessment--see labs--see MD's orders and notes. Will cont to monitor and titrate meds for sedation and pain as ordered and per pt response.

## 2021-06-13 NOTE — PLAN OF CARE
Problem: Safety  Goal: Patient will be injury free during hospitalization  Outcome: Progressing     Problem: Daily Care  Goal: Daily care needs are met  Outcome: Progressing  Able to nod to answer yes or no questions,  follow commands. BP soft, MAP greater than 65. Titrated Precedex  as per RASS goal. Pink urine with red flecks, Dr Bobo was notified. Lovenox was hold, bilateral kidney US ordered for tomorrow. Will continue to monitor.

## 2021-06-13 NOTE — PROGRESS NOTES
[facility] Pulmonary/Critical Care Consult Team Note    Hafsa Robles,  1960, MRN [id number]  Admitting Dx: Hypoxia [R09.02]  Multifocal pneumonia [J18.9]  Date / Time of Admission:  2020  5:17 PM    Overnight Events:  Intake/Output last 3 shifts:  I/O last 3 completed shifts:  In: 2929.5 [I.V.:269.5; NG/GT:2310; IV Piggyback:350]  Out: 3475 [Urine:3475]   On 50% FiO2  Sedated overnight  Afebrile overnight    Assessment/Plan: Hafsa Robles is a 60 y.o. female with PMHx of  breast cancer s/p radiation/mastectomy.  Presented to ER  with COVID-19. She decompensated and O2 went from 4 liters to HFNC 80% on 60 L, transferred to the ICU and intubated on 2020.    PULM/ID: Acute resp failure due to COVID PNA  - LAST VENT SETTINGS: Vent Mode: PCV/VG  FiO2 (%):  [40 %-60 %] 50 %  S RR:  [0-20] 20  S VT:  [0 mL-350 mL] 350 mL  PEEP/CPAP (cm H2O):  [5 cm H2O] 5 cm H2O  Minute Ventilation (L/min):  [7.7 L/min-11.3 L/min] 11.3 L/min  PIP:  [24 cm H2O-47 cm H2O] 40 cm H2O  MI SUP:  [12 cm H20] 12 cm H20  MAP (cm H2O):  [8-24] 24  - ABGs:  Lab Results   Component Value Date    PHART 7.50 (H) 2020    DKI9QRC 41 2020    PO2ART 65 (L) 2020   - daily sedation vacations and SBT  - completed a course of dex and remdesivir  - on vancomycin and cefepime- Klebsiella sensitive to Cefepime,   - stopped vanc yesterday, then spiked temp, restarted vanc  - Urine with Klebsiella     CV: Hx of HTN, now with shock  - MAP >65, wean NE as able. On and off low dose NE last few days  - Lasix 20mg IV two times a day   - Monitor on tele    GI: NPO  - on tube feeds  - GI proph    RENAL:   Lab Results   Component Value Date    CREATININE 0.44 (L) 2020   - one time dose lasix to keep even I/O's  - Follow BUN/Creatinine  - strict I/O's    NEURO: Severe anxiety  - Cont seroquel 75mg four times a day  - Cont trazodone 25mg four times a day prn for agitation and at bedtime prn for sleep  - Cont 1mg po ativan  q6h  - Cont seroquel 25mg four times a day prn for agitation  - Cont depakote sprinkles 125mg three times a day  - on precedex    ENDO:-   Lab Results   Component Value Date/Time     12/19/2020 05:51 AM     (H) 12/18/2020 05:41 AM     (H) 12/17/2020 05:48 AM   - Critical Care hyperglycemic protocol  - Accuchecks and sliding scale q6hrs  - lantus 6 units daily    Pt has critical illness and impairs breathing on vent such as there is high probability of imminent of life threatening deterioration in the patient's condition.    Code Status: FULL CODE    ICU DAILY CHECKLIST           Can patient transfer out of MICU? no  FAST HUG:  Feeding:  Feeding: Yes.    Arteaga:Yes  Analgesia/Sedation:Yes  Thromboembolic prophylaxis:Lovenox  HOB>30:  Yes  Stress Ulcer Protocol Active: yes; Mode: PPI/H2 Antagonist  Glycemic Control:   Lab Results   Component Value Date/Time     12/19/2020 05:51 AM     (H) 12/18/2020 05:41 AM     (H) 12/17/2020 05:48 AM    Any glucose > 180 no; Mode of Insulin Therapy: Sliding Scale Insulin  INTUBATED:  Can patient have daily waking:  yes  Can patient have spontaneous breathing trial:  yes  Does pt have restraints: MD RESTRAINT FOR NON-VIOLENT BEHAVIOR FACE TO FACE EVALUATION Patient's Immediate Situation: Patient demonstrated the following behaviors: Impulsive behavior, grabbing at support tubes/lines.  Patient's Reaction to the intervention Does patient understand the reason for restraint/seclusion? Unable to Express Medical Condition Is there any evidence of compromise of Skin integrity, Respiratory, Cardiovascular, Musculoskeletal, Hydration? No Behavioral ConditionIn consultation with the RN, is there a need to continue this restraint or seclusion? Yes See Restraint Flowsheet for complete restraint documentation and assessment.  PHYSICAL THERAPY AND MOBILITY: Can patient have PT and mobility trial: no Activity: ICU mobility protocol    Critical Care Time  "excluding procedures and family discussions greater than: 45 Minutes    Sweetie Hogue DO  Pulmonary and Critical Care Attending  pgr [phone]    No Known Allergies    Meds: See MAR    Physical Exam:  BP 92/57 (Patient Position: Lying)   Pulse 77   Temp 98.1  F (36.7  C) (Axillary)   Resp 23   Ht 5' 5\" (1.651 m)   Wt 134 lb 14.7 oz (61.2 kg)   SpO2 100%   BMI 22.45 kg/m    Intake/Output this shift:  I/O this shift:  In: 510 [NG/GT:200; IV Piggyback:310]  Out: 1355 [Urine:1355]  GEN: Intubated and sedated, NAD  HEENT: Pupils reactive, et tube in place, OG tube in place  CVS: regular rhythm, no murmurs  RESP: Coarse rhonchi geo, no wheezing  ABD: Soft, No abdominal pain with palpation, no guarding, no rigidity  EXT: Warm, well perfused, no rashes, no edema  Vasc: geo radial pulses intact  NEURO:  sedated  PSYCH: sedated    Pertinent Labs: Latest lab results in EHR personally reviewed.   Results from last 7 days   Lab Units 12/19/20  0551 12/18/20  0541 12/17/20  0547   LN-WHITE BLOOD CELL COUNT thou/uL 9.9 12.5* 7.9   LN-HEMOGLOBIN g/dL 10.1* 9.8* 9.2*   LN-HEMATOCRIT % 32.4* 30.9* 29.8*   LN-MEAN CORPUSCULAR VOLUME fL 87 88 90   LN-PLATELET COUNT thou/uL 430 443* 425    and   Results from last 7 days   Lab Units 12/19/20  0551 12/18/20  0541 12/17/20  0548   LN-SODIUM mmol/L 141 137 141   LN-POTASSIUM mmol/L 3.6 4.0 3.8   LN-CHLORIDE mmol/L 102 100 104   LN-CO2 mmol/L 31 28 29   LN-BLOOD UREA NITROGEN mg/dL 12 16 11   LN-CREATININE mg/dL 0.44* 0.46* 0.42*   LN-CALCIUM mg/dL 8.8 8.7 8.1*       Cultures: personally reviewed.   Culture   Date Value Ref Range Status   12/15/2020 Usual Char[place]  Final     Gram Stain Result   Date Value Ref Range Status   12/15/2020 4+ Polymorphonuclear leukocytes  Final   12/15/2020 1+ Gram positive cocci in pairs  Final       personally reviewed images:   Imaging results from past 30 days: Xr Chest 1 View Portable    Result Date: 12/15/2020  EXAM: XR CHEST 1 VIEW PORTABLE " LOCATION: [facility] DATE/TIME: 12/15/2020 2:29 PM INDICATION: Fever, respiratory failure, evaluate for interval changes. COMPARISON: 12/13/2020.     1.  No change in right upper extremity PICC line, enteric tube, or endotracheal tube. Heart and mediastinal size are stable. Lung volumes are diminished. There has been progression of bilateral interstitial and alveolar infiltrate from comparison study. No definite pleural effusion or pneumothorax.    Xr Chest 1 View Portable    Result Date: 12/13/2020  EXAM: XR CHEST 1 VIEW PORTABLE LOCATION: [facility] DATE/TIME: 12/13/2020 10:59 AM INDICATION: resp failure. COVID COMPARISON: 12/04/2020     Endotracheal tube tip at the clavicle heads. Right PICC tip projects over the lower SVC. Nasogastric tube tip below the film. Heart size normal. Stable bilateral lung infiltrates. No pneumothorax. No effusion.    Xr Chest 1 View Portable    Result Date: 12/4/2020  EXAM: XR CHEST 1 VIEW PORTABLE LOCATION: [facility] DATE/TIME: 12/4/2020 1:24 PM INDICATION: desaturation, covid ARDS eval for interval change COMPARISON: 12/01/2020 and older studies.     Endotracheal tube is in good position. NG tube is coursing in the stomach. Right upper extremity PICC line catheter overlies the mid SVC. There has been slight decrease in the diffuse, asymmetric interstitial opacities. No hydropneumothorax. Heart and pulmonary vascularity are normal.    Xr Chest 1 View Portable    Result Date: 12/1/2020  EXAM: XR CHEST 1 VIEW PORTABLE LOCATION: [facility] DATE/TIME: 12/1/2020 12:36 PM INDICATION: ETT placement, worsening condition. COMPARISON: 12/01/2020.     Extensive bilateral opacities are present, similar to same-day radiograph. No substantial pleural effusion. No pneumothorax. Normal heart size. ET tube tip appropriately positioned, roughly 23 mm above the fede. Enteric tube courses into the  stomach and off the field of view. Right PICC tip over the distal SVC.    Xr Chest 1 View Portable    Result Date: 12/1/2020  EXAM: XR CHEST 1 VIEW PORTABLE LOCATION: [facility] DATE/TIME: 12/1/2020 6:26 AM INDICATION: confirm ET Tube position COMPARISON: 11/29/2020.     The patient has been intubated. The endotracheal tube terminates 1.9 cm above the fede. Nasogastric tube passes below the diaphragm and the inferior margin of the radiograph. Extensive bilateral pulmonary infiltrates are little changed from  11/29/2020 and consistent with infectious/inflammatory pneumonitis. No significant pleural effusion. No pneumothorax. Normal heart size.    Xr Chest 1 View Portable    Result Date: 11/29/2020  EXAM: XR CHEST 1 VIEW PORTABLE LOCATION: [facility] DATE/TIME: 11/29/2020 3:44 PM INDICATION: Hypoxia COMPARISON: 11/19/2020     Previously seen PICC line has been removed. Bilateral pulmonary infiltrates have significantly increased when compared to previous. No pleural effusion or pneumothorax.    Xr Abdomen Ap Portable    Result Date: 12/2/2020  EXAM: XR ABDOMEN AP PORTABLE LOCATION: [facility] DATE/TIME: 12/2/2020 1:05 PM INDICATION: og placement COMPARISON: Chest x-ray 12/01/2020     Enteric tube with the tip in satisfactory position in the gastric antrum. Nonobstructive bowel gas pattern. No definite free air. Stable bilateral pulmonary airspace opacities.     Us Kidney Bilateral    Result Date: 12/11/2020  EXAM: US KIDNEY BILATERAL WITH BLADDER LOCATION: [facility] DATE/TIME: 12/11/2020 5:19 PM INDICATION: hematuria COMPARISON: None. TECHNIQUE: Routine Bilateral Renal and Bladder Ultrasound. FINDINGS: RIGHT KIDNEY: 12.3 x 5.6 x 4.7 cm. No hydronephrosis. Inferior renal 5 mm shadowing echogenic focus suggests a nonobstructive stone. LEFT KIDNEY: 12.4 x 5.6 x 5.1 cm. No hydronephrosis. Several small echogenic foci in  "the renal sinus could represent nonobstructive stones. BLADDER: Decompressed with Arteaga.     1.  No hydronephrosis. 2.  Probable right renal 5 mm nonobstructive stone. 3.  Possible left renal smaller nonobstructive stones.    Us Venous Legs Bilateral    Result Date: 12/15/2020  EXAM: US VENOUS LEGS BILATERAL LOCATION: [facility] DATE/TIME: 12/15/2020 5:25 PM INDICATION: Bilateral lower extremity edema COMPARISON: None. TECHNIQUE: Venous Duplex ultrasound of bilateral lower extremities with and without compression, augmentation and duplex. Color flow and spectral Doppler with waveform analysis performed. FINDINGS: Exam includes the common femoral, femoral, popliteal veins as well as segmentally visualized deep calf veins and greater saphenous vein. RIGHT: No deep vein thrombosis. No superficial thrombophlebitis. No popliteal cyst. LEFT: No deep vein thrombosis. No superficial thrombophlebitis. No popliteal cyst.     1.  No deep venous thrombosis in the bilateral lower extremities.    Poc Us 3cg Picc Placement Guidance    Result Date: 12/1/2020  Exam was performed as guidance for PICC line insertion.  Click \"PACS images\" hyperlink below to view any stored images.  For specific procedure details, view procedure note authored by PICC/Vascular Access Nurse.      Patient Active Problem List   Diagnosis     Pneumonia     Hypokalemia     Acute respiratory failure with hypoxia (H)     Pneumonia due to 2019 novel coronavirus     Severe protein-calorie malnutrition (H)     Acute respiratory failure (H)     ARDS (adult respiratory distress syndrome) (H)     Encephalopathy     Agitation     Anxiety     Shock circulatory (H)     Fever, unspecified fever cause     UTI due to Klebsiella species     Permanent atrial fibrillation with RVR (H)     Acute upper GI bleed     Single subsegmental pulmonary embolism without acute cor pulmonale (H)     Acute on chronic systolic heart failure (H)       Sweetie Hogue, " DO  Pulmonary and Critical Care Attending  pgr [phone]

## 2021-06-13 NOTE — PLAN OF CARE
Problem: Glucose Imbalance  Goal: Achieve optimal glucose control  Outcome: Progressing     Problem: Mechanical Ventilation  Goal: Respiratory status - ventilation  Description: Movement of air in and out of the lungs.    Liberate from ventilator  Outcome: Progressing    Pt meets RASS goal -1, BG WDL, no insulin needed overnight, down on levophed slightly    Dorie Thomas

## 2021-06-13 NOTE — PROGRESS NOTES
59 y/o Kinyarwanda speaking female admitted for COVID and acute hypoxic respiratory failure on 11/13. She spent several days in ICU requiring high flow NC and Bipap. On 11/30 she was transferred back to ICU for worsening hypoxia and was intubated on 12/1/20 and as of 12/7/20 remains intubated     1. ARDS due to COVID infection  - Remains intubated/vent--overnight thick secretions  -ICU managing  -probable weaning trials     2. COVID with septic and circulatory shock  - Weaned off pressors on 12/4, but back on late 12/7/20 and this am NorEpi  - Has completed Remdesivir and Dexamethasone courses  - Lovenox two times a day for VTE proph  - Received Rocephin and Azithro earlier in stay, completed empiric course of Zosyn 12/6/20  - Blood and sputum cultures NGTD     3. Malnutrition  - Tube feeding and FW flushes per RD     4. Leukocytosis  - Follow, today 12     5. Hypokalemia  -- Replaced     6.DVT prevent-Lovenox q 12  ICU managing her and HMS will continue to follow peripherally

## 2021-06-13 NOTE — PLAN OF CARE
Problem: Inadequate Gas Exchange  Goal: Patient will achieve/maintain normal respiratory rate/effort  Outcome: Progressing     Problem: Impaired Gas Exchange  Goal: Demonstrate improved ventilation and adequate oxygenation of tissues as evidenced by absence of respiratory distress  Outcome: Progressing     Problem: Mechanical Ventilation  Goal: ET tube will be managed safely  Outcome: Progressing

## 2021-06-13 NOTE — PROGRESS NOTES
Pt continues to desaturate significantly with coughing. PRN anti-tussives given per MAR. Required titration of HFNC to 60L 100%. Md updated.   Kerrie Valdez RN

## 2021-06-13 NOTE — PLAN OF CARE
Pt b/p low---Levophed ordered and hung--pt b/p immediately was high--see vitals--MD updated--will titrate as needed per order. Pt also noted to be bradycardic--low of 49 noted--Presedex on hold--Dr. De La Torre notified--to use Fentanyl for sedation and Presedex at 0.1-0.2mcg as needed and tolerated. Will cont to monitor closely.

## 2021-06-13 NOTE — PLAN OF CARE
Problem: Mechanical Ventilation  Goal: Patient will maintain patent airway  Outcome: Progressing     Problem: Mechanical Ventilation  Goal: Oral health is maintained or improved  Outcome: Progressing     Problem: Mechanical Ventilation  Goal: ET tube will be managed safely  Outcome: Progressing

## 2021-06-13 NOTE — PLAN OF CARE
Problem: Inadequate Gas Exchange  Goal: Patient will achieve/maintain normal respiratory rate/effort  Outcome: Progressing  Respiration has been 25 to 35 per minute. Patient coughs intermittently when she needs to be repositioned in bed and it makes her anxious, PRN ativan given and fentanyl 50 mcg x 3 given on this shift (SEE MAR) also adjust her Precedex drip as needed.    Patient's RASS score has been +1 or -1 on this shift.  Patient slept most of the shift.

## 2021-06-13 NOTE — ED PROVIDER NOTES
EMERGENCY DEPARTMENT ENCOUNTER      NAME: Hafsa Robles  AGE: 60 y.o. female  YOB: 1960  MRN: [id number]  EVALUATION DATE & TIME: 11/13/2020  5:17 PM    PCP: Provider, No Primary Care    ED PROVIDER: Deborah Obando M.D.      Chief Complaint   Patient presents with     Suspected Covid     FINAL IMPRESSION:  1. Multifocal pneumonia    2. Hypoxia      ED COURSE & MEDICAL DECISION MAKING:    Pertinent Labs & Imaging studies reviewed. (See chart for details)  ED Course as of Nov 13 2139 Fri Nov 13, 2020 1745 Patient is a pleasant 60-year-old female who comes in today with about 11 days of symptoms.  She reports fever and chills little bit of a cough and a little bit of shortness of breath.  She is been to be speaking and we did use the video  to help.  Her  is here.  He reports that this is her third visit to the doctor for similar symptoms.  She last had a Covid test on Monday but has not had results yet.  Today she was found to be hypoxic in clinic so they sent her here.  She was hypoxic when she first came in but when she lays in the bed her sats are sitting at 91%.  She does have some crackles in her right lung base.  I have a high suspicion that this is Covid.  Rest of her vital signs at this point look okay.  We will check some blood work and plan to get a chest x-ray and admit her to the hospital since she is hypoxic when she ambulates.  Patient is comfortable with the plan.    [CB]   1831 Patient has multifocal atypical pneumonia on her chest x-ray.  I think we will put her on vancomycin and Zosyn since it is not clearly Covid at this point.    [CB]   1910 I spoke with Dr. Haro of the hospitalist service.  He was in agreement with the plan.  Patient will come in as a [facility] inpatient bed for hypoxia with ambulation and a multifocal pneumonia.  It still suspicious for Covid but not classic so I did cover her with antibiotics.  We will give her a dose of dexamethasone.     [CB]      ED Course User Index  [CB] Deborah Obando MD   7:07 PM I spoke [place]. [place]o [facility] and plans for admission.    At the conclusion of the encounter I discussed  the results of all of the tests and the disposition with patient.   All questions were answered.  The patient acknowledged understanding and was involved in the decision making regarding the overall care plan.      COVID-19 PPE worn during patient evaluation:  Mask: yes  Eye Protection: no  Face Shield: yes  Gown: yes  Hair cover: yes  Gloves: yes     MEDICATIONS GIVEN IN THE EMERGENCY:  Medications   sodium chloride flush 10 mL (NS) (has no administration in time range)   vancomycin 1,250 mg in sodium chloride 0.9% 500 mL (VANCOCIN) (has no administration in time range)   ondansetron injection 4 mg (ZOFRAN) (has no administration in time range)   piperacillin-tazobactam 3.375 g in NaCl 0.9 % 50 mL (MINI-BAG Plus) (ZOSYN) (0 g Intravenous Stopped 11/13/20 1942)   dexAMETHasone injection 6 mg (DECADRON) (6 mg Intravenous Given 11/13/20 1934)     =================================================================    HPI    Patient information was obtained from: patient     Use of : Yes (MARTII) - Language Dada Robles is a 60 y.o. female with history of ductal carcinoma in situ of right breast who presents fever.    Per chart review,   Patient was seen at  urgent care [facility] for evaluation of  COVID on 11/13/2020. Patient was referred to the ED today based on low oxygen levels of 88% and difficulty with breathing.    Per ,  Patients  reports that the patient began to have COVID like symptoms about one week ago. He notes that the patient had a fever of 103.6 about three days ago. He notes that the patient has gotten tested for COVID but is still waiting for the final results. He adds that the patient has not had any known COVID contacts but does work at hotel. He  says that he lives at home with the patient has not had any symptoms.     Per patient,   Patient reports through MARTII  that she has had cough, fever, and chills. She notes that she has also been feeling very fatigued and has been experiencing loss of appetite. She says that her last does of Tylenol was 2:30pm today. Denies shortness of breath, body aches, headaches, and any other complaints at this time.       REVIEW OF SYSTEMS   Except as stated in the HPI all other systems reviewed and are negative.     PAST MEDICAL HISTORY:  No past medical history on file.    PAST SURGICAL HISTORY:  No past surgical history on file.    CURRENT MEDICATIONS:      Current Facility-Administered Medications:      ondansetron injection 4 mg (ZOFRAN), 4 mg, Intravenous, Once PRN, Deborah Obando MD     [COMPLETED] Insert peripheral IV, , , Once **AND** [COMPLETED] Saline lock IV, , , Once **AND** [START ON 11/14/2020] sodium chloride flush 10 mL (NS), 10 mL, Intravenous, Line Care, Deborah Obando MD     vancomycin 1,250 mg in sodium chloride 0.9% 500 mL (VANCOCIN), 1,250 mg, Intravenous, Once, Deborah Obando MD    ALLERGIES:  No Known Allergies    FAMILY HISTORY:  No family history on file.    SOCIAL HISTORY:   Social History     Socioeconomic History     Marital status:      Spouse name: Not on file     Number of children: Not on file     Years of education: Not on file     Highest education level: Not on file   Occupational History     Not on file   Social Needs     Financial resource strain: Not on file     Food insecurity     Worry: Not on file     Inability: Not on file     Transportation needs     Medical: Not on file     Non-medical: Not on file   Tobacco Use     Smoking status: Not on file   Substance and Sexual Activity     Alcohol use: Not on file     Drug use: Not on file     Sexual activity: Not on file   Lifestyle     Physical activity     Days per week: Not on file      Minutes per session: Not on file     Stress: Not on file   Relationships     Social connections     Talks on phone: Not on file     Gets together: Not on file     Attends [employer] service: Not on file     Active member of club or organization: Not on file     Attends meetings of clubs or organizations: Not on file     Relationship status: Not on file     Intimate partner violence     Fear of current or ex partner: Not on file     Emotionally abused: Not on file     Physically abused: Not on file     Forced sexual activity: Not on file   Other Topics Concern     Not on file   Social History Narrative     Not on file       PHYSICAL EXAM    VITAL SIGNS: /60 (Patient Position: Lying)   Pulse 81   Temp 99.9  F (37.7  C) (Tympanic)   Resp (!) 34   Wt (P) 145 lb 8 oz (66 kg)   SpO2 94%   BMI (P) 24.21 kg/m     GENERAL: Awake, Alert, answering questions, No acute distress, Well nourished  HEENT: Normal cephalic, Atraumatic, bilateral external ears normal, No scleral icterus, mask in place  NECK: No obvious swelling or abnormality, No stridor  PULMONARY:Normal and symmetric breath sounds, No respiratory distress, Right side lung crackles posteriorly. No wheezing  CARDIOVASCULAR: Regular rate and rhythm, Distal pulses present and normal.  ABDOMINAL: Soft, Nondistended, Nontender, No flank tenderness, No palpable masses  BACK: No bruising or tenderness.  EXTREMITIES: Moves all extremities spontaneously, warm, no edema, No major deformities  NEURO: No facial droop, normal motor function, Normal speech   PSYCH: Normal mood and affect  SKIN: No rashes on visualized skin, dry, warm     LAB:  All pertinent labs reviewed and interpreted.  Results for orders placed or performed during the hospital encounter of 11/13/20   Troponin I   Result Value Ref Range    Troponin I <0.01 0.00 - 0.29 ng/mL   Magnesium   Result Value Ref Range    Magnesium 2.0 1.8 - 2.6 mg/dL   BNP(B-type Natriuretic Peptide)   Result Value Ref Range     BNP <10 0 - 93 pg/mL   Basic Metabolic Panel   Result Value Ref Range    Sodium 140 136 - 145 mmol/L    Potassium 3.6 3.5 - 5.0 mmol/L    Chloride 100 98 - 107 mmol/L    CO2 28 22 - 31 mmol/L    Anion Gap, Calculation 12 5 - 18 mmol/L    Glucose 114 70 - 125 mg/dL    Calcium 8.4 (L) 8.5 - 10.5 mg/dL    BUN 14 8 - 22 mg/dL    Creatinine 0.64 0.60 - 1.10 mg/dL    GFR MDRD Af Amer >60 >60 mL/min/1.73m2    GFR MDRD Non Af Amer >60 >60 mL/min/1.73m2   HM1 (CBC with Diff)   Result Value Ref Range    WBC 5.5 4.0 - 11.0 thou/uL    RBC 5.47 (H) 3.80 - 5.40 mill/uL    Hemoglobin 15.6 12.0 - 16.0 g/dL    Hematocrit 47.4 (H) 35.0 - 47.0 %    MCV 87 80 - 100 fL    MCH 28.5 27.0 - 34.0 pg    MCHC 32.9 32.0 - 36.0 g/dL    RDW 13.0 11.0 - 14.5 %    Platelets 258 140 - 440 thou/uL    MPV 9.7 8.5 - 12.5 fL    Neutrophils % 84 (H) 50 - 70 %    Lymphocytes % 13 (L) 20 - 40 %    Monocytes % 2 2 - 10 %    Eosinophils % 0 0 - 6 %    Basophils % 0 0 - 2 %    Immature Granulocyte % 0 <=0 %    Neutrophils Absolute 4.6 2.0 - 7.7 thou/uL    Lymphocytes Absolute 0.7 (L) 0.8 - 4.4 thou/uL    Monocytes Absolute 0.1 0.0 - 0.9 thou/uL    Eosinophils Absolute 0.0 0.0 - 0.4 thou/uL    Basophils Absolute 0.0 0.0 - 0.2 thou/uL    Immature Granulocyte Absolute 0.0 <=0.0 thou/uL       RADIOLOGY:      Xr Chest 1 View Portable    Result Date: 11/13/2020  EXAM: XR CHEST 1 VIEW PORTABLE LOCATION: [facility] DATE/TIME: 11/13/2020 6:20 PM INDICATION: Chest Pain. Suspected COVID. COMPARISON: 11/11/2020     No pleural fluid or pneumothorax. Heterogeneous pulmonary opacities are concerning for multifocal atypical infection and have increased from the prior study. Normal size of the heart.     EKG:    Date and time: November 13, 2020 at 1828  Rate: 88 bpm  Rhythm: Normal sinus rhythm  CT interval: 156 ms  QRS interval: 82 ms  QT/QTc: 374/452 ms  ST changes or T wave changes: No acute ST or T wave abnormalities  Change from prior ECG: No  significant change from prior  I have independently reviewed and interpreted this EKG.     I, Daysi Campos, am serving as a scribe to document services personally performed by Dr. Obando based on my observation and the provider's statements to me. I, Deborah Obando MD attest that Daysi Campos is acting in a scribe capacity, has observed my performance of the services and has documented them in accordance with my direction.    Deborah Obando M.D.  Emergency Medicine  [place] [id number]  Dept: [phone]  Loc: [phone]     Deborah Obando MD  11/13/20 9760

## 2021-06-13 NOTE — PROGRESS NOTES
PROVIDER RESTRAINT FOR NON-VIOLENT BEHAVIOR FACE TO FACE EVALUATION  12/5/2020   8:45 AM     Patient seen on morning interdisciplinary rounds.     Patient's Immediate Situation:  Patient demonstrated the following behaviors: Pulling/tugging at invasive lines or tubes and does not respond to verbal/non-verbal redirection    Patient's Reaction to the intervention:  Does patient understand the reason for restraint/seclusion? Unable to Express    Medical Condition:  Is there any evidence of compromise of Skin integrity, Respiratory, Cardiovascular, Musculoskeletal, Hydration? No    Behavioral Condition:  In consultation with the RN, is there a need to continue this restraint or seclusion? Yes    See Restraint Flowsheet for complete restraint documentation and assessment.    Jennifer Leonard, CNP

## 2021-06-13 NOTE — PROGRESS NOTES
"[facility] Progress Note       ASSESSMENT:    Principal Problem:    Pneumonia  Active Problems:    Hypokalemia    Acute respiratory failure with hypoxia (H)    Pneumonia due to 2019 novel coronavirus    Severe protein-calorie malnutrition (H)      PLAN:    60-year-old female with history of breast cancer status post radiation presented 11/13/2020 with 1 week of cough fever and fatigue.  Diagnosed with COVID-19 11/10/2020.  She had rapid escalation in oxygen needs requiring transfer to the ICU for high flow oxygen therapy.      COVID-19 pneumonia: Completed course of remdesivir and 10 days dexamethasone.  Remains on high flow oxygen. Extremely slow to wean O2. Discussed with pulmonary and stated that patient has been refusing bipap or pronation and they don't have anything else to offer.   -- obtain CXR 11/29 to eval for other reversible pathology   --Wean high flow oxygen as tolerated  --Continue Lovenox 0.5 mg/kg twice daily  --BiPAP as needed still offered   -- appreciate RT working with patietnt   --Continue current supportive cares  --PT/OT      Lower abdominal pain: I think this is most likely muscle strain from coughing  --Check lactate, LFTs, white blood cell count  --Lidocaine patch ordered for pain         updated on plan of care 11/29/2020      DVT PPX:  Lovenox      Needs for Discharge: slow clinical improvement, oxygen needs, family communication and mobility progress     ESTIMATED DISCHARGE:  >2D           Seng Up D.O.  [phone]             -------------------------------------------------------------------------------------------------------------  SUBJECTIVE: NAD.  New left lower quadrant abdominal pain, not associated with any nausea, vomiting or diarrhea.  Has intermittent cough.  Denies fevers, chills, chest pain, headache or confusion.    Exam:  /56 (Patient Position: Semi-cedeño)   Pulse 89   Temp 98.6  F (37  C) (Oral)   Resp 20   Ht 5' 5\" (1.651 m)   " Wt 134 lb 6.4 oz (61 kg)   SpO2 96%   BMI 22.37 kg/m    General: NAD  RESPIRATORY: Breathing nonlabored  Abdomen: Soft, tenderness to light palpation bilateral lower quadrants without mass, rebound or guarding.  CARDIOVASCULAR:  No le edema bilat.   NEUROLOGIC: Motor and sensory intact, speech clear      Diagnostics Reviewed:      Recent Results (from the past 24 hour(s))   Basic Metabolic Panel    Collection Time: 11/29/20  6:21 AM   Result Value Ref Range    Sodium 136 136 - 145 mmol/L    Potassium 4.1 3.5 - 5.0 mmol/L    Chloride 97 (L) 98 - 107 mmol/L    CO2 30 22 - 31 mmol/L    Anion Gap, Calculation 9 5 - 18 mmol/L    Glucose 109 70 - 125 mg/dL    Calcium 8.8 8.5 - 10.5 mg/dL    BUN 9 8 - 22 mg/dL    Creatinine 0.50 (L) 0.60 - 1.10 mg/dL    GFR MDRD Af Amer >60 >60 mL/min/1.73m2    GFR MDRD Non Af Amer >60 >60 mL/min/1.73m2   Platelet Count    Collection Time: 11/29/20  6:21 AM   Result Value Ref Range    Platelets 355 140 - 440 thou/uL

## 2021-06-13 NOTE — PLAN OF CARE
Problem: Impaired Gas Exchange  Goal: Demonstrate improved ventilation and adequate oxygenation of tissues as evidenced by absence of respiratory distress  Outcome: Progressing   Problem: Inadequate Gas Exchange  Goal: Patient will achieve/maintain normal respiratory rate/effort  Outcome: Progressing   O2 >90% on 55L HF at 49%  Pt reports abdominal pain with cough, requesting prn Tessalon and Guaifenesin AC.    Problem: Safety  Goal: Patient will be injury free during hospitalization  Outcome: Progressing   Bed alarm on.  Call light within reach.  Purewick in place.

## 2021-06-13 NOTE — PLAN OF CARE
Problem: Inadequate Gas Exchange  Goal: Patient will achieve/maintain normal respiratory rate/effort  12/3/2020 0651 by Yazmin Medina, RN  Outcome: Progressing  12/3/2020 0647 by Yazmin Medina, RN  Outcome: Progressing     Improved ABG this AM. BIS in the 20's to 30's overnight. Weaned down sedation. Currently on Versed at 3 mg/hr and Fentanyl 100 mcg/hr. BIS goal now in the 40's . TOF 2-4/4.  NImbex at 2.5 mcg.  Jasmina Positional at times and waveform dampened. CMS normal and still drawing blood. Will continue to monitor.

## 2021-06-13 NOTE — CONSULTS
Care Management Initial Consult    General Information:  Patient's communication limitations:  needed  Level of Orientation: A & O x 3     Advance Care Planning: Patient does not have advance directive   No    Living Environment:   People in home/Living arrangements: Spouse/significant other  Current residence:  Private residence      Family/Social Support:  Care provided by/ Primary Caregiver: Self immediate family  Provides care for someone: No  Description of Support System: Spouse/significant other     Lifestyle & Psychosocial Needs:        Socioeconomic History     Marital status:      Spouse name: Not on file     Number of children: Not on file     Years of education: Not on file     Highest education level: Not on file          Functional Status:  Prior to admission ADL limits:        Current Resources:   Skilled Home Care Services:    Community Resources: None  Equipment currently used at home: none  Supplies currently used at home: None    Employment:  Employment Status:  Unknown No    Financial/Environmental Concerns/Barriers to Discharge:        Values/Beliefs:  Spiritual, Cultural Beliefs, [employer] Practices, Values that affect care:                Additional Information:  Assessed, lives w/spouse and is independent, has no svcs and declined HCD.      Lul Beard RN

## 2021-06-13 NOTE — PROGRESS NOTES
Overnight lung dynamics        12/17/20 0335   Patient Data   PIP Observed (cm H2O) 39 cm H2O   MAP (cm H2O) 15   Auto/Intrinsic PEEP Observed (cm H2O) 1 cm H2O   Plateau Pressure (cm H2O) 34 cm H2O   Static Compliance (L/cm H2O) 38   Dynamic Compliance (L/cm H2O) 36 L/cm H2O   Airway Resistance 12       Iftikhar Celaya, RRT

## 2021-06-13 NOTE — PROGRESS NOTES
RESPIRATORY CARE NOTE     Patient Name: aHfsa Robles  Today's Date: 12/18/2020     Vent Mode: PCV/VG  FiO2 (%):  [40 %-100 %] 40 %  S RR:  [20] 20  S VT:  [350 mL-375 mL] 350 mL  PEEP/CPAP (cm H2O):  [5 cm H2O] 5 cm H2O  Minute Ventilation (L/min):  [8.5 L/min-11.7 L/min] 9.9 L/min  PIP:  [29 cm H2O-42 cm H2O] 39 cm H2O  MAP (cm H2O):  [12-21] 15  Plateau 36    Pt continues on vent settings above.  7.5 ETT in place 23 cms at the teeth.  Plan to wean this afternoon.    Dr Hogue wanted to observe pt on her wean.  Placed on PSV 8, CPAP 5 and increased to 50% fi02.  Prior to wean sat was only 88%.  Within a minute of wean, rr 34-46, -86 mls.  RSBI > 300.    Carol Ho

## 2021-06-13 NOTE — PROGRESS NOTES
Weaning trial attempted with titrated sedation. PT has spell of apnea lasting 12 seconds and began breathing on own. PT RR climbed to 50+ with -250, RSBI 200+.  PT also had small desaturation with apnea. PT failed weaning trial.      Migel Calderon, LRT

## 2021-06-13 NOTE — PROGRESS NOTES
[facility] Pulmonary/Critical Care Consult Team Note    Hafsa Robles,  1960, MRN [id number]  Admitting Dx: Hypoxia [R09.02]  Multifocal pneumonia [J18.9]  Date / Time of Admission:  2020  5:17 PM    Overnight Events:  Intake/Output last 3 shifts:  I/O last 3 completed shifts:  In: 1630.4 [I.V.:200.4; NG/GT:1120; IV Piggyback:310]  Out: 3855 [Urine:3855]   Was titrated down to 40%, then desaturated with turning, now back to 50%  Some hypotension requiring levophed    Assessment/Plan: Hafsa Robles is a 60 y.o. female with PMHx of  breast cancer s/p radiation/mastectomy who presented to ER  with COVID-19with increasing FiO2 needs and transferred to the ICU and intubated on 2020.    PULM/ID: Acute resp failure due to COVID PNA, intubated   - LAST VENT SETTINGS: Vent Mode: PCV/VG  FiO2 (%):  [40 %-100 %] 50 %  S RR:  [0-20] 20  S VT:  [0 mL-350 mL] 350 mL  PEEP/CPAP (cm H2O):  [5 cm H2O] 5 cm H2O  Minute Ventilation (L/min):  [7.6 L/min-11.3 L/min] 7.6 L/min  PIP:  [24 cm H2O-40 cm H2O] 37 cm H2O  GA SUP:  [12 cm H20] 12 cm H20  MAP (cm H2O):  [8-24] 17  - ABGs:  Lab Results   Component Value Date    PHART 7.50 (H) 2020    MQW5ZTH 41 2020    PO2ART 65 (L) 2020   - daily sedation vacations and SBT  - completed a course of dex and remdesivir  - on vancomycin and cefepime- Klebsiella sensitive to Cefepime,   - Urine with Klebsiella   - will consult gen surg for tracheostomy    CV: Hx of HTN, now with shock  - MAP >65, wean NE as able. On and off low dose NE last few days  - Lasix 20mg IV two times a day   - Monitor on tele    GI: NPO  - on tube feeds  - GI proph    RENAL:   Lab Results   Component Value Date    CREATININE 0.46 (L) 2020   - given lasix yesterday, -2.2L  - Follow BUN/Creatinine  - strict I/O's    NEURO: Severe anxiety  - Cont seroquel 75mg four times a day  - Cont trazodone 25mg four times a day prn for agitation and at bedtime prn for sleep  - Cont 1mg  po ativan q6h  - Cont seroquel 25mg four times a day prn for agitation  - Cont depakote sprinkles 125mg three times a day  - on precedex    ENDO:-   Lab Results   Component Value Date/Time     12/20/2020 05:49 AM     12/19/2020 05:51 AM     (H) 12/18/2020 05:41 AM   - Critical Care hyperglycemic protocol  - Accuchecks and sliding scale q6hrs  - lantus 6 units daily    Pt has critical illness and impairs breathing on vent such as there is high probability of imminent of life threatening deterioration in the patient's condition.    Code Status: FULL CODE, had a long talk with  Arturo who would like to proceed with trach    ICU DAILY CHECKLIST           Can patient transfer out of MICU? no  FAST HUG:  Feeding:  Feeding: Yes.    Arteaga:Yes  Analgesia/Sedation:Yes  Thromboembolic prophylaxis:Lovenox  HOB>30:  Yes  Stress Ulcer Protocol Active: yes; Mode: PPI/H2 Antagonist  Glycemic Control:   Lab Results   Component Value Date/Time     12/20/2020 05:49 AM     12/19/2020 05:51 AM     (H) 12/18/2020 05:41 AM    Any glucose > 180 no; Mode of Insulin Therapy: Sliding Scale Insulin  INTUBATED:  Can patient have daily waking:  yes  Can patient have spontaneous breathing trial:  yes  Does pt have restraints: MD RESTRAINT FOR NON-VIOLENT BEHAVIOR FACE TO FACE EVALUATION Patient's Immediate Situation: Patient demonstrated the following behaviors: Impulsive behavior, grabbing at support tubes/lines.  Patient's Reaction to the intervention Does patient understand the reason for restraint/seclusion? Unable to Express Medical Condition Is there any evidence of compromise of Skin integrity, Respiratory, Cardiovascular, Musculoskeletal, Hydration? No Behavioral ConditionIn consultation with the RN, is there a need to continue this restraint or seclusion? Yes See Restraint Flowsheet for complete restraint documentation and assessment.  PHYSICAL THERAPY AND MOBILITY: Can patient have PT and  "mobility trial: no Activity: ICU mobility protocol    Critical Care Time excluding procedures and family discussions greater than: 45 Minutes    Sweetie Hogue DO  Pulmonary and Critical Care Attending  pgr [phone]    No Known Allergies    Meds: See MAR    Physical Exam:  BP 92/55 (Patient Position: Lying)   Pulse 68   Temp 98  F (36.7  C) (Oral)   Resp 21   Ht 5' 5\" (1.651 m)   Wt 134 lb 14.7 oz (61.2 kg)   SpO2 100%   BMI 22.45 kg/m    Intake/Output this shift:  I/O this shift:  In: 513 [I.V.:313; NG/GT:200]  Out: 250 [Urine:250]  GEN: Intubated and sedated, NAD  HEENT: Pupils reactive, et tube in place, OG tube in place  CVS: regular rhythm, no murmurs  RESP: Coarse rhonchi geo, no wheezing  ABD: Soft, No abdominal pain with palpation, no guarding, no rigidity  EXT: Warm, well perfused, no rashes, no edema  Vasc: geo radial pulses intact  NEURO:  sedated  PSYCH: sedated    Pertinent Labs: Latest lab results in EHR personally reviewed.   Results from last 7 days   Lab Units 12/20/20  0549 12/19/20  0551 12/18/20  0541   LN-WHITE BLOOD CELL COUNT thou/uL 7.6 9.9 12.5*   LN-HEMOGLOBIN g/dL 10.6* 10.1* 9.8*   LN-HEMATOCRIT % 33.0* 32.4* 30.9*   LN-MEAN CORPUSCULAR VOLUME fL 88 87 88   LN-PLATELET COUNT thou/uL 438 430 443*    and   Results from last 7 days   Lab Units 12/20/20  0549 12/19/20  0551 12/18/20  0541   LN-SODIUM mmol/L 139 141 137   LN-POTASSIUM mmol/L 3.7 3.6 4.0   LN-CHLORIDE mmol/L 102 102 100   LN-CO2 mmol/L 32* 31 28   LN-BLOOD UREA NITROGEN mg/dL 16 12 16   LN-CREATININE mg/dL 0.46* 0.44* 0.46*   LN-CALCIUM mg/dL 8.4* 8.8 8.7       Cultures: personally reviewed.   Culture   Date Value Ref Range Status   12/15/2020 Usual Charlette  Final     Gram Stain Result   Date Value Ref Range Status   12/15/2020 4+ Polymorphonuclear leukocytes  Final   12/15/2020 1+ Gram positive cocci in pairs  Final       personally reviewed images:   Imaging results from past 30 days: Xr Chest 1 View " Portable    Result Date: 12/15/2020  EXAM: XR CHEST 1 VIEW PORTABLE LOCATION: [facility] DATE/TIME: 12/15/2020 2:29 PM INDICATION: Fever, respiratory failure, evaluate for interval changes. COMPARISON: 12/13/2020.     1.  No change in right upper extremity PICC line, enteric tube, or endotracheal tube. Heart and mediastinal size are stable. Lung volumes are diminished. There has been progression of bilateral interstitial and alveolar infiltrate from comparison study. No definite pleural effusion or pneumothorax.    Xr Chest 1 View Portable    Result Date: 12/13/2020  EXAM: XR CHEST 1 VIEW PORTABLE LOCATION: [facility] DATE/TIME: 12/13/2020 10:59 AM INDICATION: resp failure. COVID COMPARISON: 12/04/2020     Endotracheal tube tip at the clavicle heads. Right PICC tip projects over the lower SVC. Nasogastric tube tip below the film. Heart size normal. Stable bilateral lung infiltrates. No pneumothorax. No effusion.    Xr Chest 1 View Portable    Result Date: 12/4/2020  EXAM: XR CHEST 1 VIEW PORTABLE LOCATION: [facility] DATE/TIME: 12/4/2020 1:24 PM INDICATION: desaturation, covid ARDS eval for interval change COMPARISON: 12/01/2020 and older studies.     Endotracheal tube is in good position. NG tube is coursing in the stomach. Right upper extremity PICC line catheter overlies the mid SVC. There has been slight decrease in the diffuse, asymmetric interstitial opacities. No hydropneumothorax. Heart and pulmonary vascularity are normal.    Xr Chest 1 View Portable    Result Date: 12/1/2020  EXAM: XR CHEST 1 VIEW PORTABLE LOCATION: [facility] DATE/TIME: 12/1/2020 12:36 PM INDICATION: ETT placement, worsening condition. COMPARISON: 12/01/2020.     Extensive bilateral opacities are present, similar to same-day radiograph. No substantial pleural effusion. No pneumothorax. Normal heart size. ET tube tip appropriately  positioned, roughly 23 mm above the fede. Enteric tube courses into the stomach and off the field of view. Right PICC tip over the distal SVC.    Xr Chest 1 View Portable    Result Date: 12/1/2020  EXAM: XR CHEST 1 VIEW PORTABLE LOCATION: [facility] DATE/TIME: 12/1/2020 6:26 AM INDICATION: confirm ET Tube position COMPARISON: 11/29/2020.     The patient has been intubated. The endotracheal tube terminates 1.9 cm above the fede. Nasogastric tube passes below the diaphragm and the inferior margin of the radiograph. Extensive bilateral pulmonary infiltrates are little changed from  11/29/2020 and consistent with infectious/inflammatory pneumonitis. No significant pleural effusion. No pneumothorax. Normal heart size.    Xr Chest 1 View Portable    Result Date: 11/29/2020  EXAM: XR CHEST 1 VIEW PORTABLE LOCATION: [facility] DATE/TIME: 11/29/2020 3:44 PM INDICATION: Hypoxia COMPARISON: 11/19/2020     Previously seen PICC line has been removed. Bilateral pulmonary infiltrates have significantly increased when compared to previous. No pleural effusion or pneumothorax.    Xr Abdomen Ap Portable    Result Date: 12/2/2020  EXAM: XR ABDOMEN AP PORTABLE LOCATION: [facility] DATE/TIME: 12/2/2020 1:05 PM INDICATION: og placement COMPARISON: Chest x-ray 12/01/2020     Enteric tube with the tip in satisfactory position in the gastric antrum. Nonobstructive bowel gas pattern. No definite free air. Stable bilateral pulmonary airspace opacities.     Us Kidney Bilateral    Result Date: 12/11/2020  EXAM: US KIDNEY BILATERAL WITH BLADDER LOCATION: [facility] DATE/TIME: 12/11/2020 5:19 PM INDICATION: hematuria COMPARISON: None. TECHNIQUE: Routine Bilateral Renal and Bladder Ultrasound. FINDINGS: RIGHT KIDNEY: 12.3 x 5.6 x 4.7 cm. No hydronephrosis. Inferior renal 5 mm shadowing echogenic focus suggests a nonobstructive stone. LEFT  "KIDNEY: 12.4 x 5.6 x 5.1 cm. No hydronephrosis. Several small echogenic foci in the renal sinus could represent nonobstructive stones. BLADDER: Decompressed with Arteaga.     1.  No hydronephrosis. 2.  Probable right renal 5 mm nonobstructive stone. 3.  Possible left renal smaller nonobstructive stones.    Us Venous Legs Bilateral    Result Date: 12/15/2020  EXAM: US VENOUS LEGS BILATERAL LOCATION: [facility] DATE/TIME: 12/15/2020 5:25 PM INDICATION: Bilateral lower extremity edema COMPARISON: None. TECHNIQUE: Venous Duplex ultrasound of bilateral lower extremities with and without compression, augmentation and duplex. Color flow and spectral Doppler with waveform analysis performed. FINDINGS: Exam includes the common femoral, femoral, popliteal veins as well as segmentally visualized deep calf veins and greater saphenous vein. RIGHT: No deep vein thrombosis. No superficial thrombophlebitis. No popliteal cyst. LEFT: No deep vein thrombosis. No superficial thrombophlebitis. No popliteal cyst.     1.  No deep venous thrombosis in the bilateral lower extremities.    Poc Us 3cg Picc Placement Guidance    Result Date: 12/1/2020  Exam was performed as guidance for PICC line insertion.  Click \"PACS images\" hyperlink below to view any stored images.  For specific procedure details, view procedure note authored by PICC/Vascular Access Nurse.      Patient Active Problem List   Diagnosis     Pneumonia     Hypokalemia     Acute respiratory failure with hypoxia (H)     Pneumonia due to 2019 novel coronavirus     Severe protein-calorie malnutrition (H)     Acute respiratory failure (H)     ARDS (adult respiratory distress syndrome) (H)     Encephalopathy     Agitation     Anxiety     Shock circulatory (H)     Fever, unspecified fever cause     UTI due to Klebsiella species     Permanent atrial fibrillation with RVR (H)     Acute upper GI bleed     Single subsegmental pulmonary embolism without acute cor " pulmonale (H)     Acute on chronic systolic heart failure (H)       Sweetie Hogue DO  Pulmonary and Critical Care Attending  pgr [phone]

## 2021-06-13 NOTE — PROGRESS NOTES
RESPIRATORY CARE NOTE     Patient Name: Hafsa Robles  Today's Date: 12/18/2020     Pt continues on the following settings:  Vent Mode: PCV/VG  FiO2 (%):  [40 %-100 %] 50 %  S RR:  [20] 20  S VT:  [350 mL] 350 mL  PEEP/CPAP (cm H2O):  [5 cm H2O] 5 cm H2O  Minute Ventilation (L/min):  [8.8 L/min-11.7 L/min] 8.8 L/min  PIP:  [29 cm H2O-42 cm H2O] 39 cm H2O  MAP (cm H2O):  [12-21] 19        Pt is intubated with  # 7.5 ETT secured 23 at the teeth. BS are coarse rhonchi and decreased @ bases. Tracheal suctioning done x 2 for small thick white secretions. Pt's respiratory status is stable. RT will continue to follow per MD's orders.        12/18/20 1535   Vent Information   Interface Invasive   Vent ID [id number]   Vent Mode PCV/VG   Patient Ventilator Status On   Flowmeter at Bedside Yes   Ambu-Bag With Mask at Bedside Yes   Vent Settings   FiO2 (%) 50 %   Heater Temperature 98.6  F (37  C)   Resp Rate (Set) 20   Vt (Set, mL) 350 mL   Waveform Square   PEEP/CPAP (cm H2O) 5 cm H2O   PIP Set (cm H2O) 65 cm H2O   Trigger Sensitivity Flow (L/min) 2 L/min   Bias Flow (lpm)  5 L/min   Humidification BTPS   Insp Time (sec) 0.9 sec   I:E Ratio 1:2.3   Patient Data   Vt (observed, mL) 350 mL   Vt Exp (mL) 340 mL   Minute Ventilation (L/min) 8.8 L/min   Total Resp Rate  25 BPM   PIP Observed (cm H2O) 39 cm H2O   MAP (cm H2O) 19   Plateau Pressure (cm H2O) 34 cm H2O   Static Compliance (L/cm H2O) 11   Airway Resistance 12   SpO2 97 %   Heart Rate 79   Alarms   High Resp Rate 55   Low Resp Rate 12   Low PEEP 1 cm H2O   Insp Pressure High (cm H2O) 65 cm H2O   Insp Pressure Low (cm H2O) 14 cm H2O   MV High (L/min) 19 L/min   MV Low (L/min) 3 L/min   Vt High (mL) 900 mL   Vt Low (mL) 120 mL   Apnea Interval (sec) 30 seconds   Apnea Rate 20   Apnea Volume (mL) 350 mL   Alarm Functional and On Yes   Backup Mode Set Yes   Airways   Airway LDA Subglottic Suction endotracheal tube   Subglottic Suction ET Tube 7.5   Placement Date/Time:  12/01/20 0535   Subglottic Suction ETT Entry Site: Oral  Size : 7.5   Secured at (cm) 23 cm   Measured from Teeth   Secured Location Center   Secured with Commercial tube bateman   Cuff Pressure (cm H2O) 30 cm H2O   Site Condition Dry   Subglottic Secretions Scant   Subglottic Suction Frequency Intermittent suction   Subglottic Suction Pressure 120 mmHg   Subglottic Suction Lumen Intervention Cleaned         Sunny Jimenez, LRT

## 2021-06-13 NOTE — PLAN OF CARE
Problem: Glucose Imbalance  Goal: Achieve optimal glucose control  Outcome: Progressing     Problem: Potential for Compromised Skin Integrity  Goal: Nutritional status is improving  Outcome: Progressing   BG WNL while at goal rate for TF today.  Improved residual after repositioning patient in bed and turning from side to side.      Problem: Mechanical Ventilation  Goal: Ability to express needs and understand communication  Outcome: Progressing   Pt communicating with gestures and brief handwritten words using paper and pen today.  Unable to reach her daughter today per ipad or phone.

## 2021-06-13 NOTE — PROGRESS NOTES
PULMONARY / CRITICAL CARE PROGRESS NOTE    Date / Time of Admission:  11/13/2020  5:17 PM    Assessment:   Principal Problem:    Pneumonia  Active Problems:    Hypokalemia    Acute respiratory failure with hypoxia (H)    Pneumonia due to 2019 novel coronavirus    Severe protein-calorie malnutrition (H)    Acute respiratory failure (H)    ARDS (adult respiratory distress syndrome) (H)    Encephalopathy        Advance Directives: Full code      Plan:   Neuro:  Sedated with Precedex   Fentanyl weaned off and stopped    Cardiovascular:  Weaned off norepinephrine.  Keep available since she drops her blood pressure due to vasoplegia from sedation.  I's and O's equal    Respiratory:  Acute hypoxic respiratory failure secondary to COVID-19 pneumonia.  Developed ARDS requiring intubation.  Completed dexamethasone and remdesivir.  Completed 7 days of empiric Zosyn.  Sputum culture negative    First symptoms beginning of November.  Admitted to the hospital with hypoxia on 11/13.  Intubated 12/1    Paralyzed until 12/4.  Tidal volume increased yesterday.  Currently on 400 mL, rate of 14, PEEP 5, FiO2 40%    SBT 15/5 daily for 30 minutes.  Weaning daily    GI:  Loose bowel movements.  Otherwise tolerating tube feeds.    :  Lasix on hold.  I's and O's equal.  Monitor and replace electrolytes.    ID:  COVID-19 pneumonia complicated by ARDS.  Completed remdesivir and dexamethasone  Completed 7 days of empiric Zosyn.  Cultures remain negative    Endocrine:  Sliding scale insulin          ICU DAILY CHECKLIST                           Can patient transfer out of MICU? no    FAST HUG:    Feeding:  Feeding: Yes.  Patient is receiving TUBE FEEDS    Arteaga:Yes  Analgesia/Sedation:Yes, see above  Thromboembolic prophylaxis: yes; Mode:  Lovenox  HOB>30:  Yes  Stress Ulcer Protocol Active: yes; Mode: PPI  Glycemic Control: Any glucose > 180 no; Mode of Insulin Therapy: Sliding Scale Insulin    INTUBATED:  Can patient have daily waking:   yes  Can patient have spontaneous breathing trial:  yes    Restraints? yes    PHYSICAL THERAPY AND MOBILITY:  Can patient have PT and mobility trial: no  Activity: Bed Rest    Critical Care Time greater than: 34min        Subjective:   HPI:  Hafsa Robles is a 60 y.o. lady with a past medical history significant for breast cancer s/p partial mastectomy and adjuvant radiation in 2017, who presented to the hospital on 11/13/2020  5:17 PM for acute hypoxemic respiratory failure.  She was subsequently diagnosed with Covid-19 infection and spent several days in the intensive care unit alternating between high flow and BiPAP.  On 11/30 she was transferred back to the intensive care unit worsening hypoxia and was emergently intubated on the morning of 12/1.    Principal Problem:    Pneumonia  Active Problems:    Hypokalemia    Acute respiratory failure with hypoxia (H)    Pneumonia due to 2019 novel coronavirus    Severe protein-calorie malnutrition (H)    Acute respiratory failure (H)    ARDS (adult respiratory distress syndrome) (H)    Encephalopathy      Allergies: Patient has no known allergies.     MEDS:  Scheduled Meds:    chlorhexidine  15 mL Topical Q12H     enoxaparin ANTICOAGULANT  0.5 mg/kg Subcutaneous Q12H     [Held by provider] furosemide  20 mg Intravenous BID - diuretic     influenza vaccine quad (PF) (age 50-64 YR) 2020-21  0.5 mL Intramuscular Prior to Discharge     insulin aspart (NovoLOG) injection   Subcutaneous Q6H FIXED TIMES     omeprazole  20 mg Oral QAM AC    Or     omeprazole  20 mg Enteral Tube QAM AC     senna-docusate  1 tablet Oral BID    Or     senna (SENOKOT) syrup  8.8 mg Enteral Tube BID     sodium chloride  10-30 mL Intravenous Q8H FIXED TIMES     Continuous Infusions:    dexmedetomidine infusion orderable (PRECEDEX) 1 mcg/kg/hr (12/10/20 1347)     fentaNYL citrate (PF) Stopped (12/08/20 2044)     norepinephrine Stopped (12/10/20 0653)     sodium chloride 0.9% 10 mL/hr (12/10/20 8105)  "    PRN Meds:.acetaminophen, acetaminophen, bacitracin, benzocaine-menthoL, bisacodyL, calcium (as carbonate), dextrose 50 % (D50W), fentaNYL - BOLUS DOSE from infusion, fentaNYL - BOLUS DOSE from infusion, glucagon (human recombinant), hydrALAZINE, lipase-protease-amylase **AND** sodium bicarbonate, LORazepam, magnesium hydroxide, midazolam, naloxone **OR** naloxone **OR** naloxone **OR** naloxone, ondansetron **OR** ondansetron, polyethylene glycol, polyvinyl alcohol, sodium chloride bacteriostatic, sodium chloride, sodium chloride, sodium chloride, traZODone      Objective:   VITALS:  /62   Pulse 76   Temp 98.9  F (37.2  C) (Oral)   Resp (!) 35   Ht 5' 5\" (1.651 m)   Wt 136 lb (61.7 kg)   SpO2 98%   BMI 22.63 kg/m    EXAM:  General appearance: Intubated   Lungs: clear to auscultation bilaterally  Heart: regular rate and rhythm, S1, S2 normal, no murmur, click, rub or gallop  Abdomen: soft, non-tender; bowel sounds normal; no masses,  no organomegaly  Extremities: extremities normal, atraumatic, no cyanosis or edema  Neurologic: Awake, moves all extremities    I&O:      Intake/Output Summary (Last 24 hours) at 12/10/2020 1510  Last data filed at 12/10/2020 1350  Gross per 24 hour   Intake 3467.3 ml   Output 2600 ml   Net 867.3 ml       Data Review:  Chest X-Ray: Bilateral infiltrates    CBC:   Lab Results   Component Value Date    WBC 10.9 12/10/2020    WBC 4.8 12/16/2014    RBC 4.00 12/10/2020     Serum Glucose range:Invalid input(s): GLUCOSEPOCT    By:  Jeramie Crespo, 12/10/2020, 11:05 AM    Primary Care Physician:  Provider, No Primary Care                 "

## 2021-06-13 NOTE — PLAN OF CARE
Problem: Inadequate Gas Exchange  Goal: Patient will achieve/maintain normal respiratory rate/effort  Outcome: Progressing   RASS +1 to -2. Versed turned off. Fentanyl bumps given x 2 for restlessness with turns and repositioning. Lungs coarse.     TF rate increased to 45 ml's . BM x 2. 975 ml's of radha urine output.

## 2021-06-13 NOTE — PROGRESS NOTES
12/17/20 0804   Patient Data   PIP Observed (cm H2O) 37 cm H2O   MAP (cm H2O) 10   Auto/Intrinsic PEEP Observed (cm H2O) 1 cm H2O   Plateau Pressure (cm H2O) 34 cm H2O   Static Compliance (L/cm H2O) 13   Dynamic Compliance (L/cm H2O) 11 L/cm H2O   Airway Resistance 14   SpO2 100 %   Heart Rate 80     Vent Mode: CPAP/PSV  FiO2 (%):  [40 %] 40 %  S RR:  [20] 20  S VT:  [375 mL] 375 mL  PEEP/CPAP (cm H2O):  [5 cm H2O] 5 cm H2O  Minute Ventilation (L/min):  [7.1 L/min-10.8 L/min] 10.8 L/min  PIP:  [18 cm H2O-48 cm H2O] 18 cm H2O  AL SUP:  [8 cm H20] 8 cm H20  MAP (cm H2O):  [9-15] 10    ETT 7.5 subglottic 3 at teeth  SBT done on cpap 5 ps 8 40% x 10 minutes, rsbi 200-300, RR 35-55.  Patient had erratic breathing pattern. MD aware  Breath sounds coarse, suction white secretions.  Plan: continue daily SBT's

## 2021-06-13 NOTE — PLAN OF CARE
Patient alert, noted desaturating to 84 % while on Hi flow oxygen at 50 % FIO2 75. House officer updated, evaluated the patient and ordered the patient to be transfered to a higher level (P 3) and get started on BIPAP. Gave report to the P 3 nurse and patient was transferred by swat nurse about 2 am.

## 2021-06-13 NOTE — PLAN OF CARE
"  Problem: Ineffective Airway Clearance  Goal: Maintain airway patency  Outcome: Progressing     Problem: Impaired Gas Exchange  Goal: Demonstrate improved ventilation and adequate oxygenation of tissues as evidenced by absence of respiratory distress  Outcome: Progressing     Problem: Breathing  Goal: Patient will maintain patent airway  Outcome: Progressing     Patient placed on BIPAP for NOC on settings of ST 15/8 12 60%. BS diminished bilaterally. HFNC on standby on settings of 50L 85% patient resting comfortably. RT will continue to follow.    BP 97/57   Pulse (!) 58   Temp 97.2  F (36.2  C) (Axillary)   Resp 21   Ht 5' 5\" (1.651 m)   Wt 136 lb 11 oz (62 kg)   SpO2 96%   BMI 22.75 kg/m     "

## 2021-06-13 NOTE — PROCEDURES
"PICC Line Insertion Procedure Note  Pt. Name: Hafsa Robles  MRN:        [id number]    Procedure: Insertion of a  Duel Lumen  5 fr  Bard SOLO (valved) Power PICC, Lot number [id number]    Indications: antibiotics    Contraindications : none    Procedure Details   Patient identified with 2 identifiers and \"Time Out\" conducted.  .     Central line insertion bundle followed: hand hygeine performed prior to procedure, site cleansed with cholraprep, hat, mask, sterile gloves,sterile gown worn, patient draped with maximum barrier head to toe drape, sterile field maintained.    The vein was assessed and found to be compressible and of adequate size. 1 ml 1% Lidocaine administered sq to the insertion site. A 5 Fr PICC was inserted into the basilic vein of the right arm with ultrasound guidance. 1 attempt(s) required to access vein.   Catheter threaded without difficulty. Good blood return noted.    Modified Seldinger Technique used for insertion.    The 8 sharps that are included in the PICC insertion kit were accounted for and disposed of in the sharps container prior to breakdown of the sterile field.    Catheter secured with Statlock, biopatch and Tegaderm dressing applied.    Findings:  Total catheter length  38 cm, with 1 cm exposed. Mid upper arm circumference is 28 cm. Catheter was flushed with 20 cc NS. Patient  tolerated procedure well.    Tip placement verified by 3CG . Tip placement in the SVC.    CLABSI prevention brochure left at bedside.    Patient's primary RN notified PICC is ready for use.    Comments:  Thanks see 3CG in imaging section        Georgia Hernandez, RN,BSN  [facility]        "

## 2021-06-13 NOTE — PROGRESS NOTES
"   12/16/20 0007   Patient Data   Vt Exp (mL) 407 mL   Minute Ventilation (L/min) 10.8 L/min   Total Resp Rate  30 BPM   PIP Observed (cm H2O) 9 cm H2O   MAP (cm H2O) 5   Auto/Intrinsic PEEP Observed (cm H2O)   (fail)   Plateau Pressure (cm H2O) 26 cm H2O   Dynamic Compliance (L/cm H2O) 17 L/cm H2O   Airway Resistance 11   SpO2 92 %   Heart Rate (!) 106     RESPIRATORY CARE NOTE     Patient Name: Hafsa Robles  Today's Date: 12/16/2020     Pt continues on the following settings:  Vent Mode: VCV  FiO2 (%):  [40 %] 40 %  S RR:  [20] 20  S VT:  [375 mL] 375 mL  PEEP/CPAP (cm H2O):  [5 cm H2O] 5 cm H2O  Minute Ventilation (L/min):  [9.6 L/min-12.3 L/min] 12.2 L/min  PIP:  [9 cm H2O-61 cm H2O] 25 cm H2O  VA SUP:  [8 cm H20] 8 cm H20  MAP (cm H2O):  [5-21] 11   Plateau pressure: 26     Pt is intubated with  # 7.5 ETT secured 23 at the teeth.BS are coarse . Pt has a strong cough with suction. RT suctioned pt for scant. Pt's respiratory status is stable. RT will continue to follow per MD's orders.     /63   Pulse 85   Temp 100.2  F (37.9  C) (Oral)   Resp 25   Ht 5' 5\" (1.651 m)   Wt 135 lb 11.2 oz (61.6 kg)   SpO2 99%   BMI 22.58 kg/m        Cristine Marie, LRT  "

## 2021-06-13 NOTE — PLAN OF CARE
"  Problem: Breathing  Goal: Patient will maintain patent airway  Outcome: Progressing     Problem: Mechanical Ventilation  Goal: Patient will maintain patent airway  Outcome: Progressing  Goal: Respiratory status - ventilation  Description: Movement of air in and out of the lungs.    Liberate from ventilator  Outcome: Progressing  Goal: ET tube will be managed safely  Outcome: Progressing     Patient remained on full vent support. Suctioned moderate amount of thin secretions.Size 7.5 ETT remains in position at 23@T. ETT was moved from side-to-side to prevent skin breakdown on lips. HOB approx 30 degrees. Current vent settings are below: RT will continue to follow.    Vent Mode: VCV  FiO2 (%):  [40 %] 40 %  S RR:  [14] 14  S VT:  [400 mL] 400 mL  PEEP/CPAP (cm H2O):  [5 cm H2O] 5 cm H2O  Minute Ventilation (L/min):  [8.1 L/min-13.5 L/min] 8.1 L/min  PIP:  [21 cm H2O-37 cm H2O] 35 cm H2O  MS SUP:  [15 cm H20] 15 cm H20  MAP (cm H2O):  [9-12] 9   PLAT: 30  /56 (Patient Position: Lying)   Pulse 66   Temp 98.3  F (36.8  C) (Oral)   Resp 21   Ht 5' 5\" (1.651 m)   Wt 136 lb (61.7 kg)   SpO2 98%   BMI 22.63 kg/m       "

## 2021-06-13 NOTE — PLAN OF CARE
Problem: Impaired Gas Exchange  Goal: Demonstrate improved ventilation and adequate oxygenation of tissues as evidenced by absence of respiratory distress  Outcome: Progressing   Patient has been on High flow NC 60L and 100%FIo2. Respiratory rate increased to 30-40. Encouraged patient to go back on BIPAP. She tolerated for about an hour then demanded it come off. Explained importance to stay on BIPAP. CNP aware and ordering ABG. Back on high flow. Will continue to monitor.  Kristel Chapman RN

## 2021-06-13 NOTE — PROGRESS NOTES
"PT remains on below settings this shift. PT was weaned this morning on 12/5 50% for approx 4 minutes. RSBI 200.  RR upper 40s. BS coarse.  Suctioning small white thick. secretions from ETT.    Vent Mode: PCV/VG  FiO2 (%):  [40 %-100 %] 50 %  S RR:  [20] 20  S VT:  [350 mL] 350 mL  PEEP/CPAP (cm H2O):  [5 cm H2O] 5 cm H2O  Minute Ventilation (L/min):  [6.8 L/min-11.2 L/min] 11.2 L/min  PIP:  [23 cm H2O-46 cm H2O] 27 cm H2O  ND SUP:  [12 cm H20] 12 cm H20  MAP (cm H2O):  [10-21] 20  Plat:15-17  IPEEP: 0  Cstat: 33  Cdyn:11  Raw: 15    Results for AMERICO HALL (MRN [id number]) as of 12/20/2020 15:31   12/12/2020 15:57 12/18/2020 05:42 12/19/2020 05:51   pH, Arterial 7.46 (H) 7.50 (H)    pCO2, Arterial 40 41    pO2, Arterial 80 65 (L)    Bicarbonate, Arterial Calc 28.5 31.6 (H)    O2 Sat, Arterial 96.7 94.8 (L)    Oxyhemoglobin 95.1 (L) 93.1 (L)    POC Base Excess Calc 4.9 8.6    Ventilation Mode CPAP/PS VC     Peep 5 5    Sample Stabilized Temperature 37.0 37.0    Rate 20 20    FIO2 35.00 0.40    pH, Venous   7.47 (H)   pCO2, Venous   46   pO2, Daniel   75 (H)   HCO3, Venous   32.3 (H)   Base Excess, Venous   9.5   Oxyhemoglobin   94.3 (H)       BP 92/55 (Patient Position: Lying)   Pulse 78   Temp 98.7  F (37.1  C)   Resp 26   Ht 5' 5\" (1.651 m)   Wt 134 lb 14.7 oz (61.2 kg)   SpO2 100%   BMI 22.45 kg/m      Fransico Wilkins  12/20/2020      "

## 2021-06-13 NOTE — PROGRESS NOTES
Pt tolerating HFNC well. Will attempt to wean FIO2 as pt tolerates. No significant events to note thus far in shift. RT will continue to follow patient. Plan is to wean oxygen as tolerated. HFNC current settings below. BIPAP was DC'D per Critical care team.       11/27/20 1138   Oxygen Therapy/Pulse Ox   O2 Device High-flow NC   O2 Flow Rate (L/min) 55 L/min   FiO2 (%) 55 %   O2 Titration attempted (Day and Evening Shift) Yes   O2 Therapy Oxygen humidified   $ Oxygen Charge / Day Yes   Heater Temperature 87.8  F (31  C)   SpO2 95 %   SpO2 Activity  O2 at rest     GUSTAVO PazT

## 2021-06-13 NOTE — PLAN OF CARE
Problem: Inadequate Gas Exchange  Goal: Patient will achieve/maintain normal respiratory rate/effort  Outcome: Progressing      Problem: Breathing  Goal: Patient will maintain patent airway  Outcome: Progressing     Problem: Knowlegde Deficit  Goal: Demonstrate technique for CPAP/BiPAP  Outcome: Progressing     Patient had episodes of non productive coughing spells, tessalon perle given, did not help much. Placed on BIPAP at the start of shift. Was desaturating down to 87-88% on HFNC. CNP ordered for promethazine -codeine prn, dose given with robitussin and kept patient's head up 60-90 degrees while sleeping. Now more relaxed and sleeping soundly after taking meds.

## 2021-06-13 NOTE — PROGRESS NOTES
PROVIDER RESTRAINT FOR NON-VIOLENT BEHAVIOR FACE TO FACE EVALUATION  12/12/2020   8:38 AM     Patient seen on morning interdisciplinary rounds.     Patient's Immediate Situation:  Patient demonstrated the following behaviors: Pulling/tugging at invasive lines or tubes and does not respond to verbal/non-verbal redirection    Patient's Reaction to the intervention:  Does patient understand the reason for restraint/seclusion? Unable to Express    Medical Condition:  Is there any evidence of compromise of Skin integrity, Respiratory, Cardiovascular, Musculoskeletal, Hydration? No    Behavioral Condition:  In consultation with the RN, is there a need to continue this restraint or seclusion? Yes    See Restraint Flowsheet for complete restraint documentation and assessment.    Jennifer Leonard, CNP

## 2021-06-13 NOTE — PROGRESS NOTES
"Critical Care Progress Note  11/22/2020     Admit Date: 11/13/2020  Moved to ICU on 11/15/20    Code Status: full code      Problem List:   Principal Problem:    Pneumonia  Active Problems:    Hypokalemia    Acute respiratory failure with hypoxia (H)    Pneumonia due to 2019 novel coronavirus  anxiety     Overnight Events  On 50 L/min 90%, ovenight was on BiPAP 15/8 65%. Self-proning, eating well. Despite having Azerbaijani  on phone, patient attempts only English with brief English phrases. States she feels \"happy, breathe good.\" Did encourage use of  to obtain more subjective history but she continued in English.    Plan by System:     Neuro/Psych: no acute issues; at risk for developing ICU delirium    - try to maintain day/night schedule.    - avoid benzos     Pulmonary: Acute respiratory failure with hypoxia due to COVID19 viral pneumonia;   - HFNC 50 L/min 90% alternating with BiPAP 15/8 65%   - Encourage self proning   -  Remains high risk for needing intubation.     CV: No acute issues    Tele monitoring.     GI/: E.ating when not on bipap   - Bowel protocol.     Renal: no acute issues    - monitor     ID: COVID19 viral pneumonia    - Dexamethasone x 10 days   - Remdesivir course completed    Heme/Coag:  COVID19 coagulopathy    - LMWH 0.5mg/kg twice daily     Endocrine: Steroid induced hyperglycemia    -  No coverage needed.   - discontinue FSBG and sliding scale insulin    ICU PROPHYLAXIS:    Enoxaparin 0.5 mg/kg two times a day    PPI    CODE STATUS, DISPOSITION, FAMILY COMMUNICATION: Full code. Critically ill. Communicated directly with the patient.    Lines/Drains/Tubes:  Central line (RUE PICC) placed on 11/16/20  External urinary catheter placed on 11/16/20    Restraints  Indicated and ordered    Total CCT: 45 minutes     Gregg Sarabia MD  Pulmonary and Critical Care Medicine  [facility]  Cell [phone]  Office [phone]  Pager " [phone]    _______________________________________________________________    HPI: Hafsa Robles is a 60 y.o. year old female with PMH Significant for breast cancer s/p radiation. Not currently under treatment. No history of heart, lung, liver, kidney disease. No diabetes. Presented to ER Nov 13 with one week history of cough, fever, fatigue. Diagnosed with Covid 19 and treated for possible bacterial superinfection. She had rapid escalation in her oxygen needs  from 4L NC to HFNC 80% 60 L/min and so she was transferred to ICU for further monitoring.     Objective:     Vitals:    11/22/20 1000 11/22/20 1100 11/22/20 1200 11/22/20 1300   BP:  113/59 101/54    Pulse: 83 96 (!) 103    Resp: 26 (!) 29 26    Temp:       TempSrc:       SpO2: 98% 95% (!) 86% 92%   Weight:       Height:          I/O:     Intake/Output Summary (Last 24 hours) at 11/22/2020 1403  Last data filed at 11/22/2020 0426  Gross per 24 hour   Intake 250 ml   Output 800 ml   Net -550 ml     Wt Readings from Last 3 Encounters:   11/22/20 136 lb 11 oz (62 kg)   12/16/14 130 lb (59 kg)      Weight change: -1 lb 1.6 oz (-0.5 kg)    Physical Exam:  Gen: awake, up in chair  HEENT: no DOT, on HFNC  NEURO: PERRL, alert, nonfocal moving all extremities  CARDIOVASCULAR: RRR  PULMONARY: diminished, no crackles or wheezes  GASTROINTESTINAL: soft nt + bs  SKIN: no rashes on visible skin    Labs:   Results from last 7 days   Lab Units 11/22/20  0425 11/19/20  0630 11/19/20  0630   LN-SODIUM mmol/L 139   < > 140   LN-POTASSIUM mmol/L 4.1   < > 3.7   LN-CHLORIDE mmol/L 102   < > 105   LN-CO2 mmol/L 25   < > 28   LN-BLOOD UREA NITROGEN mg/dL 18   < > 14   LN-CREATININE mg/dL 0.50*   < > 0.51*   LN-CALCIUM mg/dL 8.3*   < > 7.9*   LN-PROTEIN TOTAL g/dL  --   --  6.0   LN-BILIRUBIN TOTAL mg/dL  --   --  0.7   LN-ALKALINE PHOSPHATASE U/L  --   --  84   LN-ALT (SGPT) U/L  --   --  49*   LN-AST (SGOT) U/L  --   --  49*    < > = values in this interval not displayed.        Results from last 7 days   Lab Units 11/22/20  0425   LN-WHITE BLOOD CELL COUNT thou/uL 13.1*   LN-HEMOGLOBIN g/dL 14.0   LN-HEMATOCRIT % 42.4   LN-PLATELET COUNT thou/uL 417

## 2021-06-13 NOTE — PROGRESS NOTES
Pt had an uneventful night, resting comfortably in bed.  Bipap was placed on pt for night shift.  Night medications given, pt c/o LUQ abdominal pain this morning around 0615, spoke with provider to prescribe something for her.

## 2021-06-13 NOTE — PROGRESS NOTES
"RESPIRATORY CARE NOTE     Patient Name: Hafsa Hall  Today's Date: 12/12/2020     Pt continues on the following settings:  Vent Mode: VCV  FiO2 (%):  [35 %-40 %] 35 %  S RR:  [20] 20  S VT:  [375 mL-400 mL] 375 mL  PEEP/CPAP (cm H2O):  [5 cm H2O] 5 cm H2O  Minute Ventilation (L/min):  [7.9 L/min-12.5 L/min] 8 L/min  PIP:  [13 cm H2O-37 cm H2O] 32 cm H2O  AZ SUP:  [5 cm H20-15 cm H20] 5 cm H20  MAP (cm H2O):  [8-11] 10     Plateau pressure: 30cm H2O     Pt is intubated with  # 7.5 ETT secured 23 at the teeth. Pt weaned today on PS of 5, peep 5, FiO2 40% for a total of 4 hrs and 18 minutes. Pt placed back on full support for comfort. Pt did exhibit accessory muscle use, tachypnea and fatigue. BS are coarse rhonchi and decreased @ bases. RT suctioned pt for small to mod thin white secretions. RT will continue to monitor and assess as needed.      Results for HAFSA HALL (MRN [id number]) as of 12/12/2020 21:38   Ref. Range 12/12/2020 15:57   pH, Arterial Latest Ref Range: 7.37 - 7.44  7.46 (H)   pCO2, Arterial Latest Ref Range: 35 - 45 mm Hg 40   pO2, Arterial Latest Ref Range: 80 - 90 mm Hg 80   Bicarbonate, Arterial Calc Latest Ref Range: 23.0 - 29.0 mmol/L 28.5   O2 Sat, Arterial Latest Ref Range: 96.0 - 97.0 % 96.7   Oxyhemoglobin Latest Ref Range: 96.0 - 97.0 % 95.1 (L)   POC Base Excess Calc Latest Units: mmol/L 4.9   Ventilation Mode Unknown CPAP/PS VC   Peep Latest Units: cm H2O 5   Sample Stabilized Temperature Latest Units: degrees C 37.0   Rate Latest Units: rr/min 20   FIO2 Unknown 35.00     /68 (Patient Position: Lying)   Pulse 76   Temp 98.5  F (36.9  C) (Oral)   Resp 21   Ht 5' 5\" (1.651 m)   Wt 136 lb 3.2 oz (61.8 kg)   SpO2 98%   BMI 22.66 kg/m        Sunny iJmenez, LRT  "

## 2021-06-13 NOTE — PLAN OF CARE
Problem: Inadequate Gas Exchange  Goal: Patient will achieve/maintain normal respiratory rate/effort  Outcome: Progressing     Problem: Excessive Fluid Volume  Goal: Patient will achieve/maintain normal respiratory rate/effort  Outcome: Progressing   Pt is tolerating HFNC well. Attempting to wean as patient tolerates. BIPAP Dc'd per Critical care team today. Will continue to wean.

## 2021-06-13 NOTE — PROGRESS NOTES
Progress Note    Assessment/Plan  59 yo female previously healthy admitted with COVID pneumonia and worsening respiratory failure.    1. COVID-19 pneumonia  -- On care pathway for COVID, monitor closely for deterioration  -- Meets criteria for dexamethasone and Remdesivir and these are started  -- Lactic acid elevation, meets sepsis criteria -- giving fluid bolus although half dose given COVID risk for pulmonary edema  -- Antibiotics for pneumonia, blood culture pending    2. Acute respiratory failure  -- Currently requiring 4 lpm  -- Follow closely    3. Diarrhea  -- Check for C diff although most likely due to COVID    COVID Positive tested as outpatient and again here 11/13  FULL Code  VTE proph:Lovenox, high risk  Diet: Regular      Disposition Plan:Home at discharge  Barriers to Discharge:COVID, hypoxia    Principal Problem:    Pneumonia      Subjective  Feeling well at times and unwell at others. Appetite is good, She has been sick for the past 1 week. Her  and daughter in the household are not ill but are getting tested. She developed symptoms while at work and her employer has been notified.    Her breathing is okay. She is coughing a lot especially when she talks. She has been having diarrhea, multiple times today.    Objective    Vital signs in last 24 hours  Temp:  [97.4  F (36.3  C)-99.9  F (37.7  C)] 98.9  F (37.2  C)  Heart Rate:  [76-93] (P) 88  Resp:  [16-34] 22  BP: (107-122)/(60-78) 122/76  Weight:   145 lb 8 oz (66 kg)    Intake/Output last 3 shifts  No intake/output data recorded.  Intake/Output this shift:  I/O this shift:  In: 360 [P.O.:360]  Out: -     Review of Systems   Pertinent items are noted in HPI.    Physical Exam  General: Well developed middle aged female, No apparent distress, occasional cough  Head: Normocephalic, atraumatic  Cardiovascular: Regular rate and rhythm, Normal S1, S2  Lungs: Faint crackles  Abdomen: Soft, Non-tender, not distended, Bowel sounds  present  Extremities: No edema, no clubbing  Skin: Warm and well-perfused without lesions.  Neurologic: Alert and oriented. Face is symmetric, Moves all extremities equally      Pertinent Labs   Lab Results: personally reviewed.   Recent Results (from the past 24 hour(s))   Troponin I   Result Value Ref Range    Troponin I <0.01 0.00 - 0.29 ng/mL   Magnesium   Result Value Ref Range    Magnesium 2.0 1.8 - 2.6 mg/dL   BNP(B-type Natriuretic Peptide)   Result Value Ref Range    BNP <10 0 - 93 pg/mL   Basic Metabolic Panel   Result Value Ref Range    Sodium 140 136 - 145 mmol/L    Potassium 3.6 3.5 - 5.0 mmol/L    Chloride 100 98 - 107 mmol/L    CO2 28 22 - 31 mmol/L    Anion Gap, Calculation 12 5 - 18 mmol/L    Glucose 114 70 - 125 mg/dL    Calcium 8.4 (L) 8.5 - 10.5 mg/dL    BUN 14 8 - 22 mg/dL    Creatinine 0.64 0.60 - 1.10 mg/dL    GFR MDRD Af Amer >60 >60 mL/min/1.73m2    GFR MDRD Non Af Amer >60 >60 mL/min/1.73m2   HM1 (CBC with Diff)   Result Value Ref Range    WBC 5.5 4.0 - 11.0 thou/uL    RBC 5.47 (H) 3.80 - 5.40 mill/uL    Hemoglobin 15.6 12.0 - 16.0 g/dL    Hematocrit 47.4 (H) 35.0 - 47.0 %    MCV 87 80 - 100 fL    MCH 28.5 27.0 - 34.0 pg    MCHC 32.9 32.0 - 36.0 g/dL    RDW 13.0 11.0 - 14.5 %    Platelets 258 140 - 440 thou/uL    MPV 9.7 8.5 - 12.5 fL    Neutrophils % 84 (H) 50 - 70 %    Lymphocytes % 13 (L) 20 - 40 %    Monocytes % 2 2 - 10 %    Eosinophils % 0 0 - 6 %    Basophils % 0 0 - 2 %    Immature Granulocyte % 0 <=0 %    Neutrophils Absolute 4.6 2.0 - 7.7 thou/uL    Lymphocytes Absolute 0.7 (L) 0.8 - 4.4 thou/uL    Monocytes Absolute 0.1 0.0 - 0.9 thou/uL    Eosinophils Absolute 0.0 0.0 - 0.4 thou/uL    Basophils Absolute 0.0 0.0 - 0.2 thou/uL    Immature Granulocyte Absolute 0.0 <=0.0 thou/uL   INR   Result Value Ref Range    INR 0.95 0.90 - 1.10   CK Total   Result Value Ref Range    CK, Total 120 30 - 190 U/L   C-Reactive Protein   Result Value Ref Range    CRP 6.9 (H) 0.0 - 0.8 mg/dL    Procalcitonin   Result Value Ref Range    Procalcitonin 0.26 0.00 - 0.49 ng/mL   ECG 12 lead nursing unit performed   Result Value Ref Range    SYSTOLIC BLOOD PRESSURE 110 mmHg    DIASTOLIC BLOOD PRESSURE 71 mmHg    VENTRICULAR RATE 88 BPM    ATRIAL RATE 88 BPM    P-R INTERVAL 156 ms    QRS DURATION 82 ms    Q-T INTERVAL 374 ms    QTC CALCULATION (BEZET) 452 ms    P Axis 56 degrees    R AXIS 5 degrees    T AXIS 65 degrees    MUSE DIAGNOSIS       Normal sinus rhythm  Normal ECG  When compared with ECG of 16-DEC-2014 14:09,  Questionable change in QRS axis  Confirmed by SEE ED PROVIDER NOTE FOR, ECG INTERPRETATION ([id number]),  SUKHDEEP CRESPO ([id number]) on 11/14/2020 4:37:06 AM     COVID-19 Virus PCR MRF    Specimen: Respiratory   Result Value Ref Range    COVID-19 VIRUS SPECIMEN SOURCE Nasopharyngeal     2019-nCOV       Test received-See reflex to IDDL test SARS CoV2 (COVID-19) Virus RT-PCR   SARS-CoV-2 (COVID-19) RT-PCR-IDDL    Specimen: Respiratory   Result Value Ref Range    SARS-CoV-2 Virus Specimen Source Nasopharyngeal     SARS-CoV-2 PCR Result POSITIVE (!!)     SARS-COV-2 PCR COMMENT       Testing was performed using the Aptima SARS-CoV-2 Assay on the [employer]   Reticulocytes   Result Value Ref Range    Retic Absolute Count 0.044 0.010 - 0.110 mill/uL    Retic Ct Pct 0.82 0.8 - 2.7 %   Lactate Dehydrogenase (LDH)   Result Value Ref Range    LD (LDH) 611 (H) 125 - 220 U/L   D-dimer, Quantitative   Result Value Ref Range    D-Dimer, Quant 0.98 (H) <=0.50 FEU ug/mL   APTT(PTT)   Result Value Ref Range    PTT 31 24 - 37 seconds   Fibrinogen   Result Value Ref Range    Fibrinogen 667 (H) 170 - 410 mg/dL   Ferritin   Result Value Ref Range    Ferritin 2,663 (H) 10 - 130 ng/mL   ABO/Rh   Result Value Ref Range    ABORh B POS    Reticulocytes   Result Value Ref Range    Retic Absolute Count 0.030 0.010 - 0.110 mill/uL    Retic Ct Pct 0.56 (L) 0.8 - 2.7 %   Basic Metabolic Panel   Result Value Ref Range    Sodium 140  136 - 145 mmol/L    Potassium 3.6 3.5 - 5.0 mmol/L    Chloride 104 98 - 107 mmol/L    CO2 24 22 - 31 mmol/L    Anion Gap, Calculation 12 5 - 18 mmol/L    Glucose 141 (H) 70 - 125 mg/dL    Calcium 8.1 (L) 8.5 - 10.5 mg/dL    BUN 12 8 - 22 mg/dL    Creatinine 0.55 (L) 0.60 - 1.10 mg/dL    GFR MDRD Af Amer >60 >60 mL/min/1.73m2    GFR MDRD Non Af Amer >60 >60 mL/min/1.73m2   Lactate Dehydrogenase (LDH)   Result Value Ref Range    LD (LDH) 638 (H) 125 - 220 U/L   INR   Result Value Ref Range    INR 0.92 0.90 - 1.10   Fibrinogen   Result Value Ref Range    Fibrinogen 658 (H) 170 - 410 mg/dL   C-Reactive Protein   Result Value Ref Range    CRP 6.5 (H) 0.0 - 0.8 mg/dL   D-dimer, Quantitative   Result Value Ref Range    D-Dimer, Quant 1.09 (H) <=0.50 FEU ug/mL   Troponin I   Result Value Ref Range    Troponin I <0.01 0.00 - 0.29 ng/mL   Glycosylated Hemoglobin A1C   Result Value Ref Range    Hemoglobin A1c 5.8 (H) <=5.6 %   HM1 (CBC with Diff)   Result Value Ref Range    WBC 3.9 (L) 4.0 - 11.0 thou/uL    RBC 5.33 3.80 - 5.40 mill/uL    Hemoglobin 15.1 12.0 - 16.0 g/dL    Hematocrit 46.2 35.0 - 47.0 %    MCV 87 80 - 100 fL    MCH 28.3 27.0 - 34.0 pg    MCHC 32.7 32.0 - 36.0 g/dL    RDW 13.1 11.0 - 14.5 %    Platelets 281 140 - 440 thou/uL    MPV 9.6 8.5 - 12.5 fL    Neutrophils % 84 (H) 50 - 70 %    Lymphocytes % 14 (L) 20 - 40 %    Monocytes % 2 2 - 10 %    Eosinophils % 0 0 - 6 %    Basophils % 0 0 - 2 %    Immature Granulocyte % 0 <=0 %    Neutrophils Absolute 3.3 2.0 - 7.7 thou/uL    Lymphocytes Absolute 0.5 (L) 0.8 - 4.4 thou/uL    Monocytes Absolute 0.1 0.0 - 0.9 thou/uL    Eosinophils Absolute 0.0 0.0 - 0.4 thou/uL    Basophils Absolute 0.0 0.0 - 0.2 thou/uL    Immature Granulocyte Absolute 0.0 <=0.0 thou/uL   POCT Glucose    Specimen: Blood   Result Value Ref Range    Glucose 161 (H) 70 - 139 mg/dL   POCT Glucose    Specimen: Blood   Result Value Ref Range    Glucose 176 (H) 70 - 139 mg/dL   Lactic Acid   Result  Value Ref Range    Lactic Acid 3.3 (H) 0.7 - 2.0 mmol/L   Lactic Acid   Result Value Ref Range    Lactic Acid 2.8 (H) 0.7 - 2.0 mmol/L       Advanced Care Planning  Discharge Planning discussed with patient and

## 2021-06-13 NOTE — PROGRESS NOTES
Intensivist update    ABG's reviewed:  7.26/78/95 on FiO2 0.5, PEEP 12  RR 35bpm, Vt 230cc    Plan:  - cut inhaled epo to 1/2 strength  - may be able to leave supine today. Defer to Dr. Rock, he's going to reassess later in shift.    Bashir (Sharan) MD Britt  [facility] Pulmonary & Critical Care  Pager [phone]  Clinic [phone]

## 2021-06-13 NOTE — PROGRESS NOTES
Progress Note    Assessment/Plan  61 yo Moroccan speaking female admitted for COVID and acute hypoxic respiratory failure on 11/13. She spent several days in ICU requiring high flow NC and Bipap. On 11/30 she was transferred back to [facility] for worsening hypoxia and was intubated on 12/1.    1. Acute respiratory failure with hypoxia due to COVID infection  -- Remains intubated although appears to be improving and note from intensivist suggests SBT trial starting tomorrow  -- Sedated while on vent (fentanyl and dex)    2. COVID with septic and circulatory shock  -- Weaned off pressors on 12/4  -- Has completed Remdesivir and Dexamethasone courses  -- Lovenox two times a day for VTE proph  -- Received Rocephin and Azithro earlier in stay, completed empiric course of Zosyn today  -- Blood and sputum cultures NGTD    3. Malnutrition  -- Tube feeding and FW flushes per RD    4. Leukocytosis  -- Follow    5. Hypokalemia  -- Replace    ICU insulin protocols  Peridex  subcutaneous Lovenox  PPI      Intensivist Managing and will continue to follow peripherally while intubated    Principal Problem:    Pneumonia  Active Problems:    Hypokalemia    Acute respiratory failure with hypoxia (H)    Pneumonia due to 2019 novel coronavirus    Severe protein-calorie malnutrition (H)    Acute respiratory failure (H)    ARDS (adult respiratory distress syndrome) (H)    Encephalopathy

## 2021-06-13 NOTE — PLAN OF CARE
Problem: Inadequate Gas Exchange  Goal: Patient will achieve/maintain normal respiratory rate/effort  Outcome: Progressing     Problem: Daily Care  Goal: Daily care needs are met  Outcome: Progressing   No PRNs given for agitation or pain. Continues to have copious amounts of secretions. O2 and BP do not tolerate turns well, but recover quickly.

## 2021-06-13 NOTE — PROGRESS NOTES
RESPIRATORY CARE NOTE    Vent Mode: VCV  FiO2 (%):  [40 %-60 %] 40 %  S RR:  [32-35] 32  S VT:  [250 mL] 250 mL  PEEP/CPAP (cm H2O):  [8 cm H2O-12 cm H2O] 8 cm H2O  Minute Ventilation (L/min):  [9.3 L/min-10.3 L/min] 9.9 L/min  PIP:  [31 cm H2O-41 cm H2O] 31 cm H2O  MAP (cm H2O):  [14-18] 14    Pt. remains on full vent support, settings above. ETT 7.5, 21 @ teeth, BS coarse/ diminished, suctioning small to moderate amount of tan secreations via ETT. RR decreased to 32, peep to +8 per MD order, PIP 31, plats 27. Pt. has a sore on left upper lip (RN aware), ETT repositioned Q2 for skin integrity. RT following      GUSTAVO JjT

## 2021-06-13 NOTE — PROGRESS NOTES
Critical Care Progress Note  11/25/2020     Admit Date: 11/13/2020  Moved to ICU on 11/15/20    Code Status: full code      Problem List:   Principal Problem:    Pneumonia  Active Problems:    Hypokalemia    Acute respiratory failure with hypoxia (H)    Pneumonia due to 2019 novel coronavirus  anxiety     Overnight Events  No acute events    Plan by System:     Neuro/Psych: no acute issues; at risk for developing ICU delirium    - try to maintain day/night schedule.    - avoid benzos     Pulmonary: Acute respiratory failure with hypoxia due to COVID19 viral pneumonia;   - HFNC 20 - 60 L   - BIPAP as needed - she has been refusing.    - Encourage self proning - she has been refusing.    - Remains high risk for needing intubation.     CV: No acute issues    Tele monitoring.     GI/: Eating when not on bipap   - Bowel protocol.     Renal: no acute issues    - monitor     ID: COVID19 viral pneumonia    - Dexamethasone x 10 days   - Remdesivir course completed    Heme/Coag:  COVID19 coagulopathy    - LMWH 0.5mg/kg twice daily     Endocrine: Steroid induced hyperglycemia    -  No coverage needed.   - discontinue FSBG and sliding scale insulin    ICU PROPHYLAXIS:    Enoxaparin 0.5 mg/kg two times a day    PPI    CODE STATUS, DISPOSITION, FAMILY COMMUNICATION: Full code. Critically ill.        Jennifer Leonard [facility]      _______________________________________________________________    HPI: Hafsa GALEN Robles is a 60 y.o. year old female with PMH Significant for breast cancer s/p radiation. Not currently under treatment. No history of heart, lung, liver, kidney disease. No diabetes. Presented to ER Nov 13 with one week history of cough, fever, fatigue. Diagnosed with Covid 19 and treated for possible bacterial superinfection. She had rapid escalation in her oxygen needs  from 4L NC to HFNC 80% 60 L/min and so she was transferred to ICU for further monitoring.      Objective:     Vitals:    11/25/20 0810 11/25/20 0900 11/25/20 1000 11/25/20 1100   BP:  107/62 96/52 118/61   Patient Position:       Pulse:  98 (!) 112 (!) 113   Resp:  (!) 31 (!) 39 (!) 31   Temp:       TempSrc:       SpO2: 95% 96% 92% 91%   Weight:       Height:          I/O:     Intake/Output Summary (Last 24 hours) at 11/25/2020 1205  Last data filed at 11/24/2020 2000  Gross per 24 hour   Intake 500 ml   Output 600 ml   Net -100 ml     Wt Readings from Last 3 Encounters:   11/24/20 137 lb 9.1 oz (62.4 kg)   12/16/14 130 lb (59 kg)      Weight change:     Physical Exam:  Gen: awake, no distress  HEENT: AT/NC, on HFNC  NEURO: PERRL, alert, nonfocal moving all extremities  CARDIOVASCULAR: RRR  PULMONARY: diminished, no crackles or wheezes  GASTROINTESTINAL: soft nt + bs  SKIN: no rashes on visible skin    Labs:   Results from last 7 days   Lab Units 11/25/20  0434 11/19/20  0630 11/19/20  0630   LN-SODIUM mmol/L 135*   < > 140   LN-POTASSIUM mmol/L 3.5   < > 3.7   LN-CHLORIDE mmol/L 101   < > 105   LN-CO2 mmol/L 26   < > 28   LN-BLOOD UREA NITROGEN mg/dL 13   < > 14   LN-CREATININE mg/dL 0.48*   < > 0.51*   LN-CALCIUM mg/dL 8.4*   < > 7.9*   LN-PROTEIN TOTAL g/dL  --   --  6.0   LN-BILIRUBIN TOTAL mg/dL  --   --  0.7   LN-ALKALINE PHOSPHATASE U/L  --   --  84   LN-ALT (SGPT) U/L  --   --  49*   LN-AST (SGOT) U/L  --   --  49*    < > = values in this interval not displayed.       Results from last 7 days   Lab Units 11/25/20 0434 11/23/20 0427 11/23/20 0427   LN-WHITE BLOOD CELL COUNT thou/uL  --   --  16.8*   LN-HEMOGLOBIN g/dL  --   --  14.0   LN-HEMATOCRIT %  --   --  42.3   LN-PLATELET COUNT thou/uL 362   < > 320    < > = values in this interval not displayed.

## 2021-06-13 NOTE — PLAN OF CARE
Problem: Mechanical Ventilation  Goal: Mobility/activity is maintained at optimum level for patient  Outcome: Not Applicable this Shift     Problem: Mechanical Ventilation  Goal: Respiratory status - ventilation  Description: Movement of air in and out of the lungs.    Liberate from ventilator  Outcome: Not Applicable this Shift     Ed Hammer, LRT

## 2021-06-13 NOTE — PROGRESS NOTES
Chart check    60-year-old Yi speaking female admitted with hypoxia and pulmonary infiltrates due to COVID pneumonia. On BiPaP and HFNC intermittently. Intensivist managing. [facility] following peripherally.

## 2021-06-13 NOTE — PROGRESS NOTES
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 She was on high flow NC and Bipap initially in the ICU, then answered to the floor. On 11/30 she was transferred back to ICU for worsening hypoxia and was intubated on 12/01/20.    Discussed with ICU team.  Intensivist primarily managing patient.    Hospitalist service will follow patient peripherally while patient in ICU.      Note created using dragon voice recognition software.  Errors in spelling or words which seems out of context are unintentional.  Sounds alike errors may have escaped editing.    12/16/2020  VIKTOR JOHNSON MD  HOSPITALIST, [facility]  PAGER NO. [phone]

## 2021-06-13 NOTE — PROGRESS NOTES
Pt on HFNC all shift. Has tolerated weaning well. Will continue to wean as patient tolerates. No significant events noted this shift. Rt will continue to monitor.        11/29/20 1101   Oxygen Therapy/Pulse Ox   O2 Device High-flow NC   O2 Flow Rate (L/min) 45 L/min   FiO2 (%) 55 %   O2 Titration attempted (Day and Evening Shift) Yes   O2 Therapy Oxygen humidified   Heater Temperature 95  F (35  C)   SpO2 94 %   SpO2 Activity  O2 at rest   $ Monitor Pulse Ox Continuous / Overnight Monitor     GUSTAVO Paz

## 2021-06-13 NOTE — PROGRESS NOTES
Psychiatric Progress Note    Mood disorder due to medical condition, acute hypoxic respiratory failure, COVID-19.  Anxiety reaction exacerbated due to hospitalization, vent, ICU.  Mild intermittent metabolic encephalopathy likely exacerbated in the context of ICU, intubation, COVID-19.  Behavioral agitation requiring ICU sedation protocol.     Recommendations:  Seroquel 75 mg four times a day.  Discontinue Olanzapine.  Trazodone 25 mg four times a day PRN for agitation.  Seroquel 25 mg four times a day PRN for agitation and anxiety  Depakote sprinkles 250 mg 3 times a day  Case discussed with bedside RN.    SUBJECTIVE:  Patient was quite anxious earlier this morning however after receiving scheduled and as needed Seroquel she calmed down and was resting comfortably.    MENTAL STATUS EXAMINATION:   Speech is by nodding yes or no.  Thought processes with increased latency.  Thought content does not show active visual or auditory hallucinations, delusions or paranoia.  No simeon or hypomania.  No restlessness noted this morning.  Appears comfortable resting comfortably.  Judgment, insight, memory, attention, comprehension, fund of knowledge are depressed.  Language is unassessable.  Affect is blunted mood congruent.  Gait and ambulation not tested.  No muscle rigidity.  Vital signs in last 24 hours  Temp:  [99  F (37.2  C)-100.5  F (38.1  C)] 99.7  F (37.6  C)  Heart Rate:  [] 92  Resp:  [20-47] 21  BP: ()/(52-78) 91/58  Arterial Line BP: ()/(44-83) 130/68  FiO2 (%):  [40 %-100 %] 40 %  Weight:   135 lb 11.2 oz (61.6 kg)

## 2021-06-13 NOTE — PROGRESS NOTES
12/14/20 1124   Patient Data   Auto/Intrinsic PEEP Observed (cm H2O)   (failed)   Plateau Pressure (cm H2O) 32 cm H2O   Static Compliance (L/cm H2O) 9   Dynamic Compliance (L/cm H2O) 11 L/cm H2O   Airway Resistance 7   SpO2 100 %   Heart Rate 83

## 2021-06-13 NOTE — PROGRESS NOTES
Transfer to 318       0307: Report called to Callie  0315: VSS.  line used to explain details of transfer to patient. Patient confirms understanding.   0330: Patient transferred with belongings to room 318 on non-rebreather mask @ 15L.

## 2021-06-13 NOTE — PROGRESS NOTES
Care Management Follow Up Note    Length of Stay (days) 25     Patient plan of care discussed at Interdisciplinary Rounds: yes           Pt/Rep Education / Interventions: Review d/c options, Discuss d/c, Discussed readmission risk    Expected Discharge Date: 12/12/20  Concerns to be Addressed / Barriers to Discharge:      Anticipated Discharge Disposition:    Anticipated Discharge Services:      Selected Continued Care - Admitted Since 11/13/2020    No services have been selected for the patient.        Anticipated Discharge DME:      Plan:  Chart reviewed, pt on vent.  At baseline Pt lives with spouse and is independent.  Care Management to follow    Carly Millard SW

## 2021-06-13 NOTE — PLAN OF CARE
Problem: Ineffective Airway Clearance  Goal: Maintain airway patency  Outcome: Progressing     Problem: Impaired Gas Exchange  Goal: Demonstrate improved ventilation and adequate oxygenation of tissues as evidenced by absence of respiratory distress  Outcome: Progressing     BiPAP:  Mode: ST  IPAP: 15  EPAP: 8  Set RR: 20  FiO2: 80%     Pt on bipap throughout shift, tolerating well.

## 2021-06-13 NOTE — PLAN OF CARE
"  Problem: Inadequate Gas Exchange  Goal: Patient will achieve/maintain normal respiratory rate/effort  Outcome: Progressing     Problem: Pain  Goal: Patient's pain/discomfort is manageable  Outcome: Completed     Problem: Pain  Goal: Patient's pain/discomfort is manageable  Outcome: Completed     Problem: Safety  Goal: Patient will be injury free during hospitalization  Outcome: Progressing     Problem: Daily Care  Goal: Daily care needs are met  Outcome: Progressing     Patient tolerating high flow all day, got up to the chair on HF, Destats to high 80s with coughing and activities. Vitals stable. FIO2 at 75%- weaning per RTT. /67   Pulse 63   Temp 98.6  F (37  C) (Oral)   Resp 22   Ht 5' 5\" (1.651 m)   Wt 145 lb 8 oz (66 kg)   SpO2 98%   BMI 24.21 kg/m      "

## 2021-06-13 NOTE — PROGRESS NOTES
"RESPIRATORY CARE NOTE     Patient Name: Hafsa Robles  Today's Date: 12/12/2020     Pt continues on the following settings:  Vent Mode: VCV  FiO2 (%):  [35 %-100 %] 35 %  S RR:  [14-20] 20  S VT:  [400 mL] 400 mL  PEEP/CPAP (cm H2O):  [5 cm H2O] 5 cm H2O  Minute Ventilation (L/min):  [7.9 L/min-13 L/min] 7.9 L/min  PIP:  [21 cm H2O-41 cm H2O] 37 cm H2O  DE SUP:  [15 cm H20] 15 cm H20  MAP (cm H2O):  [9-17] 10   Plateau pressure: 30 cm H2O     Pt is intubated with  # 7.5 ETT secured  23 at the teeth.  BS are sl coarse and diminished pre and post neb tx. Pt has a strong cough with suction. RT suctioned pt for small tan. Pt's respiratory status is stable. RT will continue to follow per MD's orders.     /71   Pulse 68   Temp 98.6  F (37  C) (Oral)   Resp (!) 29   Ht 5' 5\" (1.651 m)   Wt 136 lb 3.2 oz (61.8 kg)   SpO2 100%   BMI 22.66 kg/m        Connie Blandon, LRT  "

## 2021-06-13 NOTE — PLAN OF CARE
"  Problem: Activity Intolerance/Impaired Mobility  Goal: Mobility/activity is maintained at optimum level for patient  Outcome: Progressing     Problem: Breathing  Goal: Patient will maintain patent airway  Outcome: Progressing     Problem: Mechanical Ventilation  Goal: Mobility/activity is maintained at optimum level for patient  Outcome: Progressing  Goal: Patient will maintain patent airway  Outcome: Progressing  Goal: Respiratory status - ventilation  Description: Movement of air in and out of the lungs.    Liberate from ventilator  Outcome: Progressing  Goal: ET tube will be managed safely  Outcome: Progressing  Goal: Ability to express needs and understand communication  Outcome: Progressing    Patient remained on full vent support. Suctioned moderate amount of thin secretions.Size 7.5 ETT remains in position at 23@T. ETT was moved from side-to-side to prevent skin breakdown on lips. HOB approx 30 degrees. Current vent settings are below: RT will continue to follow.    Vent Mode: VCV  FiO2 (%):  [40 %] 40 %  S RR:  [14] 14  S VT:  [400 mL] 400 mL  PEEP/CPAP (cm H2O):  [5 cm H2O] 5 cm H2O  Minute Ventilation (L/min):  [8.2 L/min-15.3 L/min] 8.2 L/min  PIP:  [22 cm H2O-48 cm H2O] 40 cm H2O  MAP (cm H2O):  [9-25] 9     /64 (Patient Position: Lying)   Pulse 69   Temp 97.8  F (36.6  C) (Axillary)   Resp 27   Ht 5' 5\" (1.651 m)   Wt 136 lb (61.7 kg)   SpO2 98%   BMI 22.63 kg/m             "

## 2021-06-13 NOTE — PROGRESS NOTES
Pt remained on HFNC all shift. Pt was found with saturations in 70's this shift. Increased FIO2 to 90% until pt had saturations in mid 90's then weaned back down. Tolerated well, no other events noted. Pt was weaned to 50L 50% this shift. Will attempt to wean pt as she tolerates. Rt to continue to follow.       11/28/20 1509   Oxygen Therapy/Pulse Ox   O2 Device High-flow NC   O2 Flow Rate (L/min) 55 L/min   FiO2 (%) 50 %   O2 Titration attempted (Day and Evening Shift) Yes   O2 Therapy Oxygen humidified   Heater Temperature 87.8  F (31  C)   SpO2 95 %   SpO2 Activity  O2 at rest   $ Monitor Pulse Ox Continuous / Overnight Monitor     GUSTAVO Paz

## 2021-06-13 NOTE — PROGRESS NOTES
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 She was on high flow NC and BiPAP initially in the ICU, then transferred to the floor. On 11/30 she was transferred back to ICU for worsening hypoxia and was intubated on 12/01/20.    Intensivist primarily managing patient.    Hospitalist service will follow patient peripherally while patient in ICU.      Note created using dragon voice recognition software.  Errors in spelling or words which seems out of context are unintentional.  Sounds alike errors may have escaped editing.    12/19/2020  VIKTOR JOHNSON MD  HOSPITALIST, [facility]  PAGER NO. [phone]

## 2021-06-13 NOTE — PROGRESS NOTES
Care Management Follow Up Note    Length of Stay (days) 9     Patient plan of care discussed at Interdisciplinary Rounds: yes           Pt/Rep Education / Interventions: Review d/c options, Discuss d/c, Discussed readmission risk    Expected Discharge Date: 11/25/20  Concerns to be Addressed / Barriers to Discharge: COVID +, Hi flow O2 and Bipap, IV steroids.    Anticipated Discharge Disposition:    Anticipated Discharge Services:      Selected Continued Care - Admitted Since 11/13/2020    No services have been selected for the patient.        Anticipated Discharge DME:      Plan:  Spoke with  Barry to inform that CM is following pt. And will be available for assist as needed closer to discharge.    Kaur Izaguirre RN

## 2021-06-13 NOTE — PLAN OF CARE
Problem: Inadequate Gas Exchange  Goal: Patient will achieve/maintain normal respiratory rate/effort  Outcome: Progressing     Problem: Potential for transmission of organism by Contact, Enteric, Droplet and/or Airborne routes  Goal: Prevent transmission of organisms  Outcome: Progressing.      Uneventful shift.

## 2021-06-13 NOTE — PROGRESS NOTES
12/05/20 1225   Patient Data   Vt (observed, mL) 288 mL   Vt Exp (mL) 322 mL   Minute Ventilation (L/min) 6.8 L/min   Total Resp Rate  28 BPM   PIP Observed (cm H2O) 33 cm H2O   MAP (cm H2O) 12   Auto/Intrinsic PEEP Observed (cm H2O) 2 cm H2O   Plateau Pressure (cm H2O) 26 cm H2O   Dynamic Compliance (L/cm H2O) 15 L/cm H2O

## 2021-06-13 NOTE — PLAN OF CARE
Problem: Glucose Imbalance  Goal: Achieve optimal glucose control  Outcome: Progressing  Pt with  and 85, check q6h and prn, lantus subcutaneous and Novolog subcutaneous per orders.     Problem: Inadequate Gas Exchange  Goal: Patient will achieve/maintain normal respiratory rate/effort  Outcome: Not Progressing  Pt continues on vent, did not tolerate wean today.     Problem: Potential for Compromised Skin Integrity  Goal: Nutritional status is improving  Outcome: Progressing  Pt tolerating tube feeding at goal with residuals 90 and 30cc.  Water flushes 200cc q4h.    Pt VSS, continues on small amount Norepi, titrate for MAP>65. Pt afebrile. Turn pt q2h and prn, pt often drops bp with turns, recovers ok. No difficulty dropping oxygen sats with repositioning or activity.    Will continue to monitor.

## 2021-06-13 NOTE — PROGRESS NOTES
RESPIRATORY CARE NOTE     Patient Name: Hafsa Robles  Today's Date: 12/17/2020     Pt continues on the following settings:  Vent Mode: PCV/VG  FiO2 (%):  [40 %] 40 %  S RR:  [20] 20  S VT:  [350 mL-375 mL] 350 mL  PEEP/CPAP (cm H2O):  [5 cm H2O] 5 cm H2O  Minute Ventilation (L/min):  [7.1 L/min-10.8 L/min] 10.1 L/min  PIP:  [18 cm H2O-42 cm H2O] 29 cm H2O  DC SUP:  [8 cm H20] 8 cm H20  MAP (cm H2O):  [9-15] 12      Pt is intubated with  # 7.5 ETT secured 23 at the teeth. BS: coarse rhonchi and decreased @ bases. Tracheal suctioning x 2 for small to mod thick white secretions. Pt's respiratory status is stable.RT will continue to monitor and assess as needed.        12/17/20 2053   Vent Information   Interface Invasive   Vent ID [id number]   Vent Mode PCV/VG   Patient Ventilator Status On   Flowmeter at Bedside Yes   Ambu-Bag With Mask at Bedside Yes   Vent Settings   FiO2 (%) 40 %   Heater Temperature 98.6  F (37  C)   Resp Rate (Set) 20   Vt (Set, mL) 350 mL   Waveform Decelerating ramp   PEEP/CPAP (cm H2O) 5 cm H2O   Insp Flow (L/min) 30 L/min   PIP Set (cm H2O) 65 cm H2O   Trigger Sensitivity Flow (L/min) 2 L/min   Bias Flow (lpm)  5 L/min   Humidification BTPS   Insp Time (sec) 0.9 sec   I:E Ratio 1:2.3   Patient Data   Vt (observed, mL) 374 mL   Vt Exp (mL) 385 mL   Minute Ventilation (L/min) 10.1 L/min   Total Resp Rate  31 BPM   PIP Observed (cm H2O) 29 cm H2O   MAP (cm H2O) 12   Plateau Pressure (cm H2O) 27 cm H2O   Static Compliance (L/cm H2O) 14   Airway Resistance 11   SpO2 93 %   Heart Rate (!) 107   Alarms   High Resp Rate 55   Low Resp Rate 12   Low PEEP 1 cm H2O   Insp Pressure High (cm H2O) 65 cm H2O   Insp Pressure Low (cm H2O) 14 cm H2O   MV High (L/min) 19 L/min   MV Low (L/min) 3 L/min   Vt High (mL) 900 mL   Vt Low (mL) 120 mL   Apnea Interval (sec) 30 seconds   Apnea Rate 20   Apnea Volume (mL) 350 mL   Alarm Functional and On Yes   Backup Mode Set Yes   Airways   Airway LDA Subglottic Suction  endotracheal tube   Subglottic Suction ET Tube 7.5   Placement Date/Time: 12/01/20 0535   Subglottic Suction ETT Entry Site: Oral  Size : 7.5   Secured at (cm) 23 cm   Measured from Teeth   Secured Location Left   Secured with Commercial tube bateman   Cuff Pressure (cm H2O)   (No value recorded;.)   Site Condition Dry   Subglottic Secretions Small   Subglottic Suction Frequency Intermittent suction   Subglottic Suction Pressure 120 mmHg   Subglottic Suction Lumen Intervention Cleaned         Sunny Jimenez, LRT

## 2021-06-13 NOTE — PROGRESS NOTES
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 She was on high flow NC and Bipap initially in the ICU, then answered to the floor. On 11/30 she was transferred back to ICU for worsening hypoxia and was intubated on 12/1/20 and as of 12/7/20 remains intubated with ARDS     1. ARDS/acute hypoxic respiratory failure due to COVID infection  - Remains intubated/vent  -ICU managing  - weaning trials-slow improvement, a little better today but complicated by anxiety.    Continuing furosemide  Weaning trial today, possible extubation tomorrow       COVID with septic and circulatory shock  - Weaned off pressors on 12/4, but back on again late 12/7/20, back on pressors again today likely secondary to sedation medication, doubt sepsis currently.    - Has completed Remdesivir and Dexamethasone courses  - Lovenox two times a day for VTE proph  - Received Rocephin and Azithro earlier in stay, completed empiric course of Zosyn 12/6/20  - Blood and sputum cultures NGTD    Hematuria-started 12/10, has Arteaga catheter, mild but worse with movement.  Renal ultrasound shows nonobstructive nephrolithiasis, UA positive leukocyte esterase but also positive skin cells, Arteaga catheter sample.  Lovenox restarted, hemoglobin stable, if continues will ask urology to see at some point.  Await urine culture, start antibiotics if abnormal.  Creatinine normal.  Recheck hemoglobin tomorrow.    3. Malnutrition  - Tube feeding and FW flushes per RD     4.  Leukocytosis  Had resolved, a little worse today.  Continue to trend.     5. Hypokalemia  -- Replaced     6.DVT prevent-Lovenox q 12 started today, held yesterday for hematuria    Hyperglycemia-reactive secondary to tube feeding.  On low-dose Lantus and sliding scale.  Blood sugars fairly controlled.    Anxiety-likely situational, no past history of anxiety or depression.  On scheduled Zyprexa, lorazepam as needed, also on Precedex.     7.GI proph- ppi  Discussed with intensivist  Full code     subjective  Patient intubated, interactive, gets anxious with the wean.  Still having mild hematuria in Arteaga catheter.      Past Medical History  Past Medical History:   Diagnosis Date     Malignant neoplasm of right female breast (H)        Current Medications  Scheduled Meds:    chlorhexidine  15 mL Topical Q12H     enoxaparin ANTICOAGULANT  0.5 mg/kg Subcutaneous BID     furosemide  20 mg Intravenous DAILY     influenza vaccine quad (PF) (age 50-64 YR) 2020-21  0.5 mL Intramuscular Prior to Discharge     insulin aspart (NovoLOG) injection   Subcutaneous Q6H FIXED TIMES     insulin glargine  6 Units Subcutaneous QAM     OLANZapine  5 mg Oral Q8H     omeprazole  20 mg Oral QAM AC    Or     omeprazole  20 mg Enteral Tube QAM AC     senna-docusate  1 tablet Oral BID    Or     senna (SENOKOT) syrup  8.8 mg Enteral Tube BID     sodium chloride  10-30 mL Intravenous Q8H FIXED TIMES     Continuous Infusions:    dexmedetomidine infusion orderable (PRECEDEX) 0.8 mcg/kg/hr (12/12/20 1338)     norepinephrine 0.06 mcg/kg/min (12/12/20 1136)     sodium chloride 0.9% Stopped (12/10/20 4097)     PRN Meds:.acetaminophen, acetaminophen, bacitracin, benzocaine-menthoL, bisacodyL, calcium (as carbonate), dextrose 50 % (D50W), fentaNYL pf, glucagon (human recombinant), hydrALAZINE, lipase-protease-amylase **AND** sodium bicarbonate, LORazepam, magnesium hydroxide, midazolam, naloxone **OR** naloxone **OR** naloxone **OR** naloxone,  ondansetron **OR** ondansetron, polyethylene glycol, polyvinyl alcohol, sodium chloride bacteriostatic, sodium chloride, sodium chloride, sodium chloride, traZODone  No Known Allergies    Objective  Vital signs in last 24 hours  Temp:  [96.5  F (35.8  C)-99.8  F (37.7  C)] 97.9  F (36.6  C)  Heart Rate:  [] 93  Resp:  [20-51] 51  BP: ()/(51-71) 72/54  Arterial Line BP: (0-189)/(-3-143) 115/64  FiO2 (%):  [35 %-100 %] 35 %  Weight:   136 lb 3.2 oz (61.8 kg)      Wt Readings from Last 2 Encounters:   12/11/20 2355 136 lb 3.2 oz (61.8 kg)   12/10/20 0000 136 lb (61.7 kg)   12/09/20 0000 129 lb (58.5 kg)   12/08/20 0100 133 lb 9.6 oz (60.6 kg)   12/06/20 0230 135 lb 5.8 oz (61.4 kg)   12/05/20 0028 136 lb 7.4 oz (61.9 kg)   12/04/20 0000 136 lb 0.4 oz (61.7 kg)   12/03/20 0030 136 lb 14.5 oz (62.1 kg)   12/01/20 0400 130 lb 12.8 oz (59.3 kg)   11/28/20 0602 134 lb 6.4 oz (61 kg)   11/27/20 0000 136 lb 7.4 oz (61.9 kg)   11/26/20 2013 136 lb 7.4 oz (61.9 kg)   11/26/20 0030 137 lb 5.6 oz (62.3 kg)   11/24/20 0400 137 lb 9.1 oz (62.4 kg)   11/23/20 0400 137 lb 9.1 oz (62.4 kg)   11/22/20 0425 136 lb 11 oz (62 kg)   11/21/20 0452 137 lb 12.6 oz (62.5 kg)   11/20/20 0627 138 lb 0.1 oz (62.6 kg)   11/19/20 0733 141 lb 15.6 oz (64.4 kg)   11/13/20 2013 145 lb 8 oz (66 kg)   12/16/14 1223 130 lb (59 kg)   Weight change:     Intake/Output last 3 shifts  I/O last 3 completed shifts:  In: 2831.5 [I.V.:531.5; NG/GT:2300]  Out: 2710 [Urine:2710]  Intake/Output this shift:  No intake/output data recorded.    Review of Systems   Review of systems not obtained due to inability to communicate with the patient.     Physical Exam    Temp:  [96.5  F (35.8  C)-99.8  F (37.7  C)] 97.9  F (36.6  C)  Heart Rate:  [] 93  Resp:  [20-51] 51  BP: ()/(51-71) 72/54  Arterial Line BP: (0-189)/(-3-143) 115/64  FiO2 (%):  [35 %-100 %] 35 %    BP (!) 72/54   Pulse 93   Temp 97.9  F (36.6  C) (Axillary)   Resp (!) 51   Ht 5'  "5\" (1.651 m)   Wt 136 lb 3.2 oz (61.8 kg)   SpO2 98%   BMI 22.66 kg/m    General appearance: no distress  Awake, alert  Remainder physical exam deferred to intensivist in order to preserve PPE  Pertinent Labs   Lab Results: personally reviewed.   Labs from the previous 24 hours  Recent Results (from the past 24 hour(s))   Basic Metabolic Panel   Result Value Ref Range    Sodium 138 136 - 145 mmol/L    Potassium 3.6 3.5 - 5.0 mmol/L    Chloride 104 98 - 107 mmol/L    CO2 23 22 - 31 mmol/L    Anion Gap, Calculation 11 5 - 18 mmol/L    Glucose 192 (H) 70 - 125 mg/dL    Calcium 8.3 (L) 8.5 - 10.5 mg/dL    BUN 15 8 - 22 mg/dL    Creatinine 0.52 (L) 0.60 - 1.10 mg/dL    GFR MDRD Af Amer >60 >60 mL/min/1.73m2    GFR MDRD Non Af Amer >60 >60 mL/min/1.73m2   Magnesium   Result Value Ref Range    Magnesium 2.0 1.8 - 2.6 mg/dL   HM2(CBC W/O DIFF)   Result Value Ref Range    WBC 11.9 (H) 4.0 - 11.0 thou/uL    RBC 4.07 3.80 - 5.40 mill/uL    Hemoglobin 11.4 (L) 12.0 - 16.0 g/dL    Hematocrit 35.6 35.0 - 47.0 %    MCV 88 80 - 100 fL    MCH 28.0 27.0 - 34.0 pg    MCHC 32.0 32.0 - 36.0 g/dL    RDW 13.9 11.0 - 14.5 %    Platelets 513 (H) 140 - 440 thou/uL    MPV 9.9 8.5 - 12.5 fL     Labs from the previous 7 days:   Results from last 7 days   Lab Units 12/12/20 0418 12/11/20  0359 12/10/20  0216   LN-WHITE BLOOD CELL COUNT thou/uL 11.9* 10.7 10.9   LN-HEMOGLOBIN g/dL 11.4* 11.4* 11.1*   LN-PLATELET COUNT thou/uL 513* 452* 470*     Results from last 7 days   Lab Units 12/12/20 0418 12/11/20  1415 12/11/20  0359 12/10/20  0216 12/09/20  0257 12/08/20  0348 12/07/20  0642 12/06/20  0610 12/06/20  0610   LN-SODIUM mmol/L 138  --  138 138 138 138 140  --  141   LN-POTASSIUM mmol/L 3.6 4.2 3.5 3.8 3.7 3.9 4.3   < > 3.4*   LN-CHLORIDE mmol/L 104  --  105 106 101 101 102  --  96*   LN-CO2 mmol/L 23  --  23 26 29 31 31  --  37*   LN-BLOOD UREA NITROGEN mg/dL 15  --  11 12 10 14 16  --  25*   LN-CREATININE mg/dL 0.52*  --  0.48* 0.44* " 0.44* 0.48* 0.45*  --  0.46*   LN-CALCIUM mg/dL 8.3*  --  8.7 8.4* 8.2* 8.1* 8.3*  --  8.6    < > = values in this interval not displayed.               Invalid input(s): LABALBU        Other labs:   Lab Results   Component Value Date    INR 0.96 11/17/2020    INR 0.95 11/16/2020    INR 0.91 11/15/2020       Pertinent Radiology   Personally reviewed radiologists report and impressions  Xr Chest 1 View Portable    Result Date: 12/4/2020  EXAM: XR CHEST 1 VIEW PORTABLE LOCATION: [facility] DATE/TIME: 12/4/2020 1:24 PM INDICATION: desaturation, covid ARDS eval for interval change COMPARISON: 12/01/2020 and older studies.     Endotracheal tube is in good position. NG tube is coursing in the stomach. Right upper extremity PICC line catheter overlies the mid SVC. There has been slight decrease in the diffuse, asymmetric interstitial opacities. No hydropneumothorax. Heart and pulmonary vascularity are normal.    Xr Chest 1 View Portable    Result Date: 12/1/2020  EXAM: XR CHEST 1 VIEW PORTABLE LOCATION: [facility] DATE/TIME: 12/1/2020 12:36 PM INDICATION: ETT placement, worsening condition. COMPARISON: 12/01/2020.     Extensive bilateral opacities are present, similar to same-day radiograph. No substantial pleural effusion. No pneumothorax. Normal heart size. ET tube tip appropriately positioned, roughly 23 mm above the fede. Enteric tube courses into the stomach and off the field of view. Right PICC tip over the distal SVC.    Xr Chest 1 View Portable    Result Date: 12/1/2020  EXAM: XR CHEST 1 VIEW PORTABLE LOCATION: [facility] DATE/TIME: 12/1/2020 6:26 AM INDICATION: confirm ET Tube position COMPARISON: 11/29/2020.     The patient has been intubated. The endotracheal tube terminates 1.9 cm above the fede. Nasogastric tube passes below the diaphragm and the inferior margin of the radiograph. Extensive bilateral pulmonary infiltrates are little  changed from  11/29/2020 and consistent with infectious/inflammatory pneumonitis. No significant pleural effusion. No pneumothorax. Normal heart size.    Xr Chest 1 View Portable    Result Date: 11/29/2020  EXAM: XR CHEST 1 VIEW PORTABLE LOCATION: [facility] DATE/TIME: 11/29/2020 3:44 PM INDICATION: Hypoxia COMPARISON: 11/19/2020     Previously seen PICC line has been removed. Bilateral pulmonary infiltrates have significantly increased when compared to previous. No pleural effusion or pneumothorax.    Xr Chest 1 View Portable    Result Date: 11/19/2020  EXAM: XR CHEST 1 VIEW PORTABLE LOCATION: [facility] DATE/TIME: 11/19/2020 8:30 AM INDICATION: covid ARDS COMPARISON: Portable AP view of the chest 11/13/2020     Right PICC terminates at the SVC right atrial junction. Cardiac silhouette is normal in size. Unchanged mediastinal interfaces. Patchy bilateral lung airspace opacities are present which are slightly better defined compared to 6 days earlier suggesting evolving organizing pneumonia, the typical pattern of lung injury in the setting of COVID pneumonia. No pleural fluid or pneumothorax is present.    Xr Chest 1 View Portable    Result Date: 11/13/2020  EXAM: XR CHEST 1 VIEW PORTABLE LOCATION: [facility] DATE/TIME: 11/13/2020 6:20 PM INDICATION: Chest Pain. Suspected COVID. COMPARISON: 11/11/2020     No pleural fluid or pneumothorax. Heterogeneous pulmonary opacities are concerning for multifocal atypical infection and have increased from the prior study. Normal size of the heart.     Xr Abdomen Ap Portable    Result Date: 12/2/2020  EXAM: XR ABDOMEN AP PORTABLE LOCATION: [facility] DATE/TIME: 12/2/2020 1:05 PM INDICATION: og placement COMPARISON: Chest x-ray 12/01/2020     Enteric tube with the tip in satisfactory position in the gastric antrum. Nonobstructive bowel gas pattern. No definite free air. Stable  "bilateral pulmonary airspace opacities.     Us Kidney Bilateral    Result Date: 12/11/2020  EXAM: US KIDNEY BILATERAL WITH BLADDER LOCATION: [facility] DATE/TIME: 12/11/2020 5:19 PM INDICATION: hematuria COMPARISON: None. TECHNIQUE: Routine Bilateral Renal and Bladder Ultrasound. FINDINGS: RIGHT KIDNEY: 12.3 x 5.6 x 4.7 cm. No hydronephrosis. Inferior renal 5 mm shadowing echogenic focus suggests a nonobstructive stone. LEFT KIDNEY: 12.4 x 5.6 x 5.1 cm. No hydronephrosis. Several small echogenic foci in the renal sinus could represent nonobstructive stones. BLADDER: Decompressed with Arteaga.     1.  No hydronephrosis. 2.  Probable right renal 5 mm nonobstructive stone. 3.  Possible left renal smaller nonobstructive stones.    Poc Us 3cg Picc Placement Guidance    Result Date: 12/1/2020  Exam was performed as guidance for PICC line insertion.  Click \"PACS images\" hyperlink below to view any stored images.  For specific procedure details, view procedure note authored by PICC/Vascular Access Nurse.    Poc Us 3cg Picc Placement Guidance    Result Date: 11/15/2020  Exam was performed as guidance for PICC line insertion.  Click \"PACS images\" hyperlink below to view any stored images.  For specific procedure details, view procedure note authored by PICC/Vascular Access Nurse.          Advanced Care Planning  Discharge Planning : Anticipate discharge in days to weeks  Factors affecting discharge: Hypoxia/respiratory failure  Discussed care with intensivist, staff, for total time 10 minutes with greater than 50% of total time spent in counseling and coordination of care.    Austyn Ricardo MD  [facility] Hospitalist    Office: [phone]      "

## 2021-06-13 NOTE — PLAN OF CARE
"O2 sats began to decline despite being on 100% BIPAP throughout the night. RR 30's-40's.  Via , pt denied pain but was tired. Pt able to communicate in english and with the [place]  with the LibertadCarder device. Lungs diminished. O2 sats sustained in the mid 80's and drops immediately down to 70's if bipap momentarily removed to reposition mask or swab her mouth. Discussed with CNP who has been closely monitoring pt tonight.  used by CNP to discuss need for intubation and pt was agreeable. Pt stated \"I love you all for helping me\". Anesthesia called. Pt intubated without difficulty (7.5 ETT, 22 at teeth). OG placed. Xray done post intubation and OG placement. Versed gtt started at 2mgs and Fentanyl gtt started at 50mcqs.  called and updated by Tish Adknis cnp. Anesthesia attempted to place a second PIV but was unsuccessful---PICC line ordered.  Problem: Inadequate Gas Exchange  Goal: Patient will achieve/maintain normal respiratory rate/effort  Outcome: Not Progressing   Problem: Excessive Fluid Volume  Goal: Patient will achieve/maintain normal respiratory rate/effort  Outcome: Not Progressing   Problem: Activity Intolerance/Impaired Mobility  Goal: Mobility/activity is maintained at optimum level for patient  Outcome: Not Progressing   Problem: Impaired Gas Exchange  Goal: Demonstrate improved ventilation and adequate oxygenation of tissues as evidenced by absence of respiratory distress  Outcome: Not Progressing   Kyara Hooks RN   "

## 2021-06-13 NOTE — PROGRESS NOTES
"[facility] [place]       ASSESSMENT:    Principal Problem:    Pneumonia  Active Problems:    Hypokalemia    Acute respiratory failure with hypoxia (H)    Pneumonia due to 2019 novel coronavirus    Severe protein-calorie malnutrition (H)      PLAN:    60-year-old female with history of breast cancer status post radiation presented 11/13/2020 with 1 week of cough fever and fatigue.  Diagnosed with COVID-19 11/10/2020.  She had rapid escalation in oxygen needs requiring transfer to the ICU for high flow oxygen therapy.      COVID-19 pneumonia: Completed course of remdesivir and 10 days dexamethasone   --Wean high flow oxygen, stable for transfer out of the ICU  --Continue Lovenox 0.5 mg/kg twice daily  --BiPAP as needed  --Continue current inhalers      DVT PPX:  Lovenox      Needs for Discharge: slow clinical improvement, oxygen needs, family communication and mobility progress     ESTIMATED DISCHARGE:  >2D           Seng Up D.O.  [phone]             -------------------------------------------------------------------------------------------------------------  SUBJECTIVE: NAD.     Exam:  /68   Pulse 84   Temp 97.8  F (36.6  C) (Axillary)   Resp 23   Ht 5' 5\" (1.651 m)   Wt 136 lb 7.4 oz (61.9 kg)   SpO2 92%   BMI 22.71 kg/m    General: NAD  RESPIRATORY: Breathing nonlabored  CARDIOVASCULAR:  No le edema bilat.   NEUROLOGIC: Motor and sensory intact, speech clear      Diagnostics Reviewed:      Recent Results (from the past 24 hour(s))   Basic Metabolic Panel    Collection Time: 11/27/20  3:58 AM   Result Value Ref Range    Sodium 134 (L) 136 - 145 mmol/L    Potassium 5.2 (H) 3.5 - 5.0 mmol/L    Chloride 95 (L) 98 - 107 mmol/L    CO2 28 22 - 31 mmol/L    Anion Gap, Calculation 11 5 - 18 mmol/L    Glucose 113 70 - 125 mg/dL    Calcium 7.7 (L) 8.5 - 10.5 mg/dL    BUN 10 8 - 22 mg/dL    Creatinine 0.48 (L) 0.60 - 1.10 mg/dL    GFR MDRD Af Amer >60 >60 mL/min/1.73m2    GFR MDRD " Non Af Amer >60 >60 mL/min/1.73m2   Platelet Count    Collection Time: 11/27/20  3:58 AM   Result Value Ref Range    Platelets 314 140 - 440 thou/uL

## 2021-06-13 NOTE — PLAN OF CARE
Problem: Pain  Goal: Patient's pain/discomfort is manageable  Outcome: Progressing   Pt denies any pain or discomfort at this time      Problem: Daily Care  Goal: Daily care needs are met  Outcome: Progressing   Pt started at 1 L of oxygen and writer has needed to increase her oxygen needs to 3L over the course of the night. Rest of the vitals have been stable, pt does not appear in distress. HO notified in the change, no new orders at this time. Will continue to monitor.

## 2021-06-13 NOTE — PROGRESS NOTES
PULMONARY / CRITICAL CARE PROGRESS NOTE    Date / Time of Admission:  11/13/2020  5:17 PM    Assessment:   Principal Problem:    Pneumonia  Active Problems:    Hypokalemia    Acute respiratory failure with hypoxia (H)    Pneumonia due to 2019 novel coronavirus    Severe protein-calorie malnutrition (H)    Acute respiratory failure (H)    ARDS (adult respiratory distress syndrome) (H)    Encephalopathy    60 y.o. year old female with PMH Significant for breast cancer s/p radiation/mastectomy.  Presented to ER 11/13 and dg with COVID-19. She decompensated and O2 went from 4 liters to HFNC 80% on 60 L, transferred to the ICU and intubated on 12/1/2020.    Plan:   Neuro:  On Precedex.  Fentanyl weaned off and stopped    Cardiovascular:  On/off norepinephrine.  Drops in her blood pressure due to vasoplegia from sedation.  I's and O's equal  Lasix 20 mg today.    Respiratory:  Acute hypoxic respiratory failure secondary to COVID-19 pneumonia.  Developed ARDS requiring intubation on 12/1.  Completed dexamethasone and remdesivir.  Completed 7 days of empiric Zosyn.  Sputum culture negative  First symptoms beginning of November.  Admitted to the hospital with hypoxia on 11/13.  Intubated 12/1  Paralyzed until 12/4.    Peak/Plat 40/19. PEEP of 5. DP 14 (reading appear to be somewhat random)  Currently on 400 mL, rate of 14, PEEP 5, FiO2 40%.  Won't make changes since plats and DP are acceptable.  Continue SBT 15/5 daily for 30 minutes.  Wean daily.   Lots of secretions    GI:  Loose bowel movements.  Otherwise tolerating tube feeds.    :  -Noted to have hematuria and Lovenox was stopped.  -Lasix as needed  -F/u renal ultrasound due to hematuria   -Restart lovenox and discont heparin once hematuria resolves    ID:  COVID-19 pneumonia complicated by ARDS.  Completed remdesivir and dexamethasone  Completed 7 days of empiric Zosyn.  Cultures remain negative    Endocrine:  Sliding scale insulin    Advance Directives: Full  Code    Oren Vásquez MD    Critical Care Time greater than: 38 min    Updated Barry.     ICU DAILY CHECKLIST                        Can patient transfer out of MICU? no    FAST HUG:    Feeding:  Feeding: Yes.  Patient is receiving TUBE FEEDS    Arteaga:Yes  Analgesia/Sedation:Yes, see above  Thromboembolic prophylaxis: yes; Mode:  Lovenox stopped due to hematuria. On heparin.  HOB>30:  Yes  Stress Ulcer Protocol Active: yes; Mode: PPI  Glycemic Control: Any glucose > 180 no; Mode of Insulin Therapy: Sliding Scale Insulin    INTUBATED:  Can patient have daily waking:  yes  Can patient have spontaneous breathing trial:  yes    Restraints? yes    PHYSICAL THERAPY AND MOBILITY:  Can patient have PT and mobility trial: no  Activity: Bed Rest      Subjective:   HPI:  Hafsa Robles is a 60 y.o. lady with a past medical history significant for breast cancer s/p partial mastectomy and adjuvant radiation in 2017, who presented to the hospital on 11/13/2020  5:17 PM for acute hypoxemic respiratory failure.  She was subsequently diagnosed with Covid-19 infection and spent several days in the intensive care unit alternating between high flow and BiPAP.  On 11/30 she was transferred back to the intensive care unit worsening hypoxia and was emergently intubated on the morning of 12/1.    Principal Problem:    Pneumonia  Active Problems:    Hypokalemia    Acute respiratory failure with hypoxia (H)    Pneumonia due to 2019 novel coronavirus    Severe protein-calorie malnutrition (H)    Acute respiratory failure (H)    ARDS (adult respiratory distress syndrome) (H)    Encephalopathy      Allergies: Patient has no known allergies.     MEDS:  Scheduled Meds:    chlorhexidine  15 mL Topical Q12H     [Held by provider] enoxaparin ANTICOAGULANT  0.5 mg/kg Subcutaneous Q12H     [Held by provider] furosemide  20 mg Intravenous BID - diuretic     influenza vaccine quad (PF) (age 50-64 YR) 2020-21  0.5 mL Intramuscular Prior to  "Discharge     insulin aspart (NovoLOG) injection   Subcutaneous Q6H FIXED TIMES     omeprazole  20 mg Oral QAM AC    Or     omeprazole  20 mg Enteral Tube QAM AC     senna-docusate  1 tablet Oral BID    Or     senna (SENOKOT) syrup  8.8 mg Enteral Tube BID     sodium chloride  10-30 mL Intravenous Q8H FIXED TIMES     Continuous Infusions:    dexmedetomidine infusion orderable (PRECEDEX) 1 mcg/kg/hr (12/10/20 1651)     norepinephrine Stopped (12/10/20 0653)     sodium chloride 0.9% 10 mL/hr (12/10/20 1600)     PRN Meds:.acetaminophen, acetaminophen, bacitracin, benzocaine-menthoL, bisacodyL, calcium (as carbonate), dextrose 50 % (D50W), fentaNYL - BOLUS DOSE from infusion, fentaNYL - BOLUS DOSE from infusion, glucagon (human recombinant), hydrALAZINE, lipase-protease-amylase **AND** sodium bicarbonate, LORazepam, magnesium hydroxide, midazolam, naloxone **OR** naloxone **OR** naloxone **OR** naloxone, ondansetron **OR** ondansetron, polyethylene glycol, polyvinyl alcohol, sodium chloride bacteriostatic, sodium chloride, sodium chloride, sodium chloride, traZODone      Objective:   VITALS:  /62   Pulse 75   Temp 98.2  F (36.8  C) (Axillary)   Resp 26   Ht 5' 5\" (1.651 m)   Wt 136 lb (61.7 kg)   SpO2 98%   BMI 22.63 kg/m    EXAM:  General appearance: Intubated   Lungs: clear to auscultation bilaterally  Heart: regular rate and rhythm, S1, S2 normal, no murmur, click, rub or gallop  Abdomen: soft, non-tender; bowel sounds normal; no masses,  no organomegaly  Extremities: extremities normal, atraumatic, no cyanosis or edema  Neurologic: Awake, moves all extremities    I&O:      Intake/Output Summary (Last 24 hours) at 12/10/2020 1938  Last data filed at 12/10/2020 1600  Gross per 24 hour   Intake 3098.2 ml   Output 2425 ml   Net 673.2 ml       Data Review:  Chest X-Ray: Bilateral infiltrates    CBC:   Lab Results   Component Value Date    WBC 10.9 12/10/2020    WBC 4.8 12/16/2014    RBC 4.00 12/10/2020 "     Serum Glucose range:Invalid input(s): GLUCOSEPOCT    By:  Oren Vásquez, 12/10/2020, 11:05 AM    Primary Care Physician:  Provider, No Primary Care

## 2021-06-13 NOTE — PROGRESS NOTES
11/15/20 1700   Reason for Assessment (RCAT)   RCAT Assesment Initial   Assessment Reason    (hypoxia)   $ RCAT Eval Time 30 min.  Yes   Vitals (RCAT)   Heart Rate 68   Resp 22   SpO2 93 %   FiO2 (%) 55 %   Chart Assessment (RCAT)   Pulmonary Status  0   Surgical Status  0   Chest Xray  3   Patient Assessment (RCAT)    Respiratory Pattern  1   Mental Status  0   Breath Sounds  3   Cough Effectiveness  0   Level of Activity  0   O2 Required SpO2 >= 92%  3   Chart + Pt. Assessment Total Points  10   RCAT Acuity Score 10 (Acuity 2)

## 2021-06-13 NOTE — PROGRESS NOTES
Physical Therapy     11/29/20 0900   Visit Specifics   Eval Type Initial eval   Inital PT Consult 11/29/20    Language line   Bed/Tabs/Pad Alarm Applied Yes   General   Onset date 11/13/20   Chart Reviewed Yes   PT/OT Patient/Caregiver Stated Goals none stated   Family/Caregiver Present   (by phone)   Treatment Time   Theraputic Activity 10   Precautions   General Precautions Bed alarm/Tab alarm   Home Living   Type of Home House   Home Layout One level  (no HUGO)   Bathroom Shower/Tub Tub/shower unit   Mobility Equipment   (none)   Prior Status   Independent With All ADL's/IADL's   Lives With Spouse   Receives Help From Family   Vocational Full time employment  ()   Device Used No   RLE Assessment   RLE Assessment   (AROM WFL)   LLE Assessment   LLE Assessment   (AROM WFL)   RUE Assessment   Comments AROM WFL   LUE Assessment   Comments AROM WFL   Transfer    Sit To Stand CGA   Stand To Sit CGA   Transfer Comments 3 reps. pt impulsive, cues for safety and breathing   Ambulation    Ambulation Amount of 02;Ambulation 02 Sat;02 Sat at rest   Distance (ft)  3   Assistance CGA   Assistive Device None   Quality of Gait/Comment O2 saturation drops quickly and recovers slowly   Pattern Unsteady   02 Saturation At Rest 91   02 Saturation With Activity 83 %   Amount of 02 Delivered (L/min) 45 Liters  (55%)   Balance   Sitting Balance Independent   Standing Balance CGA   Other Comments   Comments pt encouraged to use commode with nursing, work on standing isamar   Plan   Treatment/Interventions Functional transfer training;Gait/stair training;Home exercise program;Neuromuscular re-ed;Strengthening/ROM   PT Frequency 3x/week   Assessment   Prognosis Fair   Problem List Decreased endurance;Impaired balance;Decreased mobility   Barriers to Discharge None   Recommendation   PT Discharge Recommendation Home with assist  (pending improvement in activity isamar, weaning from HFNC)   Treatment Suggestions for  Next Session standing tolerance   PT Care Plan REVIEWED DAILY Yes, goals remain appropriate   Jessika Lerner, DPT 11/29/2020,  [id number]

## 2021-06-13 NOTE — PROGRESS NOTES
Respiratory Care Note     Patient is on non-rebreather face mask. Placed on high flow nasal canula 40 lpm and 90% to maintain sat obove 90%. Patient tolerating well. The plan is to wean the FIO2 down to under 60% per provider plan.

## 2021-06-13 NOTE — PROGRESS NOTES
Care Management Follow Up Note    Length of Stay (days) 4     Patient plan of care discussed at Interdisciplinary Rounds: yes           Pt/Rep Education / Interventions: Review d/c options, Discuss d/c, Discussed readmission risk    Expected Discharge Date: TBD  Concerns to be Addressed / Barriers to Discharge:  Pt in ICU, respiratory status    Anticipated Discharge Disposition:  TBD  Anticipated Discharge Services:    TBD pending clinical progression    Anticipated Discharge DME:  TBD    Plan: SWCM called patient's  Barry to introduce self, CM role and review discharge options such as home with home care or TCU if recommended. Pt 's  stated understanding of this. Pt independent at baseline.   CM will continue to follow progression of care and discharge disposition.    DIEUDONNE Laguna  11/17/2020  3:06 PM

## 2021-06-13 NOTE — PROGRESS NOTES
Respiratory therapy    Pt started on BIPAP and switched to highflow. Weaned from 90% to 80 % on 50 lpm.     GUSTAVO SquiresT

## 2021-06-13 NOTE — PLAN OF CARE
Problem: Inadequate Gas Exchange  Goal: Patient will achieve/maintain normal respiratory rate/effort  Outcome: Not Progressing   Pt has episodes of coughing spells and anxiety/agitation requiring freq suctioning. Episodes related to some incontinence of stool  Problem: Excessive Fluid Volume  Goal: Patient will achieve/maintain normal respiratory rate/effort  Outcome: Not Progressing   Increased resp rate with agitation and coughing  Problem: Activity Intolerance/Impaired Mobility  Goal: Mobility/activity is maintained at optimum level for patient  Outcome: Not Progressing   Cont to be rrestrained  Problem: Mechanical Ventilation  Goal: Mobility/activity is maintained at optimum level for patient  Outcome: Not Progressing

## 2021-06-13 NOTE — PROGRESS NOTES
Attempted more weaning today and patient could not tolerate more than a few minutes. TF on hold all day for potential extubation, restarted at 1900. RASS -2 to -1 with changing up of psych meds and titration of dex.

## 2021-06-13 NOTE — PLAN OF CARE
Pt on 100% FiO2 and 60L on HFNC, refusing BiPAP and refusing self-proning. Sats anywhere from % overnight. Desats with coughing and activity. Received robitussin and promethazine for cough, and benadryl for sleep. A&O, follows commands, moves well. Receiving remdesivir.

## 2021-06-13 NOTE — PLAN OF CARE
Problem: Inadequate Gas Exchange  Goal: Patient will achieve/maintain normal respiratory rate/effort  Outcome: Not Progressing   Attempting to wean pt on vent again this afternoon, failed wean.   Problem: Glucose Imbalance  Goal: Achieve optimal glucose control  Outcome: Progressing   Glucose 140, no coverage needed. Residuals 195 and 350. Given additional lasix 40mg per MD orders. Titrated levophed to maintain MAP goal, titrated precedex to maintain RASS goal. Will continue to monitor.

## 2021-06-13 NOTE — PROGRESS NOTES
Rass 0, afebrile, able to voice needs. ST, BP stable. HFNC, RT weaning. Saturations wnl. Nonproductive cough noted, prn cough meds given. Tolerating diet, good appetite. Up to commode with standby assist, one bm noted this shift. Good uop, purewick in place. DL PICC to right arm.

## 2021-06-13 NOTE — PROGRESS NOTES
[facility] Medicine Service        Assessment/Plan  Principal Problem:    Pneumonia  Active Problems:    Hypokalemia    Acute respiratory failure with hypoxia (H)    Pneumonia due to 2019 novel coronavirus    60 y.o. old female with COVID pneumonia and worsening respiratory failure.     1. COVID-19 pneumonia  - getting dexamethasone and Remdesivir   - Lactic acid elevation, meets sepsis criteria -- given fluid bolus although half dose given COVID risk for pulmonary edema this was on admission and today gave 20mg IV lasix may need another dose tomorrow and keep dry but reassess tomorrow and check labs   -- Antibiotics for pneumonia for now  - on HFNC doing better   - continue covid labs      2. Acute respiratory failure  - HFNC 40lpm 60% and comfortable   - treating above  - RT following      3. Hypokalemia  - replacement protocol     4.  Diarrhea  -- better today  - cdiff negative     VTE prophylaxis: Lovenox   GI Prophylaxis: IV protonix   Diet: reg  Drains/tubes: none  Weight bearing restrictions: WBAT  Disposition/Barriers to discharge: multiple days pending O2 need   Consults: none  Full Code  Subjective  Breathing improved with changed to HFNC   Objective  Vital signs in last 24 hours  Vitals:    11/15/20 1607   BP: 108/55   Pulse: 66   Resp: 22   Temp: 97.7  F (36.5  C)   SpO2: 93%     Wt Readings from Last 1 Encounters:   11/13/20 2013 145 lb 8 oz (66 kg)   Weight change:   Body mass index is 24.21 kg/m .  Intake/Output this shift:    Intake/Output Summary (Last 24 hours) at 11/15/2020 1647  Last data filed at 11/15/2020 1241  Gross per 24 hour   Intake 980 ml   Output --   Net 980 ml     I/O last 3 completed shifts:  In: 1980 [P.O.:980; I.V.:1000]  Out: -   Physical Exam  General appearance: alert, appears stated age and cooperative  HEENT:  Normocephalic without obvious abnormality, atraumatic  Lungs: Coarse BS   Heart: RRR   Abdomen: soft,  +bowel sounds  no masses,  no  organomegaly  Extremities: No E/C/C  Pulses: 2+ and symmetric  Skin: Skin color, texture, turgor normal. No rashes or lesions  Neurologic: Grossly normal  Current Medications    azithromycin  250 mg Intravenous Q24H     cefTRIAXone  2 g Intravenous Q24H     dexamethasone  6 mg Intravenous DAILY     enoxaparin ANTICOAGULANT  40 mg Subcutaneous Q24H     insulin aspart (NovoLOG) injection   Subcutaneous TID with meals     ipratropium-albuteroL  2 puff Inhalation QID     pantoprazole  40 mg Intravenous Q24H     potassium chloride liquid/packet  40 mEq Oral Q4H     remdesivir  100 mg Intravenous Q24H     sodium chloride  10 mL Intravenous Line Care     sodium chloride  2.5 mL Intravenous Line Care     sodium chloride  3 mL Intravenous Line Care       acetaminophen, benzocaine-menthoL, benzonatate, bisacodyL, dextrose 50 % (D50W), glucagon (human recombinant), guaiFENesin, magnesium hydroxide, ondansetron **OR** ondansetron, polyethylene glycol, sodium chloride bacteriostatic  Pertinent Labs   Results from last 7 days   Lab Units 11/15/20  0555 11/14/20  0636 11/13/20  1823   LN-WHITE BLOOD CELL COUNT thou/uL 9.5 3.9* 5.5   LN-HEMOGLOBIN g/dL 13.8 15.1 15.6   LN-HEMATOCRIT % 42.7 46.2 47.4*   LN-PLATELET COUNT thou/uL 302 281 258       Results from last 7 days   Lab Units 11/15/20  0555 11/14/20  1504 11/14/20  0636 11/13/20  1823   LN-SODIUM mmol/L 143  --  140 140   LN-POTASSIUM mmol/L 2.9*  --  3.6 3.6   LN-CHLORIDE mmol/L 105  --  104 100   LN-CO2 mmol/L 27  --  24 28   LN-BLOOD UREA NITROGEN mg/dL 13  --  12 14   LN-CREATININE mg/dL 0.48*  --  0.55* 0.64   LN-CALCIUM mg/dL 7.9*  --  8.1* 8.4*   LN-ALBUMIN g/dL  --  2.7*  --   --    LN-PROTEIN TOTAL g/dL  --  6.3  --   --    LN-BILIRUBIN TOTAL mg/dL  --  0.3  --   --    LN-ALKALINE PHOSPHATASE U/L  --  99  --   --    LN-ALT (SGPT) U/L  --  43  --   --    LN-AST (SGOT) U/L  --  121*  --   --      Results from last 7 days   Lab Units 11/15/20  0555 11/14/20  0636  11/13/20  2251 11/13/20  1823   LN-INR  0.91 0.92  --  0.95   LN-PARTIAL THROMBOPLASTIN TIME seconds  --   --  31  --          Total time for this visit is 35 minutes with greater than 50% of time spent in counseling and coordination of care with patient/family, discussion with RN, consultants, reviewing labs and chart.    Dana Chan MD.   [facility] Medicine Service   [phone]   Pager [phone]

## 2021-06-13 NOTE — PROGRESS NOTES
"  Clinical Nutrition Therapy Reassessment Note      Reason:  RD managing tube feeding per consult    Subjective:  Pt intubated and sedated.   Attended team rounds, tolerating TF    Nutrition Prescription:   Diet: NPO.  Tube feeding: Replete with fiber at 65 mL/hr (goal) (started 12/2)  Modulars: none  Flush Orders: 200 ml water every 4 hrs   (was hypernatremic)    IV dextrose or Fluids:     dexmedetomidine infusion orderable (PRECEDEX), Last Rate: 0.502 mcg/kg/hr (12/08/20 1200)       fentaNYL citrate (PF), Last Rate: 125 mcg/hr (12/08/20 1200)       insulin infusion (1 unit/mL)       norepinephrine, Last Rate: 0.03 mcg/kg/min (12/08/20 1200)       sodium chloride 0.9%, Last Rate: 10 mL/hr (12/08/20 1200)      Nutrition Intake:  NPO  FT is an OG placed 12/1/20.  X-ray of 12/2 confirms gastric.  TF provides:  1560 calories, 100 g protein, 193 g carb, 23 g fiber, 2498 ml free water     Anthropometrics:  Height: 5' 5\" (165.1 cm)  Admission weight: 145 lb 11/13/20  Weight: 133 lb 9.6 oz (60.6 kg)   Weights fluctuating 130 -136 lb last 8 days  BMI (Calculated): 24.2  Nonpitting geo UE documented 12/3.  -1.6 L per I/Os     Physical Findings:  Unable as pt in COVID isolation precautions       GI Status/Output:   GI symptoms per nursing: flatus, hyperactive BS  Last BM recorded: 12/8  Gastric residuals 35 mL    Skin/Wound:   Jeffry Scale Score: 15  Left lip pressure injury  No wound noted.  Nonpitting generalized edema    Medications:  Medications reviewed.  Ssi, prilosec, senokot    Labs:  Lab Results   Component Value Date/Time    ALBUMIN 2.3 (L) 11/29/2020 12:46 PM     12/08/2020 03:48 AM    K 3.9 12/08/2020 03:48 AM    BUN 14 12/08/2020 03:48 AM    CREATININE 0.48 (L) 12/08/2020 03:48 AM     (H) 12/08/2020 03:48 AM    PHOS 3.2 12/08/2020 03:48 AM    MG 2.2 12/08/2020 03:48 AM   fsbg 125  Labs reviewed    Estimated Nutrition Needs:  Using current weight (12/2) of 59.3 kg.     Energy Needs: 9164-6661 kcals " daily per 25-30 kcal/kg   Protein Needs:  g daily, 1.2-2 g/kg.  Fluid Needs: 9598-8226 mls daily, 25-30 mls/kg    Malnutrition: Noted previously-severe    Nutrition Risk Level: stable-high risk    Nutrition DX: Inadequate oral intake r/t acute respiratory failure as evidenced by Bipap/HFNC dependence     Malnutrition r/t COVID-19 as evidenced by prolonged poor intake of < 50% estimated nutrition needs for 2 weeks and 5.2% wt loss in 1 week.    Goals:  Maintain weight-wt down with diuresis  Meet estimated nutrition needs-Progressing  Electrolytes WNL-progressing  Tolerate tube feeding-progressing    Plan:  Continue current TF    Monitor:   Tube feeding tolerance, wt, labs, hydration    See Care Plan for Problems, Goals, and Interventions.

## 2021-06-13 NOTE — PLAN OF CARE
Pt admitted to room 352 at approx 2045, RT at bedside pt placed on 60L 80% HFNC, tolerating well.   Pt continued to desat throughout night. Increased to 70L and 100% on HFNC however, continued to sat in upper 80s. Placed on Bipap by RT per order. Increased to 70% throughout night. Pt intermittently c/o shortness of breath while coughing otherwise, denies pain or complaints. Pt tachypneic however does not appear dyspneic. NP aware. VSS otherwise stable.   Report given to ADELAIDA Moser at time of shift change.     Problem: Pain  Goal: Patient's pain/discomfort is manageable  Outcome: Progressing     Problem: Safety  Goal: Patient will be injury free during hospitalization  Outcome: Progressing     Problem: Daily Care  Goal: Daily care needs are met  Outcome: Progressing     Problem: Ineffective Airway Clearance  Goal: Maintain airway patency  Outcome: Progressing     Problem: Breathing  Goal: Patient will utilize incentive spirometer  Outcome: Progressing     Problem: Glucose Imbalance  Goal: Achieve optimal glucose control  Outcome: Progressing     Problem: Potential for Falls  Goal: Patient will remain free of falls  Outcome: Progressing     Problem: Potential for transmission of organism by Contact, Enteric, Droplet and/or Airborne routes  Goal: Prevent transmission of organisms  Outcome: Progressing     Problem: Potential for Compromised Skin Integrity  Goal: Skin integrity is maintained or improved  Outcome: Progressing     Problem: Urinary Incontinence  Goal: Perineal skin integrity is maintained or improved  Outcome: Progressing

## 2021-06-13 NOTE — PROGRESS NOTES
Progress Note    Assessment/Plan    Principal Problem:    Pneumonia  Active Problems:    Hypokalemia    Acute respiratory failure with hypoxia (H)    Pneumonia due to 2019 novel coronavirus    Severe protein-calorie malnutrition (H)    Acute respiratory failure (H)    ARDS (adult respiratory distress syndrome) (H)    Encephalopathy    59 y/o German speaking female with a past history of vertigo, DCIS right breast status post mastectomy and radiation treatment in 2018, admitted for COVID and acute hypoxic respiratory failure on 11/13. She was on high flow NC and Bipap initially in the ICU, then answered to the floor. On 11/30 she was transferred back to ICU for worsening hypoxia and was intubated on 12/1/20 and as of 12/7/20 remains intubated with ARDS     ARDS/acute hypoxic respiratory failure due to COVID infection  Remains intubated/vent  -ICU managing  - weaning trials-slow improvement, not tolerate much of a wean , chest x-ray unchanged, , FiO2 levels relatively low.  Question how much is complicated by anxiety, situational.  No past history of anxiety   Psychiatry consulted, starting Depakote scheduled, continuing scheduled Seroquel, lorazepam.  Zyprexa discontinued.  Continuing Precedex.       COVID with septic and circulatory shock  Weaned off pressors on 12/4, but back on again late 12/7/20, back on pressors again likely secondary to sedation medication, doubt sepsis currently.    - Has completed Remdesivir and Dexamethasone courses  - Lovenox two times a day for VTE proph  - Received Rocephin and Azithro earlier in stay, completed empiric course of Zosyn 12/6/20  - Blood and sputum cultures NGTD  Low Grade fever today with mild leukocytosis-continue to monitor.  Could be secondary to Covid and significant agitation.  Doubt PE, not having a lot of secretions, only cough.  On Lovenox,    Hematuria-started 12/10, has Arteaga catheter.  Renal ultrasound shows nonobstructive nephrolithiasis, urine culture  positive for yeast, 50,000, no need to treat at this point.  Lovenox restarted, hematuria much improved.  Hemoglobin normal.  Reactive and normal.    Low-grade fever, mild leukocytosis-at this point doubt PE, question reactive with agitation.  Holding on antibiotics.  On DVT prophylaxis.  Continue to follow.  No increased secretions according to nurse.    Malnutrition  - Tube feeding and FW flushes per RD  On furosemide IV.  Weight is stable, net small positive I's and O's     Hypokalemia  -- Replaced     .DVT prevent-restarted Lovenox q 12, held for hematuria    Hyperglycemia-reactive secondary to tube feeding.  On low-dose Lantus and sliding scale.  Blood sugars fairly controlled.    Anxiety-likely situational, no past history of anxiety or depression.  Suspect inhibiting wean.  Psychiatry consulted, started scheduled Depakote, discontinued Zyprexa, on scheduled Seroquel, scheduled lorazepam, also on Precedex.       7.GI proph- ppi  Discussed with intensivist  Full code     subjective  Patient intubated, interactive, still seems to get anxious with wean.  Hematuria improved.  Past Medical History  Past Medical History:   Diagnosis Date     Malignant neoplasm of right female breast (H)        Current Medications  Scheduled Meds:    chlorhexidine  15 mL Topical Q12H     enoxaparin ANTICOAGULANT  0.5 mg/kg Subcutaneous BID     influenza vaccine quad (PF) (age 50-64 YR) 2020-21  0.5 mL Intramuscular Prior to Discharge     insulin aspart (NovoLOG) injection   Subcutaneous Q6H FIXED TIMES     insulin glargine  6 Units Subcutaneous QAM     LORazepam (ATIVAN) conc solution 2 mg/mL  1 mg Oral Q6H     omeprazole  20 mg Oral QAM AC    Or     omeprazole  20 mg Enteral Tube QAM AC     QUEtiapine  50 mg Oral BID     senna-docusate  1 tablet Oral BID    Or     senna (SENOKOT) syrup  8.8 mg Enteral Tube BID     sodium chloride  10-30 mL Intravenous Q8H FIXED TIMES     valproate  125 mg Oral Q8H FIXED TIMES     Continuous  Infusions:    dexmedetomidine infusion orderable (PRECEDEX) 0.6 mcg/kg/hr (12/14/20 1200)     norepinephrine 0.08 mcg/kg/min (12/14/20 1200)     sodium chloride 0.9% 10 mL/hr (12/14/20 1200)     PRN Meds:.acetaminophen, acetaminophen, bacitracin, benzocaine-menthoL, bisacodyL, calcium (as carbonate), dextrose 50 % (D50W), fentaNYL pf, glucagon (human recombinant), hydrALAZINE, lipase-protease-amylase **AND** sodium bicarbonate, LORazepam, magnesium hydroxide, midazolam, naloxone **OR** naloxone **OR** naloxone **OR** naloxone, ondansetron **OR** ondansetron, polyethylene glycol, polyvinyl alcohol, QUEtiapine, sodium chloride bacteriostatic, sodium chloride, sodium chloride, sodium chloride, traZODone  No Known Allergies    Objective  Vital signs in last 24 hours  Temp:  [98.4  F (36.9  C)-100.5  F (38.1  C)] 99.2  F (37.3  C)  Heart Rate:  [] 81  Resp:  [20-47] 30  BP: ()/(51-67) 89/66  Arterial Line BP: ()/(36-73) 91/52  FiO2 (%):  [30 %-100 %] 40 %  Weight:   133 lb (60.3 kg)      Wt Readings from Last 2 Encounters:   12/14/20 0430 133 lb (60.3 kg)   12/13/20 0330 136 lb 9.6 oz (62 kg)   12/11/20 2355 136 lb 3.2 oz (61.8 kg)   12/10/20 0000 136 lb (61.7 kg)   12/09/20 0000 129 lb (58.5 kg)   12/08/20 0100 133 lb 9.6 oz (60.6 kg)   12/06/20 0230 135 lb 5.8 oz (61.4 kg)   12/05/20 0028 136 lb 7.4 oz (61.9 kg)   12/04/20 0000 136 lb 0.4 oz (61.7 kg)   12/03/20 0030 136 lb 14.5 oz (62.1 kg)   12/01/20 0400 130 lb 12.8 oz (59.3 kg)   11/28/20 0602 134 lb 6.4 oz (61 kg)   11/27/20 0000 136 lb 7.4 oz (61.9 kg)   11/26/20 2013 136 lb 7.4 oz (61.9 kg)   11/26/20 0030 137 lb 5.6 oz (62.3 kg)   11/24/20 0400 137 lb 9.1 oz (62.4 kg)   11/23/20 0400 137 lb 9.1 oz (62.4 kg)   11/22/20 0425 136 lb 11 oz (62 kg)   11/21/20 0452 137 lb 12.6 oz (62.5 kg)   11/20/20 0627 138 lb 0.1 oz (62.6 kg)   11/19/20 0733 141 lb 15.6 oz (64.4 kg)   11/13/20 2013 145 lb 8 oz (66 kg)   12/16/14 1223 130 lb (59 kg)   Weight  "change: -3 lb 9.6 oz (-1.633 kg)    Intake/Output last 3 shifts  I/O last 3 completed shifts:  In: 3001 [I.V.:1133; NG/GT:1868]  Out: 3025 [Urine:3025]  Intake/Output this shift:  I/O this shift:  In: 1106.4 [I.V.:297.4; NG/GT:809]  Out: 1100 [Urine:1100]    Review of Systems   Review of systems not obtained due to inability to communicate with the patient.     Physical Exam    Temp:  [98.4  F (36.9  C)-100.5  F (38.1  C)] 99.2  F (37.3  C)  Heart Rate:  [] 81  Resp:  [20-47] 30  BP: ()/(51-67) 89/66  Arterial Line BP: ()/(36-73) 91/52  FiO2 (%):  [30 %-100 %] 40 %    BP (!) 89/66   Pulse 81   Temp 99.2  F (37.3  C) (Oral)   Resp (!) 30   Ht 5' 5\" (1.651 m)   Wt 133 lb (60.3 kg)   SpO2 97%   BMI 22.13 kg/m    General appearance: no distress  Awake, alert  Remainder physical exam deferred to intensivist in order to preserve PPE  Pertinent Labs   Lab Results: personally reviewed.   Labs from the previous 24 hours  Recent Results (from the past 24 hour(s))   POCT Glucose    Specimen: Blood   Result Value Ref Range    Glucose 127 70 - 139 mg/dL   POCT Glucose    Specimen: Blood   Result Value Ref Range    Glucose 134 70 - 139 mg/dL   Basic Metabolic Panel   Result Value Ref Range    Sodium 137 136 - 145 mmol/L    Potassium 3.7 3.5 - 5.0 mmol/L    Chloride 101 98 - 107 mmol/L    CO2 26 22 - 31 mmol/L    Anion Gap, Calculation 10 5 - 18 mmol/L    Glucose 189 (H) 70 - 125 mg/dL    Calcium 8.3 (L) 8.5 - 10.5 mg/dL    BUN 12 8 - 22 mg/dL    Creatinine 0.52 (L) 0.60 - 1.10 mg/dL    GFR MDRD Af Amer >60 >60 mL/min/1.73m2    GFR MDRD Non Af Amer >60 >60 mL/min/1.73m2   HM2(CBC W/O DIFF)   Result Value Ref Range    WBC 12.6 (H) 4.0 - 11.0 thou/uL    RBC 4.05 3.80 - 5.40 mill/uL    Hemoglobin 11.3 (L) 12.0 - 16.0 g/dL    Hematocrit 35.0 35.0 - 47.0 %    MCV 86 80 - 100 fL    MCH 27.9 27.0 - 34.0 pg    MCHC 32.3 32.0 - 36.0 g/dL    RDW 14.2 11.0 - 14.5 %    Platelets 553 (H) 140 - 440 thou/uL    MPV 9.9 " 8.5 - 12.5 fL   POCT Glucose    Specimen: Blood   Result Value Ref Range    Glucose 152 (H) 70 - 139 mg/dL   POCT Glucose    Specimen: Blood   Result Value Ref Range    Glucose 135 70 - 139 mg/dL     Labs from the previous 7 days:   Results from last 7 days   Lab Units 12/14/20  0429 12/13/20  0406 12/12/20  2224 12/12/20  0418   LN-WHITE BLOOD CELL COUNT thou/uL 12.6* 11.1*  --  11.9*   LN-HEMOGLOBIN g/dL 11.3* 11.7* 11.7* 11.4*   LN-PLATELET COUNT thou/uL 553* 547*  --  513*     Results from last 7 days   Lab Units 12/14/20  0429 12/13/20  0406 12/12/20  0418 12/11/20  1415 12/11/20  0359 12/10/20  0216 12/09/20  0257 12/08/20  0348   LN-SODIUM mmol/L 137 137 138  --  138 138 138 138   LN-POTASSIUM mmol/L 3.7 4.1 3.6 4.2 3.5 3.8 3.7 3.9   LN-CHLORIDE mmol/L 101 102 104  --  105 106 101 101   LN-CO2 mmol/L 26 27 23  --  23 26 29 31   LN-BLOOD UREA NITROGEN mg/dL 12 12 15  --  11 12 10 14   LN-CREATININE mg/dL 0.52* 0.50* 0.52*  --  0.48* 0.44* 0.44* 0.48*   LN-CALCIUM mg/dL 8.3* 8.5 8.3*  --  8.7 8.4* 8.2* 8.1*               Invalid input(s): LABALBU        Other labs:   Lab Results   Component Value Date    INR 0.96 11/17/2020    INR 0.95 11/16/2020    INR 0.91 11/15/2020       Pertinent Radiology   Personally reviewed radiologists report and impressions  Xr Chest 1 View Portable    Result Date: 12/13/2020  EXAM: XR CHEST 1 VIEW PORTABLE LOCATION: [facility] DATE/TIME: 12/13/2020 10:59 AM INDICATION: resp failure. COVID COMPARISON: 12/04/2020     Endotracheal tube tip at the clavicle heads. Right PICC tip projects over the lower SVC. Nasogastric tube tip below the film. Heart size normal. Stable bilateral lung infiltrates. No pneumothorax. No effusion.    Xr Chest 1 View Portable    Result Date: 12/4/2020  EXAM: XR CHEST 1 VIEW PORTABLE LOCATION: [facility] DATE/TIME: 12/4/2020 1:24 PM INDICATION: desaturation, covid ARDS eval for interval change COMPARISON:  12/01/2020 and older studies.     Endotracheal tube is in good position. NG tube is coursing in the stomach. Right upper extremity PICC line catheter overlies the mid SVC. There has been slight decrease in the diffuse, asymmetric interstitial opacities. No hydropneumothorax. Heart and pulmonary vascularity are normal.    Xr Chest 1 View Portable    Result Date: 12/1/2020  EXAM: XR CHEST 1 VIEW PORTABLE LOCATION: [facility] DATE/TIME: 12/1/2020 12:36 PM INDICATION: ETT placement, worsening condition. COMPARISON: 12/01/2020.     Extensive bilateral opacities are present, similar to same-day radiograph. No substantial pleural effusion. No pneumothorax. Normal heart size. ET tube tip appropriately positioned, roughly 23 mm above the fede. Enteric tube courses into the stomach and off the field of view. Right PICC tip over the distal SVC.    Xr Chest 1 View Portable    Result Date: 12/1/2020  EXAM: XR CHEST 1 VIEW PORTABLE LOCATION: [facility] DATE/TIME: 12/1/2020 6:26 AM INDICATION: confirm ET Tube position COMPARISON: 11/29/2020.     The patient has been intubated. The endotracheal tube terminates 1.9 cm above the fede. Nasogastric tube passes below the diaphragm and the inferior margin of the radiograph. Extensive bilateral pulmonary infiltrates are little changed from  11/29/2020 and consistent with infectious/inflammatory pneumonitis. No significant pleural effusion. No pneumothorax. Normal heart size.    Xr Chest 1 View Portable    Result Date: 11/29/2020  EXAM: XR CHEST 1 VIEW PORTABLE LOCATION: [facility] DATE/TIME: 11/29/2020 3:44 PM INDICATION: Hypoxia COMPARISON: 11/19/2020     Previously seen PICC line has been removed. Bilateral pulmonary infiltrates have significantly increased when compared to previous. No pleural effusion or pneumothorax.    Xr Chest 1 View Portable    Result Date: 11/19/2020  EXAM: XR CHEST 1 VIEW PORTABLE  LOCATION: [facility] DATE/TIME: 11/19/2020 8:30 AM INDICATION: covid ARDS COMPARISON: Portable AP view of the chest 11/13/2020     Right PICC terminates at the SVC right atrial junction. Cardiac silhouette is normal in size. Unchanged mediastinal interfaces. Patchy bilateral lung airspace opacities are present which are slightly better defined compared to 6 days earlier suggesting evolving organizing pneumonia, the typical pattern of lung injury in the setting of COVID pneumonia. No pleural fluid or pneumothorax is present.    Xr Chest 1 View Portable    Result Date: 11/13/2020  EXAM: XR CHEST 1 VIEW PORTABLE LOCATION: [facility] DATE/TIME: 11/13/2020 6:20 PM INDICATION: Chest Pain. Suspected COVID. COMPARISON: 11/11/2020     No pleural fluid or pneumothorax. Heterogeneous pulmonary opacities are concerning for multifocal atypical infection and have increased from the prior study. Normal size of the heart.     Xr Abdomen Ap Portable    Result Date: 12/2/2020  EXAM: XR ABDOMEN AP PORTABLE LOCATION: [facility] DATE/TIME: 12/2/2020 1:05 PM INDICATION: og placement COMPARISON: Chest x-ray 12/01/2020     Enteric tube with the tip in satisfactory position in the gastric antrum. Nonobstructive bowel gas pattern. No definite free air. Stable bilateral pulmonary airspace opacities.     Us Kidney Bilateral    Result Date: 12/11/2020  EXAM: US KIDNEY BILATERAL WITH BLADDER LOCATION: [facility] DATE/TIME: 12/11/2020 5:19 PM INDICATION: hematuria COMPARISON: None. TECHNIQUE: Routine Bilateral Renal and Bladder Ultrasound. FINDINGS: RIGHT KIDNEY: 12.3 x 5.6 x 4.7 cm. No hydronephrosis. Inferior renal 5 mm shadowing echogenic focus suggests a nonobstructive stone. LEFT KIDNEY: 12.4 x 5.6 x 5.1 cm. No hydronephrosis. Several small echogenic foci in the renal sinus could represent nonobstructive stones. BLADDER: Decompressed with  "Arteaga.     1.  No hydronephrosis. 2.  Probable right renal 5 mm nonobstructive stone. 3.  Possible left renal smaller nonobstructive stones.    Poc Us 3cg Picc Placement Guidance    Result Date: 12/1/2020  Exam was performed as guidance for PICC line insertion.  Click \"PACS images\" hyperlink below to view any stored images.  For specific procedure details, view procedure note authored by PICC/Vascular Access Nurse.    Poc Us 3cg Picc Placement Guidance    Result Date: 11/15/2020  Exam was performed as guidance for PICC line insertion.  Click \"PACS images\" hyperlink below to view any stored images.  For specific procedure details, view procedure note authored by PICC/Vascular Access Nurse.          Advanced Care Planning  Discharge Planning : Anticipate discharge in days to weeks  Factors affecting discharge: Hypoxia/respiratory failure  Discussed care with intensivist, psychiatry, staff, for total time 10 minutes with greater than 50% of total time spent in counseling and coordination of care.    Austyn Ricardo MD  [facility] Hospitalist    Office: [phone]      "

## 2021-06-13 NOTE — PLAN OF CARE
"RESPIRATORY CARE NOTE     Patient Name: Hafsa Robles  Today's Date: 12/1/2020       Problem: Inadequate Gas Exchange  Goal: Patient will achieve/maintain normal respiratory rate/effort  Outcome: Not Progressing      Problem: Impaired Gas Exchange  Goal: Demonstrate improved ventilation and adequate oxygenation of tissues as evidenced by absence of respiratory distress  Outcome: Not Progressing     Pt remains tachypneic on the Bipap, RR 25- 40, Bipap settings adjusted for decreasing oxygenation saturations. Bipap settings Ipap 15, EPAP 10, RR 16, 100%O2. RT will continue to monitor and reassess as needed.     /74   Pulse 95   Temp 98.3  F (36.8  C) (Axillary)   Resp (!) 31   Ht 5' 5\" (1.651 m)   Wt 134 lb 6.4 oz (61 kg)   SpO2 (!) 89%   BMI 22.37 kg/m         Kia Lion, LRT    "

## 2021-06-13 NOTE — PROGRESS NOTES
"Clinical Nutrition Therapy Assessment Note      Reason for Assessment:   Hafsa Robles is a 60 y.o. female assessed by the registered Dietitian for length of stay screen. Patient presents with acute respiratory failure, COVID-19, PNA, h/o breast cancer.    Nutrition History:  Information from family/caregiver and chart. Attempted to call patient, no answer. Was able to obtain hx from   Diet prior to admission: Patient eats traditional [place] foods.  Recent food/fluid intake: Patient had a \"really bad\" appetite of < 50% usual intake for 10 days  Recent Supplements: none  Cultural/Yazdanism food needs or preferences: Patient enjoys rice, noodles, irene, [place]oese sandwiches.   Food allergies or intolerances: nkfa    Current Nutrition Prescription:   Diet: NPO  Diet Supplements: none  IV dextrose or Fluids: none    Current Nutrition Intake:  The patient's current meal intake is poor <50%   Total nutrient intake from all sources does not meet estimated nutritional needs.     Patient was eating % of meals prior to NPO.     Anthropometrics:  Height: 5' 5\" (165.1 cm)  Admission weight: 145 lb 8 oz (66 kg) bed  Weight: 145 lb 8 oz (66 kg) -no new weights  BMI (Calculated): 24.2  BMI indication: 18.5-24.9 normal weight  Ideal body weight 125lbs (56.8kg)  % Ideal body weight 116%  Usual body weight 140-150lbs stated per   % Usual body weight 100%  Weight History:   Wt Readings from Last 10 Encounters:   11/13/20 145 lb 8 oz (66 kg)   12/16/14 130 lb (59 kg)     No other wt hx available    RD Nutrition Focused Physical Exam:  The patient has the following physical signs which could indicate malnutrition: deferred dt mandatory COVID-19 precautions    GI Status/Output:   GI symptoms include:Nausea per patient's  PTA  Bowel Sounds present per nurse  Last BM: 11/16/20 per nurse    Skin/Wound:  Jeffry score Jeffry Scale Score: 16    No wound was noted.    Medications:  Medications reviewed: " zithromax, rocephin, decadron, pantoprazole, kcl    Labs:  Labs reviewed: HgbA1c 5.8, Cr 0.56    Estimated Nutrition Needs:  Assessment weight is 66kg, current weight    Energy Needs: 7001-2043 kcals daily, 25-30 kcal/kg  Protein Needs: 79-99 g daily, 1.2-1.5 g/kg.  Fluid Needs: 1980 mls daily, 30 mls/kg    Malnutrition: pending NFPE as patient has been meeting < 75% estimated energy needs for 10 days    Nutrition Risk Level: moderate risk    Nutrition dx:   Inadequate oral intake r/t acute respiratory failure as evidenced by Bipap/HFNC dependence.     Goal:   Maintain weight, Meet estimated nutrition needs and Diet advance    Intervention:   Recommend multivitamin with minerals as patient is meeting < 75% estimtated nutrition needs    Monitoring/Evaluation:   Diet advance, wt, labs    Electronically signed by:  Jaycob Zabala RD

## 2021-06-13 NOTE — PROGRESS NOTES
Critical Care Progress Note  11/21/2020     Admit Date: 11/13/2020  Moved to ICU on 11/15/20    Code Status: full code      Problem List:   Principal Problem:    Pneumonia  Active Problems:    Hypokalemia    Acute respiratory failure with hypoxia (H)    Pneumonia due to 2019 novel coronavirus  anxiety     Overnight Events  She has no pain  She had diet advanced to regular from clear liquids  She was on HFNC during the day at 100%, switched to bipap 15/8 70% and is on that this morning    Plan by System:     Neuro/Psych: no acute issues; at risk for developing ICU delirium    - try to maintain day/night schedule.    - avoid benzos    -add prn morphine     Pulmonary: Acute respiratory failure with hypoxia due to COVID19 viral pneumonia;    - BiPAP 70% today alternating with HFNC-needed 100% yesterday, not sure if she will be able to use high flow today   - Encourage self proning   - High risk for needing intubation.     CV: No acute issues    Tele monitoring.     GI/: eating when not on bipap   - Bowel protocol.     Renal: no acute issues    - monitor    - will give one time dose of lasix 20mg now to keep net negative    ID: COVID19 viral pneumonia    - Dexamethasone x 10 days   - Remdesivir course completed    Heme/Coag:  COVID19 coagulopathy    - LMWH 0.5mg/kg twice daily     Endocrine: Steroid induced hyperglycemia    -  No coverage needed. Follow        Dispo: ICU , remains at risk for needing intubation     Total  CCT: 45 min     Sweetie Hogue DO  Pulmonary and Critical Care Attending  pgr [phone]    _______________________________________________________________    HPI: Hafsa Robles  is a 60 y.o. year old female with PMH Significant for breast cancer s/p radiation. Not currently under treatment. No history of heart, lung, liver, kidney disease. No diabetes. Presented to ER Nov 13 with one week history of cough, fever, fatigue. Diagnosed with Covid 19 and treated for possible bacterial superinfection.  She had rapid escalation in her oxygen needs  from 4L NC to HFNC 80% 60 L/min and so she was transferred to ICU for further monitoring.     Objective:     Vitals:    11/21/20 0452 11/21/20 0500 11/21/20 0534 11/21/20 0600   BP:  95/51  110/55   Pulse:  (!) 57  81   Resp:  21  (!) 39   Temp:   98.2  F (36.8  C)    TempSrc:   Axillary    SpO2:  94%  (!) 89%   Weight: 137 lb 12.6 oz (62.5 kg)      Height:          I/O:     Intake/Output Summary (Last 24 hours) at 11/21/2020 0959  Last data filed at 11/21/2020 0534  Gross per 24 hour   Intake 750 ml   Output 1250 ml   Net -500 ml     Wt Readings from Last 3 Encounters:   11/21/20 137 lb 12.6 oz (62.5 kg)   12/16/14 130 lb (59 kg)      Weight change: -4 lb 3 oz (-1.9 kg)    Physical Exam:  Gen: awake, up  In chair  HEENMT:Bipap mask in place, talking through it  NEURO: grossly non-focal, moving all extremities  CARDIOVASCULAR: RRR  PULMONARY: bilateral rhonchi, on wheezing  GASTROINTESTINAL: soft nt + bs  SKIN: no rashes on visible skin      Labs:   Results from last 7 days   Lab Units 11/21/20  0534 11/19/20  0630 11/19/20  0630   LN-SODIUM mmol/L 139   < > 140   LN-POTASSIUM mmol/L 3.8   < > 3.7   LN-CHLORIDE mmol/L 103   < > 105   LN-CO2 mmol/L 27   < > 28   LN-BLOOD UREA NITROGEN mg/dL 19   < > 14   LN-CREATININE mg/dL 0.51*   < > 0.51*   LN-CALCIUM mg/dL 8.3*   < > 7.9*   LN-PROTEIN TOTAL g/dL  --   --  6.0   LN-BILIRUBIN TOTAL mg/dL  --   --  0.7   LN-ALKALINE PHOSPHATASE U/L  --   --  84   LN-ALT (SGPT) U/L  --   --  49*   LN-AST (SGOT) U/L  --   --  49*    < > = values in this interval not displayed.       Results from last 7 days   Lab Units 11/21/20  0534   LN-WHITE BLOOD CELL COUNT thou/uL 13.6*   LN-HEMOGLOBIN g/dL 14.7   LN-HEMATOCRIT % 44.1   LN-PLATELET COUNT thou/uL 432       Micro:   No growth     Imaging: all imaging personalized reviewed   11/13 CXR - No pleural fluid or pneumothorax. Heterogeneous pulmonary opacities are concerning for multifocal  atypical infection and have increased from the prior study. Normal size of the heart

## 2021-06-13 NOTE — PROGRESS NOTES
Pt vital signs remained fairly stable last night, systolic ran in the mid-80's for a period so Norepinephrine was increased to 0.05 mcg/kg/min, pt recovered and Norepinephrine was decreased back down to 0.04 mcg/kg/min.  Pt had one transient episode of fighting the restraints and becoming agitated, pt given 50mcg Fentanyl and her PO ativan and she calmed down.  Pt turned Q2 with no issues in oxygenation.

## 2021-06-13 NOTE — PROGRESS NOTES
PULMONARY / CRITICAL CARE PROGRESS NOTE    Date / Time of Admission:  11/13/2020  5:17 PM    Assessment:   Principal Problem:    Pneumonia  Active Problems:    Hypokalemia    Acute respiratory failure with hypoxia (H)    Pneumonia due to 2019 novel coronavirus    Severe protein-calorie malnutrition (H)    Acute respiratory failure (H)    ARDS (adult respiratory distress syndrome) (H)    Encephalopathy    60 y.o. year old female with PMH Significant for breast cancer s/p radiation/mastectomy.  Presented to ER 11/13 and dg with COVID-19. She decompensated and O2 went from 4 liters to HFNC 80% on 60 L, transferred to the ICU and intubated on 12/1/2020.    Plan:   Neuro:  On Precedex.  Fentanyl weaned off and stopped  On olanzapine (Zyprexa) 5 mg q8h for agitation  Started on low dose ativan    Cardiovascular:  On/off norepinephrine.  Drops in her blood pressure due to vasoplegia from sedation.  I's and O's equal  Coming down on Epinephrine ggt    Respiratory:  Acute hypoxic respiratory failure secondary to COVID-19 pneumonia.  Developed ARDS requiring intubation on 12/1.  Completed dexamethasone and remdesivir.  Completed 7 days of empiric Zosyn.  Sputum culture negative  First symptoms beginning of November.  Admitted to the hospital with hypoxia on 11/13.  Intubated 12/1  Paralyzed until 12/4.    On LPMV.   ml, PEEP of 5.  Peak/Plat 31/26  SBT two times a day and support as able (does better on 5/5 rather than 10-15/5)  Secretions pink tinged - monitor as pt is on lovenox  ABG after weaning did not look bad, although pt decompensated at the end of her wean.    GI:  Loose bowel movements.  Otherwise tolerating tube feeds.    :  -Noted to have hematuria and Lovenox was stopped.  Renal U/S with stones but no masses.  -F/u urine cultures  -Restart lovenox 30 mg two times a day as hematuria resolved.  Monitor for now    ID:  COVID-19 pneumonia complicated by ARDS.  Completed remdesivir and dexamethasone  Completed  7 days of empiric Zosyn.  Cultures remain negative    Endocrine:  Sliding scale insulin.   Start lantus 6 units daily    Heme/Onc:  On lovenox for covid dvt prophylaxis  Check Hgb and lactate at 10 pm.    Advance Directives: Full Code    Oren Vásquez MD    Critical Care Time greater than: 50 min    Updated Barry.     ICU DAILY CHECKLIST                        Can patient transfer out of MICU? no    FAST HUG:    Feeding:  Feeding: Yes.  Patient is receiving TUBE FEEDS    Arteaga:Yes  Analgesia/Sedation:Yes, see above  Thromboembolic prophylaxis: yes; Mode:  Lovenox resumed.   HOB>30:  Yes  Stress Ulcer Protocol Active: yes; Mode: PPI  Glycemic Control: Any glucose > 180 no; Mode of Insulin Therapy: Sliding Scale Insulin    INTUBATED:  Can patient have daily waking:  yes  Can patient have spontaneous breathing trial:  yes    Restraints? yes    PHYSICAL THERAPY AND MOBILITY:  Can patient have PT and mobility trial: no  Activity: Bed Rest      Subjective:   HPI:  Hafsa Robles is a 60 y.o. lady with a past medical history significant for breast cancer s/p partial mastectomy and adjuvant radiation in 2017, who presented to the hospital on 11/13/2020  5:17 PM for acute hypoxemic respiratory failure.  She was subsequently diagnosed with Covid-19 infection and spent several days in the intensive care unit alternating between high flow and BiPAP.  On 11/30 she was transferred back to the intensive care unit worsening hypoxia and was emergently intubated on the morning of 12/1.    Principal Problem:    Pneumonia  Active Problems:    Hypokalemia    Acute respiratory failure with hypoxia (H)    Pneumonia due to 2019 novel coronavirus    Severe protein-calorie malnutrition (H)    Acute respiratory failure (H)    ARDS (adult respiratory distress syndrome) (H)    Encephalopathy      Allergies: Patient has no known allergies.     MEDS:  Scheduled Meds:    chlorhexidine  15 mL Topical Q12H     [Held by provider]  "enoxaparin ANTICOAGULANT  0.5 mg/kg Subcutaneous Q12H     furosemide  20 mg Intravenous DAILY     heparin (PF) ANTICOAGULANT  5,000 Units Subcutaneous Q8H FIXED TIMES     influenza vaccine quad (PF) (age 50-64 YR) 2020-21  0.5 mL Intramuscular Prior to Discharge     insulin aspart (NovoLOG) injection   Subcutaneous Q6H FIXED TIMES     OLANZapine  5 mg Oral Q8H     omeprazole  20 mg Oral QAM AC    Or     omeprazole  20 mg Enteral Tube QAM AC     potassium chloride  20 mEq Intravenous Once     senna-docusate  1 tablet Oral BID    Or     senna (SENOKOT) syrup  8.8 mg Enteral Tube BID     sodium chloride  10-30 mL Intravenous Q8H FIXED TIMES     Continuous Infusions:    dexmedetomidine infusion orderable (PRECEDEX) 1.4 mcg/kg/hr (12/12/20 0410)     norepinephrine 0.04 mcg/kg/min (12/12/20 0400)     sodium chloride 0.9% Stopped (12/10/20 4987)     PRN Meds:.acetaminophen, acetaminophen, bacitracin, benzocaine-menthoL, bisacodyL, calcium (as carbonate), dextrose 50 % (D50W), fentaNYL pf, glucagon (human recombinant), hydrALAZINE, lipase-protease-amylase **AND** sodium bicarbonate, LORazepam, magnesium hydroxide, midazolam, naloxone **OR** naloxone **OR** naloxone **OR** naloxone, ondansetron **OR** ondansetron, polyethylene glycol, polyvinyl alcohol, sodium chloride bacteriostatic, sodium chloride, sodium chloride, sodium chloride, traZODone      Objective:   VITALS:  /71   Pulse 68   Temp 98.6  F (37  C) (Oral)   Resp (!) 29   Ht 5' 5\" (1.651 m)   Wt 136 lb 3.2 oz (61.8 kg)   SpO2 100%   BMI 22.66 kg/m    EXAM:  General appearance: Intubated   Lungs: clear to auscultation bilaterally  Heart: regular rate and rhythm, S1, S2 normal, no murmur, click, rub or gallop  Abdomen: soft, non-tender; bowel sounds normal; no masses,  no organomegaly  Extremities: extremities normal, atraumatic, no cyanosis or edema  Neurologic: Awake, moves all extremities    I&O:      Intake/Output Summary (Last 24 hours) at 12/12/2020 " 0513  Last data filed at 12/12/2020 0400  Gross per 24 hour   Intake 3069.5 ml   Output 2925 ml   Net 144.5 ml       Data Review:  Chest X-Ray: Bilateral infiltrates    CBC:   Lab Results   Component Value Date    WBC 11.9 (H) 12/12/2020    WBC 4.8 12/16/2014    RBC 4.07 12/12/2020     Serum Glucose range:Invalid input(s): GLUCOSEPOCT    By:  Oren Vásquez, 12/12/2020, 11:05 AM    Primary Care Physician:  Provider, No Primary Care

## 2021-06-13 NOTE — PLAN OF CARE
Problem: Mechanical Ventilation  Goal: Ability to express needs and understand communication  Outcome: Not Progressing   Pt is sedated and unable to communicate needs @ this time.  Does not follow commands.  Continue with medication regimen to decrease sedation needs.  Problem: Glucose Imbalance  Goal: Achieve optimal glucose control  Outcome: Progressing   BS in the 150's receiving coverage s/s.  Problem: Impaired Gas Exchange  Goal: Demonstrate improved ventilation and adequate oxygenation of tissues as evidenced by absence of respiratory distress  Outcome: Progressing   Pt weaned 5 min on day shift yesterday.  Increased RR noted.  Cont with wean trials in AM

## 2021-06-13 NOTE — PLAN OF CARE
Problem: Inadequate Gas Exchange  Goal: Patient will achieve/maintain normal respiratory rate/effort  Outcome: Progressing     Problem: Excessive Fluid Volume  Goal: Patient will achieve/maintain normal respiratory rate/effort  Outcome: Progressing     Problem: Activity Intolerance/Impaired Mobility  Goal: Mobility/activity is maintained at optimum level for patient  Outcome: Progressing     Problem: Ineffective Airway Clearance  Goal: Maintain airway patency  Outcome: Progressing     Problem: Impaired Gas Exchange  Goal: Demonstrate improved ventilation and adequate oxygenation of tissues as evidenced by absence of respiratory distress  Outcome: Progressing     Problem: Breathing  Goal: Patient will maintain patent airway  Outcome: Progressing     Problem: Mechanical Ventilation  Goal: Mobility/activity is maintained at optimum level for patient  Outcome: Progressing  Goal: Patient will maintain patent airway  Outcome: Progressing  Goal: Respiratory status - ventilation  Description: Movement of air in and out of the lungs.    Liberate from ventilator  Outcome: Progressing  Goal: ET tube will be managed safely  Outcome: Progressing     Patient weaning today for 30 minutes on CPAP +5, Pressure Support +15 FiO2 40%.

## 2021-06-13 NOTE — PROGRESS NOTES
Critical Care Progress Note  11/18/2020   9:38 AM     Admit Date: 11/13/2020  Moved to ICU on 11/15/20    Code Status: full code      Problem List:   Principal Problem:    Pneumonia  Active Problems:    Hypokalemia    Acute respiratory failure with hypoxia (H)    Pneumonia due to 2019 novel coronavirus      Major changes for today:    - stop antibiotics    - continue to encourage self proning    -adat       Plan by System:     Neuro/Psych: no acute issues; at risk for developing ICU delirium    - try to maintain day/night schedule.    - avoid benzos     Pulmonary: Acute respiratory failure with hypoxia due to COVID19 viral pneumonia;    - BiPAP alternating with HFNC   - Encourage self proning   - High risk for needing intubation.     CV: No acute issues    Tele monitoring.     GI/: NPO    - Keep NPO for now; ok for sips/ice. High risk for intubation    - Bowel protocol.     Renal: no acute issues    - monitor     ID: COVID19 viral pneumonia    - Dexamethasone x 10 days   - Remdesivir extend to 10 days    -stop antibiotics     Heme/Coag:  COVID19 coagulopathy    - LMWH 0.5mg/kg twice daily     Endocrine: Steroid induced hyperglycemia    -  No coverage needed. Follow        Dispo: ICU     Mellisa Abebe , [facility]  [facility] Pulmonary/Critical Care     Total  CCT: 33 min   _______________________________________________________________    HPI: Hafsa Robles  is a 60 y.o. year old female with PMH Significant for breast cancer s/p radiation. Not currently under treatment. No history of heart, lung, liver, kidney disease. No diabetes. Presented to ER Nov 13 with one week history of cough, fever, fatigue. Diagnosed with Covid 19 and treated for possible bacterial superinfection. She had rapid escalation in her oxygen needs  from 4L NC to HFNC 80% 60 L/min and so she was transferred to ICU for further monitoring.     ROS: dyspneic when moves around    Events over last 24-hours: BiPAP overnight 80%    Objective:      Vitals:    11/18/20 0400 11/18/20 0500 11/18/20 0600 11/18/20 0725   BP: 90/54 101/55 94/58    Pulse: (!) 58 66 (!) 58    Resp: 24 (!) 32 25    Temp:       TempSrc:       SpO2: 95% 95% 96% 92%   Weight:       Height:          I/O:     Intake/Output Summary (Last 24 hours) at 11/18/2020 0938  Last data filed at 11/18/2020 0600  Gross per 24 hour   Intake 510 ml   Output 1100 ml   Net -590 ml     Wt Readings from Last 3 Encounters:   11/13/20 145 lb 8 oz (66 kg)   12/16/14 130 lb (59 kg)      Weight change:     Physical Exam:  Gen: awake, looks frail in bed  HEENMT:PERRLA and extra ocular movement intact  NEURO: grossly non-focal  CARDIOVASCULAR: RRR  PULMONARY: Lungs are diminished and coarse.   GASTROINTESTINAL: soft nt + bs  INTEGUMENT: visible skin intact.   PSYCH: oriented to time, place and person    Labs:   Results from last 7 days   Lab Units 11/18/20  0621   LN-SODIUM mmol/L 139   LN-POTASSIUM mmol/L 3.7   LN-CHLORIDE mmol/L 104   LN-CO2 mmol/L 24   LN-BLOOD UREA NITROGEN mg/dL 16   LN-CREATININE mg/dL 0.54*   LN-CALCIUM mg/dL 7.9*   LN-PROTEIN TOTAL g/dL 6.4   LN-BILIRUBIN TOTAL mg/dL 0.6   LN-ALKALINE PHOSPHATASE U/L 101   LN-ALT (SGPT) U/L 74*   LN-AST (SGOT) U/L 73*       Results from last 7 days   Lab Units 11/18/20  0621 11/17/20  0555   LN-WHITE BLOOD CELL COUNT thou/uL  --  8.6   LN-HEMOGLOBIN g/dL  --  14.3   LN-HEMATOCRIT %  --  43.9   LN-PLATELET COUNT thou/uL 396 407       Micro:   No growth     Imaging: all imaging personalized reviewed   11/13 CXR - No pleural fluid or pneumothorax. Heterogeneous pulmonary opacities are concerning for multifocal atypical infection and have increased from the prior study. Normal size of the heart

## 2021-06-13 NOTE — PROGRESS NOTES
Chart check    60-year-old [place]bourgish speaking female admitted with hypoxia and pulmonary infiltrates due to COVID pneumonia. On BiPaP and HFNC intermittently. Intensivist managing. [facility] following peripherally.

## 2021-06-13 NOTE — PLAN OF CARE
Problem: Mechanical Ventilation  Goal: Respiratory status - ventilation  Description: Movement of air in and out of the lungs.    Liberate from ventilator  Outcome: Progressing   Attempted to wean this am. Pt RR went up into the 50's. Turned back to vent support and RR 26-30. MD cancelled SBT for today.    BP is labile. Titrate Levophed to maintain a MAP >65

## 2021-06-13 NOTE — PLAN OF CARE
"  Problem: Activity Intolerance/Impaired Mobility  Goal: Mobility/activity is maintained at optimum level for patient  Outcome: Progressing     Problem: Breathing  Goal: Patient will maintain patent airway  Outcome: Progressing     Problem: Mechanical Ventilation  Goal: Mobility/activity is maintained at optimum level for patient  Outcome: Progressing  Goal: Patient will maintain patent airway  Outcome: Progressing   .  Patient remained on full vent support. Suctioned small amount of thin secretions.Size 7.5 ETT remains in position at 21@T.   ETT was moved from side-to-side to prevent skin breakdown on lips. HOB approx 30 degrees. Current vent settings are below:  RT will continue to follow.  Vent Mode: VCV  FiO2 (%):  [40 %-100 %] 40 %  S RR:  [35] 35  S VT:  [230 mL-250 mL] 250 mL  PEEP/CPAP (cm H2O):  [12 cm H2O] 12 cm H2O  Minute Ventilation (L/min):  [7.3 L/min-9.9 L/min] 9.9 L/min  PIP:  [32 cm H2O-41 cm H2O] 39 cm H2O  MAP (cm H2O):  [16-20] 17     BP 90/56   Pulse 71   Temp 97.7  F (36.5  C) (Axillary)   Resp (!) 35   Ht 5' 5\" (1.651 m)   Wt 136 lb 14.5 oz (62.1 kg)   SpO2 91%   BMI 22.78 kg/m     "

## 2021-06-13 NOTE — PLAN OF CARE
Pt intubated and sedated. RASS -1 however, pt coughing frequently and becoming asynchronous with vent. PRN medications administered and Precedex titrated per order. Pt settled and slept well throughout the night, only coughing continuously when stimulated and/or suctioned. Pt follows commands and denies pain. Levophed titrated per order for MAP>65. Otherwise uneventful night.     Report given at time of shift change. VSS @ this time.     Problem: Inadequate Gas Exchange  Goal: Patient will achieve/maintain normal respiratory rate/effort  Outcome: Progressing     Problem: Daily Care  Goal: Daily care needs are met  Outcome: Progressing     Problem: Excessive Fluid Volume  Goal: Patient will achieve/maintain normal respiratory rate/effort  Outcome: Progressing     Problem: Ineffective Airway Clearance  Goal: Maintain airway patency  Outcome: Progressing     Problem: Breathing  Goal: Patient will maintain patent airway  Outcome: Progressing     Problem: Potential for Falls  Goal: Patient will remain free of falls  Outcome: Progressing     Problem: Potential for transmission of organism by Contact, Enteric, Droplet and/or Airborne routes  Goal: Prevent transmission of organisms  Outcome: Progressing     Problem: Potential for Compromised Skin Integrity  Goal: Skin integrity is maintained or improved  Outcome: Progressing  Goal: Nutritional status is improving  Outcome: Progressing     Problem: Urinary Incontinence  Goal: Perineal skin integrity is maintained or improved  Outcome: Progressing     Problem: Glucose Imbalance  Goal: Achieve optimal glucose control  Outcome: Progressing     Problem: Mechanical Ventilation  Goal: Patient will maintain patent airway  Outcome: Progressing  Goal: Oral health is maintained or improved  Outcome: Progressing  Goal: ET tube will be managed safely  Outcome: Progressing     Problem: Risk of Injury Due to Unsafe Behavior  Goal: Patient will remain safe while in restraint;  physical/psychological needs will be met  Outcome: Progressing  Goal: Alternatives to restraint will be continually assessed with use of least restrictive device and discontinuation as soon as possible  Outcome: Progressing

## 2021-06-13 NOTE — PLAN OF CARE
Problem: Ineffective Airway Clearance  Goal: Maintain airway patency  Outcome: Progressing     Problem: Potential for Compromised Skin Integrity  Goal: Skin integrity is maintained or improved  Outcome: Progressing     Problem: Mechanical Ventilation  Goal: Oral health is maintained or improved  Outcome: Progressing     Problem: Mechanical Ventilation  Goal: ET tube will be managed safely  Outcome: Progressing     Problem: Risk of Injury Due to Unsafe Behavior  Goal: Patient will remain safe while in restraint; physical/psychological needs will be met  Outcome: Progressing     The patient is alert and able to follow commands; Able to make her needs known by gestures and non verbal cues (nodding for yes and shaking her head for no); coughs frequently. Suctioning done PRN. Has pink-tinged secretions per endotracheal tube. Still on Precedex and restarted Levophed for MAP below 65 at 2109H. Afebrile.  Between 11pm and 12am, the patient became restless and anxious, asked this writer to call her daughter Randee thru the iPad, removed her blanket, pulled her gown and wanted to keep her cellphone at the bedside, she was backing up the vent from time to time, not in any form of labored breathing. The patient was advised to try to get some sleep. Precedex was increased according to RASS goal. Levophed was temporarily held but MAP went below 65 again. It was restarted.

## 2021-06-13 NOTE — PLAN OF CARE
Problem: Mechanical Ventilation  Goal: Mobility/activity is maintained at optimum level for patient  12/6/2020 1722 by Kia Pena LRT  Outcome: Progressing  12/6/2020 1721 by Kia Pena LRT  Outcome: Progressing  Goal: Patient will maintain patent airway  12/6/2020 1722 by Kia Pena LRT  Outcome: Progressing  12/6/2020 1721 by Kia Pena LRT  Outcome: Progressing  Goal: Oral health is maintained or improved  12/6/2020 1722 by Kia Pena LRT  Outcome: Progressing  12/6/2020 1721 by Kia Pena LRT  Outcome: Progressing  Goal: ET tube will be managed safely  Outcome: Progressing  Goal: Ability to express needs and understand communication  Outcome: Progressing

## 2021-06-13 NOTE — PROGRESS NOTES
"  RESPIRATORY CARE NOTE  ETT 7.5 23 cm at teeth, tolerating bellow settings:    Vent Mode: PCV/VG  FiO2 (%):  [40 %-100 %] 40 %  S RR:  [20] 20  S VT:  [350 mL] 350 mL  PEEP/CPAP (cm H2O):  [5 cm H2O] 5 cm H2O  Minute Ventilation (L/min):  [3 L/min-9 L/min] 3 L/min  PIP:  [33 cm H2O-37 cm H2O] 34 cm H2O  MAP (cm H2O):  [13-22] 13   PIP 34, plateau 34  BS course crackles, Sxn small tan thick, Pt tolerated well.    /75 (Patient Position: Lying)   Pulse 79   Temp 98.2  F (36.8  C) (Oral)   Resp 23   Ht 5' 5\" (1.651 m)   Wt 136 lb 14.5 oz (62.1 kg)   SpO2 99%   BMI 22.78 kg/m      RT will follow closely.      Sridhar Lopez, LRT    "

## 2021-06-13 NOTE — PROGRESS NOTES
12/19/20 4370   Patient Data   PIP Observed (cm H2O) 33 cm H2O   MAP (cm H2O) 13   Auto/Intrinsic PEEP Observed (cm H2O) 0 cm H2O   Plateau Pressure (cm H2O) 21 cm H2O   Static Compliance (L/cm H2O) 13   Dynamic Compliance (L/cm H2O) 28 L/cm H2O   Airway Resistance 12
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
## 2021-06-13 NOTE — PROGRESS NOTES
"60yr F, admitted on 11/13/20 for low SaO2%.  She transferred to ICU and intubated on 12/1/20 for failed BiPAP, patient remains intubated, also was proned and on Veletri.  Patient remains intubated, BS decreased.  Vent Day #10.  Patient weaned this AM from 10:07 to 10:37.  Suctioning pale and tan sputum moderate amount.    Weaning 10:07-10:37 end of wean parameters RR 45, Vt 333 VE 14.7L, RSBI 132, HR 80, /57    /62   Pulse 73   Temp 98.9  F (37.2  C) (Oral)   Resp 23   Ht 5' 5\" (1.651 m)   Wt 136 lb (61.7 kg)   SpO2 98%   BMI 22.63 kg/m      Vent Mode: VCV  FiO2 (%):  [40 %] 40 %  S RR:  [14] 14  S VT:  [400 mL] 400 mL  PEEP/CPAP (cm H2O):  [5 cm H2O] 5 cm H2O  Minute Ventilation (L/min):  [8.1 L/min-15.3 L/min] 10 L/min  PIP:  [22 cm H2O-48 cm H2O] 48 cm H2O  MAP (cm H2O):  [9-25] 21  ETT 7.5 23 cm teeth    CXR 12/4/20  A slight decrease in the diffuse, asymmetric interstitial opacities.     Continue current treatment, wean daily, and suction PRN.               "

## 2021-06-13 NOTE — PROGRESS NOTES
"Attempted to do another weaning trial per MD order but did not tolerate. Placed back on vent. Resting well. RN was to give more medication to help pt relax. Will continue to monitor.    On VCV, RATE 20, , PEEP 5, 40%    /69   Pulse 70   Temp 99.8  F (37.7  C) (Oral)   Resp 27   Ht 5' 5\" (1.651 m)   Wt 136 lb (61.7 kg)   SpO2 98%   BMI 22.63 kg/m      Bobby Bills, LRT    "

## 2021-06-13 NOTE — PLAN OF CARE
Problem: Pain  Goal: Patient's pain/discomfort is manageable  Outcome: Progressing  Note: Patient denies pain this shift.      Problem: Activity Intolerance/Impaired Mobility  Goal: Mobility/activity is maintained at optimum level for patient  Outcome: Progressing  Note: Patient self-proned per Dr. De La Torre this AM for approx. 2 hours. Patient O2 sats did well during prone, bipap O2 was titrated from 70% down to 50% during this time. Patient moved to chair after proning, bipap O2 back to 60%. Patient resting comfortably.      Problem: Potential for Compromised Skin Integrity  Goal: Nutritional status is improving  Outcome: Not Progressing  Note: Patient remains NPO while on bipap.

## 2021-06-13 NOTE — PROGRESS NOTES
Problem: Inadequate Gas Exchange  Goal: Patient will achieve/maintain normal respiratory rate/effort  Outcome: Progressing     Problem: Excessive Fluid Volume  Goal: Patient will achieve/maintain normal respiratory rate/effort

## 2021-06-13 NOTE — PLAN OF CARE
Problem: Mechanical Ventilation  Goal: Mobility/activity is maintained at optimum level for patient  Outcome: Progressing  Goal: Patient will maintain patent airway  Outcome: Progressing  Goal: Oral health is maintained or improved  Outcome: Progressing  Goal: ET tube will be managed safely  Outcome: Progressing  Goal: Ability to express needs and understand communication  Outcome: Progressing

## 2021-06-13 NOTE — PROGRESS NOTES
"Pharmacy Consult: Vancomycin Dosing    Pharmacist consulted to dose vancomycin for Hafsa Robles, a 60 y.o. female.    Ordering provider:     Indication for vancomycin therapy: Hospital Acquired Pneumonia    Goal Trough Range:  15-20 mcg/mL based on indication    Other current antimicrobials              vancomycin 1,000 mg in sodium chloride 0.9% 250 mL (VANCOCIN)  Every 8 hours          cefepime 1 g in NaCl 0.9 % (MINI-BAG Plus) 50 mL (MAXIPIME)  Every 8 hours                   Subjective/Objective:    Patient was admitted for Pneumonia due to 2019 novel coronavirus on 11/13/2020    Height: 5' 5\" (1.651 m)    Actual Body Weight (ABW): 61.2 kg (134 lb 14.7 oz)    Ideal body weight: 57 kg (125 lb 10.6 oz)  Adjusted ideal body weight: 58.7 kg (129 lb 5.9 oz)    BMI: Body mass index is 22.45 kg/m .    No Known Allergies    Patient Active Problem List   Diagnosis     Pneumonia     Hypokalemia     Acute respiratory failure with hypoxia (H)     Pneumonia due to 2019 novel coronavirus     Severe protein-calorie malnutrition (H)     Acute respiratory failure (H)     ARDS (adult respiratory distress syndrome) (H)     Encephalopathy     Agitation     Anxiety     Shock circulatory (H)     Fever, unspecified fever cause     UTI due to Klebsiella species     Permanent atrial fibrillation with RVR (H)     Acute upper GI bleed     Single subsegmental pulmonary embolism without acute cor pulmonale (H)     Acute on chronic systolic heart failure (H)    Past Medical History:   Diagnosis Date     Malignant neoplasm of right female breast (H)         Temp Readings from Current Encounter:     12/19/20 1200 12/19/20 1400 12/19/20 1600   Temp: 97.4  F (36.3  C) 97.6  F (36.4  C) 99.4  F (37.4  C)     Net Intake/Output (last 24 hours):  I/O last 3 completed shifts:  In: 3162.4 [I.V.:312.4; NG/GT:2240; IV Piggyback:610]  Out: 5005 [Urine:5005]    Recent Labs     12/17/20  0547 12/17/20  0548 12/18/20  0541 12/19/20  0551   WBC " 7.9  --  12.5* 9.9   BUN  --  11 16 12   CREATININE  --  0.42* 0.46* 0.44*     Estimated Creatinine Clearance: 131.4 mL/min (A) (by C-G formula based on SCr of 0.44 mg/dL (L)).    No results for input(s): CULTURE in the last 72 hours.    Results for orders placed or performed during the hospital encounter of 11/13/20   Sputum culture    Collection Time: 12/15/20  3:46 PM    Specimen: Respiratory   Result Value Status    Culture Usual Charlette Final   Urine culture    Collection Time: 12/15/20  2:19 PM    Specimen: Urine   Result Value Status    Culture >100,000 col/ml Klebsiella oxytoca (!) Final   Culture, Urine    Collection Time: 12/11/20  2:15 PM    Specimen: Urine, Catheter   Result Value Status    Culture 10,000-50,000 col/ml Yeast species (!) Final   Sputum culture    Collection Time: 12/01/20 10:48 AM    Specimen: Respiratory   Result Value Status    Culture Usual Charlette Final       Recent labs: (last 7 days)     12/17/20  1603 12/19/20  1847   VANCOMYCIN 7.4 14.9       Vancomycin administrations: (last 120 hours)     Date/Time Action Medication Dose Rate    12/19/20 1924 New Bag    vancomycin 1,000 mg in sodium chloride 0.9% 250 mL (VANCOCIN) 1,000 mg 130 mL/hr    12/19/20 1036 New Bag    vancomycin 1,000 mg in sodium chloride 0.9% 250 mL (VANCOCIN) 1,000 mg 130 mL/hr    12/19/20 0159 New Bag    vancomycin 1,000 mg in sodium chloride 0.9% 250 mL (VANCOCIN) 1,000 mg 130 mL/hr    12/18/20 1839 New Bag    vancomycin 1,000 mg in sodium chloride 0.9% 250 mL (VANCOCIN) 1,000 mg 130 mL/hr    12/18/20 0120 New Bag    vancomycin 1,000 mg in sodium chloride 0.9% 250 mL (VANCOCIN) 1,000 mg 130 mL/hr    12/17/20 1717 New Bag    vancomycin 1,000 mg in sodium chloride 0.9% 250 mL (VANCOCIN) 1,000 mg 130 mL/hr    12/17/20 0417 New Bag    vancomycin 1,000 mg in sodium chloride 0.9% 250 mL (VANCOCIN) 1,000 mg 130 mL/hr    12/16/20 1627 New Bag    vancomycin 1,000 mg in sodium chloride 0.9% 250 mL (VANCOCIN) 1,000 mg 130 mL/hr     12/16/20 0311 New Bag    vancomycin 1,000 mg in sodium chloride 0.9% 250 mL (VANCOCIN) 1,000 mg 130 mL/hr    12/15/20 1638 New Bag    vancomycin 1,250 mg in sodium chloride 0.9% 500 mL (VANCOCIN) 1,250 mg 256.3 mL/hr          Assessment/Plan:    Pharmacist consulted to dose vancomycin for Hospital Acquired Pneumonia, goal trough range 15-20 mcg/mL.  1. Continue vancomycin 1000 mg IV every 8 hours (16 mg/kg actual body weight).  2. Vancomycin trough level of 14.9 mcg/mL was within the goal trough range. This trough level was drawn at steady state.  3. Pharmacist will plan to check a vancomycin trough level prior to the 4th or 5th dose of new regimen if vancomycin therapy is continued at that point.  4. Pharmacist will continue to follow.    Thank you for the consult.  Gretta Echeverria, PharmD 12/19/2020 7:36 PM

## 2021-06-13 NOTE — PLAN OF CARE
Problem: Inadequate Gas Exchange  Goal: Patient will achieve/maintain normal respiratory rate/effort  Outcome: Progressing     Goal: Patient will achieve/maintain normal respiratory rate/effort  Outcome: Progressing   Patient on high flow nasal cannula all shift at 50L 60%. Phenergan with codeine given for frequent non productive cough then robitussin prn. Patient desaturated with activities then recover after few minutes.   Another dose of phenergan with codeine this am for frequent coughing which is making her more exhausted.     Will continue to monitor.

## 2021-06-13 NOTE — SIGNIFICANT EVENT
"House staff was called by the patient's nurse due to increasing O2 requirement. Briefly, the patient was admitted for COVID-19.    I went to evaluate the patient and found patient laying in bed. Communication was limited due to patient needing an     Gen: Laying in bed  CV: RRR  Resp: Subcostal retractions    /57 (Patient Position: Semi-cedeño)   Pulse 97   Temp 98.6  F (37  C) (Oral)   Resp 18   Ht 5' 5\" (1.651 m)   Wt 134 lb 6.4 oz (61 kg)   SpO2 91%   BMI 22.37 kg/m      A/P  Increasing O2 requirement   Patient now on 50L 91% FiO2 HFNC, she sats around 90-91% when not speaking, when she begins to speak she will desat to mid 80s.   - Spoke with intensivist Dr. Hogue  - Will start BiPAP and transfer to P3    This note was routed to the hospitalist service.     Little Guzman MD  [facility], PGY-1  Pager # [phone]    "

## 2021-06-13 NOTE — PROGRESS NOTES
12/16/20 0741   Patient Data   PIP Observed (cm H2O) 38 cm H2O   MAP (cm H2O) 13   Auto/Intrinsic PEEP Observed (cm H2O) 2 cm H2O   Plateau Pressure (cm H2O) 37 cm H2O   Static Compliance (L/cm H2O) 11   Dynamic Compliance (L/cm H2O) 12 L/cm H2O   Airway Resistance 13   SpO2 92 %   Heart Rate 82     Vent Mode: CPAP/PSV  FiO2 (%):  [40 %] 40 %  S RR:  [20] 20  S VT:  [375 mL] 375 mL  PEEP/CPAP (cm H2O):  [5 cm H2O] 5 cm H2O  Minute Ventilation (L/min):  [9.3 L/min-12.3 L/min] 12.1 L/min  PIP:  [9 cm H2O-38 cm H2O] 23 cm H2O  [place] SUP:  [8 cm H20-10 cm H20] 10 cm H20  MAP (cm H2O):  [5-13] 9  ETT 7.5 23 at teeth  SBT done on cpap 5 ps 10x 5 minutes, Rsbi 152-198, RR 40-49, MD aware.  Breath sounds coarse, lots of coughing.  sats 99%, pt has a cuff leak when cuff deflated.  Plan: cont daily sbt's  Suctioning white secretions

## 2021-06-13 NOTE — PROGRESS NOTES
11/29/20 1335   Visit Specifics   Eval Type Inital eval   Inital OT Consult  11/29/20      (inter. not used, understood some English, filter iin rm loud)   Bed/Tabs/Pad Alarm Applied Yes   Treatment Time   ADL Training 15   General   Onset date 11/13/20   Chart Reviewed Yes   PT/OT Patient/Caregiver Stated Goals none stated   Family/Caregiver Present No   Precautions   General Precautions Bed alarm/Tab alarm   Home Living   Additional Comments see PT note   Prior Status   Independent With All ADL's/IADL's   Lives With Spouse   Comments see PT note   ADL   Lower Body Dressing Socks   Socks Independent  (to adjust in sitting )   Activity Tolerance   Endurance Poor   Activity Tolerance Comments high flow o2, 86% after several minutes rest and PLB, RN aware   Balance   Sitting Balance Independent   Standing Balance Min assist   Bed Mobility   Supine to Sit Stand by assistance   Sit to Supine Stand by assistance   Functional Transfers   Functional Transfers Bed Transfers;Chair Transfers   Bed Transfers CGA   Chair Transfers CGA   Transfer Cues Safety   Transfer Deficits SOB;Fatigue   Ambulation   Location To chair;To bed   Assistance Min Assist   Device No device;Hand hold assist   Verbal Cues Safety;Attention to direction   02 Saturation With Activity   (low 80's, to 86% w/ rest)   Cognition   Overall Cognitive Status WFL   Behavior    Behavior During Session Cooperative   Vision-Basic Assessment   Current Vision   (not wearing glasses)   Vision/Perception   Vision/Perception   (NT)   RUE Assessment   RUE Assessment WFL  (AROM)   LUE Assessment   LUE Assessment WFL  (AROM)   Hand Function   Gross Grasp Functional   Sensation   Light Touch No apparent deficits   Assessment   Assessment Decreased endurance;Decreased ADL status;Decreased funtional mobility   Prognosis Good   Treatment/Interventions ADL training;Functional transfer training;Other (Comment)  (endurance for adls)   OT Frequency Daily   Goal  Formulation Patient   Recommendations   OT Discharge Recommendation Home with family support/assistance  (pendng o2 needs)   Treatment Suggestions for Next Session trsfs, g/h, toileting, activity as tolerated-low o2 sats   OT Care Plan REVIEWED DAILY Yes, goals remain appropriate

## 2021-06-13 NOTE — PROGRESS NOTES
12/08/20 1102   Patient Data   PIP Observed (cm H2O) 39 cm H2O   MAP (cm H2O) 12   Auto/Intrinsic PEEP Observed (cm H2O)   (Failed)   Plateau Pressure (cm H2O) 33 cm H2O   Static Compliance (L/cm H2O) 18   Dynamic Compliance (L/cm H2O) 15 L/cm H2O   SpO2 96 %   Heart Rate 91

## 2021-06-13 NOTE — PLAN OF CARE
Problem: Inadequate Gas Exchange  Goal: Patient will achieve/maintain normal respiratory rate/effort  Outcome: Progressing   Patient off bipap all night.  Using HFNC at 50L 60%  1040: decreased to 40L, sats 99%

## 2021-06-13 NOTE — PROGRESS NOTES
Vent Mode: VCV  FiO2 (%):  [40 %] 40 %  S RR:  [14] 14  S VT:  [400 mL] 400 mL  PEEP/CPAP (cm H2O):  [5 cm H2O] 5 cm H2O  Minute Ventilation (L/min):  [8.1 L/min-15.3 L/min] 9.6 L/min  PIP:  [22 cm H2O-48 cm H2O] 37 cm H2O  MAP (cm H2O):  [9-25] 12   PLAT: 25

## 2021-06-13 NOTE — PLAN OF CARE
Problem: Inadequate Gas Exchange  Goal: Patient will achieve/maintain normal respiratory rate/effort  Outcome: Progressing   RR 24-30. O2 sat 92-96%. Decreased FiO2 from 100% to 80%. Denied shortness of breath. Gave PRN cough medicine.       Problem: Breathing  Goal: Patient will utilize incentive spirometer  Outcome: Progressing   Achieved 500 on IS.     Denies pain.     Purwick in place. Urine output 600 mL overnight. VSS. Afebrile. BP soft at times MAP of 63. Notified CNP.

## 2021-06-13 NOTE — PLAN OF CARE
"  Problem: Breathing  Goal: Patient will maintain patent airway  Outcome: Progressing  Goal: Patient performs or directs assisted coughing  Outcome: Progressing     Patient remained on HFNC throughout the night on settings of 55L 55% with no complications. BIPAP in room on standby on settings ST 15/8 12 70% but did not need. Water bag changed in Am. RT will continue to follow.    BP (!) 87/52   Pulse 90   Temp 97.9  F (36.6  C) (Axillary)   Resp 26   Ht 5' 5\" (1.651 m)   Wt 136 lb 7.4 oz (61.9 kg)   SpO2 96%   BMI 22.71 kg/m     "

## 2021-06-13 NOTE — PROGRESS NOTES
Rass -1, afebrile on precedex gtt. SR, bp stable, still intubated, saturations wnl. See vital signs, TF at goal, with free water flushes. 2 bowel movements during this shift. [place] patent, uop adequate. Pt set up on facetime with .

## 2021-06-13 NOTE — PLAN OF CARE
Problem: Inadequate Gas Exchange  Goal: Patient will achieve/maintain normal respiratory rate/effort  Outcome: Progressing     Problem: Excessive Fluid Volume  Goal: Patient will achieve/maintain normal respiratory rate/effort  Outcome: Progressing      Patient's sats in 90's on high flow oxygen.  Purewick in place to wall suction.  Denies pain.  Reports improved cough at this time.  Able to make needs known.    Gina Chen RN

## 2021-06-13 NOTE — PLAN OF CARE
Problem: Impaired Gas Exchange  Goal: Demonstrate improved ventilation and adequate oxygenation of tissues as evidenced by absence of respiratory distress  Outcome: Progressing   Patient has been on high flow today and up in chair for most of my shift. High flow titrated down to 60L and 65% Fio2. Patient feels her breathing has really improved biggest complaint is the coughing. Using tessalon pearls and Robitussin. She likes to use the phenergan and codeine at night. Will continue to monitor.  Kristel Chapman RN

## 2021-06-13 NOTE — PROGRESS NOTES
12/06/20 0852   Patient Data   Vt (observed, mL) 201 mL   Vt Exp (mL) 291 mL   Minute Ventilation (L/min) 8.5 L/min   Total Resp Rate  33 BPM   PIP Observed (cm H2O) 31 cm H2O   MAP (cm H2O) 12   Auto/Intrinsic PEEP Observed (cm H2O) 4 cm H2O   Plateau Pressure (cm H2O) 19 cm H2O   Dynamic Compliance (L/cm H2O) 19 L/cm H2O

## 2021-06-13 NOTE — PROGRESS NOTES
"RESPIRATORY CARE NOTE    Patient remains intubated with 7.5 ETT secure 23 cm at teeth, tolerating bellow settings:     Vent Mode: PCV/VG  FiO2 (%):  [40 %-100 %] 50 %  S RR:  [20] 20  S VT:  [350 mL] 350 mL  PEEP/CPAP (cm H2O):  [5 cm H2O] 5 cm H2O  Minute Ventilation (L/min):  [6.8 L/min-11.2 L/min] 9.6 L/min  PIP:  [23 cm H2O-46 cm H2O] 37 cm H2O  [place] SUP:  [12 cm H20] 12 cm H20  MAP (cm H2O):  [10-22] 20  PIP 37, Plateau 32  BS diminished with tight course crackles,  Sxn small tan thick, Pt tolerated well.    BP 92/55 (Patient Position: Lying)   Pulse 74   Temp 98.6  F (37  C) (Oral)   Resp 24   Ht 5' 5\" (1.651 m)   Wt 134 lb 14.7 oz (61.2 kg)   SpO2 99%   BMI 22.45 kg/m       RT will follow per protocol.      Sridhar Lopez, LRT    "

## 2021-06-13 NOTE — ANESTHESIA PROCEDURE NOTES
Emergent Intubation    Date/Time: 12/1/2020 5:35 AM    CRNA: Yves Cerna CRNA  Indications: respiratory distress    Medications Administered  PropofoL injection (DIPRIVAN), 120 mg  succinylcholine injection (ANECTINE), 100 mgMedication administration time:12/1/2020 5:35 AMTechnique: fiberoptic assisted  Tube size: 7.5 mm  Tube type: cuffed  Cuff inflated: yes  Level of Difficulty: 0ETT to lip: 22 cm  Tube secured with: ETT bateman  ETCO2 = Yes  Breath sounds: equal  SaO2 %: 95  Comments: COVID-19 intubation requiring additional PPE equipment and setup time.

## 2021-06-13 NOTE — PROGRESS NOTES
CRITICAL CARE PROGRESS NOTE:    Assessment/Plan:  Hafsa Robles is a 60 y.o. lady with a past medical history significant for breast cancer s/p partial mastectomy and adjuvant radiation in 2017, who presented to the hospital on 11/13/2020  5:17 PM for acute hypoxemic respiratory failure.  She was subsequently diagnosed with Covid-19 infection and spent several days in the intensive care unit alternating between high flow and BiPAP.  On 11/30 she was transferred back to the intensive care unit worsening hypoxia and was emergently intubated on the morning of 12/1.    RESP:  ARDS, resp failure, worsened after initial improvement from covid with NIPPV, HFNC. Intubated 12/1. Pf: ratio 245 this AM iwhile supine. Tried dropping PEEP to 8 but didn't tolerate with marked worsening of PaO2. On very low Vt ventilation. Slight resp alkalosis today, decr RR     Cont LPV, Vt is 250cc which is 4-5cc/kg    Pplat <30, last was 24    Titrate FiO2 for goal O2 sat 88-92% or PaO2 55 mm Hg or higher    Off NMB as of 12/4    Off veletri    Daily ABG's for now.     CV:  Shock, circulatory likely vasoplegic from sedation, vasodilatory from severe infection with capillary leak. No echo on file.    MAP >65, off pressors as of 12/4    lactate normal no need to trend further    Hold any home anti-hypertensives    NEURO:  Sedated while on vent. S/p NMB paralytic    Cont fent, midaz lighten RASS goal to -1 to 0, try to wean versed today, will use dex if needed    Off NMB as of 12/4    Tylenol prn pain    GI:  No issues, LFTs OK on 11/29    Cont TF and advance as tolerated.     Bowel regimen    IV PPI    RENAL:  No issues, normal SCr. Compensated chronic resp acidosis with high serum bicarb     Cont Lasix 20mg IV two times a day for now to keep on dry side    Avoid nephrotoxins    Maintain Arteaga    ID:  Severe covid-19 infection, initially improved then worsend 12/1 with ARDS requiring intubation.    10 days IV dex restarted 11/30 for unclear  reasons - stop it today.     Completed 5 days of remdesivir    Cont zosyn empirically - stop after 7 days, stop date placed.     F/u culture data    completed CTX + azithro earlier in hospitalization    HEMATOLOGIC:  Leukocytosis likely 2/2 steroids, acute stress and infectious processes. No anemia. Wbc improved and normal today.     hgb >7    ENDOCRINE:  No issues    FSBG checks, insulin sliding scale/drip per ICU protocol    ICU PROPHYLAXIS:    LMWH two times a day per covid cat B a/c guidelines    PPI    peridex    CODE STATUS, DISPOSITION, FAMILY COMMUNICATION: full code    Lines/Drains/Tubes:  PICC 12/1 RUE  NEW left ax art line 12/4  OG tube  Arteaga  ETT 7.5mm    Restraints  Progress Note  Restraint Application    I recognize that restraints are physical and/or chemical interventions intended to restrict a person's movements. Restraints are currently needed to ensure the safety of this patient and/or others. My clinical rationale appears below.    Category/Type of Restraint     Non Violent:  Soft limb restraint x2  --  Behavior  Pulling at tubes/lines  --  Root Cause of the Behavior  Sedation/intubation  --  Less-Restrictive Measures that Failed  Non Violent Measures:  Close Observation  --  Response to the Restraint  Patient unable to pull at tubes/lines  --  Criteria for Release from the Restraint  Patient calm and off sedation    Bashir (Sharan) MD Britt  [facility]  Pager [phone]  Clinic [phone]      Overnight events:  No events   Off NMB as of yesterday  PEEP down to 8 this AM, tolerating much better than yesterday.  Weaning versed down, on fentanyl drip.  Minimal secretions.   Remains off pressors.     Subjective:  Unable to assess    Objective:  Physical Exam:  Vent settings for last 24 hours:  Vent Mode: VCV  FiO2 (%):  [40 %-100 %] 40 %  S RR:  [32] 32  S VT:  [250 mL] 250 mL  PEEP/CPAP (cm H2O):  [8 cm H2O-10 cm H2O] 8 cm H2O  Minute Ventilation  "(L/min):  [7.9 L/min-9.8 L/min] 8.3 L/min  PIP:  [30 cm H2O-34 cm H2O] 34 cm H2O  MAP (cm H2O):  [13-14] 13    /73   Pulse 63   Temp 97.4  F (36.3  C) (Axillary)   Resp (!) 32   Ht 5' 5\" (1.651 m)   Wt 136 lb 7.4 oz (61.9 kg)   SpO2 95%   BMI 22.71 kg/m      Intake/Output last 3 shifts:  I/O last 3 completed shifts:  In: 2188.1 [I.V.:429.1; NG/GT:1609; IV Piggyback:150]  Out: 2170 [Urine:2170]  Intake/Output this shift:  I/O this shift:  In: 410 [NG/GT:360; IV Piggyback:50]  Out: 350 [Urine:350]    Physical Exam  Gen: intubated, paralyzed, sedated  HEENT: no OP lesions, no DOT  CV: RRR, no m/g/r  Resp: clear ant no w/r/r   Abd: soft, nontender, BS+  Neuro: PERRL, nonfocal  Ext: no edema    LAB:  Results from last 7 days   Lab Units 12/05/20  0435   LN-WHITE BLOOD CELL COUNT thou/uL 12.8*   LN-HEMOGLOBIN g/dL 11.5*   LN-HEMATOCRIT % 37.6   LN-PLATELET COUNT thou/uL 400     Results from last 7 days   Lab Units 12/05/20  0435 12/04/20  1417 12/04/20  0408 12/03/20  0419 11/29/20  1246 11/29/20  1246   LN-SODIUM mmol/L 144  --  147* 145   < >  --    LN-POTASSIUM mmol/L 3.3* 4.5 3.2* 4.4   < >  --    LN-CHLORIDE mmol/L 100  --  99 100   < >  --    LN-CO2 mmol/L 36*  --  40* 35*   < >  --    LN-BLOOD UREA NITROGEN mg/dL 34*  --  36* 35*   < >  --    LN-CREATININE mg/dL 0.52*  --  0.54* 0.57*   < >  --    LN-CALCIUM mg/dL 8.5  --  8.4* 8.9   < >  --    LN-PROTEIN TOTAL g/dL  --   --   --   --   --  7.1   LN-BILIRUBIN TOTAL mg/dL  --   --   --   --   --  0.5   LN-ALKALINE PHOSPHATASE U/L  --   --   --   --   --  146*   LN-ALT (SGPT) U/L  --   --   --   --   --  41   LN-AST (SGOT) U/L  --   --   --   --   --  36    < > = values in this interval not displayed.     Micro  Sputum 12/1 neg  Blood pending  C diff neg  Blood neg from 11/14   PCT neg x2      Current Facility-Administered Medications   Medication Dose Route Frequency Provider Last Rate Last Admin     acetaminophen solution 650 mg (TYLENOL)  650 mg " Enteral Tube Q4H PRN Tish Adkins CNP   650 mg at 12/05/20 0436     acetaminophen tablet 650 mg (TYLENOL)  650 mg Oral Q4H PRN Ramon Haro MD   650 mg at 11/30/20 0058     bacitracin ointment packet 1 packet  1 packet Topical Once PRN Tish Adkins CNP         benzocaine-menthoL lozenge 1 lozenge (CEPACOL)  1 lozenge Oral Q1H PRN Eben Butterfield MD         benzonatate capsule 200 mg (TESSALON)  200 mg Oral Q6H PRN Ramon Haro MD   200 mg at 11/30/20 0845     [Held by provider] benzonatate capsule 200 mg (TESSALON)  200 mg Oral QHS Seng Up DO   Stopped at 11/30/20 2100     bisacodyL suppository 10 mg (DULCOLAX)  10 mg Rectal Daily PRN Eben Butterfield MD         calcium (as carbonate) chewable tablet 400 mg (TUMS)  400 mg Oral TID PRN Ramon Haro MD   400 mg at 11/18/20 1222     chlorhexidine 0.12 % solution 15 mL (PERIDEX)  15 mL Topical Q12H Tish Adkins CNP   15 mL at 12/05/20 0600     dexAMETHasone (PF) injection 6 mg (DECADRON)  6 mg Intravenous Q24H bEen Butterfield MD   6 mg at 12/05/20 0950     dextrose 50 % (D50W) syringe 20-50 mL  20-50 mL Intravenous Q15 Min PRN Ramon Haro MD         diphenhydrAMINE tablet 25 mg (BENADRYL)  25 mg Oral Bedtime PRN Ramon Haro MD   25 mg at 11/27/20 0349     enoxaparin ANTICOAGULANT syringe 30 mg (LOVENOX)  0.5 mg/kg Subcutaneous Q12H Sherri Garcia MD   30 mg at 12/05/20 0952     fentaNYL - BOLUS DOSE from infusion 25-50 mcg  25-50 mcg Intravenous Q1H PRN Tish Adkins CNP   50 mcg at 12/05/20 0303     fentaNYL - BOLUS DOSE from infusion  mcg   mcg Intravenous Q1H PRN Tish Adkins CNP   100 mcg at 12/03/20 0857     fentaNYL 2500 mcg/50 mL CADD infusion (50 mcg/mL)   mcg/hr Intravenous Continuous Tish Adkins CNP 2 mL/hr at 12/05/20 1055 100 mcg/hr at 12/05/20 1055     furosemide injection 20 mg (LASIX)  20 mg Intravenous BID - diuretic Britt  MD Bashir   20 mg at 12/05/20 0947     glucagon (human recombinant) injection 1 mg  1 mg Subcutaneous Q15 Min PRN Ramon Haro MD         hydrALAZINE injection 10 mg (APRESOLINE)  10 mg Intravenous Q4H PRN Ramon Haro MD         insulin aspart U-100 injection pen (NovoLOG)   Subcutaneous Q6H FIXED TIMES Eben Butterfield MD   2 Units at 12/05/20 0501     insulin regular 1 Units/mL in sodium chloride 0.9% 250 mL  0-24 Units/hr Intravenous Continuous PRN Eben Butterfield MD         lipase-protease-amylase 5,000-17,000- 24,000 unit capsule 2 capsule (ZENPEP)  2 capsule Enteral Tube PRN Eben Butterfield MD        And     sodium bicarbonate tablet 325 mg  325 mg Enteral Tube PRN Eben Butterfield MD         LORazepam tablet 0.5 mg (ATIVAN)  0.5 mg Oral Q4H PRN Seng Up DO         magnesium hydroxide suspension 30 mL (MILK OF MAG)  30 mL Oral Daily PRN Eben Butterfield MD         melatonin tablet 3 mg  3 mg Oral Bedtime PRN Ramon Haro MD   3 mg at 11/29/20 2034     midazolam (PF) injection 1 mg (VERSED)  1 mg Intravenous Q2H PRN Tish Adkins CNP   1 mg at 12/01/20 1138     midazolam 100 mg/100 mL (1 mg/mL) infusion (VERSED)  0.25-15 mg/hr Intravenous Continuous Tish Adkins CNP 3 mL/hr at 12/05/20 1057 3 mg/hr at 12/05/20 1057     naloxone injection 0.2 mg (NARCAN)  0.2 mg Intravenous Q2 Min PRN Ramon Haro MD        Or     naloxone injection 0.2 mg (NARCAN)  0.2 mg Intramuscular Q2 Min PRN Ramon Haro MD        Or     naloxone injection 0.4 mg (NARCAN)  0.4 mg Intravenous Q2 Min PRN Ramon Haro MD        Or     naloxone injection 0.4 mg (NARCAN)  0.4 mg Intramuscular Q2 Min PRN Ramon Haro MD         norepinephrine 4 mg/250 mL in NS (16 mcg/mL) - CENTRAL  0.01-0.4 mcg/kg/min Intravenous Continuous Radha, Sherri Craft MD   Stopped at 12/04/20 0100     omeprazole capsule 20 mg (PriLOSEC)   20 mg Oral QAM Tish Gonzalez CNP        Or     omeprazole suspension 20 mg (PriLOSEC)  20 mg Enteral Tube QAM Tish Gonzalez CNP   20 mg at 12/05/20 0711     ondansetron injection 4 mg (ZOFRAN)  4 mg Intravenous Q4H PRN Eben Butterfield MD        Or     ondansetron tablet 8 mg (ZOFRAN)  8 mg Oral Q8H PRN Eben Butterfield MD         piperacillin-tazobactam 3.375 g in NaCl 0.9 % 50 mL (MINI-BAG Plus) (ZOSYN)  3.375 g Intravenous Q8H Bashir De La Torre MD 12.5 mL/hr at 12/05/20 0711 3.375 g at 12/05/20 0711     polyethylene glycol packet 17 g (MIRALAX)  17 g Oral Daily PRN Ramon Haro MD   17 g at 12/04/20 0834     polyvinyl alcohol 1.4 % ophthalmic solution 1-2 drop (LIQUIFILM TEARS)  1-2 drop Both Eyes Q1H PRN Eben Butterfield MD         senna-docusate 8.6-50 mg tablet 1 tablet (PERICOLACE)  1 tablet Oral BID Eben Butterfield MD   Stopped at 11/30/20 2100    Or     sennosides syrup 8.8 mg (for SENOKOT)  8.8 mg Enteral Tube BID Eben Butterfield MD   8.8 mg at 12/04/20 2050     sodium chloride 0.9%  10 mL/hr Intravenous Continuous Eben Butterfield MD 10 mL/hr at 12/04/20 2000 10 mL/hr at 12/04/20 2000     sodium chloride bacteriostatic 0.9 % injection 0.1-0.3 mL  0.1-0.3 mL Subcutaneous PRN Ramon Haro MD         sodium chloride bacteriostatic 0.9 % injection 1-5 mL  1-5 mL Intradermal Once PRN Ramon Haro MD         sodium chloride flush 10-20 mL (NS)  10-20 mL Intravenous PRN Tish Adkins CNP   10 mL at 12/04/20 1736     sodium chloride flush 10-30 mL (NS)  10-30 mL Intravenous PRN Tish Adkins CNP         sodium chloride flush 10-30 mL (NS)  10-30 mL Intravenous Q8H FIXED TIMES Tish Adkins CNP   10 mL at 12/05/20 0600     sodium chloride flush 20 mL (NS)  20 mL Intravenous PRN Tish Adkins CNP         traZODone tablet 25 mg (DESYREL)  25 mg Oral Bedtime PRN Ramon Haro MD   25 mg at 11/25/20  2209       Critical care attestation: 45 minutes spent managing the following issues: acute respiratory failure requiring intubation/IMV, volume overload, encephalopathy due to toxic-metabolic causes, severe covid-19 infection with ARDS, hypoxemic resp failure, . High risk for organ deterioration and death requiring ICU level care.

## 2021-06-13 NOTE — PLAN OF CARE
Problem: Inadequate Gas Exchange  Goal: Patient will achieve/maintain normal respiratory rate/effort  Outcome: Progressing     Problem: Daily Care  Goal: Daily care needs are met  Outcome: Progressing   Good response to lasix. Not tolerating weans even with prn ativan given.

## 2021-06-13 NOTE — ED TRIAGE NOTES
Language barrier.  at bedside as patient brought back to room 3 due to low oxygen sat's. Room air sat's at 86% in Triage. Primary RN, Abby notified. Low oxygen sat's and was referred to [facility]'s by clinic. Seen 2 previous times for complaint of fever, shortness of breath, fatigue.

## 2021-06-13 NOTE — PLAN OF CARE
Problem: Inadequate Gas Exchange  Goal: Patient will achieve/maintain normal respiratory rate/effort  Outcome: Progressing     Problem: Excessive Fluid Volume  Goal: Patient will achieve/maintain normal respiratory rate/effort  Outcome: Progressing     Problem: Activity Intolerance/Impaired Mobility  Goal: Mobility/activity is maintained at optimum level for patient  Outcome: Progressing     Problem: Ineffective Airway Clearance  Goal: Maintain airway patency  Outcome: Progressing     Problem: Impaired Gas Exchange  Goal: Demonstrate improved ventilation and adequate oxygenation of tissues as evidenced by absence of respiratory distress  Outcome: Progressing     Problem: Mechanical Ventilation  Goal: Mobility/activity is maintained at optimum level for patient  Outcome: Progressing  Goal: Patient will maintain patent airway  Outcome: Progressing  Goal: Respiratory status - ventilation  Description: Movement of air in and out of the lungs.    Liberate from ventilator  Outcome: Progressing  Goal: ET tube will be managed safely  Outcome: Progressing

## 2021-06-13 NOTE — PLAN OF CARE
Problem: Inadequate Gas Exchange  Goal: Patient will achieve/maintain normal respiratory rate/effort  Outcome: Progressing     Problem: Safety  Goal: Patient will be injury free during hospitalization  Outcome: Progressing     Problem: Daily Care  Goal: Daily care needs are met  Outcome: Progressing     Problem: Psychosocial Needs  Goal: Demonstrates ability to cope with hospitalization/illness  Outcome: Progressing  Goal: Collaborate with patient/family/caregiver to identify patient specific goals for this hospitalization  Outcome: Progressing     Problem: Discharge Barriers  Goal: Patient's discharge needs are met  Outcome: Progressing     Problem: Excessive Fluid Volume  Goal: Patient will achieve/maintain normal respiratory rate/effort  Outcome: Progressing     Problem: Activity Intolerance/Impaired Mobility  Goal: Mobility/activity is maintained at optimum level for patient  Outcome: Progressing     Problem: Ineffective Airway Clearance  Goal: Maintain airway patency  Outcome: Progressing     Problem: Impaired Gas Exchange  Goal: Demonstrate improved ventilation and adequate oxygenation of tissues as evidenced by absence of respiratory distress  Outcome: Progressing     Problem: Breathing  Goal: Patient will maintain patent airway  Outcome: Progressing  Goal: Patient will utilize incentive spirometer  Outcome: Progressing     Problem: Knowlegde Deficit  Goal: Verbalize understanding of condition/disease process and treatment, participate in lifestyle changes and treatment regimen  Outcome: Progressing  Goal: Demonstrate technique for CPAP/BiPAP  Outcome: Progressing     Problem: Risk for Infection  Goal: Identify and demonstrate techniques, lifestyle changes to prevent/reduce risk of infection and promote safe environment  Outcome: Progressing     Problem: Knowledge Deficit  Goal: Patient/family/caregiver demonstrates understanding of disease process, treatment plan, medications, and discharge  instructions  Outcome: Progressing     Problem: Potential for Falls  Goal: Patient will remain free of falls  Outcome: Progressing     Problem: Potential for transmission of organism by Contact, Enteric, Droplet and/or Airborne routes  Goal: Prevent transmission of organisms  Outcome: Progressing     Problem: Potential for Compromised Skin Integrity  Goal: Skin integrity is maintained or improved  Outcome: Progressing  Goal: Nutritional status is improving  Outcome: Progressing     Problem: Urinary Incontinence  Goal: Perineal skin integrity is maintained or improved  Outcome: Progressing     Problem: Glucose Imbalance  Goal: Achieve optimal glucose control  Outcome: Progressing     Problem: Mechanical Ventilation  Goal: Mobility/activity is maintained at optimum level for patient  Outcome: Progressing  Goal: Patient will maintain patent airway  Outcome: Progressing  Goal: Oral health is maintained or improved  Outcome: Progressing  Goal: Respiratory status - ventilation  Description: Movement of air in and out of the lungs.    Liberate from ventilator  Outcome: Progressing  Goal: ET tube will be managed safely  Outcome: Progressing  Goal: Ability to express needs and understand communication  Outcome: Progressing     Problem: Risk of Injury Due to Unsafe Behavior  Goal: Patient will remain safe while in restraint; physical/psychological needs will be met  Outcome: Progressing  Goal: Alternatives to restraint will be continually assessed with use of least restrictive device and discontinuation as soon as possible  Outcome: Progressing  Goal: Patient/Family will be able to communicate reason for restraint and steps for restraint application and removal  Outcome: Progressing

## 2021-06-13 NOTE — PROGRESS NOTES
"Wound Ostomy  [facility] Assessment - virtual        Allergies:  No Known Allergies    Diagnosis:   Patient Active Problem List    Diagnosis Date Noted     Acute respiratory failure (H) 11/30/2020     Severe protein-calorie malnutrition (H) 11/27/2020     Hypokalemia 11/15/2020     Acute respiratory failure with hypoxia (H) 11/15/2020     Pneumonia due to 2019 novel coronavirus 11/15/2020     Pneumonia 11/13/2020       Height:  5' 5\" (1.651 m)    Weight:   130 lb 12.8 oz (59.3 kg)    Labs:  Recent Labs     11/29/20  1246 11/30/20  0607   CRP  --  7.8*   HGB  --  13.5   ALBUMIN 2.3*  --        Jeffry:  Jeffry Scale Score: 14    Specialty Bed:  Specialty Bed: Pocahontas Position Pro (ICU only) (STACH)    Anatomic Site/Laterality: body    Reason for ongoing care:   Prone skin prevention    Encounter Type:  Initial Prone Skin Prevention:    Chart review completed. Patient now on prone positioning schedule. Noted blanchable erythema on buttocks and bruising on abd and forerms in flowsheets. Continue with routine prone skin protection:    Mepilex dressings to cheeks and posterior neck when in prone position - may leave in place when not proned. Change every 5 days or sooner if soiled.    Prevention measures for mucosal integrity:  1. No bite block when proning. (rationale - the hard plastic will leave the patient at greater risk for pressure injury)  2. Hourly head reposition and moisturizer to exposed portion of tongue (moisturizer which comes with the oral care kits). (rationale - remove pressure to tongue/lips and face and maintain mucosal integrity for the tongue)  3. Use tongue blade when repositioning ETT, so tongue doesn t move along with the tube. (rationale - to ensure pressure is not on the same area of the tongue, even though the ETT is technically repositioned)    Prone Positioning recommendations:    Prior  Apply Mepilex sacral dressings for protection if pt has bony prominences (shoulders, iliac crest) - may leave in " place when not proned.  Ensure tongue is in the mouth, remove bite block for pressure injury prevention.  Ensure no lines/tubes/drains (as much as able) will be under patient when prone.    Prone position  Use pillows or Z jarrod pads to off load pressure to bony prominences.  Position head so that ears are not folded and so that head/neck are aligned.  Reposition head every one hour.  Use swim position per protocol for positioning when supine.         Nursing care provided was none at this time.    Discussed plan of care with none.    Outcomes and treatment recommendations are to promote skin integrity.    Actions taken by WOC RN: orders entered.    Planned Follow Up: Weekly.    Plan for next visit: assess skin integrity

## 2021-06-13 NOTE — PROGRESS NOTES
"RESPIRATORY CARE NOTE     Patient Name: Hafsa Robles  Today's Date: 12/2/2020     Pt continues on the following settings:  Vent Mode: VCV  FiO2 (%):  [50 %-100 %] 50 %  S RR:  [30-35] 35  S VT:  [230 mL-250 mL] 230 mL  PEEP/CPAP (cm H2O):  [12 cm H2O-14 cm H2O] 12 cm H2O  Minute Ventilation (L/min):  [8.1 L/min-9 L/min] 8.8 L/min  PIP:  [33 cm H2O-48 cm H2O] 34 cm H2O  MAP (cm H2O):  [16-19] 16     Plateau pressure: 30 cm H2O     Pt is intubated with  # 7.5 ETT secured 21 at the teeth. BS are slightly coarse rhonchi and decreased @ bases. pre and post neb tx. RT suctioned pt for small thick white secretions. Pt's respiratory status is stable. RT will continue to follow per MD's orders.     BP 97/69   Pulse 100   Temp 97.3  F (36.3  C) (Axillary)   Resp (!) 35   Ht 5' 5\" (1.651 m)   Wt 130 lb 12.8 oz (59.3 kg)   SpO2 96%   BMI 21.77 kg/m        Sunny Jimenez, LRT  "

## 2021-06-13 NOTE — PROGRESS NOTES
CRITICAL CARE PROGRESS NOTE:    Assessment/Plan:  60 y.o. year old female with PMH Significant for breast cancer s/p radiation/mastectomy.  Presented to ER 11/13 and dg with COVID-19. She decompensated and O2 went from 4 liters to HFNC 80% on 60 L, transferred to the ICU and intubated on 12/1/2020.    RESP:  ARDS, prolonged hypoxemic resp failure due to covid-19 infection. Approaching 2 weeks on vent. Minimal vent settings. Main barrier to extubation is severe anxiety with agitation limiting SBT performance. Last P:F on ABG from 12/12 was 228    Cont LPV with Vt 375cc (6-7cc/kg)    Pplat <30, last was 32 but she was coughing during maneuver.     Daily sedation vacation and SBT's    ABG prn, no need for scheduled ABG's    Titrate FiO2 for goal O2 sat 88-92% or PaO2 55 mm Hg or greater.    CV:  Shock, persistent. Likely some vasoplegia due to high sedation requirements. No echo on file.    MAP >65, wean NE as able    Hold home anti-hypertensives    Lactate normal on 12/12 no need to trend further.    Keep even to slightly net neg vol status.    NEURO:  Encephalopathy, agitation, anxiety.     Psych consult to assist with antipsychotnics    Cont dex for RASS goal 0 to -1    Cont scheduled lorazepam, seroquel, oxycodone, zyprexa, for now defer to psych for further dose adjustments    Stop the atarax.     Tylenol prn pain    Trazodone prn at night for sleep    GI:  No issues, tolerating TF    Cont TF, advance as tolerated    Cont FWF    PPI    Bowel regimen    RENAL:  No issues. Normal renal function    Maintain potter    Avoid nephrotoxins    Keep on even to slightly dry side as above with Lasix prn    ID:  Severe covid-19 infection, improving. Yeast in urine, ?colonizer vs. Contaminant.     Completed Dex + remdesivir    Monitor off abx    F/u culture data    HEMATOLOGIC:  Mild leukocytosis, stable. Stable mild anemia    hgb >7    ENDOCRINE:  No issues    Cont Lantus 6U daily    FSBG checks, insulin sliding scale/drip per  "ICU protocol     ICU PROPHYLAXIS:    peridex    LMWH two times a day per covid cat B a/c guidelines    PPI    CODE STATUS, DISPOSITION, FAMILY COMMUNICATION: full code  Will touch base with her   later today    Lines/Drains/Tubes:  TL PICC 12/1  Art line left axillary 12/4  Arteaga  OG tube  ETT 7.5mm    Restraints  Progress Note  Restraint Application    I recognize that restraints are physical and/or chemical interventions intended to restrict a person's movements. Restraints are currently needed to ensure the safety of this patient and/or others. My clinical rationale appears below.    Category/Type of Restraint     Non Violent:  Soft limb restraint x2  --  Behavior  Pulling at tubes/lines  --  Root Cause of the Behavior  Sedation/intubation  --  Less-Restrictive Measures that Failed  Non Violent Measures:  Close Observation  --  Response to the Restraint  Patient unable to pull at tubes/lines  --  Criteria for Release from the Restraint  Patient calm and off sedation    Bashir (Fox De La Torre MD  [facility] Pulmonary & Critical Care  Pager [phone]  Clinic [phone]      Overnight events:  Continues to have intermittent episodes of agitation requiring sedation bumps. Poor weaning all weekend primarily due to agitation, anxiety. Minimal vent settings.    Subjective:  Unable to assess    Objective:  Physical Exam:  Vent settings for last 24 hours:  Vent Mode: VCV  FiO2 (%):  [30 %-40 %] 40 %  S RR:  [20] 20  S VT:  [375 mL] 375 mL  PEEP/CPAP (cm H2O):  [5 cm H2O] 5 cm H2O  Minute Ventilation (L/min):  [9.1 L/min-11.8 L/min] 10.3 L/min  PIP:  [35 cm H2O-65 cm H2O] 39 cm H2O  NC SUP:  [8 cm H20] 8 cm H20  MAP (cm H2O):  [13-17] 13    BP (!) 82/51   Pulse 88   Temp 99  F (37.2  C) (Oral)   Resp 27   Ht 5' 5\" (1.651 m)   Wt 133 lb (60.3 kg)   SpO2 100%   BMI 22.13 kg/m      Intake/Output last 3 shifts:  I/O last 3 completed shifts:  In: 3001 [I.V.:1133; NG/GT:1868]  Out: 3025 " [Urine:3025]  Intake/Output this shift:  I/O this shift:  In: 406.6 [I.V.:89.6; NG/GT:317]  Out: 200 [Urine:200]    Physical Exam  Gen: intubated, sedated  HEENT: no OP lesions, no DOT  CV: RRR, no m/g/r  Resp: clear ant no wheezing  Abd: soft, nontender, BS+  Neuro: PERRL, nonfocal  Ext: no edema    LAB:  Results from last 7 days   Lab Units 12/14/20  0429   LN-WHITE BLOOD CELL COUNT thou/uL 12.6*   LN-HEMOGLOBIN g/dL 11.3*   LN-HEMATOCRIT % 35.0   LN-PLATELET COUNT thou/uL 553*     Results from last 7 days   Lab Units 12/14/20  0429 12/13/20  0406 12/12/20  0418   LN-SODIUM mmol/L 137 137 138   LN-POTASSIUM mmol/L 3.7 4.1 3.6   LN-CHLORIDE mmol/L 101 102 104   LN-CO2 mmol/L 26 27 23   LN-BLOOD UREA NITROGEN mg/dL 12 12 15   LN-CREATININE mg/dL 0.52* 0.50* 0.52*   LN-CALCIUM mg/dL 8.3* 8.5 8.3*       Urine 12/11  + yeast    Current Facility-Administered Medications   Medication Dose Route Frequency Provider Last Rate Last Admin     acetaminophen solution 650 mg (TYLENOL)  650 mg Enteral Tube Q4H PRN Tish Adkins CNP   650 mg at 12/11/20 1655     acetaminophen tablet 650 mg (TYLENOL)  650 mg Oral Q4H PRN Ramon Haro MD   650 mg at 11/30/20 0058     bacitracin ointment packet 1 packet  1 packet Topical Once PRN Tish Adkins CNP         benzocaine-menthoL lozenge 1 lozenge (CEPACOL)  1 lozenge Oral Q1H PRN Eben Butterfield MD         bisacodyL suppository 10 mg (DULCOLAX)  10 mg Rectal Daily PRN Eben Butterfield MD         calcium (as carbonate) chewable tablet 400 mg (TUMS)  400 mg Oral TID PRN Ramon Haro MD   400 mg at 11/18/20 1222     chlorhexidine 0.12 % solution 15 mL (PERIDEX)  15 mL Topical Q12H Tish Adkins CNP   15 mL at 12/14/20 0552     dexmedetomidine (PRECEDEX) 1000 mcg/250 mL in NS (4 mcg/mL) infusion  0.1-1.5 mcg/kg/hr Intravenous Continuous IvJeramie bruner MD 7.7 mL/hr at 12/14/20 0944 0.5 mcg/kg/hr at 12/14/20 0944     dextrose 50 % (D50W)  syringe 20-50 mL  20-50 mL Intravenous Q15 Min PRN Ramon Haro MD         enoxaparin ANTICOAGULANT syringe 30 mg (LOVENOX)  0.5 mg/kg Subcutaneous BID Oren Vásquez MD   30 mg at 12/14/20 0810     fentaNYL pf injection 25-50 mcg (SUBLIMAZE)  25-50 mcg Intravenous Q1H PRN Jennifer Gunter, CNP   50 mcg at 12/14/20 0552     glucagon (human recombinant) injection 1 mg  1 mg Subcutaneous Q15 Min PRN Ramon Haro MD         hydrALAZINE injection 10 mg (APRESOLINE)  10 mg Intravenous Q4H PRN Ramon Haro MD         hydrOXYzine HCL tablet 25 mg (ATARAX)  25 mg Oral TID Oren Vásquez MD   25 mg at 12/14/20 0810     influenza vaccine quad (PF) (age 50-64 YR) 2020-21 injection 0.5 mL (FLUBLOK)  0.5 mL Intramuscular Prior to Discharge Jeramie Crespo MD         insulin aspart U-100 injection pen (NovoLOG)   Subcutaneous Q6H FIXED TIMES Eben Butterfield MD   2 Units at 12/14/20 0551     insulin glargine injection 6 Units (LANTUS)  6 Units Subcutaneous QAM Oren Vásquez MD   6 Units at 12/14/20 0811     lipase-protease-amylase 5,000-17,000- 24,000 unit capsule 2 capsule (ZENPEP)  2 capsule Enteral Tube PRN Eben Butterfield MD        And     sodium bicarbonate tablet 325 mg  325 mg Enteral Tube PRN Eben Butterfield MD         LORazepam 1 mg injection (diluted concentration)  1 mg Intravenous Q4H PRN Oren Vásquez MD   1 mg at 12/14/20 0155     LORazepam 1 mg/0.5 mL concentrated solution 1 mg (ATIVAN)  1 mg Oral Q6H Oren Vásquez MD   1 mg at 12/14/20 0551     magnesium hydroxide suspension 30 mL (MILK OF MAG)  30 mL Oral Daily PRN Eben Butterfield MD         midazolam (PF) injection 1 mg (VERSED)  1 mg Intravenous Q2H PRN Tish Adkins CNP   1 mg at 12/05/20 1560     naloxone injection 0.2 mg (NARCAN)  0.2 mg Intravenous Q2 Min PRN Ramon Haro MD        Or     naloxone injection 0.2 mg (NARCAN)  0.2 mg Intramuscular Q2 Min  PRN Ramon Haro MD        Or     naloxone injection 0.4 mg (NARCAN)  0.4 mg Intravenous Q2 Min PRN Ramon Haro MD        Or     naloxone injection 0.4 mg (NARCAN)  0.4 mg Intramuscular Q2 Min PRN Ramon Haro MD         norepinephrine 4 mg/250 mL in NS (16 mcg/mL) - CENTRAL  0.01-0.4 mcg/kg/min Intravenous Continuous Bashir De La Torre MD 18.4 mL/hr at 12/14/20 0944 0.08 mcg/kg/min at 12/14/20 0944     OLANZapine tablet 5 mg (zyPREXA)  5 mg Oral Q6H Oren Vásquez MD   5 mg at 12/14/20 0914     omeprazole capsule 20 mg (PriLOSEC)  20 mg Oral QAM  Tish Adkins, CNP        Or     omeprazole suspension 20 mg (PriLOSEC)  20 [facility] Tish Adkins, CNP   20 mg at 12/14/20 0633     ondansetron injection 4 mg (ZOFRAN)  4 mg Intravenous Q4H PRN Eben Butterfield MD   4 mg at 12/06/20 2219    Or     ondansetron tablet 8 mg (ZOFRAN)  8 mg Oral Q8H PRN Eben Butterfield MD         polyethylene glycol packet 17 g (MIRALAX)  17 g Oral Daily PRN Ramon Haro MD   17 g at 12/04/20 0834     polyvinyl alcohol 1.4 % ophthalmic solution 1-2 drop (LIQUIFILM TEARS)  1-2 drop Both Eyes Q1H PRN Eben Butterfield MD         QUEtiapine tablet 25 mg (SEROquel)  25 mg Oral BID Oren Vásquez MD   25 mg at 12/14/20 0808     senna-docusate 8.6-50 mg tablet 1 tablet (PERICOLACE)  1 tablet Oral BID Eben Butterfield MD   Stopped at 11/30/20 2100    Or     sennosides syrup 8.8 mg (for SENOKOT)  8.8 mg Enteral Tube BID Eben Butterfield MD   8.8 mg at 12/14/20 0808     sodium chloride 0.9%  10 mL/hr Intravenous Continuous ZeEben Mcknight MD 10 mL/hr at 12/14/20 0820 10 mL/hr at 12/14/20 0820     sodium chloride bacteriostatic 0.9 % injection 0.1-0.3 mL  0.1-0.3 mL Subcutaneous PRN Ramon Haro MD         sodium chloride flush 10-20 mL (NS)  10-20 mL Intravenous PRN Tish dAkins CNP   10 mL at 12/04/20 1736     sodium  chloride flush 10-30 mL (NS)  10-30 mL Intravenous PRN Tish Adkins CNP         sodium chloride flush 10-30 mL (NS)  10-30 mL Intravenous Q8H FIXED TIMES Tish Adkins CNP   30 mL at 12/14/20 0552     sodium chloride flush 20 mL (NS)  20 mL Intravenous PRN Tish Adkins CNP         traZODone tablet 25 mg (DESYREL)  25 mg Oral Bedtime PRN Ramon Haro MD   25 mg at 12/09/20 2051       Critical care attestation: 45 minutes spent managing the following issues: acute respiratory failure requiring intubation/IMV, circulatory shock requiring continuous vasopressor infusions, severe covid-19 infection c/b ARDS and prolonged hypoxemic resp failure, toxic-metabolic encephalopathy. High risk for organ deterioration and death requiring ICU level care.

## 2021-06-13 NOTE — PLAN OF CARE
"  Problem: Inadequate Gas Exchange  Goal: Patient will achieve/maintain normal respiratory rate/effort  Outcome: Progressing     Problem: Impaired Gas Exchange  Goal: Demonstrate improved ventilation and adequate oxygenation of tissues as evidenced by absence of respiratory distress  Outcome: Progressing   Patient is currently on HFNC with SAO2 95%. R.T. monitoring closely. Patient states \"sometimes its hard to breath\"  Strong dry cough-tessalon pearls provided. Sore throat-Cepacol given. Scheduled decadron and remdivir ordered. Lung sounds diminished. IV lasix given this morning and has voided large amounts.  Elisa Lopez RN  "

## 2021-06-13 NOTE — PROCEDURES
ARTERIAL LINE INSERTION PROCEDURE NOTE  (NON-OR)    Procedure Date:  12/4/2020   Performing Physician:  Bashir De La Torre    Procedure: placement of left axillary arterial line  Indications: radial a-line not working, needing replacement. Hemodynamic monitoring, ABG's for ARDS, resp failure.       Estimated Blood Loss: minimal   Complications: none immediate    Procedure Details: emergent procedure.   The site of the procedure was properly noted/marked. The patient was identified as Hafsa Robles with date of birth 1960 and the procedure verified as placement of left axillary arterial line. A time out was held and the above information confirmed.    In sterile fashion, the skin over the left axillary region was sterilized with chlorhexidine. Strict sterile conditions were maintained: cap, mask, and sterile gloves were worn by all participants. Under continuous ultrasound guidance using a sterile probe cover, the left axillary artery cather was accessed with a finder needle. A wire was threaded into the artery and a catheter placed via Seldinger technique. The line was sutured in place. A bio patch and sterile dressing were placed. The total number of needle stick attempts was 2.    Condition: critical and unchanged    Bashir (Sharan) MD Britt  [facility] Pulmonary & Critical Care  Pager [phone]  Clinic [phone]

## 2021-06-13 NOTE — PROGRESS NOTES
Rass -1, low grade temp,  on dex gtt, follows commands. SR on levophed most of the day, titrated off this evening. Remains intubated, oxygen saturations wnl. Weaning trail with RT today, pt had increase wob, RR 40-50s, frequent coughing. RT placed back on full support. TF continuously via OGT, with q 4 hr water flushes. Two bm on this shift. Arteaga remain in place. Lines  unchanged.

## 2021-06-13 NOTE — PROGRESS NOTES
Chart check    60-year-old Welsh speaking female admitted with hypoxia and pulmonary infiltrates due to COVID pneumonia. On BiPaP and HFNC intermittently. Intensivist managing. [facility] following peripherally.

## 2021-06-13 NOTE — PROGRESS NOTES
12/05/20 1656   Patient Data   Vt (observed, mL) 277 mL   Vt Exp (mL) 274 mL   Minute Ventilation (L/min) 7.6 L/min   Total Resp Rate  29 BPM   PIP Observed (cm H2O) 27 cm H2O   MAP (cm H2O) 12   Auto/Intrinsic PEEP Observed (cm H2O) 2 cm H2O   Plateau Pressure (cm H2O) 23 cm H2O   Dynamic Compliance (L/cm H2O) 18 L/cm H2O

## 2021-06-13 NOTE — PROGRESS NOTES
Vent Mode: VCV  FiO2 (%):  [40 %-100 %] 40 %  S RR:  [20] 20  S VT:  [375 mL] 375 mL  PEEP/CPAP (cm H2O):  [5 cm H2O] 5 cm H2O  Minute Ventilation (L/min):  [7.5 L/min-11.3 L/min] 10.6 L/min  PIP:  [35 cm H2O-39 cm H2O] 38 cm H2O  MAP (cm H2O):  [10-13] 12   PLAT: 30

## 2021-06-13 NOTE — PLAN OF CARE
Problem: Impaired Gas Exchange  Goal: Demonstrate improved ventilation and adequate oxygenation of tissues as evidenced by absence of respiratory distress  Outcome: Progressing     Problem: Mechanical Ventilation  Goal: Patient will maintain patent airway  Outcome: Progressing     RESPIRATORY CARE NOTE     Patient Name: Hafsa Robles  Today's Date: 12/2/2020   Vent Mode: VCV  FiO2 (%):  [50 %-100 %] 50 %  S RR:  [30-35] 35  S VT:  [230 mL-250 mL] 230 mL  PEEP/CPAP (cm H2O):  [12 cm H2O-14 cm H2O] 12 cm H2O  Minute Ventilation (L/min):  [8.1 L/min-9 L/min] 8.2 L/min  PIP:  [33 cm H2O-53 cm H2O] 33 cm H2O  MAP (cm H2O):  [16-20] 16   Plateau 28    Pr remains on full vent settings.  Pt was in prone position changed to supine around 12:00.  ETT in place at 21 at the teeth.  Suctioned for small amts white secretions.  Multiple vent changes done by MD today with an abg due at 13:00.  Retaped ETT at 21 cms at the teeth, able to pass suction catheter well.  No skin issues noted.  Will continue to monitor closely.        Carol Ho

## 2021-06-13 NOTE — PROGRESS NOTES
Critical Care Progress Note  11/26/2020     Admit Date: 11/13/2020  Moved to ICU on 11/15/20    Code Status: full code      Problem List:   Principal Problem:    Pneumonia  Active Problems:    Hypokalemia    Acute respiratory failure with hypoxia (H)    Pneumonia due to 2019 novel coronavirus  anxiety     Overnight Events  No acute events    Plan by System:     Neuro/Psych: no acute issues; at risk for developing ICU delirium    - try to maintain day/night schedule.    - avoid benzos     Pulmonary: Acute respiratory failure with hypoxia due to COVID19 viral pneumonia;   - HFNC 20 - 60 L   - BIPAP as needed - she has been refusing.    - Encourage self proning - she has been refusing.   - stop combivent inhaler.     - Remains high risk for needing intubation.     CV: No acute issues    Tele monitoring.     GI/: Eating when not on bipap   - Bowel protocol.     Renal: no acute issues    - monitor     ID: COVID19 viral pneumonia    - Dexamethasone x 10 days   - Remdesivir course completed    Heme/Coag:  COVID19 coagulopathy    - LMWH 0.5mg/kg twice daily     Endocrine: Steroid induced hyperglycemia    -  No coverage needed.   - discontinue FSBG and sliding scale insulin    ICU PROPHYLAXIS:    Enoxaparin 0.5 mg/kg two times a day    PPI    CODE STATUS, DISPOSITION, FAMILY COMMUNICATION: Full code. Critically ill.        [facility] Pulmonary and Critical Care Medicine      _______________________________________________________________    HPI: Hafsa GALEN Robles is a 60 y.o. year old female with PMH Significant for breast cancer s/p radiation. Not currently under treatment. No history of heart, lung, liver, kidney disease. No diabetes. Presented to ER Nov 13 with one week history of cough, fever, fatigue. Diagnosed with Covid 19 and treated for possible bacterial superinfection. She had rapid escalation in her oxygen needs  from 4L NC to HFNC 80% 60 L/min and so she was transferred to ICU for  further monitoring.     Subjective: Feeling much better today; was able to sleep. Cough is improved.     Objective:     Vitals:    11/26/20 0445 11/26/20 0500 11/26/20 0600 11/26/20 0700   BP:  92/61 99/56 100/55   Pulse:  89 87 84   Resp:  (!) 29 27 (!) 29   Temp: 98.2  F (36.8  C)  98.5  F (36.9  C)    TempSrc: Oral  Oral    SpO2:  94% 96% 98%   Weight:       Height:          I/O:     Intake/Output Summary (Last 24 hours) at 11/26/2020 1031  Last data filed at 11/26/2020 0600  Gross per 24 hour   Intake 1760 ml   Output 1400 ml   Net 360 ml     Wt Readings from Last 3 Encounters:   11/26/20 137 lb 5.6 oz (62.3 kg)   12/16/14 130 lb (59 kg)      Weight change:     Physical Exam:  Gen: awake, no distress; looks better today.   HEENT: AT/NC, on HFNC  NEURO: PERRL, alert, nonfocal moving all extremities  CARDIOVASCULAR: RRR  PULMONARY: diminished, no crackles or wheezes  GASTROINTESTINAL: soft nt + bs  SKIN: no rashes on visible skin    Labs:   Results from last 7 days   Lab Units 11/26/20  0454   LN-SODIUM mmol/L 135*   LN-POTASSIUM mmol/L 3.8   LN-CHLORIDE mmol/L 98   LN-CO2 mmol/L 28   LN-BLOOD UREA NITROGEN mg/dL 10   LN-CREATININE mg/dL 0.45*   LN-CALCIUM mg/dL 8.7       Results from last 7 days   Lab Units 11/26/20  0454   LN-WHITE BLOOD CELL COUNT thou/uL 15.5*   LN-HEMOGLOBIN g/dL 13.0   LN-HEMATOCRIT % 39.3   LN-PLATELET COUNT thou/uL 266  266

## 2021-06-13 NOTE — PROGRESS NOTES
Patient having difficulty breathing with Resp between 30-40 on BiPAP with FiO2 at 100%. Updated Dr. Up. Patient was transferred to the ICU. Called and updated her  Barry.     11/30/20 1908   Vital Signs   Temp 98.6  F (37  C)   Heart Rate (!) 102   Heart Rate Source Machine/Monitor   Resp (!) 36   /69   MAP (mmHg) (Calculated) 84   BP Location Right arm   BP Method Machine/Monitor   Patient Position Semi-cedeño   Oxygen Therapy/Pulse Ox   SpO2 93 %   O2 Device Bi-Pap   FiO2 (%) 100 %

## 2021-06-13 NOTE — PROGRESS NOTES
"RESPIRATORY CARE NOTE     Patient Name: Hafsa Robles  Today's Date: 12/4/2020     Pt continues on the following settings:  Vent Mode: VCV  FiO2 (%):  [40 %-100 %] 40 %  S RR:  [35] 35  S VT:  [250 mL] 250 mL  PEEP/CPAP (cm H2O):  [12 cm H2O] 12 cm H2O  Minute Ventilation (L/min):  [8.3 L/min-10.3 L/min] 10.3 L/min  PIP:  [32 cm H2O-41 cm H2O] 35 cm H2O  MAP (cm H2O):  [16-18] 17   Plateau pressure: 28 cm H2O     Pt is intubated with  # 7.5 ETT secured  21 at the teeth.Pt's respiratory status is stable. RT will continue to follow per MD's orders.     BP 95/60   Pulse 71   Temp 97.8  F (36.6  C) (Axillary)   Resp (!) 35   Ht 5' 5\" (1.651 m)   Wt 136 lb 0.4 oz (61.7 kg)   SpO2 97%   BMI 22.64 kg/m        Wilson Armando, LRT  "

## 2021-06-13 NOTE — PROGRESS NOTES
Patient on HFNC during day and placed on BIPAP during hours of sleep.  Breathsounds diminished bilaterally.  Strong non productive cough.  RT to continue to monitor and sleep as ordered.     11/18/20 2139   Non-Invasive    Pt Owned Device No   Device V 60   Pt. Interface Over the nose   Over the nose Size Small   Mode ST   IPAP 15 cmH2O   EPAP 8 cmH2O   Resp Rate (Set) 20   Insp Time (sec) 1 sec   FiO2 (%) 75 %   Insp Rise Time (%) 3 %   Audible Alarm set at (Volume of Alarm) 7   Monitoring   Resp Rate Observed 26   Tidal (Observed) 401 mL   Minute Ventilation (L/min) 10.5 L/min   PIP Observed (cm H2O) 15 cm H2O   Ti/Ttot 29   Pt Leak 10   Alarms   Insp Pressure High (cm H2O) 24 cm H2O   Insp Pressure Low (cm H2O) 10 cm H2O   Tidal Volume High 1500   Tidal Volume Low 200   Low Presssure Delay 30 sec   MV Low (L/min) 3 L/min   High Resp Rate 60   Low Resp Rate 10   Alarm Functional and On Yes   NPPV Other   Skin Under Mask No breakdown

## 2021-06-13 NOTE — PROGRESS NOTES
"Pharmacy Consult: Vancomycin Dosing    Pharmacist consulted to dose vancomycin for Hafsa Robles, a 60 y.o. female.    Ordering provider:     Indication for vancomycin therapy: Hospital Acquired Pneumonia    Goal Trough Range:  15-20 mcg/mL based on indication    Other current antimicrobials              vancomycin 1,000 mg in sodium chloride 0.9% 250 mL (VANCOCIN)  Every 12 hours          cefepime 1 g in NaCl 0.9 % (MINI-BAG Plus) 50 mL (MAXIPIME)  Every 8 hours          vancomycin 1,250 mg in sodium chloride 0.9% 500 mL (VANCOCIN)  Once                   Subjective/Objective:    Patient was admitted for Pneumonia on 11/13/2020    Height: 5' 5\" (1.651 m)    Actual Body Weight (ABW): 61.6 kg (135 lb 11.2 oz)    Ideal body weight: 57 kg (125 lb 10.6 oz)  Adjusted ideal body weight: 58.8 kg (129 lb 10.8 oz)    BMI: Body mass index is 22.58 kg/m .    No Known Allergies    Patient Active Problem List   Diagnosis     Pneumonia     Hypokalemia     Acute respiratory failure with hypoxia (H)     Pneumonia due to 2019 novel coronavirus     Severe protein-calorie malnutrition (H)     Acute respiratory failure (H)     ARDS (adult respiratory distress syndrome) (H)     Encephalopathy     Agitation     Anxiety     Shock circulatory (H)    Past Medical History:   Diagnosis Date     Malignant neoplasm of right female breast (H)         Temp Readings from Current Encounter:     12/15/20 1200 12/15/20 1333 12/15/20 1414   Temp: (!) 101.1  F (38.4  C) (!) 101.5  F (38.6  C) (!) 101.3  F (38.5  C)     Net Intake/Output (last 24 hours):  I/O last 3 completed shifts:  In: 3559.8 [I.V.:886.8; NG/GT:2673]  Out: 2750 [Urine:2750]    Recent Labs     12/12/20  2224 12/13/20  0406 12/14/20  0429 12/15/20  0442   WBC  --  11.1* 12.6* 11.3*   LACTICACID 0.4*  --   --   --    BUN  --  12 12 11   CREATININE  --  0.50* 0.52* 0.46*     Estimated Creatinine Clearance: 126.5 mL/min (A) (by C-G formula based on SCr of 0.46 mg/dL (L)).    No " results for input(s): CULTURE in the last 72 hours.    Results for orders placed or performed during the hospital encounter of 11/13/20   Culture, Urine    Collection Time: 12/11/20  2:15 PM    Specimen: Urine, Catheter   Result Value Status    Culture 10,000-50,000 col/ml Yeast species (!) Final   Sputum culture    Collection Time: 12/01/20 10:48 AM    Specimen: Respiratory   Result Value Status    Culture Usual Charlette Final       No results for input(s): VANCOMYCIN in the last 168 hours.    Vancomycin administrations: (last 120 hours)     None          Assessment/Plan:    Pharmacist consulted to dose vancomycin for Hospital Acquired Pneumonia, goal trough range 15-20 mcg/mL.  1. Initiate vancomycin 1250 mg IV once followed by vancomycin 1000 mg IV every 12 hours (16 mg/kg actual body weight).  2. No vancomycin level available for assessment.  3. Pharmacist will plan to check a vancomycin trough level prior to the 4th or 5th dose.  4. Pharmacist will continue to follow.    Thank you for the consult.  Casi Rinaldi, PharmD 12/15/2020 2:36 PM

## 2021-06-13 NOTE — PROGRESS NOTES
Pt was transferred from p2 to p3 due to increase oxygen needs. Pt was placed on Bipap 12/5 16 80% and tolerating it well. RT will continue to monitor.    Cristine Marie, GUSTAVOT

## 2021-06-13 NOTE — PLAN OF CARE
Problem: Impaired Gas Exchange  Goal: Demonstrate improved ventilation and adequate oxygenation of tissues as evidenced by absence of respiratory distress  Outcome: Progressing     Problem: Breathing  Goal: Patient will maintain patent airway  Outcome: Progressing     Pt. remains on HFNC at 50L 85% sats 90-94%, BS diminished, RR 20-24, titrate oxygen as tolerated, BIPAP in the room on S/B, RT following    GUSTAVO JjT

## 2021-06-13 NOTE — PLAN OF CARE
Problem: Glucose Imbalance  Goal: Achieve optimal glucose control  Outcome: Progressing   Glucose 117, no sliding scale insulin needed.   Problem: Inadequate Gas Exchange  Goal: Patient will achieve/maintain normal respiratory rate/effort  Outcome: Not Progressing   Failed wean. Titrated levophed for MAP goal, titrated precedex for RASS goal. Will continue to monitor.

## 2021-06-13 NOTE — PROGRESS NOTES
"RESPIRATORY CARE NOTE     Patient Name: Hafsa Robles  Today's Date: 12/4/2020     Pt continues on the following settings:  Vent Mode: VCV  FiO2 (%):  [40 %-100 %] 40 %  S RR:  [32-35] 32  S VT:  [250 mL] 250 mL  PEEP/CPAP (cm H2O):  [8 cm H2O-12 cm H2O] 10 cm H2O  Minute Ventilation (L/min):  [9.5 L/min-10.3 L/min] 9.8 L/min  PIP:  [29 cm H2O-39 cm H2O] 31 cm H2O  MAP (cm H2O):  [12-17] 14     Plateau pressure: 26 cm H2O     Pt is intubated with  # 7.5 ETT secured 21 at the teeth. BS: coarse rhonchi and decreased @ bases. Tracheal suctioning performed x 3 for mod thin white secretions.  Pt's respiratory status is stable. RT will continue to monitor and assess as needed.     /73   Pulse 69   Temp 98.1  F (36.7  C) (Axillary)   Resp (!) 32   Ht 5' 5\" (1.651 m)   Wt 136 lb 0.4 oz (61.7 kg)   SpO2 94%   BMI 22.64 kg/m        Sunny Jimenez, LRT  "

## 2021-06-13 NOTE — PLAN OF CARE
Pts O2 sats upper 90's on 40% FIO2 until after repositioninsg pt at 0430 this am onto her right side. Pt did not have previous issus with desaturation prior to this turn. After pt was turned onto her right side, her oxygen level decreased as low as 73. FIO2 bumped to 100%, Precedex gtt was turned up from 0.5 to 0.9, ETT suctioned and lavaged, pt turned back onto her back, and RT called and CNP called to room. Breath sounds unchanged from 2400 (clear/coarse/diminished bases). Versed 2mgs iv given. O2 sats improved to 92% while CNP still present at bedside. Precedex decreased to 0.7. RASS score now improved from 2+ to -2.  Problem: Inadequate Gas Exchange  Goal: Patient will achieve/maintain normal respiratory rate/effort   Problem: Excessive Fluid Volume  Goal: Patient will achieve/maintain normal respiratory rate/effort   Problem: Activity Intolerance/Impaired Mobility  Goal: Mobility/activity is maintained at optimum level for patient   Problem: Impaired Gas Exchange  Goal: Demonstrate improved ventilation and adequate oxygenation of tissues as evidenced by absence of respiratory distress  Goal: Mobility/activity is maintained at optimum level for patient  Outcome: Not Progressing    Kyaar Hooks RN

## 2021-06-13 NOTE — PLAN OF CARE
"  Problem: Potential for Compromised Skin Integrity  Goal: Skin integrity is maintained or improved  Outcome: Progressing     Problem: Potential for Compromised Skin Integrity  Goal: Nutritional status is improving  Outcome: Progressing   Tolerating TF goal rate of 65 with minimal residuals today.  Incontinent of stool.  Perineal skin intact without redness.      Problem: Mechanical Ventilation  Goal: Mobility/activity is maintained at optimum level for patient  Outcome: Progressing   Pt refusing SCD's.  Writes with pen on paper \"hot\" and motions with hands.  Moving herself well in bed when awake, though wrist restraints remain in place.  Turns self on back after turning with pillow supports placed.    "

## 2021-06-13 NOTE — PLAN OF CARE
Problem: Inadequate Gas Exchange  Goal: Patient will achieve/maintain normal respiratory rate/effort  Outcome: Not Progressing   Pt had episode of extended coughing and desat after turning and cares requiring increased sedation and FiO2  Problem: Psychosocial Needs  Goal: Demonstrates ability to cope with hospitalization/illness  Outcome: Not Progressing   Cont to be sedated  Problem: Excessive Fluid Volume  Goal: Patient will achieve/maintain normal respiratory rate/effort  Outcome: Not Progressing     Problem: Activity Intolerance/Impaired Mobility  Goal: Mobility/activity is maintained at optimum level for patient  Outcome: Not Progressing   Cont with restraints and Q2 hour repositioning  Problem: Mechanical Ventilation  Goal: Mobility/activity is maintained at optimum level for patient  Outcome: Not Progressing   Sedated and restrained with PROM

## 2021-06-13 NOTE — PROGRESS NOTES
Pt remained on BIPAP most of shift. Had diagnostic test in afternoon and when she came back to the room saturations were low and breathing was more labored. Increased FIO2 at that time to get saturations above 90%. ABG drawn and results were 7.43ph, 47co2, 079UaD5, 29.6 HCO3. Pt remains on BIPAP settings below. Increased RR so no weaning of FIO2 done at this time. No other significant events to note. Rt will continue to monitor and follow.    Bobby Bills, LRT

## 2021-06-13 NOTE — PROGRESS NOTES
"Problem: Mechanical Ventilation  Goal: Patient will maintain patent airway  Outcome: Progressing     Problem: Mechanical Ventilation  Goal: ET tube will be managed safely  Outcome: Progressing        12/18/20 2335   Vent Information   Interface Invasive   Vent ID [id number]   Vent Mode PCV/VG   Patient Ventilator Status On   Flowmeter at Bedside Yes   Ambu-Bag With Mask at Bedside Yes   Respiratory Assessment   Assessment Type Assess only   Respiratory Pattern Regular   Chest Assessment Chest expansion symmetrical   Bilateral Breath Sounds Diminished;Coarse   Vent Settings   FiO2 (%) 60 %   Heater Temperature 96.8  F (36  C)   Resp Rate (Set) 20   Vt (Set, mL) 350 mL   PEEP/CPAP (cm H2O) 5 cm H2O   Trigger Sensitivity Flow (L/min) 2 L/min   Humidification Heater   Patient Data   Vt Exp (mL) 384 mL   Minute Ventilation (L/min) 8.1 L/min   Total Resp Rate  25 BPM   PIP Observed (cm H2O) 35 cm H2O   MAP (cm H2O) 14   Plateau Pressure (cm H2O) 31 cm H2O   SpO2 97 %   Heart Rate 79   Alarms   High Resp Rate 55   Low PEEP 1 cm H2O   Insp Pressure High (cm H2O) 65 cm H2O   Insp Pressure Low (cm H2O) 14 cm H2O   MV High (L/min) 19 L/min   MV Low (L/min) 3 L/min   Vt High (mL) 900 mL   Vt Low (mL) 120 mL   Apnea Interval (sec) 30 seconds   Apnea Rate 20   Apnea Volume (mL) 350 mL   Alarm Functional and On Yes   Backup Mode Set Yes   Airways   Airway LDA Subglottic Suction endotracheal tube   Subglottic Suction ET Tube 7.5   Placement Date/Time: 12/01/20 0535   Subglottic Suction ETT Entry Site: Oral  Size : 7.5   Secured at (cm) 23 cm   Measured from Teeth   Secured with Commercial tube bateman   Cuff Pressure (cm H2O)   (CHECKED)   Site Condition Cool;Dry     /57   Pulse 71   Temp 98.6  F (37  C) (Oral)   Resp 23   Ht 5' 5\" (1.651 m)   Wt 134 lb 14.7 oz (61.2 kg)   SpO2 98%   BMI 22.45 kg/m      "

## 2021-06-13 NOTE — PLAN OF CARE
Problem: Impaired Gas Exchange  Goal: Demonstrate improved ventilation and adequate oxygenation of tissues as evidenced by absence of respiratory distress  Outcome: Progressing     Problem: Inadequate Gas Exchange  Goal: Patient will achieve/maintain normal respiratory rate/effort  Outcome: Progressing     Pt. was alternating between BIPAP (15/8, 20, 70%) and HFNC 50L 100% during this shift. BS diminished, RR 20-24, sats 90-94%, titrate oxygen as tolerated.  Face to face done with RN regarding skin integrity , RT following    GUSTAVO Jj

## 2021-06-13 NOTE — PLAN OF CARE
Problem: Inadequate Gas Exchange  Goal: Patient will achieve/maintain normal respiratory rate/effort  Outcome: Progressing   Pt here with COVID, tested + on 11/10/2020. Currently on high-flow NC 45LPM at 55% FiO2. Pt has congested lung sounds, diminished (more diminished on right than left), and strong, dry cough. Administered PRN benzonatate and guaifenesin/codeine which pt states is helpful. Encouraged pt to use IS, currently can get to 500ml. Encourages pt to self-prone, she declined d/t thinking it will hurt too much when she coughs. She states she will try tomorrow.     Problem: Activity Intolerance/Impaired Mobility  Goal: Mobility/activity is maintained at optimum level for patient  Outcome: Progressing  Patient feels weak, deconditioned (she has been here 16 days). PT/OT worked with patient in the room. She sat up in the chair this shift, encouraged pt to reposition in bed also.

## 2021-06-13 NOTE — PLAN OF CARE
[place]  utilized for focused assessment.     Problem: Pain  Goal: Patient's pain/discomfort is manageable  Outcome: Progressing  Pt is A&Ox4 and able to communicate needs with staff. Pt denied pain, and no nonverbal indicators of pain observed. No prn medications required.     Problem: Glucose Imbalance  Goal: Achieve optimal glucose control  Outcome: Progressing  Blood sugar checks HC/HS d/t pt being on Remdesivir for COVID symptom treatment. BS stable before dinner at 106, and pt was asymptomatic.     Problem: Potential for Compromised Skin Integrity  Goal: Skin integrity is maintained or improved  Outcome: Progressing   Skin is C/D/I with no open areas or skin impairment or breakdown. Red-blanchable marks on back and buttocks from position in bed. Pt is independent in repositioning, so staff reminded pt to do so frequently to protect skin and prevent breakdown. Slight pink/red-blanchable alfredo on R side of cheek from high-flow NC tubing observed. Tubing re-adjusted and RT notified. RT stated she would assess as tubing may be too small.      -Mag & K protocol replacement continued: K redraw at 8pm was 4.2 and next draw for tomorrow AM. Mag this AM was 2.1 with next draw for tomorrow AM.

## 2021-06-13 NOTE — PLAN OF CARE
Problem: Inadequate Gas Exchange  Goal: Patient will achieve/maintain normal respiratory rate/effort  Outcome: Progressing  Patient complained of shortness of breath, noted breathing with accessory muscles, called RT increased oxygen to 15 liters, saturation 88-90 %. Paged resident updated, ordered ABGs. Changed patient to non-rebreathe mask, saturation 93 % 15 liters. House came up to assess the patient with RT, ordered the patient to start hi flow oxygen and also informed nurse to update Dr. Chan about the patient change of status. Noted with a strong dry cough, administered Tessalon Pearls alternating with Robitussin. Will continue to monitor the patient at this time.

## 2021-06-13 NOTE — PROGRESS NOTES
PROVIDER RESTRAINT FOR NON-VIOLENT BEHAVIOR FACE TO FACE EVALUATION  12/3/2020   8:44 AM     Patient seen on morning interdisciplinary rounds.     Patient's Immediate Situation:  Patient demonstrated the following behaviors: Pulling/tugging at invasive lines or tubes and does not respond to verbal/non-verbal redirection    Patient's Reaction to the intervention:  Does patient understand the reason for restraint/seclusion? Unable to Express    Medical Condition:  Is there any evidence of compromise of Skin integrity, Respiratory, Cardiovascular, Musculoskeletal, Hydration? No    Behavioral Condition:  In consultation with the RN, is there a need to continue this restraint or seclusion? Yes    See Restraint Flowsheet for complete restraint documentation and assessment.    Jennifer Leonard, CNP

## 2021-06-13 NOTE — PLAN OF CARE
Problem: Inadequate Gas Exchange  Goal: Patient will achieve/maintain normal respiratory rate/effort  Outcome: Progressing     Problem: Excessive Fluid Volume  Goal: Patient will achieve/maintain normal respiratory rate/effort  Outcome: Progressing     RESPIRATORY CARE NOTE     Patient Name: Hafsa Robles  Today's Date: 11/17/2020     Pt on bipap overnight.  Placed on HFNC for the day.  Pt tolerating well.  Pt currently on 60 lpm, titrated to 75% fi02.  Pt alert.  HR 70, rr 23, 02 sat 93%.  RN will be getting pt up in chair.  Decreased breath sounds.        Carol Ho

## 2021-06-13 NOTE — PROGRESS NOTES
Progress Note    Assessment/Plan    Principal Problem:    Pneumonia  Active Problems:    Hypokalemia    Acute respiratory failure with hypoxia (H)    Pneumonia due to 2019 novel coronavirus    Severe protein-calorie malnutrition (H)    Acute respiratory failure (H)    ARDS (adult respiratory distress syndrome) (H)    Encephalopathy    61 y/o Ukrainian speaking female admitted for COVID and acute hypoxic respiratory failure on 11/13. She was on high flow NC and Bipap, then to the floor. On 11/30 she was transferred back to ICU for worsening hypoxia and was intubated on 12/1/20 and as of 12/7/20 remains intubated with ARDS     1. ARDS/acute hypoxic respiratory failure due to COVID infection  - Remains intubated/vent  -ICU managing  - weaning trials-slow improvement, not able to wean very long.  According to staff she is a little more coarse on lung exam today, intensivist adding on furosemide today.  Follow electrolytes, add on magnesium tomorrow further diuretic today, as volume neutral.  Continue wean, only tolerated 30 minutes yesterday       COVID with septic and circulatory shock  - Weaned off pressors on 12/4, but back on again late 12/7/20, weaned again .    - Has completed Remdesivir and Dexamethasone courses  - Lovenox two times a day for VTE proph  - Received Rocephin and Azithro earlier in stay, completed empiric course of Zosyn 12/6/20  - Blood and sputum cultures NGTD    Hematuria-started yesterday, has Arteaga catheter, mild but worse with movement.  Check UA, renal ultrasound.  Continuing Lovenox for now.  Hemoglobin stable.  Creatinine normal..     3. Malnutrition  - Tube feeding and FW flushes per RD     4. Leukocytosis  Resolved     5. Hypokalemia  -- Replaced     6.DVT prevent-Lovenox q 12     7.GI proph- ppi  Discussed with intensivist  Full code     subjective  Patient intubated, interactive, will wave.  Having mild hematuria in Arteaga catheter    Past Medical History  Past Medical History:    Diagnosis Date     Malignant neoplasm of right female breast (H)        Current Medications  Scheduled Meds:    chlorhexidine  15 mL Topical Q12H     [Held by provider] enoxaparin ANTICOAGULANT  0.5 mg/kg Subcutaneous Q12H     [START ON 12/12/2020] furosemide  20 mg Intravenous DAILY     heparin (PF) ANTICOAGULANT  5,000 Units Subcutaneous Q8H FIXED TIMES     influenza vaccine quad (PF) (age 50-64 YR) 2020-21  0.5 mL Intramuscular Prior to Discharge     insulin aspart (NovoLOG) injection   Subcutaneous Q6H FIXED TIMES     omeprazole  20 mg Oral QAM AC    Or     omeprazole  20 mg Enteral Tube QAM AC     senna-docusate  1 tablet Oral BID    Or     senna (SENOKOT) syrup  8.8 mg Enteral Tube BID     sodium chloride  10-30 mL Intravenous Q8H FIXED TIMES     Continuous Infusions:    dexmedetomidine infusion orderable (PRECEDEX) 1.1 mcg/kg/hr (12/11/20 1103)     norepinephrine Stopped (12/10/20 0653)     sodium chloride 0.9% Stopped (12/10/20 4749)     PRN Meds:.acetaminophen, acetaminophen, bacitracin, benzocaine-menthoL, bisacodyL, calcium (as carbonate), dextrose 50 % (D50W), fentaNYL - BOLUS DOSE from infusion, fentaNYL - BOLUS DOSE from infusion, glucagon (human recombinant), hydrALAZINE, lipase-protease-amylase **AND** sodium bicarbonate, LORazepam, magnesium hydroxide, midazolam, naloxone **OR** naloxone **OR** naloxone **OR** naloxone, ondansetron **OR** ondansetron, polyethylene glycol, polyvinyl alcohol, sodium chloride bacteriostatic, sodium chloride, sodium chloride, sodium chloride, traZODone  No Known Allergies    Objective  Vital signs in last 24 hours  Temp:  [97.8  F (36.6  C)-99.8  F (37.7  C)] 99.5  F (37.5  C)  Heart Rate:  [61-81] 71  Resp:  [20-38] 29  BP: ()/(55-64) 91/55  Arterial Line BP: ()/(45-64) 110/62  FiO2 (%):  [40 %-100 %] 40 %  Weight:   136 lb (61.7 kg)      Wt Readings from Last 2 Encounters:   12/10/20 0000 136 lb (61.7 kg)   12/09/20 0000 129 lb (58.5 kg)   12/08/20 0100  "133 lb 9.6 oz (60.6 kg)   12/06/20 0230 135 lb 5.8 oz (61.4 kg)   12/05/20 0028 136 lb 7.4 oz (61.9 kg)   12/04/20 0000 136 lb 0.4 oz (61.7 kg)   12/03/20 0030 136 lb 14.5 oz (62.1 kg)   12/01/20 0400 130 lb 12.8 oz (59.3 kg)   11/28/20 0602 134 lb 6.4 oz (61 kg)   11/27/20 0000 136 lb 7.4 oz (61.9 kg)   11/26/20 2013 136 lb 7.4 oz (61.9 kg)   11/26/20 0030 137 lb 5.6 oz (62.3 kg)   11/24/20 0400 137 lb 9.1 oz (62.4 kg)   11/23/20 0400 137 lb 9.1 oz (62.4 kg)   11/22/20 0425 136 lb 11 oz (62 kg)   11/21/20 0452 137 lb 12.6 oz (62.5 kg)   11/20/20 0627 138 lb 0.1 oz (62.6 kg)   11/19/20 0733 141 lb 15.6 oz (64.4 kg)   11/13/20 2013 145 lb 8 oz (66 kg)   12/16/14 1223 130 lb (59 kg)   Weight change:     Intake/Output last 3 shifts  I/O last 3 completed shifts:  In: 3195 [I.V.:574; NG/GT:2621]  Out: 2775 [Urine:2775]  Intake/Output this shift:  I/O this shift:  In: 292 [I.V.:92; NG/GT:200]  Out: 1250 [Urine:1250]    Review of Systems   Review of systems not obtained due to inability to communicate with the patient.     Physical Exam    Temp:  [97.8  F (36.6  C)-99.8  F (37.7  C)] 99.5  F (37.5  C)  Heart Rate:  [61-81] 71  Resp:  [20-38] 29  BP: ()/(55-64) 91/55  Arterial Line BP: ()/(45-64) 110/62  FiO2 (%):  [40 %-100 %] 40 %    BP 91/55   Pulse 71   Temp 99.5  F (37.5  C) (Oral)   Resp (!) 29   Ht 5' 5\" (1.651 m)   Wt 136 lb (61.7 kg)   SpO2 99%   BMI 22.63 kg/m    General appearance: no distress  Awake, alert  Remainder physical exam deferred to intensivist in order to preserve PPE  Pertinent Labs   Lab Results: personally reviewed.   Labs from the previous 24 hours  Recent Results (from the past 24 hour(s))   POCT Glucose    Specimen: Blood   Result Value Ref Range    Glucose 129 70 - 139 mg/dL   POCT Glucose    Specimen: Blood   Result Value Ref Range    Glucose 132 70 - 139 mg/dL   POCT Glucose    Specimen: Artery; Blood   Result Value Ref Range    Glucose 174 (H) 70 - 139 mg/dL   Basic " Metabolic Panel   Result Value Ref Range    Sodium 138 136 - 145 mmol/L    Potassium 3.5 3.5 - 5.0 mmol/L    Chloride 105 98 - 107 mmol/L    CO2 23 22 - 31 mmol/L    Anion Gap, Calculation 10 5 - 18 mmol/L    Glucose 181 (H) 70 - 125 mg/dL    Calcium 8.7 8.5 - 10.5 mg/dL    BUN 11 8 - 22 mg/dL    Creatinine 0.48 (L) 0.60 - 1.10 mg/dL    GFR MDRD Af Amer >60 >60 mL/min/1.73m2    GFR MDRD Non Af Amer >60 >60 mL/min/1.73m2   HM2(CBC W/O DIFF)   Result Value Ref Range    WBC 10.7 4.0 - 11.0 thou/uL    RBC 4.05 3.80 - 5.40 mill/uL    Hemoglobin 11.4 (L) 12.0 - 16.0 g/dL    Hematocrit 35.3 35.0 - 47.0 %    MCV 87 80 - 100 fL    MCH 28.1 27.0 - 34.0 pg    MCHC 32.3 32.0 - 36.0 g/dL    RDW 13.9 11.0 - 14.5 %    Platelets 452 (H) 140 - 440 thou/uL    MPV 10.0 8.5 - 12.5 fL   POCT Glucose    Specimen: Blood   Result Value Ref Range    Glucose 130 70 - 139 mg/dL     Labs from the previous 7 days:   Results from last 7 days   Lab Units 12/11/20  0359 12/10/20  0216 12/09/20  0257   LN-WHITE BLOOD CELL COUNT thou/uL 10.7 10.9 12.6*   LN-HEMOGLOBIN g/dL 11.4* 11.1* 11.0*   LN-PLATELET COUNT thou/uL 452* 470* 439     Results from last 7 days   Lab Units 12/11/20  0359 12/10/20  0216 12/09/20  0257 12/08/20  0348 12/07/20  0642 12/06/20  1625 12/06/20  0610 12/05/20  0435 12/05/20  0435   LN-SODIUM mmol/L 138 138 138 138 140  --  141  --  144   LN-POTASSIUM mmol/L 3.5 3.8 3.7 3.9 4.3 3.6 3.4*   < > 3.3*   LN-CHLORIDE mmol/L 105 106 101 101 102  --  96*  --  100   LN-CO2 mmol/L 23 26 29 31 31  --  37*  --  36*   LN-BLOOD UREA NITROGEN mg/dL 11 12 10 14 16  --  25*  --  34*   LN-CREATININE mg/dL 0.48* 0.44* 0.44* 0.48* 0.45*  --  0.46*  --  0.52*   LN-CALCIUM mg/dL 8.7 8.4* 8.2* 8.1* 8.3*  --  8.6  --  8.5    < > = values in this interval not displayed.               Invalid input(s): LABALBU        Other labs:   Lab Results   Component Value Date    INR 0.96 11/17/2020    INR 0.95 11/16/2020    INR 0.91 11/15/2020       Pertinent  Radiology   Personally reviewed radiologists report and impressions  Xr Chest 1 View Portable    Result Date: 12/4/2020  EXAM: XR CHEST 1 VIEW PORTABLE LOCATION: [facility] DATE/TIME: 12/4/2020 1:24 PM INDICATION: desaturation, covid ARDS eval for interval change COMPARISON: 12/01/2020 and older studies.     Endotracheal tube is in good position. NG tube is coursing in the stomach. Right upper extremity PICC line catheter overlies the mid SVC. There has been slight decrease in the diffuse, asymmetric interstitial opacities. No hydropneumothorax. Heart and pulmonary vascularity are normal.    Xr Chest 1 View Portable    Result Date: 12/1/2020  EXAM: XR CHEST 1 VIEW PORTABLE LOCATION: [facility] DATE/TIME: 12/1/2020 12:36 PM INDICATION: ETT placement, worsening condition. COMPARISON: 12/01/2020.     Extensive bilateral opacities are present, similar to same-day radiograph. No substantial pleural effusion. No pneumothorax. Normal heart size. ET tube tip appropriately positioned, roughly 23 mm above the fede. Enteric tube courses into the stomach and off the field of view. Right PICC tip over the distal SVC.    Xr Chest 1 View Portable    Result Date: 12/1/2020  EXAM: XR CHEST 1 VIEW PORTABLE LOCATION: [facility] DATE/TIME: 12/1/2020 6:26 AM INDICATION: confirm ET Tube position COMPARISON: 11/29/2020.     The patient has been intubated. The endotracheal tube terminates 1.9 cm above the fede. Nasogastric tube passes below the diaphragm and the inferior margin of the radiograph. Extensive bilateral pulmonary infiltrates are little changed from  11/29/2020 and consistent with infectious/inflammatory pneumonitis. No significant pleural effusion. No pneumothorax. Normal heart size.    Xr Chest 1 View Portable    Result Date: 11/29/2020  EXAM: XR CHEST 1 VIEW PORTABLE LOCATION: [facility] DATE/TIME: 11/29/2020 3:44 PM  "INDICATION: Hypoxia COMPARISON: 11/19/2020     Previously seen PICC line has been removed. Bilateral pulmonary infiltrates have significantly increased when compared to previous. No pleural effusion or pneumothorax.    Xr Chest 1 View Portable    Result Date: 11/19/2020  EXAM: XR CHEST 1 VIEW PORTABLE LOCATION: [facility] DATE/TIME: 11/19/2020 8:30 AM INDICATION: covid ARDS COMPARISON: Portable AP view of the chest 11/13/2020     Right PICC terminates at the SVC right atrial junction. Cardiac silhouette is normal in size. Unchanged mediastinal interfaces. Patchy bilateral lung airspace opacities are present which are slightly better defined compared to 6 days earlier suggesting evolving organizing pneumonia, the typical pattern of lung injury in the setting of COVID pneumonia. No pleural fluid or pneumothorax is present.    Xr Chest 1 View Portable    Result Date: 11/13/2020  EXAM: XR CHEST 1 VIEW PORTABLE LOCATION: [facility] DATE/TIME: 11/13/2020 6:20 PM INDICATION: Chest Pain. Suspected COVID. COMPARISON: 11/11/2020     No pleural fluid or pneumothorax. Heterogeneous pulmonary opacities are concerning for multifocal atypical infection and have increased from the prior study. Normal size of the heart.     Xr Abdomen Ap Portable    Result Date: 12/2/2020  EXAM: XR ABDOMEN AP PORTABLE LOCATION: [facility] DATE/TIME: 12/2/2020 1:05 PM INDICATION: og placement COMPARISON: Chest x-ray 12/01/2020     Enteric tube with the tip in satisfactory position in the gastric antrum. Nonobstructive bowel gas pattern. No definite free air. Stable bilateral pulmonary airspace opacities.     Poc Us 3cg Picc Placement Guidance    Result Date: 12/1/2020  Exam was performed as guidance for PICC line insertion.  Click \"PACS images\" hyperlink below to view any stored images.  For specific procedure details, view procedure note authored by PICC/Vascular Access " "Nurse.    Poc Us 3cg Picc Placement Guidance    Result Date: 11/15/2020  Exam was performed as guidance for PICC line insertion.  Click \"PACS images\" hyperlink below to view any stored images.  For specific procedure details, view procedure note authored by PICC/Vascular Access Nurse.          Advanced Care Planning  Discharge Planning : Anticipate discharge in days to weeks  Factors affecting discharge: Hypoxia/respiratory failure  Discussed care with intensivist, staff, for total time 10 minutes with greater than 50% of total time spent in counseling and coordination of care.    Austyn Ricardo MD  [facility] Hospitalist    Office: [phone]      "

## 2021-06-13 NOTE — PLAN OF CARE
HISTORY OF PRESENT ILLNESS:  Mt Meléndez is seen acutely because she has developed swelling and pain in her right wrist, this is the first time this has happened in years.  It should be noted that she is currently being treated with antibiotics for a urinary tract infection and the flare-up occurred 2 days after the diagnosis.  Prior to this she had been doing well on her combination of methotrexate 20 mg weekly.  She denies any side effects from the methotrexate such as nausea, vomiting, diarrhea, stomatitis.  She has not developed any progressive dyspnea or chronic cough.      PHYSICAL EXAMINATION:   GENERAL:  Pleasant, no apparent distress.   VITAL SIGNS:  Blood pressure and vital signs are in the Epic system.     MUSCULOSKELETAL:  There is moderate synovitis with tenderness of the right wrist.  No deformities noted of the upper or lower extremities, no synovitis of the left wrist or either MCPs, PIPs, elbows, shoulders, knees or ankles.      IMPRESSION:  Seropositive rheumatoid arthritis with flare.      RECOMMENDATIONS:   1.  Course of prednisone 20 mg daily for 4 days, then taper by 5 mg every 4 days.   2.  Continue methotrexate.   3.  Need labs today.     4.  I reminded her labs should be done every 3 months and she can follow up with me in 1 year or sooner if there are problems.         MARK IBARRA MD             D: 2017 12:52   T: 2017 13:25   MT: VANITA#[id number]      Name:     MT MELÉNDEZ   MRN:      [id number]        Account:      [id number]   :      1953           Visit Date:   2017      Document: [id number]     Pt transferred from p2 at 0230 for requiring bipap. Pt is on Bipap Fio2 at 80%. Pt desats quickly when having a coughing spell. Pt has prn cough medicine, pt would like it regularly. Sats hanging around 92%

## 2021-06-13 NOTE — PROGRESS NOTES
CRITICAL CARE PROGRESS NOTE:    Assessment/Plan:  60 y.o. year old female with PMH Significant for breast cancer s/p radiation/mastectomy.  Presented to ER 11/13 and dg with COVID-19. She decompensated and O2 went from 4 liters to HFNC 80% on 60 L, transferred to the ICU and intubated on 12/1/2020.    RESP:  ARDS, prolonged hypoxemic resp failure due to covid-19 infection. Approaching 2 weeks on vent. Minimal vent settings. Main barrier to extubation is severe anxiety with agitation limiting SBT performance. Last P:F on ABG from 12/12 was 228    Cont LPV with Vt 375cc (6-7cc/kg)    Pplat <30, was 26-30 last 24 hours    Daily sedation vacation and SBT's    ABG prn, no need for scheduled ABG's    Titrate FiO2 for goal O2 sat 88-92% or PaO2 55 mm Hg or greater.    Cont cough medicine with codeine today to see if this helps with cough/SBT    CV:  Shock, persistent. Likely some vasoplegia due to high sedation requirements. No echo on file. Possible septic shock last 24 hours given fevers, tachycardia. Lactate normal.     MAP >65, wean NE as able. On and off low dose NE last few days    Hold home anti-hypertensives    Lactate normal on 12/16 no need to trend further unless clinical change    Keep even to slightly net neg vol status (not on any scheduled Lasix currently)    NEURO:  Encephalopathy, agitation, anxiety. Psych following    Greatly appreciate psych assistance    Cont seroquel 75mg four times a day    zyprexa + atarax discontinued 12/14    Cont trazodone 25mg four times a day prn for agitation and at bedtime prn for sleep    Cont seroquel 25mg four times a day prn for agitation    Cont depakote sprinkles 125mg three times a day      Cont dex for RASS goal 0 to -1 - wean as able    Tylenol prn pain    GI:  No issues, tolerating TF    Cont TF, advance as tolerated    Cont FWF    PPI    Bowel regimen    RENAL:  No issues. Normal renal function    Maintain potter    Avoid nephrotoxins    Keep on even to slightly dry  side as above with Lasix prn    ID:  Severe covid-19 infection, improving. Yeast in urine, ?colonizer vs. Contaminant. Mild temp overnight 100.4. febrile today to 101+ with persistent hypotension concerning for new sepsis/septic shock. CXR markedly worse 12/15 though vent settings minimal and not really changed. Noted PCT 12/15. LE US neg for DVT. If bacteremic will need to change out the lines.     Completed Dex + remdesivir    Monitor off abx    F/u culture data - pan-cultured on 12/15    Cont vanco + cefepime empirically.    HEMATOLOGIC:  Mild leukocytosis, stable. Stable mild anemia. US of legs neg for DVT on 12/15    hgb >7    Cont LMWH two times a day per covid cat B a/c guidelines    ENDOCRINE:  No issues currently    Cont Lantus 6U daily    FSBG checks, insulin sliding scale/drip per ICU protocol     ICU PROPHYLAXIS:    peridex    LMWH two times a day per covid cat B a/c guidelines    PPI    CODE STATUS, DISPOSITION, FAMILY COMMUNICATION: full code  Updated  Arturo yesterday.     Lines/Drains/Tubes:  TL PICC 12/1  Art line left axillary 12/4  Arteaga  OG tube  ETT 7.5mm    Restraints  Progress Note  Restraint Application    I recognize that restraints are physical and/or chemical interventions intended to restrict a person's movements. Restraints are currently needed to ensure the safety of this patient and/or others. My clinical rationale appears below.    Category/Type of Restraint     Non Violent:  Soft limb restraint x2  --  Behavior  Pulling at tubes/lines  --  Root Cause of the Behavior  Sedation/intubation  --  Less-Restrictive Measures that Failed  Non Violent Measures:  Close Observation  --  Response to the Restraint  Patient unable to pull at tubes/lines  --  Criteria for Release from the Restraint  Patient calm and off sedation    Bashir (Fox De La Torre MD  [facility] Pulmonary & Critical Care  Pager [phone]  Clinic [phone]      Overnight events:  Still requiring  "NE up to 0.09 overnight.  occ coughing, agitation.  A little bit improved compared to yesterday.  Arteaga was changed out yesterday.     Does have +CLT per RT.     Subjective:  Unable to assess    Objective:  Physical Exam:  Vent settings for last 24 hours:  Vent Mode: VCV  FiO2 (%):  [40 %] 40 %  S RR:  [20] 20  S VT:  [375 mL] 375 mL  PEEP/CPAP (cm H2O):  [5 cm H2O] 5 cm H2O  Minute Ventilation (L/min):  [9.3 L/min-12.3 L/min] 11.3 L/min  PIP:  [9 cm H2O-38 cm H2O] 38 cm H2O  CO SUP:  [8 cm H20] 8 cm H20  MAP (cm H2O):  [5-13] 13    /63   Pulse 82   Temp 98.9  F (37.2  C) (Oral)   Resp 26   Ht 5' 5\" (1.651 m)   Wt 135 lb 11.2 oz (61.6 kg)   SpO2 92%   BMI 22.58 kg/m      Intake/Output last 3 shifts:  I/O last 3 completed shifts:  In: 4082 [I.V.:741; NG/GT:2431; IV Piggyback:910]  Out: 2250 [Urine:2250]  Intake/Output this shift:  No intake/output data recorded.    Physical Exam  Gen: intubated, sedated. Does follow commands and nod appropriately.   HEENT: no OP lesions, no DOT  CV: RRR, no m/g/r  Resp: clear ant no wheezing  Abd: soft, nontender, BS+  Neuro: PERRL, nonfocal. Does follow commands when calm  Ext: no edema    LAB:  Results from last 7 days   Lab Units 12/16/20  0513 12/15/20  0442   LN-WHITE BLOOD CELL COUNT thou/uL  --  11.3*   LN-HEMOGLOBIN g/dL  --  10.3*   LN-HEMATOCRIT %  --  32.9*   LN-PLATELET COUNT thou/uL 466* 496*     Results from last 7 days   Lab Units 12/15/20  0442 12/14/20  0429 12/13/20  0406   LN-SODIUM mmol/L 137 137 137   LN-POTASSIUM mmol/L 3.9 3.7 4.1   LN-CHLORIDE mmol/L 103 101 102   LN-CO2 mmol/L 25 26 27   LN-BLOOD UREA NITROGEN mg/dL 11 12 12   LN-CREATININE mg/dL 0.46* 0.52* 0.50*   LN-CALCIUM mg/dL 8.1* 8.3* 8.5       Urine 12/11  + yeast    PCT neg 12/15    12/15 pan-cultured  Sputum pending  Blood pending  UA c/w possible UTI    Current Facility-Administered Medications   Medication Dose Route Frequency Provider Last Rate Last Admin     acetaminophen solution " 650 mg (TYLENOL)  650 mg Enteral Tube Q4H PRN Tish Adkins CNP   650 mg at 12/15/20 1225     acetaminophen tablet 650 mg (TYLENOL)  650 mg Oral Q4H PRN Ramon aHro MD   650 mg at 11/30/20 0058     bacitracin ointment packet 1 packet  1 packet Topical Once PRN Tish Adkins CNP         benzocaine-menthoL lozenge 1 lozenge (CEPACOL)  1 lozenge Oral Q1H PRN Eben Butterfield MD         bisacodyL suppository 10 mg (DULCOLAX)  10 mg Rectal Daily PRN Eben Butterfield MD         calcium (as carbonate) chewable tablet 400 mg (TUMS)  400 mg Oral TID PRN Ramon Haro MD   400 mg at 11/18/20 1222     cefepime 1 g in NaCl 0.9 % (MINI-BAG Plus) 50 mL (MAXIPIME)  1 g Intravenous Q8H Bashir De La Torre  mL/hr at 12/16/20 0815 1 g at 12/16/20 0815     chlorhexidine 0.12 % solution 15 mL (PERIDEX)  15 mL Topical Q12H Tish Adkins CNP   15 mL at 12/16/20 0527     codeine-guaiFENesin  mg/5 mL liquid 5 mL (GUAIFENESIN AC)  5 mL Oral Q4H PRN Bashir De La Torre MD   5 mL at 12/16/20 0855     dexmedetomidine (PRECEDEX) 1000 mcg/250 mL in NS (4 mcg/mL) infusion  0.1-1.5 mcg/kg/hr Intravenous Continuous IvJeramie bruner MD 6.15 mL/hr at 12/16/20 0812 0.4 mcg/kg/hr at 12/16/20 0812     dextrose 50 % (D50W) syringe 20-50 mL  20-50 mL Intravenous Q15 Min PRN Ramon Haro MD         enoxaparin ANTICOAGULANT syringe 30 mg (LOVENOX)  0.5 mg/kg Subcutaneous BID Oren Vásquez MD   30 mg at 12/16/20 0821     fentaNYL pf injection 25-50 mcg (SUBLIMAZE)  25-50 mcg Intravenous Q1H PRN Jennifer Gunter, CNP   50 mcg at 12/15/20 1749     glucagon (human recombinant) injection 1 mg  1 mg Subcutaneous Q15 Min PRN Ramon Haro MD         hydrALAZINE injection 10 mg (APRESOLINE)  10 mg Intravenous Q4H PRN Ramon Haro MD         influenza vaccine quad (PF) (age 50-64 YR) 2020-21 injection 0.5 mL (FLUBLOK)  0.5 mL Intramuscular Prior to Discharge Jeramie Crespo MD          insulin aspart U-100 injection pen (NovoLOG)   Subcutaneous Q6H FIXED TIMES Eben Butterfield MD   2 Units at 12/16/20 0527     insulin glargine injection 6 Units (LANTUS)  6 Units Subcutaneous Oren Albert MD   6 Units at 12/16/20 0820     lipase-protease-amylase 5,000-17,000- 24,000 unit capsule 2 capsule (ZENPEP)  2 capsule Enteral Tube PRN Eben Butterfield MD        And     sodium bicarbonate tablet 325 mg  325 mg Enteral Tube PRN Eben Butterfield MD         LORazepam 1 mg injection (diluted concentration)  1 mg Intravenous Q4H PRN Oren Vásquez MD   1 mg at 12/16/20 0854     LORazepam 1 mg/0.5 mL concentrated solution 1 mg (ATIVAN)  1 mg Oral Q6H Oren Vásquez MD   1 mg at 12/16/20 0506     magnesium hydroxide suspension 30 mL (MILK OF MAG)  30 mL Oral Daily PRN Eben Butterfield MD         midazolam (PF) injection 1 mg (VERSED)  1 mg Intravenous Q2H PRN Tish Adkins CNP   1 mg at 12/15/20 2140     naloxone injection 0.2 mg (NARCAN)  0.2 mg Intravenous Q2 Min PRN Ramon Haro MD        Or     naloxone injection 0.2 mg (NARCAN)  0.2 mg Intramuscular Q2 Min PRN Ramon Haro MD        Or     naloxone injection 0.4 mg (NARCAN)  0.4 mg Intravenous Q2 Min PRN Ramon Haro MD        Or     naloxone injection 0.4 mg (NARCAN)  0.4 mg Intramuscular Q2 Min PRN Ramon Haro MD         norepinephrine 4 mg/250 mL in NS (16 mcg/mL) - CENTRAL  0.01-0.4 mcg/kg/min Intravenous Continuous Bashir De La Torre MD 16.1 mL/hr at 12/16/20 0831 0.07 mcg/kg/min at 12/16/20 0831     omeprazole capsule 20 mg (PriLOSEC)  20 mg Oral QAM Tish Gonzalez CNP        Or     omeprazole suspension 20 mg (PriLOSEC)  20 mg Enteral Tube QAM AC Tish Adkins CNP   20 mg at 12/16/20 0621     ondansetron injection 4 mg (ZOFRAN)  4 mg Intravenous Q4H PRN Eben Butterfield MD   4 mg at 12/06/20 7780    Or     ondansetron tablet 8 mg (ZOFRAN)  8 mg  Oral Q8H PRN Eben Butterfield MD         polyethylene glycol packet 17 g (MIRALAX)  17 g Oral Daily PRN Ramon Haro MD   17 g at 12/04/20 0834     polyvinyl alcohol 1.4 % ophthalmic solution 1-2 drop (LIQUIFILM TEARS)  1-2 drop Both Eyes Q1H PRN Eben Butterfield MD         QUEtiapine tablet 25 mg (SEROquel)  25 mg Oral QID PRN Quick, Alma Keshia, CNP   25 mg at 12/15/20 0820     QUEtiapine tablet 50 mg (SEROquel)  50 mg Oral Daily PRN Quick, Alma Keshia, CNP   50 mg at 12/15/20 1004     QUEtiapine tablet 75 mg (SEROquel)  75 mg Oral QID Quick, Alma Keshia, CNP   75 mg at 12/16/20 0821     senna-docusate 8.6-50 mg tablet 1 tablet (PERICOLACE)  1 tablet Oral BID Eben Butterfield MD   Stopped at 11/30/20 2100    Or     sennosides syrup 8.8 mg (for SENOKOT)  8.8 mg Enteral Tube BID Eben Butterfield MD   8.8 mg at 12/16/20 0822     sodium chloride 0.9%  10 mL/hr Intravenous Continuous Eben Butterfield MD 10 mL/hr at 12/16/20 0815 10 mL/hr at 12/16/20 0815     sodium chloride bacteriostatic 0.9 % injection 0.1-0.3 mL  0.1-0.3 mL Subcutaneous PRN Ramon Haro MD         sodium chloride flush 10-20 mL (NS)  10-20 mL Intravenous PRN Tish Adkins CNP   10 mL at 12/04/20 1736     sodium chloride flush 10-30 mL (NS)  10-30 mL Intravenous PRN Tish Adkins CNP         sodium chloride flush 10-30 mL (NS)  10-30 mL Intravenous Q8H FIXED TIMES Tish Adkins CNP   10 mL at 12/16/20 0506     sodium chloride flush 20 mL (NS)  20 mL Intravenous PRN Tish Adkins CNP         traZODone tablet 25 mg (DESYREL)  25 mg Oral QID PRN Alma Mcmillan CNP         valproic acid (as sodium salt) solution 250 mg (DEPAKENE)  250 mg Oral Q8H FIXED TIMES Alma Mcmillan CNP   250 mg at 12/16/20 0506     vancomycin 1,000 mg in sodium chloride 0.9% 250 mL (VANCOCIN)  1,000 mg Intravenous Q12H Bashir De La Torre  mL/hr at 12/16/20 0311 1,000 mg at  12/16/20 0311       Critical care attestation: 45 minutes spent managing the following issues: acute respiratory failure requiring intubation/IMV, circulatory shock requiring continuous vasopressor infusions, severe covid-19 infection c/b ARDS and prolonged hypoxemic resp failure, toxic-metabolic encephalopathy with severe agitation and anxiety limiting SBT performance. High risk for organ deterioration and death requiring ICU level care.

## 2021-06-13 NOTE — PROGRESS NOTES
Critical Care Progress Note  11/24/2020     Admit Date: 11/13/2020  Moved to ICU on 11/15/20    Code Status: full code      Problem List:   Principal Problem:    Pneumonia  Active Problems:    Hypokalemia    Acute respiratory failure with hypoxia (H)    Pneumonia due to 2019 novel coronavirus  anxiety     Overnight Events  No acute events    Plan by System:     Neuro/Psych: no acute issues; at risk for developing ICU delirium    - try to maintain day/night schedule.    - avoid benzos     Pulmonary: Acute respiratory failure with hypoxia due to COVID19 viral pneumonia;   - HFNC 20 - 60 L   - BIPAP as needed - she has been refusing.    - Encourage self proning   - Remains high risk for needing intubation.     CV: No acute issues    Tele monitoring.     GI/: E.ating when not on bipap   - Bowel protocol.     Renal: no acute issues    - monitor     ID: COVID19 viral pneumonia    - Dexamethasone x 10 days   - Remdesivir course completed    Heme/Coag:  COVID19 coagulopathy    - LMWH 0.5mg/kg twice daily     Endocrine: Steroid induced hyperglycemia    -  No coverage needed.   - discontinue FSBG and sliding scale insulin    ICU PROPHYLAXIS:    Enoxaparin 0.5 mg/kg two times a day    PPI    CODE STATUS, DISPOSITION, FAMILY COMMUNICATION: Full code. Critically ill. Communicated directly with the patient.      [facility] Pulmonary and Critical Care Medicine      _______________________________________________________________    HPI: Hafsa Robles is a 60 y.o. year old female with PMH Significant for breast cancer s/p radiation. Not currently under treatment. No history of heart, lung, liver, kidney disease. No diabetes. Presented to ER Nov 13 with one week history of cough, fever, fatigue. Diagnosed with Covid 19 and treated for possible bacterial superinfection. She had rapid escalation in her oxygen needs  from 4L NC to HFNC 80% 60 L/min and so she was transferred to ICU for further  monitoring.     Objective:     Vitals:    11/24/20 0900 11/24/20 1000 11/24/20 1048 11/24/20 1100   BP: 127/58 97/56  98/57   Patient Position:       Pulse: 87 86  (!) 102   Resp: (!) 33 26  (!) 36   Temp:  97.9  F (36.6  C)     TempSrc:  Oral     SpO2: 97% 93% 90% 90%   Weight:       Height:          I/O:     Intake/Output Summary (Last 24 hours) at 11/24/2020 1232  Last data filed at 11/24/2020 0800  Gross per 24 hour   Intake 760 ml   Output 1000 ml   Net -240 ml     Wt Readings from Last 3 Encounters:   11/24/20 137 lb 9.1 oz (62.4 kg)   12/16/14 130 lb (59 kg)      Weight change: 0 lb (0 kg)    Physical Exam:  Gen: awake, no distress  HEENT: AT/NC, on HFNC  NEURO: PERRL, alert, nonfocal moving all extremities  CARDIOVASCULAR: RRR  PULMONARY: diminished, no crackles or wheezes  GASTROINTESTINAL: soft nt + bs  SKIN: no rashes on visible skin    Labs:   Results from last 7 days   Lab Units 11/24/20  0405 11/19/20  0630 11/19/20  0630   LN-SODIUM mmol/L 138   < > 140   LN-POTASSIUM mmol/L 3.8   < > 3.7   LN-CHLORIDE mmol/L 101   < > 105   LN-CO2 mmol/L 28   < > 28   LN-BLOOD UREA NITROGEN mg/dL 15   < > 14   LN-CREATININE mg/dL 0.51*   < > 0.51*   LN-CALCIUM mg/dL 8.6   < > 7.9*   LN-PROTEIN TOTAL g/dL  --   --  6.0   LN-BILIRUBIN TOTAL mg/dL  --   --  0.7   LN-ALKALINE PHOSPHATASE U/L  --   --  84   LN-ALT (SGPT) U/L  --   --  49*   LN-AST (SGOT) U/L  --   --  49*    < > = values in this interval not displayed.       Results from last 7 days   Lab Units 11/24/20  0405 11/23/20  0427   LN-WHITE BLOOD CELL COUNT thou/uL  --  16.8*   LN-HEMOGLOBIN g/dL  --  14.0   LN-HEMATOCRIT %  --  42.3   LN-PLATELET COUNT thou/uL 419 320

## 2021-06-13 NOTE — PROGRESS NOTES
Patient afebrile and VSS throughout shift. Patient tolerating HFNC 50 L / 100%. Up to chair for most of the day. Patient active with IS. Unable titrate HFNC down as patient oxygen saturations drops and takes some time to recover. No other significant events noted.

## 2021-06-13 NOTE — CONSULTS
"  Clinical Nutrition Therapy Reassessment Note      Reason:  Received verbal consult to initiate trickle TF.    Principal Problem:    Pneumonia  Active Problems:    Hypokalemia    Acute respiratory failure with hypoxia (H)    Pneumonia due to 2019 novel coronavirus    Severe protein-calorie malnutrition (H)    Acute respiratory failure (H)    Subjective:  Pt intubated, sedated, paralyzed and proned in the ICU.  Pt has been NPO since 11/30.      Nutrition Prescription:   Diet: NPO (11/30/20)  IV dextrose or Fluids:     cisatracurium (NIMBEX) infusion, Last Rate: 2.5 mcg/kg/min (12/02/20 0905)       epoprostenol, Last Rate: 8 mL/hr at 12/02/20 0942       fentaNYL citrate (PF), Last Rate: 200 mcg/hr (12/02/20 0849)       insulin infusion (1 unit/mL)       midazolam, Last Rate: 9 mg/hr (12/02/20 0849)       norepinephrine, Last Rate: 0.05 mcg/kg/min (12/02/20 0802)       sodium chloride 0.9%, Last Rate: 10 mL/hr (12/02/20 0850)    IV fluids of 1891.2 mls in past 24 hrs.    Nutrition Intake:  NPO  FT is a OG placed 12/1/20.  X-ray of 12/1 confirms gastric.    Nutrition needs not currently met.      Anthropometrics:  Height: 5' 5\" (165.1 cm)  Admission weight: 145 lb 11/13/20  Weight: 130 lb 12.8 oz (59.3 kg)  BMI (Calculated): 24.2   -3.7 L per I/Os.      Physical Findings:  Nonpitting geo upper ext edema per chart  Further unable as pt in COVID isolation precautions.       GI Status/Output:   GI symptoms per nursing:   Last BM recorded: 11/30/20  150 ml out OG    Skin/Wound:   Jeffry Scale Score: 9    No wound noted.    Medications:  Lasix-IV, decadron-IV, zosyn-IV, pericolac, senokot   Medications reviewed.    Labs:  Lab Results   Component Value Date/Time    ALBUMIN 2.3 (L) 11/29/2020 12:46 PM     12/02/2020 04:25 AM    K 4.0 12/02/2020 04:25 AM    BUN 25 (H) 12/02/2020 04:25 AM    CREATININE 0.54 (L) 12/02/2020 04:25 AM     (H) 12/02/2020 04:25 AM    PHOS 3.5 12/02/2020 04:25 AM    MG 2.6 12/02/2020 " 04:25 AM   fsbg 113-139  Labs reviewed    Estimated Nutrition Needs:  Using current weight of 59.3 kg.    Energy Needs: 5024-0677 kcals daily per 25-30 kcal/kg   Protein Needs:  g daily, 1.2-2 g/kg.  Fluid Needs: 7461-4914 mls daily, 25-30 mls/kg    Malnutrition: Noted previously-severe    Nutrition Risk Level: high risk    Nutrition DX: Inadequate oral intake r/t acute respiratory failure as evidenced by Bipap/HFNC dependence     Malnutrition r/t COVID-19 as evidenced by prolonged poor intake of < 50% estimated nutrition needs for 2 weeks and 5.2% wt loss in 1 week.    Goals:  Maintain weight-wt down with diuresis  Meet estimated nutrition needs-not met x 2 days  New-electrolytes WNL  New-tolerate TF    Plan:  Start trickle TF of Replete with fiber at 20 ml/hr.  Water flush 30 ml every 4 hrs.  No carb prosource 1 pkt 2/day.  This to provide 600 calories, 61 g protein, 60 g carb , 7 fiber, 399 ml free water plus flushes, total 519 ml free water/day.    Monitor:   TF tolerance, ability to advance TF, wt, labs    See Care Plan for Problems, Goals, and Interventions.

## 2021-06-13 NOTE — PLAN OF CARE
Problem: Occupational Therapy  Goal: OT Goals  Description: The following goals to be met by 12/5/20  Pt will transfer to/from bed/chair/toilet with SBA  with adaptive device prn to maximize independence in ADLs.  Pt will participate in grooming/hygiene tasks with SBA to increase independence in ADLs.  Pt will participate in toileting task with SBA to increase independence in ADLs.     Mary Reyes, OTR/L  11/29/2020    Outcome: Completed   Occupational Therapy Discharge Summary    Date of OT Discharge: 12/1/2020  Refer to daily doc flowsheet for equipment issued and current functional status.  Discharge Destination: Remains in hospital  Discharge Comments: change in medical status; discharge from OT this date. Please reorder if appropriate.      12/1/2020 by Gretta Henriquez OT

## 2021-06-13 NOTE — PROGRESS NOTES
Vent Mode: VCV  FiO2 (%):  [40 %-100 %] 40 %  S RR:  [20] 20  S VT:  [375 mL] 375 mL  PEEP/CPAP (cm H2O):  [5 cm H2O] 5 cm H2O  Minute Ventilation (L/min):  [7.5 L/min-11.3 L/min] 9.5 L/min  PIP:  [35 cm H2O-65 cm H2O] 39 cm H2O  MAP (cm H2O):  [10-17] 11   PLAT 35

## 2021-06-13 NOTE — PROGRESS NOTES
Care Management Follow Up Note    Length of Stay (days) 31     Patient plan of care discussed at Interdisciplinary Rounds: yes           Pt/Rep Education / Interventions: Review d/c options, Discuss d/c, Discussed readmission risk    Expected Discharge Date: 12/16/20  Concerns to be Addressed / Barriers to Discharge:  Pt intubated in ICU    Anticipated Discharge Disposition:  TBD  Anticipated Discharge Services:    TBD  Anticipated Discharge DME:  TBD    Plan: SWCM completed chart review and spoke to patient's  Barry to introduce self, CM role in progression of care.  denies questions or needs regarding discharge planning at this time, informed  Care Management will continue to follow progression of care.    DIEUDONNE Laguna  12/14/2020  3:56 PM

## 2021-06-13 NOTE — PLAN OF CARE
Problem: Impaired Gas Exchange  Goal: Demonstrate improved ventilation and adequate oxygenation of tissues as evidenced by absence of respiratory distress  Outcome: Progressing  Oxygen saturation has been more than 96% on this shift. Able to titrated down her Fi02 to 35% and tolerating it really well.     Problem: Glucose Imbalance  Goal: Achieve optimal glucose control  Outcome: Progressing  Blood sugar has been on normal range on this shift.     Patient gets anxious easily when she needed to be suction and calm down after a few minutes. She slept most of the shift. Will continue to monitor.

## 2021-06-13 NOTE — PROGRESS NOTES
Respiratory Care Note     Patient is on high flow nasal canula (HFNC) 60 lpm and 60% oxygen. Desaturation and increased work breathing noted. Increased oxygen HFNC to 60 lpm and 100% FIO2. Patient desaturation improved but there is still persistent desaturation. Patient declined Bipap to help decrease work of breathing at this time. Will continue to monitor.       11/24/20 1919   Oxygen Therapy/Pulse Ox   O2 Device High-flow NC   O2 Flow Rate (L/min) 60 L/min   FiO2 (%) 100 %   O2 Therapy Oxygen humidified   Heater Temperature 93.2  F (34  C)   SpO2 90 %

## 2021-06-13 NOTE — PROGRESS NOTES
"RESPIRATORY CARE NOTE     Patient Name: Hafsa Robles  Today's Date: 12/1/2020     Pt continues on the following settings:  Vent Mode: VCV  FiO2 (%):  [100 %] 100 %  S RR:  [20] 20  S VT:  [250 mL] 250 mL  PEEP/CPAP (cm H2O):  [10 cm H2O] 10 cm H2O  Minute Ventilation (L/min):  [12.2 L/min] 12.2 L/min  PIP:  [37 cm H2O] 37 cm H2O  MAP (cm H2O):  [20] 20   Plateau pressure: 30 cm H2O     Pt is intubated with  # 7.5 ETT secured  21 at the teeth. Pt intubated for increased work of breathing, and decreasing oxygen saturation. ABGs to be drawn. Pt's respiratory status is stable. RT will continue to follow per MD's orders.     /65   Pulse 91   Temp 97.7  F (36.5  C) (Axillary)   Resp (!) 37   Ht 5' 5\" (1.651 m)   Wt 130 lb 12.8 oz (59.3 kg)   SpO2 94%   BMI 21.77 kg/m        Kia Lion, LRT  "

## 2021-06-13 NOTE — PLAN OF CARE
Problem: Inadequate Gas Exchange  Goal: Patient will achieve/maintain normal respiratory rate/effort  Outcome: Progressing   Pt on 1L O2, stating well.     Problem: Psychosocial Needs  Goal: Demonstrates ability to cope with hospitalization/illness  Outcome: Progressing   Discussed POC with pt. Answered any questions/concerns she has.    Pt stating she hasn't ate in five days and is hungry, but does not have an appetite.     Boogie Berman RN

## 2021-06-13 NOTE — PROGRESS NOTES
Critical Care Progress Note  11/19/2020   9:38 AM     Admit Date: 11/13/2020  Moved to ICU on 11/15/20    Code Status: full code      Problem List:   Principal Problem:    Pneumonia  Active Problems:    Hypokalemia    Acute respiratory failure with hypoxia (H)    Pneumonia due to 2019 novel coronavirus  anxiety     Major changes for today:    -CXR   - back to clear liguid diet   - get up in  Chair at least two times a day   -prn morphine for dyspnea     -needing more o2 today, concerning    -stop remdesivir( new recs  Just 5 days of tx)    Plan by System:     Neuro/Psych: no acute issues; at risk for developing ICU delirium    - try to maintain day/night schedule.    - avoid benzos    -add prn morphine     Pulmonary: Acute respiratory failure with hypoxia due to COVID19 viral pneumonia;    - BiPAP alternating with HFNC   - Encourage self proning   - High risk for needing intubation.     CV: No acute issues    Tele monitoring.     GI/: NPO    - back to clear liguid diet. High risk for intubation    - Bowel protocol.     Renal: no acute issues    - monitor     ID: COVID19 viral pneumonia    - Dexamethasone x 10 days   - Remdesivir stopped today, 11/19  New recs tx only 5 days    -stopped  antibiotics on 11/18    Heme/Coag:  COVID19 coagulopathy    - LMWH 0.5mg/kg twice daily     Endocrine: Steroid induced hyperglycemia    -  No coverage needed. Follow        Dispo: ICU     Mellisa Abebe , TOMMIE  [facility] Pulmonary/Critical Care     Total  CCT: 40 min   _______________________________________________________________    HPI: Hafsa Robles  is a 60 y.o. year old female with PMH Significant for breast cancer s/p radiation. Not currently under treatment. No history of heart, lung, liver, kidney disease. No diabetes. Presented to ER Nov 13 with one week history of cough, fever, fatigue. Diagnosed with Covid 19 and treated for possible bacterial superinfection. She had rapid escalation in her oxygen needs  from 4L  NC to HFNC 80% 60 L/min and so she was transferred to ICU for further monitoring.     ROS: dyspneic when moves around  Has trouble keeping mouth closed when HFNC is on    Events over last 24-hours: BiPAP overnight 90%    Objective:     Vitals:    11/19/20 0700 11/19/20 0733 11/19/20 0758 11/19/20 0800   BP: 92/53      Pulse: (!) 54 73  66   Resp: 23 21  25   Temp:  98.7  F (37.1  C)     TempSrc:  Axillary     SpO2: 90% (!) 84% 96% 91%   Weight:  141 lb 15.6 oz (64.4 kg)     Height:          I/O:     Intake/Output Summary (Last 24 hours) at 11/19/2020 0806  Last data filed at 11/19/2020 0600  Gross per 24 hour   Intake 850 ml   Output 1150 ml   Net -300 ml     Wt Readings from Last 3 Encounters:   11/19/20 141 lb 15.6 oz (64.4 kg)   12/16/14 130 lb (59 kg)      Weight change:     Physical Exam:  Gen: awake, looks frail in bed  HEENMT:PERRLA and extra ocular movement intact  NEURO: grossly non-focal  CARDIOVASCULAR: RRR  PULMONARY: Lungs are diminished and coarse.   GASTROINTESTINAL: soft nt + bs  INTEGUMENT: visible skin intact.   PSYCH: oriented to time, place and person    Labs:   Results from last 7 days   Lab Units 11/19/20  0630   LN-SODIUM mmol/L 140   LN-POTASSIUM mmol/L 3.7   LN-CHLORIDE mmol/L 105   LN-CO2 mmol/L 28   LN-BLOOD UREA NITROGEN mg/dL 14   LN-CREATININE mg/dL 0.51*   LN-CALCIUM mg/dL 7.9*   LN-PROTEIN TOTAL g/dL 6.0   LN-BILIRUBIN TOTAL mg/dL 0.7   LN-ALKALINE PHOSPHATASE U/L 84   LN-ALT (SGPT) U/L 49*   LN-AST (SGOT) U/L 49*       Results from last 7 days   Lab Units 11/19/20  0630 11/17/20  0555 11/17/20  0555   LN-WHITE BLOOD CELL COUNT thou/uL  --   --  8.6   LN-HEMOGLOBIN g/dL  --   --  14.3   LN-HEMATOCRIT %  --   --  43.9   LN-PLATELET COUNT thou/uL 418   < > 407    < > = values in this interval not displayed.       Micro:   No growth     Imaging: all imaging personalized reviewed   11/13 CXR - No pleural fluid or pneumothorax. Heterogeneous pulmonary opacities are concerning for  multifocal atypical infection and have increased from the prior study. Normal size of the heart

## 2021-06-13 NOTE — PROGRESS NOTES
Patient tolerating HF, occasional desaturation with activity. Tolerating liquid diet so now advanced to regular. Patient reports decreased appetite in pervious days and is having diarrhea Patient's , Barry, updated this morning. Patient ate 100% of dinner. Weaned HF FIO2 at 70% and remains at 55L.

## 2021-06-13 NOTE — PROGRESS NOTES
Care Management Follow Up Note    Length of Stay (days) 5     Patient plan of care discussed at Interdisciplinary Rounds: yes           Pt/Rep Education / Interventions: Review d/c options, Discuss d/c, Discussed readmission risk    Expected Discharge Date: 11/20/20  Concerns to be Addressed / Barriers to Discharge:  ICU cares oxygen requirements, remdesivir, IV decadron, clinical improvement    Anticipated Discharge Disposition:   home  Anticipated Discharge Services:    to be determined  Selected Continued Care - Admitted Since 11/13/2020    No services have been selected for the patient.        Anticipated Discharge DME:      Plan:  Chart review and updates with care team.  Care management will continue to follow and is available to assist as needed    Tracee Desir RN

## 2021-06-13 NOTE — PLAN OF CARE
Problem: Inadequate Gas Exchange  Goal: Patient will achieve/maintain normal respiratory rate/effort  Outcome: Progressing     Problem: Daily Care  Goal: Daily care needs are met  Outcome: Progressing   Pt was difficult to keep at RASS goal, either +2 or -3 throughout shift. Precedex increased, Fentanyl given X1. Copious oral secretions. Loose stools.

## 2021-06-13 NOTE — PLAN OF CARE
Problem: Inadequate Gas Exchange  Goal: Patient will achieve/maintain normal respiratory rate/effort  Outcome: Progressing     Sats remain stable on high flow NC.  Patient still coughing quite a bit and does desat momentarily before returning to 90's.  PRN cough syrup given as well as a cepacol tablet.  Patient also requested something for sleep and melatonin was given.  Purewick in place, patient refusing to get up to use commode.  Able to make needs known.    Gina Chen, RN

## 2021-06-13 NOTE — PLAN OF CARE
Problem: Inadequate Gas Exchange  Goal: Patient will achieve/maintain normal respiratory rate/effort  Outcome: Progressing   Titrating nimbex down. Patient synchronous with vent at this time. RR-32. ABG sent, awaiting results.    Problem: Glucose Imbalance  Goal: Achieve optimal glucose control  Outcome: Progressing   Blood glucose 156. Sliding scale given.     Problem: Urinary Incontinence  Goal: Perineal skin integrity is maintained or improved  Outcome: Progressing   Arteaga catheter in place with adequate output. Lauren, hazy with sediment    Increased tube feeding from 20 to 25ml/hr per order. Flushed with 200cc of water.  Residual of 8 ml.

## 2021-06-13 NOTE — PROGRESS NOTES
BiPAP:  Mode: ST  IPAP: 15  EPAP: 8  Set RR: 20  FiO2: 75%    Pt on bipap throughout shift, tolerating well. No breakdown noted on skin.

## 2021-06-13 NOTE — PLAN OF CARE
Problem: Potential for Compromised Skin Integrity  Goal: Nutritional status is improving  Outcome: Progressing  Note: Tolerating tube feeding at goal rate. 5 mL residual. Gave 200 mL water flush at 1700.      Problem: Glucose Imbalance  Goal: Achieve optimal glucose control  Outcome: Progressing  Note:

## 2021-06-13 NOTE — PROGRESS NOTES
CRITICAL CARE PROGRESS NOTE:    Assessment/Plan:  60 y.o. year old female with PMH Significant for breast cancer s/p radiation/mastectomy.  Presented to ER 11/13 and dg with COVID-19. She decompensated and O2 went from 4 liters to HFNC 80% on 60 L, transferred to the ICU and intubated on 12/1/2020.    RESP:  ARDS, prolonged hypoxemic resp failure due to covid-19 infection. Approaching 2 weeks on vent. Minimal vent settings. Main barrier to extubation is severe anxiety with agitation limiting SBT performance. Last P:F on ABG from 12/12 was 228    Cont LPV with Vt 375cc (6-7cc/kg)    Pplat <30, last was 30     Daily sedation vacation and SBT's    ABG prn, no need for scheduled ABG's    Titrate FiO2 for goal O2 sat 88-92% or PaO2 55 mm Hg or greater.    Try some cough medicine with codeine today to see if this helps with cough    CV:  Shock, persistent. Likely some vasoplegia due to high sedation requirements. No echo on file.    MAP >65, wean NE as able. On and off low dose NE last few days    Hold home anti-hypertensives    Lactate normal on 12/12 no need to trend further.    Keep even to slightly net neg vol status.    NEURO:  Encephalopathy, agitation, anxiety. Psych following    Greatly appreciate psych assistance    Cont seroquel 50mg four times a day    zyprexa + atarax discontinued 12/14    Cont trazodone 25mg four times a day prn for agitation and at bedtime prn for sleep    Cont seroquel 25mg four times a day prn for agitation    Cont depakote sprinkles 125mg three times a day      Cont dex for RASS goal 0 to -1 - try to wean off today     Tylenol prn pain    GI:  No issues, tolerating TF    Cont TF, advance as tolerated    Cont FWF    PPI    Bowel regimen    RENAL:  No issues. Normal renal function    Maintain potter    Avoid nephrotoxins    Keep on even to slightly dry side as above with Lasix prn    ID:  Severe covid-19 infection, improving. Yeast in urine, ?colonizer vs. Contaminant. Mild temp overnight  100.4. febrile today to 101+ with persistent hypotension concerning for new sepsis/septic shock    Completed Dex + remdesivir    Monitor off abx    F/u culture data    Blood, urine, sputum cultures; UA; CXR for hospital-acquired infection workup    HEMATOLOGIC:  Mild leukocytosis, stable. Stable mild anemia    hgb >7    ENDOCRINE:  No issues    Cont Lantus 6U daily    FSBG checks, insulin sliding scale/drip per ICU protocol     ICU PROPHYLAXIS:    peridex    LMWH two times a day per covid cat B a/c guidelines    PPI    CODE STATUS, DISPOSITION, FAMILY COMMUNICATION: full code  Updated  Arturo yesterday.     Lines/Drains/Tubes:  TL PICC 12/1  Art line left axillary 12/4  Arteaga  OG tube  ETT 7.5mm    Restraints  Progress Note  Restraint Application    I recognize that restraints are physical and/or chemical interventions intended to restrict a person's movements. Restraints are currently needed to ensure the safety of this patient and/or others. My clinical rationale appears below.    Category/Type of Restraint     Non Violent:  Soft limb restraint x2  --  Behavior  Pulling at tubes/lines  --  Root Cause of the Behavior  Sedation/intubation  --  Less-Restrictive Measures that Failed  Non Violent Measures:  Close Observation  --  Response to the Restraint  Patient unable to pull at tubes/lines  --  Criteria for Release from the Restraint  Patient calm and off sedation    Bashir (Sharan) MD Britt  [facility] Pulmonary & Critical Care  Pager [phone]  Clinic [phone]      Overnight events:  No major issues overnight  Psych saw yesterday, added depakote sprinkles and stopped zyprexa  Extremely agitated with coughing fit when sedation lightened this AM, desaturated and became hypotensive.    NE @0.05     Does have +CLT per RT.     Subjective:  Unable to assess    Objective:  Physical Exam:  Vent settings for last 24 hours:  Vent Mode: VCV  FiO2 (%):  [40 %-100 %] 40 %  S RR:  [20] 20  S VT:  " [375 mL] 375 mL  PEEP/CPAP (cm H2O):  [5 cm H2O] 5 cm H2O  Minute Ventilation (L/min):  [7.5 L/min-12.2 L/min] 12.2 L/min  PIP:  [35 cm H2O-61 cm H2O] 61 cm H2O  MAP (cm H2O):  [10-21] 21    BP 91/58   Pulse 92   Temp 99.7  F (37.6  C) (Oral)   Resp 21   Ht 5' 5\" (1.651 m)   Wt 135 lb 11.2 oz (61.6 kg)   SpO2 94%   BMI 22.58 kg/m      Intake/Output last 3 shifts:  I/O last 3 completed shifts:  In: 3559.8 [I.V.:886.8; NG/GT:2673]  Out: 2750 [Urine:2750]  Intake/Output this shift:  I/O this shift:  In: 200 [NG/GT:200]  Out: 200 [Urine:200]    Physical Exam  Gen: intubated, sedated  HEENT: no OP lesions, no DOT  CV: RRR, no m/g/r  Resp: clear ant no wheezing  Abd: soft, nontender, BS+  Neuro: PERRL, nonfocal. Does follow commands when calm  Ext: no edema    LAB:  Results from last 7 days   Lab Units 12/15/20  0442   LN-WHITE BLOOD CELL COUNT thou/uL 11.3*   LN-HEMOGLOBIN g/dL 10.3*   LN-HEMATOCRIT % 32.9*   LN-PLATELET COUNT thou/uL 496*     Results from last 7 days   Lab Units 12/15/20  0442 12/14/20  0429 12/13/20  0406   LN-SODIUM mmol/L 137 137 137   LN-POTASSIUM mmol/L 3.9 3.7 4.1   LN-CHLORIDE mmol/L 103 101 102   LN-CO2 mmol/L 25 26 27   LN-BLOOD UREA NITROGEN mg/dL 11 12 12   LN-CREATININE mg/dL 0.46* 0.52* 0.50*   LN-CALCIUM mg/dL 8.1* 8.3* 8.5       Urine 12/11  + yeast    Current Facility-Administered Medications   Medication Dose Route Frequency Provider Last Rate Last Admin     acetaminophen solution 650 mg (TYLENOL)  650 mg Enteral Tube Q4H PRN Tish Adkins CNP   650 mg at 12/14/20 2117     acetaminophen tablet 650 mg (TYLENOL)  650 mg Oral Q4H PRN Ramon Haro MD   650 mg at 11/30/20 0058     bacitracin ointment packet 1 packet  1 packet Topical Once PRN Tish Adkins CNP         benzocaine-menthoL lozenge 1 lozenge (CEPACOL)  1 lozenge Oral Q1H PRN Eben Butterfield MD         bisacodyL suppository 10 mg (DULCOLAX)  10 mg Rectal Daily PRN Eben Butterfield MD    "      calcium (as carbonate) chewable tablet 400 mg (TUMS)  400 mg Oral TID PRN Ramon Haro MD   400 mg at 11/18/20 1222     chlorhexidine 0.12 % solution 15 mL (PERIDEX)  15 mL Topical Q12H Tish Adkins, CNP   15 mL at 12/15/20 0538     codeine-guaiFENesin  mg/5 mL liquid 5 mL (GUAIFENESIN AC)  5 mL Oral Q4H PRN Bashir De La Torre MD         dexmedetomidine (PRECEDEX) 1000 mcg/250 mL in NS (4 mcg/mL) infusion  0.1-1.5 mcg/kg/hr Intravenous Continuous Jeramie Crespo MD 9.2 mL/hr at 12/15/20 0830 0.6 mcg/kg/hr at 12/15/20 0830     dextrose 50 % (D50W) syringe 20-50 mL  20-50 mL Intravenous Q15 Min PRN Ramon Haro MD         enoxaparin ANTICOAGULANT syringe 30 mg (LOVENOX)  0.5 mg/kg Subcutaneous BID Oren Vásquez MD   30 mg at 12/15/20 0822     fentaNYL pf injection 25-50 mcg (SUBLIMAZE)  25-50 mcg Intravenous Q1H PRN Jennifer Gunter CNP   50 mcg at 12/15/20 0325     glucagon (human recombinant) injection 1 mg  1 mg Subcutaneous Q15 Min PRN Ramon Haro MD         hydrALAZINE injection 10 mg (APRESOLINE)  10 mg Intravenous Q4H PRN Ramon Haro MD         influenza vaccine quad (PF) (age 50-64 YR) 2020-21 injection 0.5 mL (FLUBLOK)  0.5 mL Intramuscular Prior to Discharge Jeramie Crespo MD         insulin aspart U-100 injection pen (NovoLOG)   Subcutaneous Q6H FIXED TIMES Eben Butterfield MD   2 Units at 12/14/20 0551     insulin glargine injection 6 Units (LANTUS)  6 Units Subcutaneous QAM Oren Vásquez MD   6 Units at 12/15/20 0823     lipase-protease-amylase 5,000-17,000- 24,000 unit capsule 2 capsule (ZENPEP)  2 capsule Enteral Tube PRN Eben Butterfield MD        And     sodium bicarbonate tablet 325 mg  325 mg Enteral Tube PRN Eben Butterfield MD         LORazepam 1 mg injection (diluted concentration)  1 mg Intravenous Q4H PRN Oren Vásquez MD   1 mg at 12/14/20 0155     LORazepam 1 mg/0.5 mL concentrated solution  1 mg (ATIVAN)  1 mg Oral Q6H Oren Vásquez MD   1 mg at 12/15/20 0544     magnesium hydroxide suspension 30 mL (MILK OF MAG)  30 mL Oral Daily PRN Eben Butterfield MD         midazolam (PF) injection 1 mg (VERSED)  1 mg Intravenous Q2H PRN Tish Adkins CNP   1 mg at 12/05/20 2330     naloxone injection 0.2 mg (NARCAN)  0.2 mg Intravenous Q2 Min PRN Ramon Haro MD        Or     naloxone injection 0.2 mg (NARCAN)  0.2 mg Intramuscular Q2 Min PRN Ramon Haro MD        Or     naloxone injection 0.4 mg (NARCAN)  0.4 mg Intravenous Q2 Min PRN Ramon Haro MD        Or     naloxone injection 0.4 mg (NARCAN)  0.4 mg Intramuscular Q2 Min PRN Ramon Haro MD         norepinephrine 4 mg/250 mL in NS (16 mcg/mL) - CENTRAL  0.01-0.4 mcg/kg/min Intravenous Continuous Bashir De La Torre MD 11.5 mL/hr at 12/15/20 0832 0.05 mcg/kg/min at 12/15/20 0832     omeprazole capsule 20 mg (PriLOSEC)  20 mg Oral QAM Tish Gonzalez CNP        Or     omeprazole suspension 20 mg (PriLOSEC)  20 mg Enteral Tube QAM Tish Gonzalez CNP   20 mg at 12/15/20 0820     ondansetron injection 4 mg (ZOFRAN)  4 mg Intravenous Q4H PRN Eben Butterfield MD   4 mg at 12/06/20 2219    Or     ondansetron tablet 8 mg (ZOFRAN)  8 mg Oral Q8H PRN Eben Butterfield MD         polyethylene glycol packet 17 g (MIRALAX)  17 g Oral Daily PRN Ramon Haro MD   17 g at 12/04/20 0834     polyvinyl alcohol 1.4 % ophthalmic solution 1-2 drop (LIQUIFILM TEARS)  1-2 drop Both Eyes Q1H PRN Eben Butterfield MD         QUEtiapine tablet 25 mg (SEROquel)  25 mg Oral QID PRN Alma Mcmillan CNP   25 mg at 12/15/20 0820     QUEtiapine tablet 50 mg (SEROquel)  50 mg Oral Daily PRN Alma Mcmillan CNP         QUEtiapine tablet 75 mg (SEROquel)  75 mg Oral QID Alma Mcmillan CNP         senna-docusate 8.6-50 mg tablet 1 tablet (PERICOLACE)  1 tablet Oral BID Pranav Elam,  Eben Alcantara MD   Stopped at 11/30/20 2100    Or     sennosides syrup 8.8 mg (for SENOKOT)  8.8 mg Enteral Tube BID Eben Butterfield MD   8.8 mg at 12/15/20 0821     sodium chloride 0.9%  10 mL/hr Intravenous Continuous Eben Butterfield MD 10 mL/hr at 12/14/20 1200 10 mL/hr at 12/14/20 1200     sodium chloride bacteriostatic 0.9 % injection 0.1-0.3 mL  0.1-0.3 mL Subcutaneous PRN Ramon Haro MD         sodium chloride flush 10-20 mL (NS)  10-20 mL Intravenous PRN Tish Adkins CNP   10 mL at 12/04/20 1736     sodium chloride flush 10-30 mL (NS)  10-30 mL Intravenous PRN Tish Adkins CNP         sodium chloride flush 10-30 mL (NS)  10-30 mL Intravenous Q8H FIXED TIMES Tish Adkins CNP   10 mL at 12/14/20 2120     sodium chloride flush 20 mL (NS)  20 mL Intravenous PRN Tish Adkins CNP         traZODone tablet 25 mg (DESYREL)  25 mg Oral QID PRN Alma Mcmillan CNP         valproic acid (as sodium salt) solution 250 mg (DEPAKENE)  250 mg Oral Q8H FIXED TIMES Alma Mcmillan CNP           Critical care attestation: 45 minutes spent managing the following issues: acute respiratory failure requiring intubation/IMV, circulatory shock requiring continuous vasopressor infusions, severe covid-19 infection c/b ARDS and prolonged hypoxemic resp failure, toxic-metabolic encephalopathy with severe agitation and anxiety limiting SBT performance. High risk for organ deterioration and death requiring ICU level care.

## 2021-06-13 NOTE — PROGRESS NOTES
Critical Care Progress Note  11/27/2020     Admit Date: 11/13/2020  Moved to ICU on 11/15/20    Code Status: full code      Problem List:   Principal Problem:    Pneumonia  Active Problems:    Hypokalemia    Acute respiratory failure with hypoxia (H)    Pneumonia due to 2019 novel coronavirus    Severe protein-calorie malnutrition (H)  anxiety     Overnight Events  No acute events    Plan by System:     Neuro/Psych: no acute issues; at risk for developing ICU delirium    - try to maintain day/night schedule.    - avoid benzos     Pulmonary: Acute respiratory failure with hypoxia due to COVID19 viral pneumonia;   - HFNC 20 - 60 L   - BIPAP as needed - she has been refusing.    - Encourage self proning - she has been refusing.   - stable for several days on same oxygen level.     CV: No acute issues    Tele monitoring.     GI/: Eating when not on bipap   - Bowel protocol.     Renal: no acute issues    - monitor     ID: COVID19 viral pneumonia    - Dexamethasone x 10 days   - Remdesivir course completed    Heme/Coag:  COVID19 coagulopathy    - LMWH 0.5mg/kg twice daily     Endocrine: Steroid induced hyperglycemia    -  No coverage needed.   - discontinue FSBG and sliding scale insulin    ICU PROPHYLAXIS:    Enoxaparin 0.5 mg/kg two times a day    PPI    CODE STATUS, DISPOSITION, FAMILY COMMUNICATION: Full code. Stable for transfer out of ICU if needed. Discussed with Dr. MO Up who will take over care of patient.       Jennifer Leonard [facility] Pulmonary and Critical [facility] Medicine      _______________________________________________________________    HPI: Hafsa Robles is a 60 y.o. year old female with PMH Significant for breast cancer s/p radiation. Not currently under treatment. No history of heart, lung, liver, kidney disease. No diabetes. Presented to ER Nov 13 with one week history of cough, fever, fatigue. Diagnosed with Covid 19 and treated for possible bacterial superinfection. She had  rapid escalation in her oxygen needs  from 4L NC to HFNC 80% 60 L/min and so she was transferred to ICU for further monitoring.     Subjective: Up in chair, having breakfast. Has no complaints.      Objective:     Vitals:    11/27/20 0900 11/27/20 1100 11/27/20 1138 11/27/20 1200   BP: 93/55      Patient Position:       Pulse: 99 97  97   Resp: (!) 31 (!) 30  (!) 30   Temp:       TempSrc:       SpO2: 94% 96% 95% 94%   Weight:       Height:          I/O:     Intake/Output Summary (Last 24 hours) at 11/27/2020 1316  Last data filed at 11/26/2020 2013  Gross per 24 hour   Intake 260 ml   Output 850 ml   Net -590 ml     Wt Readings from Last 3 Encounters:   11/27/20 136 lb 7.4 oz (61.9 kg)   12/16/14 130 lb (59 kg)      Weight change: -14.1 oz (-0.4 kg)    Physical Exam:  Gen: awake, no distress   HEENT: AT/NC, on HFNC  NEURO: PERRL, alert, nonfocal moving all extremities  CARDIOVASCULAR: RRR  PULMONARY: unlabored on high flow  GASTROINTESTINAL: soft nt + bs  SKIN: no rashes on visible skin    Labs:   Results from last 7 days   Lab Units 11/27/20  0358   LN-SODIUM mmol/L 134*   LN-POTASSIUM mmol/L 5.2*   LN-CHLORIDE mmol/L 95*   LN-CO2 mmol/L 28   LN-BLOOD UREA NITROGEN mg/dL 10   LN-CREATININE mg/dL 0.48*   LN-CALCIUM mg/dL 7.7*       Results from last 7 days   Lab Units 11/27/20  0358 11/26/20  0454   LN-WHITE BLOOD CELL COUNT thou/uL  --  15.5*   LN-HEMOGLOBIN g/dL  --  13.0   LN-HEMATOCRIT %  --  39.3   LN-PLATELET COUNT thou/uL 314 266  266

## 2021-06-13 NOTE — PROGRESS NOTES
PULMONARY / CRITICAL CARE FOLLOW UP NOTE    Date / Time of Admission:  11/13/2020  5:17 PM    Assessment:     1. Acute respiratory failure s/p intubation 12/1  Improvement of paO2/FiO2 ratio , currently 202  Dyssynchronous with ventilator when paralytics were discontinued.   Plan to wean off epoprostenol, then attempt to discontinue paralytic drip and titrate up sedation.   2. Pneumonia due to 2019 novel coronavirus  Completed 5 days Remdesivir. On IV dexamethasone   3. Hx breast cancer s/p partial mastectomy and adjuvant radiation therapy 2017  4. Severe protein-calorie malnutrition (H)    Advance Directives: Full code    Plan:   1. Titrate vent settings A/C 35/250/12/40%  2. Wean off epoprostenol continuous nebs  3. Sedation to keep RASS -4, versed, fentanyl drip  4. Re-attempt to discontinue paralytics   5. Heplock IV fluids  6. Titrate BP meds  7. Continue IV diuresis  8. On empiric Abx zosyn  9. IV dexamethasone  10. Advance tube feedings   11. PPI for GI prophylaxis   12. Glucose level monitoring  13. DVT prophylaxis lovenox SQ      Please contact me if you have any questions.  Total critical care time, not including separately billable procedure time: 45 minutes  This patient had a high probability of imminent or life threatening deterioration due to acute respiratory failure which required my direct attention, intervention and personal management.     Eben Elam  Pulmonary / Critical Care  12/3/2020   12:05 PM      ICU DAILY CHECKLIST                           Can patient transfer out of MICU? no    FAST HUG:    Feeding:  Feeding: yes.  Patient is receiving tube feedings  [place]:Yes  Analgesia/Sedation: versed, fentanyl   Thromboembolic prophylaxis: yes; Mode:  Lovenox  HOB>30:  Yes  Stress Ulcer Protocol Active: yes; Mode: PPI  Glycemic Control: Any glucose > 180 no; Mode of Insulin Therapy: Sliding Scale Insulin    INTUBATED:  Can patient have daily waking:  no  Can patient have spontaneous  breathing trial:  no    Restraints? no    PHYSICAL THERAPY AND MOBILITY:  Can patient have PT and mobility trial: no  Activity: Bed Rest      Subjective:   HPI:  Hafsa Robles is a 60 y.o. female with hx breast cancer s/p partial mastectomy and radiation therapy 2017.  Presented to ER on Nov 13 for evaluation of shortness of breath, cough, fever and fatigue for one week.   Diagnosed with Covid 19 and started on treatment for possible bacterial superinfection.   Patient required ICU admission due to increase O2 requirements. Patient stabilized from the respiratory point of view and she was transferred to medical floor on 11/27.  Unfortunately, patient had increased O2 requirements, from high flow nasal cannula, she was started on BiPAP and FiO2 was titrated to 100%. Transferred to ICU on 11/30. Patient was intubated on 12/1. Required, prone position,  Paralytics, epoprostenol continuous nebs.     Events   - Improvement of PaO2/FiO2 ratio  - Dyssynchronous with ventilator after discontinue paralytics.   - Paralytics were resumed.  - Epoprostenol nebs are weaning off.      Past Medical History:   Diagnosis Date     Malignant neoplasm of right female breast (H)      Allergies: Patient has no known allergies.     MEDS:  Scheduled Meds:    amino acids-protein hydrolys  1 packet Enteral Tube BID     benzonatate  200 mg Oral QHS     chlorhexidine  15 mL Topical Q12H     dexamethasone  6 mg Intravenous Q24H     enoxaparin ANTICOAGULANT  0.5 mg/kg Subcutaneous Q12H     furosemide  20 mg Intravenous BID - diuretic     insulin aspart (NovoLOG) injection   Subcutaneous Q6H FIXED TIMES     omeprazole  20 mg Oral QAM AC    Or     omeprazole  20 mg Enteral Tube QAM AC    Or     pantoprazole  40 mg Intravenous QAM AC     piperacillin-tazobactam  3.375 g Intravenous Q8H     senna-docusate  1 tablet Oral BID    Or     senna (SENOKOT) syrup  8.8 mg Enteral Tube BID     sodium chloride  10-30 mL Intravenous Q8H FIXED TIMES     white  "petrolatum-mineral oiL  1 application Both Eyes Q8H     Continuous Infusions:    cisatracurium (NIMBEX) infusion 3 mcg/kg/min (12/03/20 1054)     epoprostenol 8 mL/hr at 12/03/20 1138     fentaNYL citrate (PF) 125 mcg/hr (12/03/20 1100)     insulin infusion (1 unit/mL)       midazolam 5 mg/hr (12/03/20 1055)     norepinephrine 0.03 mcg/kg/min (12/03/20 0815)     sodium chloride 0.9% 10 mL/hr (12/03/20 0400)     PRN Meds:.acetaminophen, acetaminophen, bacitracin, benzocaine-menthoL, benzonatate, bisacodyL, calcium (as carbonate), dextrose 50 % (D50W), diphenhydrAMINE, fentaNYL - BOLUS DOSE from infusion, fentaNYL - BOLUS DOSE from infusion, glucagon (human recombinant), hydrALAZINE, insulin infusion (1 unit/mL), lipase-protease-amylase **AND** sodium bicarbonate, LORazepam, magnesium hydroxide, melatonin, midazolam, naloxone **OR** naloxone **OR** naloxone **OR** naloxone, ondansetron **OR** ondansetron, polyethylene glycol, polyvinyl alcohol, sodium chloride bacteriostatic, sodium chloride bacteriostatic, sodium chloride, sodium chloride, sodium chloride, traZODone    Objective:   VITALS:  BP 97/69   Pulse 81   Temp 97.9  F (36.6  C) (Axillary)   Resp (!) 35   Ht 5' 5\" (1.651 m)   Wt 136 lb 14.5 oz (62.1 kg)   SpO2 97%   BMI 22.78 kg/m    Vent Mode: VCV  FiO2 (%):  [40 %-100 %] 50 %  S RR:  [35] 35  S VT:  [230 mL-250 mL] 250 mL  PEEP/CPAP (cm H2O):  [12 cm H2O] 12 cm H2O  Minute Ventilation (L/min):  [7.3 L/min-9.7 L/min] 9.3 L/min  PIP:  [32 cm H2O-41 cm H2O] 41 cm H2O  MAP (cm H2O):  [16-20] 18    EXAM:   Gen: sedated, vented  HEENT: pink conjunctiva, moist mucosa  Neck: no thyromegaly, masses or JVD  Lungs: crackles both HT  CV: regular, no murmurs or gallops appreciated  Abdomen: soft, NT, BS wnl  Ext: no edema  Neuro: sedated, unresponsive    I&O:      Intake/Output Summary (Last 24 hours) at 12/3/2020 1205  Last data filed at 12/3/2020 1200  Gross per 24 hour   Intake 1570.62 ml   Output 1890 ml   Net " -319.38 ml       Data Review:  Results from last 7 days   Lab Units 12/03/20 0419   LN-WHITE BLOOD CELL COUNT thou/uL 14.0*   LN-HEMOGLOBIN g/dL 12.2   LN-HEMATOCRIT % 39.8   LN-PLATELET COUNT thou/uL 468*     Results from last 7 days   Lab Units 12/03/20 0419   LN-SODIUM mmol/L 145   LN-POTASSIUM mmol/L 4.4   LN-CHLORIDE mmol/L 100   LN-CO2 mmol/L 35*   LN-BLOOD UREA NITROGEN mg/dL 35*   LN-CREATININE mg/dL 0.57*   LN-CALCIUM mg/dL 8.9      12/3/2020 05:54   pH, Arterial 7.38   pCO2, Arterial 67 (H)   pO2, Arterial 81   Bicarbonate, Arterial Calc 34.9 (H)   O2 Sat, Arterial 96.7   Oxyhemoglobin 95.7 (L)   POC Base Excess Calc 13.8   Ventilation Mode VCV   Peep 12   Sample Stabilized Temperature 37.0   Rate 35   FIO2 40.00     Sputum Culture Usual Charlette              Gram Stain Result  3+ Polymorphonuclear leukocytes      No organisms seen               XR CHEST 1 VIEW PORTABLE  LOCATION: [facility]  DATE/TIME: 12/1/2020 12:36 PM  INDICATION: ETT placement, worsening condition.  COMPARISON: 12/01/2020.  IMPRESSION:   Extensive bilateral opacities are present, similar to same-day radiograph. No substantial pleural effusion. No pneumothorax. Normal heart size. ET tube tip appropriately positioned, roughly 23 mm above the fede. Enteric tube courses into the stomach and off the field of view. Right PICC tip over the distal SVC.    By:  Eben Elam, 12/3/2020, 12:05 PM    Primary Care Physician:  Provider, No Primary Care

## 2021-06-13 NOTE — PROGRESS NOTES
Problem: Inadequate Gas Exchange  Goal: Patient will achieve/maintain normal respiratory rate/effort  Outcome: Not Progressing   Problem: Excessive Fluid Volume  Goal: Patient will achieve/maintain normal respiratory rate/effort  Outcome: Not Progressing   Problem: Ineffective Airway Clearance  Goal: Maintain airway patency  Outcome: Not Progressing   Problem: Impaired Gas Exchange  Goal: Demonstrate improved ventilation and adequate oxygenation of tissues as evidenced by absence of respiratory distress  Outcome: Not Progressing   Problem: Breathing  Goal: Patient will maintain patent airway  Outcome: Not Progressing  Goal: Patient will utilize incentive spirometer  Outcome: Not Progressing    Around 6:40pm, pt reported that her cough has become more congested, with phlegm feeling thick in the base of her throat and difficult to release. IS dropped from 1200mL this morning to 500mL. Continued crackles in bilat lungs, and O2sats decreased to 86% on High-flow NC 40L @ 55% with speaking and slight movement. Sats reached no higher than 90%. Pt denied shortness of breath, and no respiratory distress observed. Prn benzonatate for cough and cepacol for sore throat administered, and  GUSTAVO ChangT notified. Phuong assessed pt and reported that she needed to increase High-Flow NC to 60L @ 80% to keep sats >90%. She reported that she would speak with the House Officer to determine probable need to transfer pt to ICU. Dr. Low Sweet assessed pt and placed orders to transfer pt to ICU. Pt transferred to ICU with all personal belongings and room medications at 8:45pm, accompanied by RT and SWAT nurse. Writer gave report to ICU RN, Lydia Bennett. Writer also left message with pt's spouse, Elieser Robles, to call [facility] for update of pt status.

## 2021-06-13 NOTE — PLAN OF CARE
Problem: Impaired Gas Exchange  Goal: Demonstrate improved ventilation and adequate oxygenation of tissues as evidenced by absence of respiratory distress  Outcome: Progressing   Patient being weaned off of Versed. Currently at 2mg Versed down from 6mg. Fentanyl down to 100mcg/hr. Next shift will continue to wean Versed. Patient can wake up restless at times. So weaned slower than normal. Pain seems well controlled will continue to monitor.  Kristel Chapman RN

## 2021-06-13 NOTE — PROGRESS NOTES
Clinical Nutrition Therapy Follow Up Note      Current Nutrition Prescription:   Diet: Regular  Diet Supplements: none  IV dextrose or Fluids: none    Current Nutrition Intake:  The patient's current meal intake is poor <50%   Total nutrient intake from all sources does not meet estimated nutritional needs.     Patient stated that now she has a very good appetite which is an improvement from the past 2 weeks. Patient says the food here is very good and she is very happy to have it.     Patient has been eating % of meals per nursing records and only advanced to regular diet this morning.     Anthropometrics:  Admission weight: 145 lb 8 oz (66 kg) bed  Weight: 138 lb 0.1 oz (62.6 kg) bed 5.2% unintentional weight loss in 1 week    GI Status/Output:   GI symptoms include: WDL per nurse and Diarrhea per patient  Bowel Sounds present per nurse  Last BM: 11/20/20 per patient    Skin/Wound:  No wound was noted.    Medications:  Medications reviewed: decadron, pantoprazole     Labs:  Labs reviewed: Cr 0.53    Malnutrition: Patient meets diagnostic criteria for severe protein calorie malnutrition in the context of acute illness as evidenced by  unintentional weight loss and poor nutrient intake    Nutrition Risk Level: moderate risk    Nutrition dx:  Inadequate oral intake r/t acute respiratory failure as evidenced by Bipap/HFNC dependence    Malnutrition r/t COVID-19 as evidenced by prolonged poor intake of < 50% estimated nutrition needs for 2 weeks and 5.2% wt loss in 1 week.    Goal Status:  Maintain weight-not progresing  Meet estimated nutrition needs-progressing  Diet advance-met    Intervention:  Recommend multivitamin with minerals as patient is meeting < 75% estimtated nutrition needs    Encourage PO    Monitoring/Evaluation:  PO intake, wt, labs, need for nutrition supplements

## 2021-06-13 NOTE — PLAN OF CARE
Problem: Safety  Goal: Patient will be injury free during hospitalization  Outcome: Progressing     Problem: Daily Care  Goal: Daily care needs are met  Outcome: Progressing     Problem: Breathing  Goal: Patient will maintain patent airway  Outcome: Progressing   Pt remains at 55 Lpm high flow nasal canula at 67% O2. Pt reports discomfort following coughing bouts. Cough managed with PRN benzonatate and PRN codeine-guaifenesin. Family updated via phone call.  utilized. Telemetry monitoring discontinued. No other concerns noted.   Immanuel Sher  11/28/2020  3:11 PM

## 2021-06-13 NOTE — PROGRESS NOTES
CRITICAL CARE PROGRESS NOTE:    Assessment/Plan:  Hafsa Robles is a 60 y.o. lady with a past medical history significant for breast cancer s/p partial mastectomy and adjuvant radiation in 2017, who presented to the hospital on 11/13/2020  5:17 PM for acute hypoxemic respiratory failure.  She was subsequently diagnosed with Covid-19 infection and spent several days in the intensive care unit alternating between high flow and BiPAP.  On 11/30 she was transferred back to the intensive care unit worsening hypoxia and was emergently intubated on the morning of 12/1.    RESP:  ARDS, resp failure, worsened after initial improvement from covid with NIPPV, HFNC. Intubated 12/1. Pf: ratio 262 this AM iwhile supine approaching minimal vent settings. On very low Vt ventilation at 4cc/kg IBW, will liberalize today to promote weaning over the next few days     Cont LPV, liberalize to Vt 350cc which is 66cc/kg IBW, decr RR to 24 to maintain similar minute ventilation    Pplat <30, last was 23    Titrate FiO2 for goal O2 sat 88-92% or PaO2 55 mm Hg or higher    Off NMB as of 12/4    Off veletri    Daily ABG's for now.     Could likely start SBT's starting tomorrow    CV:  Shock, circulatory likely vasoplegic from sedation, vasodilatory from severe infection with capillary leak. No echo on file.    MAP >65, off pressors as of 12/4    lactate normal no need to trend further    Hold any home anti-hypertensives    NEURO:  Sedated while on vent. Off paralytic.     Change versed to dex, wean off the versed to promote SBT and vent weaning    Cont fentanyl    RASS goal 0 to -1    Off NMB as of 12/4    Tylenol prn pain    GI:  No issues, LFTs OK on 11/29    Cont TF and advance as tolerated.     Bowel regimen    IV PPI    RENAL:  No issues, normal SCr. Compensated chronic resp acidosis with high serum bicarb     Cont Lasix 20mg IV two times a day for now to keep on dry side    Avoid nephrotoxins    Maintain Arteaga    ID:  Severe covid-19  infection, initially improved then worsend 12/1 with ARDS requiring intubation.    10 days IV dex restarted 11/30, second course - I stopped this 12/5 due to lack of indication for another course.     Completed 5 days of remdesivir    Zosyn to stop today after 7 days empiric.      F/u culture data    completed CTX + azithro earlier in hospitalization    HEMATOLOGIC:  Leukocytosis likely 2/2 steroids, acute stress and infectious processes. No anemia. Wbc improved and normal today.     hgb >7    ENDOCRINE:  No issues    FSBG checks, insulin sliding scale/drip per ICU protocol    ICU PROPHYLAXIS:    LMWH two times a day per covid cat B a/c guidelines    PPI    peridex    CODE STATUS, DISPOSITION, FAMILY COMMUNICATION: full code    Lines/Drains/Tubes:  PICC 12/1 RUE  left ax art line 12/4  OG tube  Arteaga  ETT 7.5mm    Restraints  Progress Note  Restraint Application    I recognize that restraints are physical and/or chemical interventions intended to restrict a person's movements. Restraints are currently needed to ensure the safety of this patient and/or others. My clinical rationale appears below.    Category/Type of Restraint     Non Violent:  Soft limb restraint x2  --  Behavior  Pulling at tubes/lines  --  Root Cause of the Behavior  Sedation/intubation  --  Less-Restrictive Measures that Failed  Non Violent Measures:  Close Observation  --  Response to the Restraint  Patient unable to pull at tubes/lines  --  Criteria for Release from the Restraint  Patient calm and off sedation    Bashir De La Torre MD (Avi)  [facility]  Pager [phone]  Clinic [phone]      Overnight events:  No major issues overnight  PEEP 8, FiO2 0.4 - 0.5  P:f 262 this AM  Awake and following commands this morning  Unable to wean off her versed due to some agitation overnight.  No pressors    Subjective:  Unable to assess    Objective:  Physical Exam:  Vent settings for last 24 hours:  Vent  "Mode: VCV  FiO2 (%):  [40 %-70 %] 40 %  S RR:  [24-32] 24  S VT:  [250 mL-350 mL] 350 mL  PEEP/CPAP (cm H2O):  [8 cm H2O] 8 cm H2O  Minute Ventilation (L/min):  [6.8 L/min-10.2 L/min] 8.9 L/min  PIP:  [27 cm H2O-35 cm H2O] 35 cm H2O  MAP (cm H2O):  [12-15] 13    /82 (Patient Position: Lying)   Pulse 71   Temp 99.3  F (37.4  C)   Resp 24   Ht 5' 5\" (1.651 m)   Wt 135 lb 5.8 oz (61.4 kg)   SpO2 95%   BMI 22.53 kg/m      Intake/Output last 3 shifts:  I/O last 3 completed shifts:  In: 2975.4 [I.V.:252.4; NG/GT:2342; IV Piggyback:381]  Out: 2850 [Urine:2850]  Intake/Output this shift:  I/O this shift:  In: 322.2 [I.V.:62.2; NG/GT:260]  Out: 2030 [Urine:2030]    Physical Exam  Gen: intubated, paralyzed, sedated  HEENT: no OP lesions, no DOT  CV: RRR, no m/g/r  Resp: clear ant no w/r/r   Abd: soft, nontender, BS+  Neuro: PERRL, nonfocal  Ext: no edema    LAB:  Results from last 7 days   Lab Units 12/06/20  0610   LN-WHITE BLOOD CELL COUNT thou/uL 12.7*   LN-HEMOGLOBIN g/dL 11.4*   LN-HEMATOCRIT % 35.9   LN-PLATELET COUNT thou/uL 418     Results from last 7 days   Lab Units 12/06/20  0610 12/05/20  1526 12/05/20  0435 12/04/20  0408 12/04/20  0408 11/29/20  1246 11/29/20  1246   LN-SODIUM mmol/L 141  --  144  --  147*   < >  --    LN-POTASSIUM mmol/L 3.4* 4.4 3.3*   < > 3.2*   < >  --    LN-CHLORIDE mmol/L 96*  --  100  --  99   < >  --    LN-CO2 mmol/L 37*  --  36*  --  40*   < >  --    LN-BLOOD UREA NITROGEN mg/dL 25*  --  34*  --  36*   < >  --    LN-CREATININE mg/dL 0.46*  --  0.52*  --  0.54*   < >  --    LN-CALCIUM mg/dL 8.6  --  8.5  --  8.4*   < >  --    LN-PROTEIN TOTAL g/dL  --   --   --   --   --   --  7.1   LN-BILIRUBIN TOTAL mg/dL  --   --   --   --   --   --  0.5   LN-ALKALINE PHOSPHATASE U/L  --   --   --   --   --   --  146*   LN-ALT (SGPT) U/L  --   --   --   --   --   --  41   LN-AST (SGOT) U/L  --   --   --   --   --   --  36    < > = values in this interval not displayed.     Micro  Sputum " 12/1 neg  Blood pending  C diff neg  Blood neg from 11/14   PCT neg x2      Current Facility-Administered Medications   Medication Dose Route Frequency Provider Last Rate Last Admin     acetaminophen solution 650 mg (TYLENOL)  650 mg Enteral Tube Q4H PRN Tish Adkins CNP   650 mg at 12/05/20 0436     acetaminophen tablet 650 mg (TYLENOL)  650 mg Oral Q4H PRN Ramon Haro MD   650 mg at 11/30/20 0058     bacitracin ointment packet 1 packet  1 packet Topical Once PRN Tish Adkins CNP         benzocaine-menthoL lozenge 1 lozenge (CEPACOL)  1 lozenge Oral Q1H PRN Eben Butterfield MD         benzonatate capsule 200 mg (TESSALON)  200 mg Oral Q6H PRN Ramon Haro MD   200 mg at 11/30/20 0845     [Held by provider] benzonatate capsule 200 mg (TESSALON)  200 mg Oral QHS Seng Up DO   Stopped at 11/30/20 2100     bisacodyL suppository 10 mg (DULCOLAX)  10 mg Rectal Daily PRN Eben Butterfield MD         calcium (as carbonate) chewable tablet 400 mg (TUMS)  400 mg Oral TID PRN Ramon Haro MD   400 mg at 11/18/20 1222     chlorhexidine 0.12 % solution 15 mL (PERIDEX)  15 mL Topical Q12H Tish Adkins CNP   15 mL at 12/06/20 0614     dexmedetomidine 200 mcg/50 mL in NS (PRECEDEX) (4mcg/mL)  0.1-1.5 mcg/kg/hr Intravenous Continuous de La Mater, Sunitha, CNP 3.1 mL/hr at 12/06/20 1150 0.2 mcg/kg/hr at 12/06/20 1150     dextrose 50 % (D50W) syringe 20-50 mL  20-50 mL Intravenous Q15 Min PRN Ramon Haro MD         diphenhydrAMINE tablet 25 mg (BENADRYL)  25 mg Oral Bedtime PRN Ramon Haro MD   25 mg at 11/27/20 0349     enoxaparin ANTICOAGULANT syringe 30 mg (LOVENOX)  0.5 mg/kg Subcutaneous Q12H Sherri Garcia MD   30 mg at 12/06/20 0900     fentaNYL - BOLUS DOSE from infusion 25-50 mcg  25-50 mcg Intravenous Q1H PRN Tish Adkins CNP   50 mcg at 12/06/20 0645     fentaNYL - BOLUS DOSE from infusion  mcg   mcg Intravenous Q1H PRN Jed  TOMMIE Winston   50 mcg at 12/05/20 2020     fentaNYL 2500 mcg/50 mL CADD infusion (50 mcg/mL)   mcg/hr Intravenous Continuous Tish Adkins CNP 2 mL/hr at 12/06/20 1150 100 mcg/hr at 12/06/20 1150     furosemide injection 20 mg (LASIX)  20 mg Intravenous BID - diuretic Bashir De La Torre MD   20 mg at 12/06/20 0900     glucagon (human recombinant) injection 1 mg  1 mg Subcutaneous Q15 Min PRN Ramon Haro MD         hydrALAZINE injection 10 mg (APRESOLINE)  10 mg Intravenous Q4H PRN Ramon Haro MD         insulin aspart U-100 injection pen (NovoLOG)   Subcutaneous Q6H FIXED TIMES Eben Butterfield MD   2 Units at 12/05/20 0501     insulin regular 1 Units/mL in sodium chloride 0.9% 250 mL  0-24 Units/hr Intravenous Continuous PRN Eben Butterfield MD         lipase-protease-amylase 5,000-17,000- 24,000 unit capsule 2 capsule (ZENPEP)  2 capsule Enteral Tube PRN Eben Butterfield MD        And     sodium bicarbonate tablet 325 mg  325 mg Enteral Tube PRN Eben Butterfield MD         LORazepam tablet 0.5 mg (ATIVAN)  0.5 mg Oral Q4H PRN Seng Up DO         magnesium hydroxide suspension 30 mL (MILK OF MAG)  30 mL Oral Daily PRN Eben Butterfield MD         melatonin tablet 3 mg  3 mg Oral Bedtime PRN Ramon Haro MD   3 mg at 11/29/20 2034     midazolam (PF) injection 1 mg (VERSED)  1 mg Intravenous Q2H PRN Tish Adkins CNP   1 mg at 12/05/20 2330     midazolam 100 mg/100 mL (1 mg/mL) infusion (VERSED)  0.25-15 mg/hr Intravenous Continuous Tish Adkins CNP   Stopped at 12/06/20 1152     naloxone injection 0.2 mg (NARCAN)  0.2 mg Intravenous Q2 Min PRN Ramon Haro MD        Or     naloxone injection 0.2 mg (NARCAN)  0.2 mg Intramuscular Q2 Min PRN Ramon Haro, MD        Or     naloxone injection 0.4 mg (NARCAN)  0.4 mg Intravenous Q2 Min Ramon Christensen MD        Or     naloxone injection 0.4 mg  (NARCAN)  0.4 mg Intramuscular Q2 Min PRN Ramon Haro MD         norepinephrine 4 mg/250 mL in NS (16 mcg/mL) - CENTRAL  0.01-0.4 mcg/kg/min Intravenous Continuous Radha, Sherri Craft MD   Stopped at 12/04/20 0100     omeprazole capsule 20 mg (PriLOSEC)  20 mg Oral QAM  Tish Adkins CNP        Or     omeprazole suspension 20 mg (PriLOSEC)  20 mg Enteral Tube QAM  Tish Adkins CNP   20 mg at 12/06/20 0614     ondansetron injection 4 mg (ZOFRAN)  4 mg Intravenous Q4H PRN Eben Butterfield MD        Or     ondansetron tablet 8 mg (ZOFRAN)  8 mg Oral Q8H PRN Eben Butterfield MD         piperacillin-tazobactam 3.375 g in NaCl 0.9 % 50 mL (MINI-BAG Plus) (ZOSYN)  3.375 g Intravenous Q8H Bashir De La Torre MD 12.5 mL/hr at 12/06/20 0410 3.375 g at 12/06/20 0410     polyethylene glycol packet 17 g (MIRALAX)  17 g Oral Daily PRN Ramon Haro MD   17 g at 12/04/20 0834     polyvinyl alcohol 1.4 % ophthalmic solution 1-2 drop (LIQUIFILM TEARS)  1-2 drop Both Eyes Q1H PRN bEen Butterfield MD         potassium chloride 30 mEq/22.5 mL solution 30 mEq (KAYCIEL)  30 mEq Oral Q4H Bashir De La Torre MD   30 mEq at 12/06/20 0900     senna-docusate 8.6-50 mg tablet 1 tablet (PERICOLACE)  1 tablet Oral BID Eben Butterfield MD   Stopped at 11/30/20 2100    Or     sennosides syrup 8.8 mg (for SENOKOT)  8.8 mg Enteral Tube BID Eben Butterfield MD   8.8 mg at 12/04/20 2050     sodium chloride 0.9%  10 mL/hr Intravenous Continuous Eben Butterfield MD 10 mL/hr at 12/06/20 0800 10 mL/hr at 12/06/20 0800     sodium chloride bacteriostatic 0.9 % injection 0.1-0.3 mL  0.1-0.3 mL Subcutaneous PRN Ramon Haro MD         sodium chloride bacteriostatic 0.9 % injection 1-5 mL  1-5 mL Intradermal Once PRN Ramon Haro MD         sodium chloride flush 10-20 mL (NS)  10-20 mL Intravenous PRN Tish Adkins CNP   10 mL at 12/04/20 3582     sodium  chloride flush 10-30 mL (NS)  10-30 mL Intravenous PRN Tish Adkins CNP         sodium chloride flush 10-30 mL (NS)  10-30 mL Intravenous Q8H FIXED TIMES Tish Adkins CNP   10 mL at 12/05/20 2034     sodium chloride flush 20 mL (NS)  20 mL Intravenous PRN Tish Adkins CNP         traZODone tablet 25 mg (DESYREL)  25 mg Oral Bedtime PRN Ramon Haro MD   25 mg at 11/25/20 2113       Critical care attestation: 45 minutes spent managing the following issues: acute respiratory failure requiring intubation/IMV, volume overload, encephalopathy due to toxic-metabolic causes, severe covid-19 infection with ARDS, hypoxemic resp failure, . High risk for organ deterioration and death requiring ICU level care.

## 2021-06-13 NOTE — PLAN OF CARE
Problem: Inadequate Gas Exchange  Goal: Patient will achieve/maintain normal respiratory rate/effort  Outcome: Progressing   Vent, Fi02 35%      Problem: Excessive Fluid Volume  Goal: Patient will achieve/maintain normal respiratory rate/effort  Outcome: Progressing   Lasix IV given with adequate urine out. Pink tinged urine.      Problem: Potential for Compromised Skin Integrity  Goal: Nutritional status is improving  Outcome: Progressing   Tubefeeding at goal.    Patient does cough and trigger the vent at times. She does cough with suction. Precedex is being titrated down, will assess responsiveness. Did attempt a wean without success.  Ativan given per order, will attempt to wean again today. ICU MD notified.

## 2021-06-13 NOTE — PROGRESS NOTES
"RESPIRATORY CARE NOTE    Patient remains intubated with 7.5 ETT secure 23 cm at teeth, tolerating bellow ventilator settings:     Vent Mode: VCV  FiO2 (%):  [40 %-100 %] 40 %  S RR:  [14] 14  S VT:  [400 mL] 400 mL  PEEP/CPAP (cm H2O):  [5 cm H2O] 5 cm H2O  Minute Ventilation (L/min):  [5 L/min-12.1 L/min] 9.1 L/min  PIP:  [12 cm H2O-44 cm H2O] 31 cm H2O  NC SUP:  [5 cm H20-15 cm H20] 15 cm H20  MAP (cm H2O):  [6-13] 12   PIP, 31, Plateau 24    /56 (Patient Position: Lying)   Pulse 89   Temp 98.3  F (36.8  C) (Oral)   Resp (!) 30   Ht 5' 5\" (1.651 m)   Wt 129 lb (58.5 kg)   SpO2 96%   BMI 21.47 kg/m      Weaned for 25 min on day shift on PS15, PEEP 5.   BS clear with scatter course crackles, Sxn small tan thick, Pt tolerated well.  RT will continue to assess and monitor.      Sridhar Lopez, LRT    "

## 2021-06-13 NOTE — PLAN OF CARE
Problem: Inadequate Gas Exchange  Goal: Patient will achieve/maintain normal respiratory rate/effort  Outcome: Not Progressing     Problem: Excessive Fluid Volume  Goal: Patient will achieve/maintain normal respiratory rate/effort  Outcome: Not Progressing   Pt not synchronizing with vent this AM after titrating sedation and giving PRN bolus, MD ordered 10 vec x1. Again became further hypoxic and not synching with vent again, added additional  10 vec and started nimbex gtt. Arterial line placed by MD. Pt prone at 1330. Started levophed gtt as well to maintain MAP greater than 65. Sedation titrated for BIS goal 40-60.

## 2021-06-13 NOTE — PROGRESS NOTES
HMS Progress Note    Patient with COVID-19 infection and respiratory failure, continues on mechanical ventilation and intensivist team is managing as primary. Will continue to follow peripherally.

## 2021-06-13 NOTE — PROGRESS NOTES
"  Clinical Nutrition Therapy Reassessment Note      Reason:  RD managing tube feeding per consult    Nutrition Prescription:   Diet: NPO.  Tube feeding: Replete with fiber at 65 ml/hr (started 12/2)  Flush Orders: 200 ml water every 4 hrs  Propofol:   IV dextrose or Fluids:     dexmedetomidine infusion orderable (PRECEDEX), Last Rate: 0.2 mcg/kg/hr (12/17/20 0000)       norepinephrine, Last Rate: 0.06 mcg/kg/min (12/17/20 0859)       sodium chloride 0.9%, Last Rate: 10 mL/hr (12/16/20 1500)    IV fluids:  1290.8 ml in past 24 hrs    Nutrition Intake:  NPO  FT is an OG placed 12/1/20.  X-ray of 12/2 confirms gastric.  TF provides: 1560 calories, 100 g protein, 193 g carb, 23 g fiber, 2498 ml free water     Nutrition needs met.      Anthropometrics:  Height: 5' 5\" (165.1 cm)  Admission weight: 145 lb 11/13/20  Weight: 137 lb 12.6 oz (62.5 kg)  BMI (Calculated): 24.2  Nonpitting all extremities noted per chart  Wt down w/previous diuresis    Physical Findings:  Unable as pt in COVID isolation precautions       GI Status/Output:   GI symptoms per nursing: WDL, constipation  Last BM recorded: 12/11  Gastric residuals:  Minimal    Skin/Wound:   Jeffry Scale Score: 13    Left lip PU noted per chart    Medications:  Lantus and SS Insulin, lasix-IV, vanco-IV  Medications reviewed.    Labs:  Lab Results   Component Value Date/Time    ALBUMIN 2.3 (L) 11/29/2020 12:46 PM     12/17/2020 05:48 AM    K 3.8 12/17/2020 05:48 AM    BUN 11 12/17/2020 05:48 AM    CREATININE 0.42 (L) 12/17/2020 05:48 AM     (H) 12/17/2020 05:48 AM    PHOS 3.2 12/08/2020 03:48 AM    MG 2.0 12/12/2020 04:18 AM   fsbg 126-152  Labs reviewed    Estimated Nutrition Needs:  Using current weight (12/2) of 59.3 kg.     Energy Needs: 5790-1969 kcals daily per 25-30 kcal/kg   Protein Needs:  g daily, 1.2-2 g/kg.  Fluid Needs: 4331-6654 mls daily, 25-30 mls/kg    Malnutrition: Noted previously-severe    Nutrition Risk Level: stable-high " risk    Nutrition DX: Inadequate oral intake r/t acute respiratory failure as evidenced by Bipap/HFNC dependence     Malnutrition r/t COVID-19 as evidenced by prolonged poor intake of < 50% estimated nutrition needs for 2 weeks and 5.2% wt loss in 1 week.    Goals:  Maintain weight-wt down with diuresis  Meet estimated nutrition needs-met  Electrolytes WNL-met  Tolerate tube feeding-met    Plan:  Continue current TF for pt's nutrition needs.    Monitor:   Tube feeding tolerance, wt, labs, hydration    See Care Plan for Problems, Goals, and Interventions.

## 2021-06-13 NOTE — PLAN OF CARE
Problem: Potential for Compromised Skin Integrity  Goal: Nutritional status is improving  Outcome: Progressing   Tolerating tube feedings. No stools this shift      Medicated x1 with fentanyl for throat pain. Low dose levophed for bp support with sedation.

## 2021-06-13 NOTE — PROGRESS NOTES
CRITICAL CARE PROGRESS NOTE:    Assessment/Plan:  60 y.o. year old female with PMH Significant for breast cancer s/p radiation/mastectomy.  Presented to ER 11/13 and dg with COVID-19. She decompensated and O2 went from 4 liters to HFNC 80% on 60 L, transferred to the ICU and intubated on 12/1/2020.    RESP:  ARDS, prolonged hypoxemic resp failure due to covid-19 infection. Approaching 2 weeks on vent. Minimal vent settings. Main barrier to extubation is severe anxiety with agitation limiting SBT performance. Last P:F on ABG from 12/12 was 228. Day 17 on vent.     Cont LPV with Vt 375cc (6-7cc/kg)    Pplat <30, was 34 but was coughing. Not really in ARDS anymore.     Daily sedation vacation and SBT's     ABG prn, no need for scheduled ABG's    Titrate FiO2 for goal O2 sat 88-92% or PaO2 55 mm Hg or greater.    Cont cough medicine with codeine prn, was coughing a lot even before intubation.     CV:  Shock, persistent. Likely some vasoplegia due to high sedation requirements. No echo on file. Possible septic shock last 24 hours given fevers, tachycardia, +UCx, lactate normal.     MAP >65, wean NE as able. On and off low dose NE last few days    Hold home anti-hypertensives    Lactate normal on 12/16 no need to trend further unless clinical change    Lasix 20mg IV two times a day, getting 5L positive, to promote better vent weaning/keep on dry side     NEURO:  Encephalopathy, agitation, anxiety. Psych following. Did better on wean with seroquel, ativan given before SBT.     Greatly appreciate psych assistance    Cont seroquel 75mg four times a day    Cont trazodone 25mg four times a day prn for agitation and at bedtime prn for sleep    Cont 1mg po ativan q6h    Stop the prn versed and prn ativan. Trying to limit benzo's     Cont seroquel 25mg four times a day prn for agitation    Cont depakote sprinkles 125mg three times a day      Cont dex for RASS goal 0 to -1 - wean as able    Tylenol prn pain    GI:  No issues,  tolerating TF    Cont TF, advance as tolerated    Cont FWF    PPI    Bowel regimen    RENAL:  No issues. Normal renal function    Maintain potter    Avoid nephrotoxins    Lasix as above    ID:  Severe covid-19 infection, improving. Yeast in urine, ?colonizer vs. Contaminant. Mild temp overnight 100.4. febrile today to 101+ with persistent hypotension concerning for new sepsis/septic shock. CXR markedly worse 12/15 though vent settings minimal and not really changed. Noted neg PCT 12/15. LE US neg for DVT. If bacteremic will need to change out the lines. Now urine has klebsiella oxytoca consistent with UTI    Completed Dex + remdesivir    Cont vanco + cefepime empirically, keep vanco on until sputum speciates -> having a lot of secretions, GPC in sputum.     Potter was exchanged on 12/15 when UA came back positive.     HEMATOLOGIC:  Mild leukocytosis, stable. Stable mild anemia. US of legs neg for DVT on 12/15    hgb >7    Cont LMWH two times a day per covid cat B a/c guidelines    ENDOCRINE:  No issues currently    Cont Lantus 6U daily    FSBG checks, insulin sliding scale/drip per ICU protocol     ICU PROPHYLAXIS:    peridex    LMWH two times a day per covid cat B a/c guidelines    PPI    CODE STATUS, DISPOSITION, FAMILY COMMUNICATION: full code  Updated  Arturo yesterday.     Lines/Drains/Tubes:  TL PICC 12/1  Art line left axillary 12/4  Potter  OG tube  ETT 7.5mm    Restraints  Progress Note  Restraint Application    I recognize that restraints are physical and/or chemical interventions intended to restrict a person's movements. Restraints are currently needed to ensure the safety of this patient and/or others. My clinical rationale appears below.    Category/Type of Restraint     Non Violent:  Soft limb restraint x2  --  Behavior  Pulling at tubes/lines  --  Root Cause of the Behavior  Sedation/intubation  --  Less-Restrictive Measures that Failed  Non Violent Measures:  Close Observation  --  Response to the  "Restraint  Patient unable to pull at tubes/lines  --  Criteria for Release from the Restraint  Patient calm and off sedation    Bashir De La Torre MD (Avi)  [facility]  Pager [phone]  Clinic [phone]      Overnight events:  Calm overnight  Failed SBT again this AM but did better than yesterday  NE @0.08  No fevers overnight.     Urine growing 100K klebsiella oxytoca sens to cefepime     Subjective:  Unable to assess    Objective:  Physical Exam:  Vent settings for last 24 hours:  Vent Mode: CPAP/PSV  FiO2 (%):  [40 %] 40 %  S RR:  [20] 20  S VT:  [375 mL] 375 mL  PEEP/CPAP (cm H2O):  [5 cm H2O] 5 cm H2O  Minute Ventilation (L/min):  [7.1 L/min-10.8 L/min] 10.8 L/min  PIP:  [18 cm H2O-48 cm H2O] 18 cm H2O  DC SUP:  [8 cm H20] 8 cm H20  MAP (cm H2O):  [9-15] 10    /63   Pulse 98   Temp 98.3  F (36.8  C) (Axillary)   Resp (!) 38   Ht 5' 5\" (1.651 m)   Wt 137 lb 12.6 oz (62.5 kg)   SpO2 90%   BMI 22.93 kg/m      Intake/Output last 3 shifts:  I/O last 3 completed shifts:  In: 3992.8 [I.V.:660.8; NG/GT:2702; IV Piggyback:630]  Out: 3235 [Urine:3235]  Intake/Output this shift:  I/O this shift:  In: 377.9 [I.V.:127.9; NG/GT:200; IV Piggyback:50]  Out: 600 [Urine:600]    Physical Exam  Gen: intubated, sedated. Does follow commands and nod appropriately.   HEENT: no OP lesions, no DOT  CV: RRR, no m/g/r  Resp: clear ant no wheezing  Abd: soft, nontender, BS+  Neuro: PERRL, nonfocal. Does follow commands when calm  Ext: no edema    LAB:  Results from last 7 days   Lab Units 12/17/20  0547   LN-WHITE BLOOD CELL COUNT thou/uL 7.9   LN-HEMOGLOBIN g/dL 9.2*   LN-HEMATOCRIT % 29.8*   LN-PLATELET COUNT thou/uL 425     Results from last 7 days   Lab Units 12/17/20  0548 12/16/20  1623 12/15/20  0442 12/14/20  0429   LN-SODIUM mmol/L 141  --  137 137   LN-POTASSIUM mmol/L 3.8 3.9 3.9 3.7   LN-CHLORIDE mmol/L 104  --  103 101   LN-CO2 mmol/L 29  --  25 26   LN-BLOOD UREA " NITROGEN mg/dL 11  --  11 12   LN-CREATININE mg/dL 0.42*  --  0.46* 0.52*   LN-CALCIUM mg/dL 8.1*  --  8.1* 8.3*       Urine 12/11  + yeast    PCT neg 12/15    12/15 pan-cultured  Sputum pending  Blood pending    UCx 12/15 199K klebsiella oxytoca sens to cefepime.     Current Facility-Administered Medications   Medication Dose Route Frequency Provider Last Rate Last Admin     acetaminophen solution 650 mg (TYLENOL)  650 mg Enteral Tube Q4H PRN Tish Adkins CNP   650 mg at 12/16/20 1630     acetaminophen tablet 650 mg (TYLENOL)  650 mg Oral Q4H PRN Ramon Haro MD   650 mg at 11/30/20 0058     bacitracin ointment packet 1 packet  1 packet Topical Once PRN Tish Adkins CNP         benzocaine-menthoL lozenge 1 lozenge (CEPACOL)  1 lozenge Oral Q1H PRN Eben Butterfield MD         bisacodyL suppository 10 mg (DULCOLAX)  10 mg Rectal Daily PRN Eben Butterfield MD   10 mg at 12/16/20 1631     calcium (as carbonate) chewable tablet 400 mg (TUMS)  400 mg Oral TID PRN Ramon Haro MD   400 mg at 11/18/20 1222     cefepime 1 g in NaCl 0.9 % (MINI-BAG Plus) 50 mL (MAXIPIME)  1 g Intravenous Q8H Bashir De La Torre  mL/hr at 12/17/20 0846 1 g at 12/17/20 0846     chlorhexidine 0.12 % solution 15 mL (PERIDEX)  15 mL Topical Q12H Tish Adkins CNP   15 mL at 12/17/20 0527     codeine-guaiFENesin  mg/5 mL liquid 5 mL (GUAIFENESIN AC)  5 mL Oral Q4H PRN Bashir De La Torre MD   5 mL at 12/17/20 0246     dexmedetomidine (PRECEDEX) 1000 mcg/250 mL in NS (4 mcg/mL) infusion  0.1-1.5 mcg/kg/hr Intravenous Continuous IvJeramie bruner MD 3.05 mL/hr at 12/17/20 0000 0.2 mcg/kg/hr at 12/17/20 0000     dextrose 50 % (D50W) syringe 20-50 mL  20-50 mL Intravenous Q15 Min PRN Ramon Haro MD         enoxaparin ANTICOAGULANT syringe 30 mg (LOVENOX)  0.5 mg/kg Subcutaneous BID Oren Vásquez MD   30 mg at 12/17/20 0843     fentaNYL pf injection 25-50 mcg (SUBLIMAZE)  25-50 mcg  Intravenous Q1H PRN Jennifer Gunter CNP   50 mcg at 12/16/20 1932     glucagon (human recombinant) injection 1 mg  1 mg Subcutaneous Q15 Min PRN Ramon Haro MD         hydrALAZINE injection 10 mg (APRESOLINE)  10 mg Intravenous Q4H PRN Ramon Haro MD         influenza vaccine quad (PF) (age 50-64 YR) 2020-21 injection 0.5 mL (FLUBLOK)  0.5 mL Intramuscular Prior to Discharge Jeramie Crespo MD         insulin aspart U-100 injection pen (NovoLOG)   Subcutaneous Q6H FIXED TIMES Eben Butterfield MD   2 Units at 12/17/20 0617     insulin glargine injection 6 Units (LANTUS)  6 Units Subcutaneous M Oren Vásquez MD   6 Units at 12/17/20 0843     lipase-protease-amylase 5,000-17,000- 24,000 unit capsule 2 capsule (ZENPEP)  2 capsule Enteral Tube PRN Eben Butterfield MD        And     sodium bicarbonate tablet 325 mg  325 mg Enteral Tube PRN Eben Butterfield MD         LORazepam 1 mg injection (diluted concentration)  1 mg Intravenous Q4H PRN Oren Vásquez MD   1 mg at 12/17/20 1011     LORazepam 1 mg/0.5 mL concentrated solution 1 mg (ATIVAN)  1 mg Oral Q6H Oren Vásquez MD   1 mg at 12/17/20 0527     magnesium hydroxide suspension 30 mL (MILK OF MAG)  30 mL Oral Daily PRN Eben Butterfield MD         midazolam (PF) injection 1 mg (VERSED)  1 mg Intravenous Q2H PRN Tish Adkins CNP   1 mg at 12/15/20 2140     naloxone injection 0.2 mg (NARCAN)  0.2 mg Intravenous Q2 Min PRN Ramon Haro MD        Or     naloxone injection 0.2 mg (NARCAN)  0.2 mg Intramuscular Q2 Min PRN Ramon Haro MD        Or     naloxone injection 0.4 mg (NARCAN)  0.4 mg Intravenous Q2 Min PRN Ramon Haro MD        Or     naloxone injection 0.4 mg (NARCAN)  0.4 mg Intramuscular Q2 Min PRN Ramon Haro MD         norepinephrine 4 mg/250 mL in NS (16 mcg/mL) - CENTRAL  0.01-0.4 mcg/kg/min Intravenous Continuous Bashir De La Torre MD 13.8  mL/hr at 12/17/20 0859 0.06 mcg/kg/min at 12/17/20 0859     omeprazole capsule 20 mg (PriLOSEC)  20 mg Oral QAM Tish Gonzalez CNP        Or     omeprazole suspension 20 mg (PriLOSEC)  20 mg Enteral Tube QAM Tish Gonzalez CNP   20 mg at 12/17/20 0850     ondansetron injection 4 mg (ZOFRAN)  4 mg Intravenous Q4H PRN Eben Butterfield MD   4 mg at 12/06/20 2219    Or     ondansetron tablet 8 mg (ZOFRAN)  8 mg Oral Q8H PRN Eben Butterfield MD         polyethylene glycol packet 17 g (MIRALAX)  17 g Oral Daily PRN Ramon Haro MD   17 g at 12/16/20 1630     polyvinyl alcohol 1.4 % ophthalmic solution 1-2 drop (LIQUIFILM TEARS)  1-2 drop Both Eyes Q1H PRN Eben Butterfield MD         QUEtiapine tablet 25 mg (SEROquel)  25 mg Oral QID PRN Quick, Alma Keshia, CNP   25 mg at 12/17/20 0247     QUEtiapine tablet 50 mg (SEROquel)  50 mg Oral Daily PRN Quick, Alma Keshia, CNP   50 mg at 12/15/20 1004     QUEtiapine tablet 75 mg (SEROquel)  75 mg Oral QID Quick, Alma Keshia, CNP   75 mg at 12/17/20 0850     senna-docusate 8.6-50 mg tablet 1 tablet (PERICOLACE)  1 tablet Oral BID Eben Butterfield MD   Stopped at 11/30/20 2100    Or     sennosides syrup 8.8 mg (for SENOKOT)  8.8 mg Enteral Tube BID Eben Butterfield MD   8.8 mg at 12/17/20 0849     sodium chloride 0.9%  10 mL/hr Intravenous Continuous Eben Butterfield MD 10 mL/hr at 12/16/20 1500 10 mL/hr at 12/16/20 1500     sodium chloride bacteriostatic 0.9 % injection 0.1-0.3 mL  0.1-0.3 mL Subcutaneous PRN Ramon Haro MD         sodium chloride flush 10-20 mL (NS)  10-20 mL Intravenous PRN Tish Adkins CNP   10 mL at 12/04/20 1736     sodium chloride flush 10-30 mL (NS)  10-30 mL Intravenous PRN Tish Adkins CNP         sodium chloride flush 10-30 mL (NS)  10-30 mL Intravenous Q8H FIXED TIMES Tish Adkins CNP   10 mL at 12/16/20 0506     sodium chloride flush 20 mL (NS)   20 mL Intravenous PRN Tish Adkins CNP         traZODone tablet 25 mg (DESYREL)  25 mg Oral QID PRN Alma Mcmillan CNP         valproic acid (as sodium salt) solution 250 mg (DEPAKENE)  250 mg Oral Q8H FIXED TIMES Alma Mcmillan CNP   250 mg at 12/17/20 0528     vancomycin 1,000 mg in sodium chloride 0.9% 250 mL (VANCOCIN)  1,000 mg Intravenous Q12H Bashir De La Torre  mL/hr at 12/17/20 0417 1,000 mg at 12/17/20 0417       Critical care attestation: 45 minutes spent managing the following issues: acute respiratory failure requiring intubation/IMV, septic/ circulatory shock requiring continuous vasopressor infusions, severe covid-19 infection c/b ARDS and prolonged hypoxemic resp failure, hypervolemia, toxic-metabolic encephalopathy with severe agitation and anxiety limiting SBT performance. High risk for organ deterioration and death requiring ICU level care.

## 2021-06-13 NOTE — PROGRESS NOTES
"60yr F, admitted on 11/13/20 for low SaO2%.  She transferred to ICU and intubated on 12/1/20 for failed BiPAP, patient remains intubated, also was proned and on Veletri.  Vent changed made today at rate decreased to 14, PEEP +5.  Vent Day #7    PIP 36  Plateau 28    /56 (Patient Position: Lying)   Pulse 73   Temp 99.8  F (37.7  C) (Oral)   Resp (!) 41   Ht 5' 5\" (1.651 m)   Wt 135 lb 5.8 oz (61.4 kg)   SpO2 94%   BMI 22.53 kg/m      Vent Mode: VCV  FiO2 (%):  [40 %-100 %] 40 %  S RR:  [14-24] 14  S VT:  [350 mL] 350 mL  PEEP/CPAP (cm H2O):  [5 cm H2O-8 cm H2O] 5 cm H2O  Minute Ventilation (L/min):  [6.5 L/min-8.2 L/min] 8.2 L/min  PIP:  [26 cm H2O-37 cm H2O] 27 cm H2O  MAP (cm H2O):  [10-13] 11  ETT #7.5 21 cm at the teeth.    AM ABG   Ph 7.42, PCO2 52, PO2 101, HCO3 31.8, SO2% 97%  Settings VCV 18, Vt 350, FiO2 40% PEEP +8    Continue current treatment, wean when indicated, suction PRN.        "

## 2021-06-13 NOTE — PLAN OF CARE
Problem: Safety  Goal: Patient will be injury free during hospitalization  Outcome: Progressing     Problem: Daily Care  Goal: Daily care needs are met  Outcome: Progressing   Able to nod to answer yes or no questions,  follow commands. Titrated Levophed to 0.09 mcg/kg/h. Precedex  remained 0.7mg/kg/h.  All daily needs met. Will continue to monitor.

## 2021-06-13 NOTE — PLAN OF CARE
"  Problem: Ineffective Airway Clearance  Goal: Maintain airway patency  Outcome: Progressing     Problem: Impaired Gas Exchange  Goal: Demonstrate improved ventilation and adequate oxygenation of tissues as evidenced by absence of respiratory distress  Outcome: Progressing     Problem: Breathing  Goal: Patient will maintain patent airway  Outcome: Progressing     Patient placed on BIPAP for NOC on settings of, ST 15/8 20 65%. Patient BS are diminished bilaterally, RR mid 20's - mid 30's. HFNC on standby for when patient wants a break from BIPAP if tolerated. RT will continue to follow.    BP 98/56   Pulse (!) 58   Temp 97.8  F (36.6  C) (Axillary)   Resp 17   Ht 5' 5\" (1.651 m)   Wt 136 lb 11 oz (62 kg)   SpO2 96%   BMI 22.75 kg/m     "

## 2021-06-13 NOTE — PLAN OF CARE
Problem: Inadequate Gas Exchange  Goal: Patient will achieve/maintain normal respiratory rate/effort  Outcome: Progressing   Patient tolerated ventilation this shift. Lung sounds diminished. Moderate amounts of secretions to ETT/PO. Strong cough/gag noted. Patient had one episode of strong coughing and patient had emesis x1. NP notified. Patient was able to recover and no further episodes this shift.     Problem: Potential for Compromised Skin Integrity  Goal: Skin integrity is maintained or improved  Outcome: Progressing   Patient turned/repositioned q2h. Skin checked under devices. Patient stooling, blanchable redness noted to coccyx. Barrier lotion applied. Will continue to monitor.

## 2021-06-13 NOTE — PROGRESS NOTES
Progress Note    61 y/o [place] speaking female admitted for COVID and acute hypoxic respiratory failure on 11/13. She was on high flow NC and Bipap, then to the floor. On 11/30 she was transferred back to ICU for worsening hypoxia and was intubated on 12/1/20 and as of 12/7/20 remains intubated     1. ARDS due to COVID infection  - Remains intubated/vent  -ICU managing  - weaning trials     2. COVID with septic and circulatory shock  - Weaned off pressors on 12/4, but back on again late 12/7/20 and as of this am still on   NorEpi  - Has completed Remdesivir and Dexamethasone courses  - Lovenox two times a day for VTE proph  - Received Rocephin and Azithro earlier in stay, completed empiric course of Zosyn 12/6/20  - Blood and sputum cultures NGTD     3. Malnutrition  - Tube feeding and FW flushes per RD     4. Leukocytosis  - Follow, today 12.6     5. Hypokalemia  -- Replaced     6.DVT prevent-Lovenox q 12    7.GI proph- ppi    ICU managing her and HMS will continue to follow peripherally

## 2021-06-13 NOTE — PLAN OF CARE
Problem: Discharge Barriers  Goal: Patient's discharge needs are met  Outcome: Not Applicable this Shift  Problem: Impaired Gas Exchange  Goal: Demonstrate improved ventilation and adequate oxygenation of tissues as evidenced by absence of respiratory distress  Outcome: Not Progressing  Patient still on high flow nasal cannula, gets very short of breath and dyspneic with activity. Encouraged to use the Incentive Spirometer every 2 hours while awake. Stable vital signs & denies any pain.

## 2021-06-13 NOTE — PLAN OF CARE
Problem: Inadequate Gas Exchange  Goal: Patient will achieve/maintain normal respiratory rate/effort  Outcome: Not Progressing     Problem: Ineffective Airway Clearance  Goal: Maintain airway patency  Outcome: Progressing     Problem: Impaired Gas Exchange  Goal: Demonstrate improved ventilation and adequate oxygenation of tissues as evidenced by absence of respiratory distress  Outcome: Not Progressing   Pt had decompensated on NOC shift and needed to be placed on BIPAP to maintain saturations. Will still attempt to wean FIO2 on BIPAP. Can attempt HFNC if pt can tolerate as well. Will continue to monitor.

## 2021-06-13 NOTE — PLAN OF CARE
Problem: Inadequate Gas Exchange  Goal: Patient will achieve/maintain normal respiratory rate/effort  Outcome: Progressing   Pt has HFNC at 85% on d 50LPM to maintain SPO2 > 90%. Nonproductive cough and uses IS to 750 ml every hour. Continue to monitor.    Problem: Potential for Compromised Skin Integrity  Goal: Nutritional status is improving  Outcome: Adequate for Discharge   Ate 100% of tray, had BM, no nausea, active BS.

## 2021-06-13 NOTE — PROGRESS NOTES
Rass -1 afebrile, on dex gtt. SR, on levo for map > 65. Still on vent, tolerating settings. Oxygen saturations stable. See vital sign flow sheet. TF at goal with water flushes. Two medium bms on this shift. [place] good uop. Lines unchanged.Report given to ADELAIDA Dixon.

## 2021-06-13 NOTE — PLAN OF CARE
"  Problem: Impaired Gas Exchange  Goal: Demonstrate improved ventilation and adequate oxygenation of tissues as evidenced by absence of respiratory distress  Outcome: Progressing   Patient trialed off the bipap on non-rebreather mask at 15L. Pt tolerated for 1-2 hours and then was put back on the bipap due to drop in O2 sats. Waiting for PE run; CT was contacted however they are not able to do the scan now due to STAT scans from the ED. MD aware. Continue to monitor.    /68 (Patient Position: Semi-cedeño)   Pulse (!) 105   Temp 98.3  F (36.8  C) (Axillary)   Resp 22   Ht 5' 5\" (1.651 m)   Wt 134 lb 6.4 oz (61 kg)   SpO2 (!) 89%   BMI 22.37 kg/m      "

## 2021-06-13 NOTE — PLAN OF CARE
Problem: Inadequate Gas Exchange  Goal: Patient will achieve/maintain normal respiratory rate/effort  Outcome: Progressing   Checking abg every 6 hours, adjust vent per results.      Problem: Activity Intolerance/Impaired Mobility  Goal: Mobility/activity is maintained at optimum level for patient  Outcome: Progressing   Tolerating being turned, supine and prone.      Problem: Knowledge Deficit  Goal: Patient/family/caregiver demonstrates understanding of disease process, treatment plan, medications, and discharge instructions  Outcome: Progressing   Family updated daily  Patient still on paralytic, per BIS ad TO4 patient is sedated and paralyzed adequately.  Patient was turned supine today at 1200.  Tube feed will be initiated today, abd xray obtained.  Blood pressure stable, has levophed infusing.  Continue to monitor.

## 2021-06-13 NOTE — PLAN OF CARE
Problem: Inadequate Gas Exchange  Goal: Patient will achieve/maintain normal respiratory rate/effort  Outcome: Progressing   Pt wore bipap overnight 15/8 RR 12 60%  0745: placed on HFNC at 60L 85%  1145: dec to 75%  60L  sats 99%   1335: dec to 65%, sats 98.

## 2021-06-13 NOTE — PROGRESS NOTES
"  Clinical Nutrition Therapy Reassessment Note      Reason:  RD managing tube feeding per consult    Subjective:  Pt intubated sedated, paralyzed. Hypernatremia noted.  Attended team rounds, tube feeding to advance today.        Nutrition Prescription:   Diet: NPO.  Tube feeding: Replete with fiber trickle rate 20 ml/hr (started 12/2)  Modulars: No carb prosource 2 pkts/day  Flush Orders: 30 ml water every 4 hrs  Propofol:   IV dextrose or Fluids:     cisatracurium (NIMBEX) infusion, Last Rate: 3 mcg/kg/min (12/04/20 0814)       fentaNYL citrate (PF), Last Rate: 125 mcg/hr (12/04/20 0758)       insulin infusion (1 unit/mL)       midazolam, Last Rate: 6 mg/hr (12/04/20 0759)       norepinephrine, Last Rate: Stopped (12/04/20 0100)       sodium chloride 0.9%, Last Rate: 10 mL/hr (12/04/20 0800)    IV fluids:  749.7 ml in past 24 hrs    Nutrition Intake:  NPO  FT is an OG placed 12/1/20.  X-ray of 12/2 confirms gastric.  TF provides: 600 calories, 61 g protein, 60 g carb , 7 fiber, 399 ml free water plus flushes, total 519 ml free water/day.    Nutrition needs not currently met.      Anthropometrics:  Height: 5' 5\" (165.1 cm)  Admission weight: 145 lb 11/13/20  Weight: 136 lb 0.4 oz (61.7 kg)  BMI (Calculated): 24.2  Nonpitting geo UE documented 12/3.  -3.3 L per I/Os     Physical Findings:  Unable as pt in COVID isolation precautions       GI Status/Output:   GI symptoms per nursing: WDL, constipation  Last BM recorded: 11/30  Gastric residuals 0-100 mls.  Good urine output, pt on diuretic    Skin/Wound:   Jeffry Scale Score: 10    No wound noted.    Medications:  Medications reviewed.    Labs:  Lab Results   Component Value Date/Time    ALBUMIN 2.3 (L) 11/29/2020 12:46 PM     (H) 12/04/2020 04:08 AM    K 3.2 (L) 12/04/2020 04:08 AM    BUN 36 (H) 12/04/2020 04:08 AM    CREATININE 0.54 (L) 12/04/2020 04:08 AM     (H) 12/04/2020 04:08 AM    PHOS 2.6 12/04/2020 04:08 AM    MG 3.7 (H) 12/04/2020 04:08 AM "   fsbg 111-169  Labs reviewed    Estimated Nutrition Needs:  Using current weight (12/2) of 59.3 kg.     Energy Needs: 8670-4009 kcals daily per 25-30 kcal/kg   Protein Needs:  g daily, 1.2-2 g/kg.  Fluid Needs: 9015-6200 mls daily, 25-30 mls/kg    Malnutrition: Noted previously-severe    Nutrition Risk Level: stable-high risk    Nutrition DX: Inadequate oral intake r/t acute respiratory failure as evidenced by Bipap/HFNC dependence     Malnutrition r/t COVID-19 as evidenced by prolonged poor intake of < 50% estimated nutrition needs for 2 weeks and 5.2% wt loss in 1 week.    Goals:  Maintain weight-wt down with diuresis  Meet estimated nutrition needs-Progressing  Electrolytes WNL-progressing  Tolerate tube feeding-progressing    Plan:  Increase water flush d/t hypernatremia:  200 ml every 4 hrs.  Gradually increase tube feeding to new goal rate to provide for estimated nutrition needs:  Increase 5 ml every 8 hrs to goal of 65 ml/hr continuous.  Stop No carb prosource.  TF at goal to provide:  1560 calories, 100 g protein, 193 g carb, 23 g fiber, 2497 ml free water.  TF to reach goal in ~72 hrs.    TF held 1 hr prior to turning-supine to prone, prone to supine.    Monitor:   Tube feeding tolerance, wt, labs, hydration    See Care Plan for Problems, Goals, and Interventions.

## 2021-06-13 NOTE — PLAN OF CARE
Problem: Inadequate Gas Exchange  Goal: Patient will achieve/maintain normal respiratory rate/effort  11/26/2020 1905 by Michell Victor, RN  Outcome: Progressing  11/26/2020 1904 by Michell Victor, RN  Outcome: Progressing    Remains on HFNC. Titrated to 55L and 55%. Patient desats to 70's with activity. Place on bipap and sats increased to >92% quickly. Up to chair most of shift.

## 2021-06-13 NOTE — PROGRESS NOTES
"ICU PROGRESS NOTE:    Patient Summary:  Hafsa Robles is a 60 y.o. lady with a past medical history significant for breast cancer s/p partial mastectomy and adjuvant radiation in 2017, who presented to the hospital on 11/13/2020  5:17 PM for acute hypoxemic respiratory failure.  She was subsequently diagnosed with Covid-19 infection and spent several days in the intensive care unit alternating between high flow and BiPAP.  On 11/30 she was transferred back to the intensive care unit worsening hypoxia and was emergently intubated on the morning of 12/1.    Lines/Drains/Tubes:  Central line Left basilic PICC, Placed on 12/1  Arterial line Left radial, Placed on 12/1  ETT placed on 12/1; Size 7.5  Feeding tube placed on 12/1  Arteaga catheter placed on 12/1    Overnight events:  Acutely hypoxic this morning and was emergently intubated.    Subjective:  Unable to offer any complaints.    Objective:  Physical Exam:  Vent settings for last 24 hours:  Vent Mode: VCV  FiO2 (%):  [100 %] 100 %  S RR:  [20] 20  S VT:  [250 mL] 250 mL  PEEP/CPAP (cm H2O):  [10 cm H2O] 10 cm H2O  Minute Ventilation (L/min):  [5.6 L/min-12.2 L/min] 5.6 L/min  PIP:  [20 cm H2O-45 cm H2O] 45 cm H2O  MAP (cm H2O):  [11-20] 14    BP (!) 84/53   Pulse 91   Temp 97.2  F (36.2  C) (Axillary)   Resp 17   Ht 5' 5\" (1.651 m)   Wt 130 lb 12.8 oz (59.3 kg)   SpO2 (!) 89%   BMI 21.77 kg/m      Intake/Output last 3 shifts:  I/O last 3 completed shifts:  In: 250 [P.O.:240; I.V.:10]  Out: 1100 [Urine:1100]  Intake/Output this shift:  I/O this shift:  In: 300 [IV Piggyback:300]  Out: 215 [Urine:215]    Physical Exam   Constitutional:   Slight lady in NAD.   HENT:   Head: Normocephalic.   Eyes: Pupils are equal, round, and reactive to light.   Neck: Normal range of motion.   Cardiovascular:   Sinus tachycardia.  S1 and S2 audible.   Pulmonary/Chest: Effort normal.   Elevated Peak airway pressures.  Coarse BS BL.   Abdominal: Soft.   Musculoskeletal:        "  General: No edema.   Neurological:   No focal deficits.   Skin: Skin is warm.         LAB:  Results from last 7 days   Lab Units 12/01/20 0412 11/30/20  0607 11/26/20  0454 11/26/20  0454   LN-WHITE BLOOD CELL COUNT thou/uL  --  10.4   < > 15.5*   LN-HEMOGLOBIN g/dL  --  13.5  --  13.0   LN-HEMATOCRIT %  --   --   --  39.3   LN-PLATELET COUNT thou/uL 423 374   < > 266  266    < > = values in this interval not displayed.     Results from last 7 days   Lab Units 12/01/20 0412 11/30/20  0607 11/29/20  1246 11/29/20  0621   LN-SODIUM mmol/L 137 136  --  136   LN-POTASSIUM mmol/L 3.7 4.2  --  4.1   LN-CHLORIDE mmol/L 96* 99  --  97*   LN-CO2 mmol/L 29 29  --  30   LN-BLOOD UREA NITROGEN mg/dL 12 8  --  9   LN-CREATININE mg/dL 0.54* 0.50*  --  0.50*   LN-CALCIUM mg/dL 9.4 8.8  --  8.8   LN-PROTEIN TOTAL g/dL  --   --  7.1  --    LN-BILIRUBIN TOTAL mg/dL  --   --  0.5  --    LN-ALKALINE PHOSPHATASE U/L  --   --  146*  --    LN-ALT (SGPT) U/L  --   --  41  --    LN-AST (SGOT) U/L  --   --  36  --        RADIOLOGY:  Xr Chest 1 View Portable    Result Date: 12/1/2020  EXAM: XR CHEST 1 VIEW PORTABLE LOCATION: [facility] DATE/TIME: 12/1/2020 6:26 AM INDICATION: confirm ET Tube position COMPARISON: 11/29/2020.     The patient has been intubated. The endotracheal tube terminates 1.9 cm above the fede. Nasogastric tube passes below the diaphragm and the inferior margin of the radiograph. Extensive bilateral pulmonary infiltrates are little changed from  11/29/2020 and consistent with infectious/inflammatory pneumonitis. No significant pleural effusion. No pneumothorax. Normal heart size.    Xr Chest 1 View Portable    Result Date: 11/29/2020  EXAM: XR CHEST 1 VIEW PORTABLE LOCATION: [facility] DATE/TIME: 11/29/2020 3:44 PM INDICATION: Hypoxia COMPARISON: 11/19/2020     Previously seen PICC line has been removed. Bilateral pulmonary infiltrates have significantly increased  "when compared to previous. No pleural effusion or pneumothorax.    Xr Chest 1 View Portable    Result Date: 11/19/2020  EXAM: XR CHEST 1 VIEW PORTABLE LOCATION: [facility] DATE/TIME: 11/19/2020 8:30 AM INDICATION: covid ARDS COMPARISON: Portable AP view of the chest 11/13/2020     Right PICC terminates at the SVC right atrial junction. Cardiac silhouette is normal in size. Unchanged mediastinal interfaces. Patchy bilateral lung airspace opacities are present which are slightly better defined compared to 6 days earlier suggesting evolving organizing pneumonia, the typical pattern of lung injury in the setting of COVID pneumonia. No pleural fluid or pneumothorax is present.    Xr Chest 1 View Portable    Result Date: 11/13/2020  EXAM: XR CHEST 1 VIEW PORTABLE LOCATION: [facility] DATE/TIME: 11/13/2020 6:20 PM INDICATION: Chest Pain. Suspected COVID. COMPARISON: 11/11/2020     No pleural fluid or pneumothorax. Heterogeneous pulmonary opacities are concerning for multifocal atypical infection and have increased from the prior study. Normal size of the heart.     Poc Us 3cg Picc Placement Guidance    Result Date: 12/1/2020  Exam was performed as guidance for PICC line insertion.  Click \"PACS images\" hyperlink below to view any stored images.  For specific procedure details, view procedure note authored by PICC/Vascular Access Nurse.    Poc Us 3cg Picc Placement Guidance    Result Date: 11/15/2020  Exam was performed as guidance for PICC line insertion.  Click \"PACS images\" hyperlink below to view any stored images.  For specific procedure details, view procedure note authored by PICC/Vascular Access Nurse.    MICRO:  Date Source Organism   11/14 Blood NGTD    Blood NGTD    C-Diff PCR Negative   12/1 Sputum NGTD     Organism Antibiotic Antibiotic Start Date Antibiotic End Date   NGTD Azithromycin 11/14 11/17    Ceftriaxone 11/14 11/17       COVID-19 Specific " Therapies:  Therapy Start Date End Date Number of Doses   Remdesivir 11/14 11/19 5   Decadron 11/13 11/23 10   Decadron 11/30 Ongoing 1       MAR REVIEWED    ICU DAILY CHECKLIST                           Can patient transfer out of MICU? no    FAST HUG:    Feeding:  Feeding: No.  Patient is receiving NPO    Arteaga:Yes  Analgesia/Sedation:Yes fentanyl, midazolam and Cisatricurium  Thromboembolic prophylaxis: yes; Mode:  Lovenox  HOB>30:  Yes  Stress Ulcer Protocol Active: yes; Mode: PPI  Glycemic Control: Any glucose > 180 yes; Mode of Insulin Therapy: Sliding Scale Insulin    INTUBATED:  Can patient have daily waking:  no  Can patient have spontaneous breathing trial:  no    Restraints? no    Progress Note  Restraint Application    I recognize that restraints are physical and/or chemical interventions intended to restrict a person's movements. Restraints are currently needed to ensure the safety of this patient and/or others. My clinical rationale appears below.    --  Category/Type of Restraint  Non Violent:  Hand mitt x2 (if patient cannot self-remove)  --  Behavior  (Example: Patient attempted to assault his nurse)  Tugging at lines.  --  Root Cause of the Behavior  (Example: Cocaine intoxication)  ARDS  --  Less-Restrictive Measures that Failed  Non Violent Measures:  Close Observation, Repositioning and Pain Management  --  Response to the Restraint  (Example: Patient stopped attempting to pull out her NG tube)  Patient stopped pulling at lines  --  Criteria for Release from the Restraint  (Example: Patient is calm and agrees to not strike staff)  Patient is calm.     PHYSICAL THERAPY AND MOBILITY:  Can patient have PT and mobility trial: no  Activity: Bed Rest    Assessment/Plan:  Hafsa Robles is a 60 y.o. lady with a past medical history significant for breast cancer s/p partial mastectomy and adjuvant radiation in 2017, presenting to the hospital for acute hypoxemic respiratory failure secondary to  Covid-19 infection and was transferred back to the intensive care unit on 11/30 and emergently intubated on the morning of 12/1 for worsening hypoxia.    1. NEURO:Is presently intubated, sedated andparalyzed.     RASS goal of -4.    BIS goal of 40-60.    Midazolam for sedation, Fentanyl for analgesia.    Cisatracurium for paralysis.  2. CVS: Is noted to be hypotensive likely secondary to vasoplegia from sedative medications.    Continue telemetry monitoring.    MAP goal of 60mmHg with SBP>90mmHg.    Vasopressors: Norepinephrine.  3. RESP:Admitted with ARDS with a P:F of 67 secondary to Covid-19. Chest imaging reveals diffuse bilateral infiltrates.  She continues to have significantly elevated peak pressures with no evidence of a pneumothorax.  She initially responded to a single dose of cisatracurium with an improvement in her airway pressures, however, is continuing to overbreathing slightly and have elevated peak pressures.  We will initiate sedation and paralysis as above to improve her lung compliance.    Maintain SpO2 of >92%.    Follow sputum culture with gram stain.    Will titrate sedation to maintain ventilator synchrony may consider initiation of full strength Epoprostenol.    Initiate prone positioning with 16/8h regimen.    ARDS net low tidal volume ventilation at 6 mL/kg ideal body weight.    Vent Mode: VCV    FiO2 (%):  [100 %] 100 %    S RR:  [20] 20    S VT:  [250 mL] 250 mL    PEEP/CPAP (cm H2O):  [10 cm H2O] 10 cm H2O    Minute Ventilation (L/min):  [5.6 L/min-12.2 L/min] 5.6 L/min    PIP:  [20 cm H2O-45 cm H2O] 45 cm H2O    MAP (cm H2O):  [11-20] 14  4. GI: No acute issues currently.    Would make the patient NPO.    Continue Protonix daily for ulcer prophylaxis.    We will Place feeding tube and initiate tube feeds.    Senna, Miralax, Colace for bowel regimen.  5. RENAL : No acute issues..    Would trend renal function daily and closely follow I/O.    Follow electrolytes and correct as  abnormalities arise.  6. ID: Patient has a known Covid-19 infection and was recently transferred out of the intensive care unit but decompensated in respiratory perspective and was emergently reintubated this morning.  Has already completed a course of azithromycin and Ceftriaxone early in her hospital course I do not see any indication to resume anything presently.    Obtain Blood and sputum cultures.    We will resume Decadron as above but will not administer second dose of remdesivir.  7. HEMATOLOGIC:No acute issues.    Daily CBC.    We will reduce Lovenox to 0.5 mg/kg.  8. ENDOCRINE:    ICU insulin sliding scale.  9. ICU PROPHYLAXIS:Protonix, Lovenox.  10. DISPO:Unclear. Continues to require ICU levels of care.    Total Critical Care Time : 1 Hour      Sherri Kowalskivi  Pulmonary and Critical Care  [id number]

## 2021-06-13 NOTE — PROGRESS NOTES
Patient is more awake and alert. Able to make needs known. Patient is weaning. She does have periods of anxiety. Treated with medication. respiratory rate high 40's MD is aware.

## 2021-06-13 NOTE — PROGRESS NOTES
"RESPIRATORY CARE NOTE    Arterial Blood Gas result:  pH 7.45; pCO2 60; pO2 59; HCO3 39, %O2 Sat 91.    Vent Mode: VCV  FiO2 (%):  [40 %-100 %] 50 %  S RR:  [32-35] 32  S VT:  [250 mL] 250 mL  PEEP/CPAP (cm H2O):  [8 cm H2O-12 cm H2O] 10 cm H2O  Minute Ventilation (L/min):  [7.9 L/min-9.9 L/min] 8.2 L/min  PIP:  [29 cm H2O-37 cm H2O] 30 cm H2O  MAP (cm H2O):  [12-17] 14    /73   Pulse 63   Temp 99.1  F (37.3  C)   Resp (!) 32   Ht 5' 5\" (1.651 m)   Wt 136 lb 7.4 oz (61.9 kg)   SpO2 98%   BMI 22.71 kg/m       BS diminished, Sxn small tan thick, Pt tolerated well.    RT will follow per protocol.      GUSTAVO SoriaT    "

## 2021-06-13 NOTE — PROGRESS NOTES
Pharmacy Consult: Vancomycin Dosing in the Emergency Department    Pharmacist consulted by Dr. Obando to dose vancomycin for Hafsa Robles, a 60 y.o. female.    Indication for vancomycin therapy: Community Acquired Pneumonia    Other current antimicrobials              vancomycin 1,250 mg in sodium chloride 0.9% 500 mL (VANCOCIN)  Once          piperacillin-tazobactam 3.375 g in NaCl 0.9 % 50 mL (MINI-BAG Plus) (ZOSYN)  Once                   Assessment/Plan    1. Vancomycin 1250 mg IV once in the ED (19.7 mg/kg actual body weight). Note that no current weight is documented in [facility] EMR, but weight of 63.5kg taken at urgent care visit earlier today in [facility] was used to calculate dose.  2. If the patient is admitted to the hospital and vancomycin therapy should continue, please re-consult pharmacy.    Subjective/Objective    Hafsa Robles presented to the ED on 11/13/2020 for Suspected Covid    Height:    Actual Body Weight (ABW):      Patient weight not recorded    BMI: There is no height or weight on file to calculate BMI.    No Known Allergies    There is no problem list on file for this patient.   No past medical history on file.     Temp Readings from Current Encounter:     11/13/20 1723   Temp: 97.6  F (36.4  C)       Recent Labs     11/13/20  1823   WBC 5.5     Recent Labs     11/13/20  1823   BUN 14   CREATININE 0.64       CrCl cannot be calculated (Unknown ideal weight.).    Thank you for the consult,  Nerissa Al, PharmD  11/13/2020  7:04 PM

## 2021-06-13 NOTE — PROGRESS NOTES
Progress Note  Restraint Application    I recognize that restraints are physical and/or chemical interventions intended to restrict a person's movements. Restraints are currently needed to ensure the safety of this patient and/or others. My clinical rationale appears below.    Category/Type of Restraint     Non Violent:  Soft limb restraint x2  --  Behavior  Pulling at tubes/lines  --  Root Cause of the Behavior  Sedation/intubation  --  Less-Restrictive Measures that Failed  Non Violent Measures:  Close Observation  --  Response to the Restraint  Patient unable to pull at tubes/lines  --  Criteria for Release from the Restraint  Patient calm and off sedation

## 2021-06-13 NOTE — PROGRESS NOTES
12/18/20 0421   Patient Data   Vt Exp (mL) 310 mL   Minute Ventilation (L/min) 9.1 L/min   Total Resp Rate  24 BPM   PIP Observed (cm H2O) 42 cm H2O   MAP (cm H2O) 21   Plateau Pressure (cm H2O) 37 cm H2O   SpO2 93 %   Heart Rate 93

## 2021-06-13 NOTE — PROGRESS NOTES
RESPIRATORY CARE NOTE     Patient Name: Hafsa Robles  Today's Date: 12/14/2020     Vent Mode: VCV  FiO2 (%):  [30 %-100 %] 50 %  S RR:  [20] 20  S VT:  [375 mL] 375 mL  PEEP/CPAP (cm H2O):  [5 cm H2O] 5 cm H2O  Minute Ventilation (L/min):  [7.5 L/min-10.6 L/min] 7.5 L/min  PIP:  [35 cm H2O-65 cm H2O] 35 cm H2O  AK SUP:  [8 cm H20] 8 cm H20  MAP (cm H2O):  [10-17] 10  ETT 23 cms at the teeth    Pt continues on full vent support.  This morning, pt very awake sqeezing my hand.  Had to increase fi02 to 50% because pt desaturating with a RR of 33 on vent support.  Sedation given by RN.  Ventilation improved.  Does desaturate with suction for small amts tan secretions.          Carol Ho

## 2021-06-13 NOTE — PLAN OF CARE
Problem: Pain  Goal: Patient's pain/discomfort is manageable  Outcome: Progressing   Complains of generalized muscle discomfort throughout. Tylenol administered. Essential oils on.  Problem: Potential for transmission of organism by Contact, Enteric, Droplet and/or Airborne routes  Goal: Prevent transmission of organisms  Outcome: Progressing     Problem: Risk for Infection  Goal: Identify and demonstrate techniques, lifestyle changes to prevent/reduce risk of infection and promote safe environment  Outcome: Progressing   Patient is in contact/droplet precautions d/t +COVID-19. Decadron, remdesavir and Lovenox ordered. Has a strong nonproductive cough-tessalon pearls and cough syrup administered. SAO2 91-93% on 4L O2. Currently receiving 1000cc NS bolus d/t LA 3.3.  Vital signs taking frequently. Afebrile. Monitoring closely.    Elisa Lopez RN

## 2021-06-13 NOTE — PROGRESS NOTES
Pt was on HFNC upon beginning of shift and transitioned to Bipap for overnight.  Pt c/o not being able to sleep and was given Melatonin 3mg PO.  Later pt was still awake and c/o not sleeping, PRN order received for Trazodone or Benadryl, as Trazodone was not in the accudose pt was given 25mg Diphenhydramine PO, approximately 1.5 hours later pt fell asleep and has remained that way for the most part.  She desatted several times overnight to 89% but this was due to her turning her head and causing the mask to leak. Pt needed no coverage for her blood sugar checks.

## 2021-06-13 NOTE — PROGRESS NOTES
"Pharmacy Consult: Vancomycin Dosing    Pharmacist consulted to dose vancomycin for Hafsa Robles, a 60 y.o. female.    Ordering provider: Sweetie Hogue DO    Indication for vancomycin therapy: Aspiration Pneumonia    Goal Trough Range:  15-20 mcg/mL based on indication    Other current antimicrobials              vancomycin 1,000 mg in sodium chloride 0.9% 250 mL (VANCOCIN)  Every 8 hours          cefepime 1 g in NaCl 0.9 % (MINI-BAG Plus) 50 mL (MAXIPIME)  Every 8 hours                   Subjective/Objective:    Patient was admitted for Pneumonia due to 2019 novel coronavirus on 11/13/2020    Height: 5' 5\" (1.651 m)    Actual Body Weight (ABW): 62.8 kg (138 lb 7.2 oz)    Ideal body weight: 57 kg (125 lb 10.6 oz)  Adjusted ideal body weight: 59.3 kg (130 lb 12.4 oz)    BMI: Body mass index is 23.04 kg/m .    No Known Allergies    Patient Active Problem List   Diagnosis     Pneumonia     Hypokalemia     Acute respiratory failure with hypoxia (H)     Pneumonia due to 2019 novel coronavirus     Severe protein-calorie malnutrition (H)     Acute respiratory failure (H)     ARDS (adult respiratory distress syndrome) (H)     Encephalopathy     Agitation     Anxiety     Shock circulatory (H)     Fever, unspecified fever cause     UTI due to Klebsiella species     Permanent atrial fibrillation with RVR (H)     Acute upper GI bleed     Single subsegmental pulmonary embolism without acute cor pulmonale (H)     Acute on chronic systolic heart failure (H)    Past Medical History:   Diagnosis Date     Malignant neoplasm of right female breast (H)         Temp Readings from Current Encounter:     12/18/20 1045 12/18/20 1200 12/18/20 1400   Temp: 98.4  F (36.9  C) 97.5  F (36.4  C) 97.5  F (36.4  C)     Net Intake/Output (last 24 hours):  I/O last 3 completed shifts:  In: 3770.1 [I.V.:580.1; NG/GT:2990; IV Piggyback:200]  Out: 3430 [Urine:3430]    Recent Labs     12/16/20  0513 12/17/20  0547 12/17/20  0548 12/18/20  0541 "   WBC  --  7.9  --  12.5*   LACTICACID 1.2  --   --   --    BUN  --   --  11 16   CREATININE  --   --  0.42* 0.46*     Estimated Creatinine Clearance: 128.9 mL/min (A) (by C-G formula based on SCr of 0.46 mg/dL (L)).    No results for input(s): CULTURE in the last 72 hours.    Results for orders placed or performed during the hospital encounter of 11/13/20   Sputum culture    Collection Time: 12/15/20  3:46 PM    Specimen: Respiratory   Result Value Status    Culture Usual Charlette Final   Urine culture    Collection Time: 12/15/20  2:19 PM    Specimen: Urine   Result Value Status    Culture >100,000 col/ml Klebsiella oxytoca (!) Final   Culture, Urine    Collection Time: 12/11/20  2:15 PM    Specimen: Urine, Catheter   Result Value Status    Culture 10,000-50,000 col/ml Yeast species (!) Final   Sputum culture    Collection Time: 12/01/20 10:48 AM    Specimen: Respiratory   Result Value Status    Culture Usual Charlette Final       Recent labs: (last 7 days)     12/17/20  1603   VANCOMYCIN 7.4       Vancomycin administrations: (last 120 hours)     Date/Time Action Medication Dose Rate    12/18/20 0120 New Bag    vancomycin 1,000 mg in sodium chloride 0.9% 250 mL (VANCOCIN) 1,000 mg 130 mL/hr    12/17/20 1717 New Bag    vancomycin 1,000 mg in sodium chloride 0.9% 250 mL (VANCOCIN) 1,000 mg 130 mL/hr    12/17/20 0417 New Bag    vancomycin 1,000 mg in sodium chloride 0.9% 250 mL (VANCOCIN) 1,000 mg 130 mL/hr    12/16/20 1627 New Bag    vancomycin 1,000 mg in sodium chloride 0.9% 250 mL (VANCOCIN) 1,000 mg 130 mL/hr    12/16/20 0311 New Bag    vancomycin 1,000 mg in sodium chloride 0.9% 250 mL (VANCOCIN) 1,000 mg 130 mL/hr    12/15/20 1638 New Bag    vancomycin 1,250 mg in sodium chloride 0.9% 500 mL (VANCOCIN) 1,250 mg 256.3 mL/hr          Assessment/Plan:    Pharmacist consulted to dose vancomycin for Aspiration Pneumonia, goal trough range 15-20 mcg/mL.  1. Continue vancomycin 1000mg IV every 8 hours (16 mg/kg actual body  weight).  2. No vancomycin level available for assessment.  3. Pharmacist will plan to re-check a vancomycin trough level when clinically approprate. Vancomycin therapy was discontinued earlier today, now being resumed. Patient missed 1 dose today, last dose 1000mg given this morning at 0100.  4. Pharmacist will continue to follow.    Thank you for the consult.  Gretta Echeverria, PharmD 12/18/2020 4:07 PM

## 2021-06-13 NOTE — PROGRESS NOTES
Patient had been on BiPAP, 100% FIO2 since transfer.  Patient has history of breast cancer, s/p mastectomy and adjuvant radiation therapy in 2017.  She was diagnosed with COVID 19 on 11/13/20.  Patient spent time in the ICU alternating between BiPAP and high flow nasal cannula.  Unfortunately this am, patient remained on BiPAP 100%, but only sating high 80's.  Looked exhausted, and would desat quickly to 70's when off of BiPAP for short time.  We decided that it was time to intubate.  Patient understands and is agreeable.  We spoke over Froylan with a Trin[place] .     Smooth intubation.  Initial settings at 250 Tidal Volume, rate of 20, 100%.  Reviewed CXR.  ABG post-intubation still needs to be drawn.  Started on Versed and Fentanyl gtt for pain management and sedation.  RASS goal -2.  Patient also a very difficult IV stick, one PIV.  Anesthesia attempted as well.  Patient will be getting a PICC Line place for access.  I updated patient's  Barry about above event.      Plan from last night, per Dr. Rock:    1. Titrate FiO2  2. Continuous BiPAP   3. Heplock IV fluids  4. Titrate BP meds  5. IV diuresis  6. Sputum Cx  7. Continue broad Abx zosyn and vancomycin  8. Start IV dexamethasone  9. NPO  10. PPI for GI prophylaxis   11. Glucose level monitoring  12. DVT prophylaxis lovenox subcutaneous    PROVIDER RESTRAINT FOR NON-VIOLENT BEHAVIOR FACE TO FACE EVALUATION    Patient's Immediate Situation:  Patient demonstrated the following behaviors: Pulling/tugging at invasive lines or tubes and does not respond to verbal/non-verbal redirection    Patient's Reaction to the intervention:  Does patient understand the reason for restraint/seclusion? Unable to Express    Medical Condition:  Is there any evidence of compromise of Skin integrity, Respiratory, Cardiovascular, Musculoskeletal, Hydration? No    Behavioral Condition:  In consultation with the RN, is there a need to continue this restraint or  seclusion? Yes    See Restraint Flowsheet for complete restraint documentation and assessment.    Tish Adkins, CHIKI Mabry, CNP  Pulmonary Critical Care  Pager: [phone]  45 minutes critical care time billed today.

## 2021-06-13 NOTE — PROGRESS NOTES
Critical Care Progress Note  11/16/2020   1:05 PM    Admit Date: 11/13/2020  ICU Day: 2  Code Status: full code      Problem List:   Principal Problem:    Pneumonia  Active Problems:    Hypokalemia    Acute respiratory failure with hypoxia (H)    Pneumonia due to 2019 novel coronavirus      Major changes for today:    - Try to increase activity to chair   - continue to encourage self proning      Plan by System:     Neuro/Psych: no acute issues; at risk for developing ICU delirium    - try to maintain day/night schedule.    - avoid benzos     Pulmonary: Acute respiratory failure with hypoxia due to COVID19 viral pneumonia;    - BiPAP continuous for now.    - might be able to trial HFNC if stable.    - Encourage self proning   - High risk for needing intubation.     CV: No acute issues    Tele monitoring.     GI/: NPO    - Keep NPO for now; ok for sips/ice. High risk for intubation and on continuous BiPAP.    - Bowel protocol.     Renal: no acute issues    - monitor     ID: COVID19 viral pneumonia    - Dexamethasone x 10 days   - Remdesivir x 5 days     Heme/Coag:  COVID19 coagulopathy    - LMWH 0.5mg/kg twice daily     Endocrine: Steroid induced hyperglycemia    -  No coverage needed. Follow          Dispo: ICU     Jennifer Leonard, CNP  [facility] Pulmonary/Critical Care     _______________________________________________________________    HPI: Hafsa Robles  is a 60 y.o. year old female with PMH Significant for breast cancer s/p radiation. Not currently under treatment. No history of heart, lung, liver, kidney disease. No diabetes. Presented to ER Nov 13 with one week history of cough, fever, fatigue. Diagnosed with Covid 19 and treated for possible bacterial superinfection. She had rapid escalation in her oxygen needs  from 4L NC to HFNC 80% 60 L/min and so she was transferred to ICU for further monitoring.     ROS: a bit uncomfortable in prone position - her arm hurts. Breathing is difficult. No  nausea.     Events over last 24-hours: BiPAP overnight.     Objective:     Vitals:    11/16/20 1130 11/16/20 1145 11/16/20 1200 11/16/20 1300   BP:   95/53 99/57   Patient Position:       Pulse:  61 62 68   Resp:  13 23 (!) 29   Temp: 99  F (37.2  C)      TempSrc: Axillary      SpO2:  95% 96% 92%   Weight:       Height:          I/O:     Intake/Output Summary (Last 24 hours) at 11/16/2020 1305  Last data filed at 11/16/2020 1000  Gross per 24 hour   Intake 1072 ml   Output 1500 ml   Net -428 ml     Wt Readings from Last 3 Encounters:   11/13/20 145 lb 8 oz (66 kg)   12/16/14 130 lb (59 kg)      Weight change:     Physical Exam:  Gen: looks uncomfortable, on BiPAP, proned.   HEENMT:PERRLA and extra ocular movement intact  NEURO: grossly non-focal  CARDIOVASCULAR: S1, S2 normal, no murmur, rub or gallop, regular rate and rhythm  PULMONARY: labored on BiPAP; dry cough present. Lungs are diminished and coarse.   GASTROINTESTINAL: moe in prone position.   INTEGUMENT: visible skin intact.   PSYCH: oriented to time, place and person    Labs:   Results from last 7 days   Lab Units 11/16/20  0512 11/14/20  1504 11/14/20  1504   LN-SODIUM mmol/L 143   < >  --    LN-POTASSIUM mmol/L 3.7   < >  --    LN-CHLORIDE mmol/L 103   < >  --    LN-CO2 mmol/L 27   < >  --    LN-BLOOD UREA NITROGEN mg/dL 14   < >  --    LN-CREATININE mg/dL 0.53*   < >  --    LN-CALCIUM mg/dL 7.9*   < >  --    LN-PROTEIN TOTAL g/dL  --   --  6.3   LN-BILIRUBIN TOTAL mg/dL  --   --  0.3   LN-ALKALINE PHOSPHATASE U/L  --   --  99   LN-ALT (SGPT) U/L  --   --  43   LN-AST (SGOT) U/L  --   --  121*    < > = values in this interval not displayed.       Results from last 7 days   Lab Units 11/16/20  0602   LN-WHITE BLOOD CELL COUNT thou/uL 8.1   LN-HEMOGLOBIN g/dL 14.1   LN-HEMATOCRIT % 43.0   LN-PLATELET COUNT thou/uL 324       Micro:   No growth     Imaging: all imaging personalized reviewed   11/13 CXR - No pleural fluid or pneumothorax. Heterogeneous  pulmonary opacities are concerning for multifocal atypical infection and have increased from the prior study. Normal size of the heart      Jennifer Leonard, [facility] Pulmonary/Critical Care

## 2021-06-13 NOTE — PROGRESS NOTES
PROVIDER RESTRAINT FOR NON-VIOLENT BEHAVIOR FACE TO FACE EVALUATION  12/17/2020   8:36 AM     Patient seen on morning interdisciplinary rounds.     Patient's Immediate Situation:  Patient demonstrated the following behaviors: Pulling/tugging at invasive lines or tubes and does not respond to verbal/non-verbal redirection    Patient's Reaction to the intervention:  Does patient understand the reason for restraint/seclusion? Unable to Express    Medical Condition:  Is there any evidence of compromise of Skin integrity, Respiratory, Cardiovascular, Musculoskeletal, Hydration? No    Behavioral Condition:  In consultation with the RN, is there a need to continue this restraint or seclusion? Yes    See Restraint Flowsheet for complete restraint documentation and assessment.    Jennifer Leonard, CNP

## 2021-06-13 NOTE — PROGRESS NOTES
Critical Care Progress Note  11/20/2020   9:38 AM     Admit Date: 11/13/2020  Moved to ICU on 11/15/20    Code Status: full code      Problem List:   Principal Problem:    Pneumonia  Active Problems:    Hypokalemia    Acute respiratory failure with hypoxia (H)    Pneumonia due to 2019 novel coronavirus  anxiety       I used Lao  on IPAD device to examine  and talk with pt today    Major changes for today:    -ADAT   - self proning as able   - get up in  Chair at least two times a day   -prn morphine for dyspnea     Plan by System:     Neuro/Psych: no acute issues; at risk for developing ICU delirium    - try to maintain day/night schedule.    - avoid benzos    -add prn morphine     Pulmonary: Acute respiratory failure with hypoxia due to COVID19 viral pneumonia;    - BiPAP alternating with HFNC   - Encourage self proning   - High risk for needing intubation.     CV: No acute issues    Tele monitoring.     GI/: NPO    - back to clear liguid diet. High risk for intubation    - Bowel protocol.     Renal: no acute issues    - monitor     ID: COVID19 viral pneumonia    - Dexamethasone x 10 days   - Remdesivir stopped today, 11/19  New recs tx only 5 days    -stopped  antibiotics on 11/18    Heme/Coag:  COVID19 coagulopathy    - LMWH 0.5mg/kg twice daily     Endocrine: Steroid induced hyperglycemia    -  No coverage needed. Follow        Dispo: ICU , remains at risk for needing intubation    Mellisa Abebe , CNP  [facility] Pulmonary/Critical Care     Total  CCT: 45 min     With use of  services I fell that pt now has a better understanding of treatment plan     I also talked with pt's  Barry on the phone    _______________________________________________________________    HPI: Hafsa Robles  is a 60 y.o. year old female with PMH Significant for breast cancer s/p radiation. Not currently under treatment. No history of heart, lung, liver, kidney disease. No diabetes.  Presented to ER Nov 13 with one week history of cough, fever, fatigue. Diagnosed with Covid 19 and treated for possible bacterial superinfection. She had rapid escalation in her oxygen needs  from 4L NC to HFNC 80% 60 L/min and so she was transferred to ICU for further monitoring.     ROS: with aide of Polish  pt wanting to eat    Events over last 24-hours: BiPAP overnight 90%    Objective:     Vitals:    11/20/20 0600 11/20/20 0627 11/20/20 0750 11/20/20 0856   BP: (!) 86/64      Pulse: 69  66    Resp: (!) 45      Temp:       TempSrc:       SpO2: (!) 89%  96% (!) 88%   Weight:  138 lb 0.1 oz (62.6 kg)     Height:          I/O:     Intake/Output Summary (Last 24 hours) at 11/20/2020 1028  Last data filed at 11/20/2020 0456  Gross per 24 hour   Intake --   Output 1300 ml   Net -1300 ml     Wt Readings from Last 3 Encounters:   11/20/20 138 lb 0.1 oz (62.6 kg)   12/16/14 130 lb (59 kg)      Weight change:     Physical Exam:  Gen: awake, up  In chair  HEENMT:PERRLA and extra ocular movement intact  NEURO: grossly non-focal  CARDIOVASCULAR: RRR  PULMONARY: Lungs are diminished and coarse.   GASTROINTESTINAL: soft nt + bs  INTEGUMENT: visible skin intact.   PSYCH: oriented to time, place and person via aide with spoken     Labs:   Results from last 7 days   Lab Units 11/20/20  0508 11/19/20  0630   LN-SODIUM mmol/L 141 140   LN-POTASSIUM mmol/L 4.0 3.7   LN-CHLORIDE mmol/L 104 105   LN-CO2 mmol/L 28 28   LN-BLOOD UREA NITROGEN mg/dL 16 14   LN-CREATININE mg/dL 0.53* 0.51*   LN-CALCIUM mg/dL 8.3* 7.9*   LN-PROTEIN TOTAL g/dL  --  6.0   LN-BILIRUBIN TOTAL mg/dL  --  0.7   LN-ALKALINE PHOSPHATASE U/L  --  84   LN-ALT (SGPT) U/L  --  49*   LN-AST (SGOT) U/L  --  49*       Results from last 7 days   Lab Units 11/20/20  0508   LN-WHITE BLOOD CELL COUNT thou/uL 11.0   LN-HEMOGLOBIN g/dL 14.3   LN-HEMATOCRIT % 43.4   LN-PLATELET COUNT thou/uL 426       Micro:   No growth     Imaging: all  imaging personalized reviewed   11/13 CXR - No pleural fluid or pneumothorax. Heterogeneous pulmonary opacities are concerning for multifocal atypical infection and have increased from the prior study. Normal size of the heart

## 2021-06-13 NOTE — PROGRESS NOTES
PULMONARY / CRITICAL CARE PROGRESS NOTE    Date / Time of Admission:  11/13/2020  5:17 PM    Assessment:   Principal Problem:    Pneumonia  Active Problems:    Hypokalemia    Acute respiratory failure with hypoxia (H)    Pneumonia due to 2019 novel coronavirus    Severe protein-calorie malnutrition (H)    Acute respiratory failure (H)    ARDS (adult respiratory distress syndrome) (H)    Encephalopathy        Advance Directives: Full code      Plan:   Neuro:  Sedated with Precedex and fentanyl.  0.5 and 75mcg/h    Cardiovascular:  Low-dose norepinephrine 0.02.  Probably vasoplegia from sedation  1 L negative.  Lasix on hold since 12/6.  Due to hypotension she got 1 L bolus on 12/6 as well.      Respiratory:  Acute hypoxic respiratory failure secondary to COVID-19 pneumonia.  Developed ARDS requiring intubation.  Completed dexamethasone and remdesivir.  Completed 7 days of empiric Zosyn.  Sputum culture negative    First symptoms beginning of November.  Admitted to the hospital with hypoxia on 11/13.  Intubated 12/1    Paralyzed until 12/4.  Tidal volume increased yesterday.  Currently on 400 mL, rate of 14, PEEP 5, FiO2 40%    SBT 15/5 daily for 30 minutes    GI:  Loose bowel movements.  Otherwise tolerating tube feeds.    :  Lasix on hold.  1 L negative.  Monitor and replace electrolytes.    ID:  COVID-19 pneumonia complicated by ARDS.  Completed remdesivir and dexamethasone  Completed 7 days of empiric Zosyn.  Cultures remain negative    Endocrine:  Sliding scale insulin          ICU DAILY CHECKLIST                           Can patient transfer out of MICU? no    FAST HUG:    Feeding:  Feeding: Yes.  Patient is receiving TUBE FEEDS    [place]:Yes  Analgesia/Sedation:Yes, see above  Thromboembolic prophylaxis: yes; Mode:  Lovenox  HOB>30:  Yes  Stress Ulcer Protocol Active: yes; Mode: PPI  Glycemic Control: Any glucose > 180 no; Mode of Insulin Therapy: Sliding Scale Insulin    INTUBATED:  Can patient have daily  waking:  yes  Can patient have spontaneous breathing trial:  yes    Restraints? yes    PHYSICAL THERAPY AND MOBILITY:  Can patient have PT and mobility trial: no  Activity: Bed Rest    Critical Care Time greater than: 37min        Subjective:   HPI:  Hafsa Robles is a 60 y.o. lady with a past medical history significant for breast cancer s/p partial mastectomy and adjuvant radiation in 2017, who presented to the hospital on 11/13/2020  5:17 PM for acute hypoxemic respiratory failure.  She was subsequently diagnosed with Covid-19 infection and spent several days in the intensive care unit alternating between high flow and BiPAP.  On 11/30 she was transferred back to the intensive care unit worsening hypoxia and was emergently intubated on the morning of 12/1.    Principal Problem:    Pneumonia  Active Problems:    Hypokalemia    Acute respiratory failure with hypoxia (H)    Pneumonia due to 2019 novel coronavirus    Severe protein-calorie malnutrition (H)    Acute respiratory failure (H)    ARDS (adult respiratory distress syndrome) (H)    Encephalopathy      Allergies: Patient has no known allergies.     MEDS:  Scheduled Meds:    chlorhexidine  15 mL Topical Q12H     enoxaparin ANTICOAGULANT  0.5 mg/kg Subcutaneous Q12H     [Held by provider] furosemide  20 mg Intravenous BID - diuretic     influenza vaccine quad (PF) (age 50-64 YR) 2020-21  0.5 mL Intramuscular Prior to Discharge     insulin aspart (NovoLOG) injection   Subcutaneous Q6H FIXED TIMES     omeprazole  20 mg Oral QAM AC    Or     omeprazole  20 mg Enteral Tube QAM AC     senna-docusate  1 tablet Oral BID    Or     senna (SENOKOT) syrup  8.8 mg Enteral Tube BID     sodium chloride  10-30 mL Intravenous Q8H FIXED TIMES     Continuous Infusions:    dexmedetomidine infusion orderable (PRECEDEX) 1 mcg/kg/hr (12/09/20 1500)     fentaNYL citrate (PF) Stopped (12/08/20 2044)     norepinephrine 0.02 mcg/kg/min (12/09/20 1500)     sodium chloride 0.9% 10 mL/hr  "(12/09/20 1500)     PRN Meds:.acetaminophen, acetaminophen, bacitracin, benzocaine-menthoL, bisacodyL, calcium (as carbonate), dextrose 50 % (D50W), fentaNYL - BOLUS DOSE from infusion, fentaNYL - BOLUS DOSE from infusion, glucagon (human recombinant), hydrALAZINE, lipase-protease-amylase **AND** sodium bicarbonate, LORazepam, magnesium hydroxide, midazolam, naloxone **OR** naloxone **OR** naloxone **OR** naloxone, ondansetron **OR** ondansetron, polyethylene glycol, polyvinyl alcohol, sodium chloride bacteriostatic, sodium chloride, sodium chloride, sodium chloride, traZODone      Objective:   VITALS:  /56 (Patient Position: Lying)   Pulse 74   Temp 98.8  F (37.1  C) (Axillary)   Resp (!) 29   Ht 5' 5\" (1.651 m)   Wt 129 lb (58.5 kg)   SpO2 96%   BMI 21.47 kg/m    EXAM:  General appearance: Intubated   Lungs: clear to auscultation bilaterally  Heart: regular rate and rhythm, S1, S2 normal, no murmur, click, rub or gallop  Abdomen: soft, non-tender; bowel sounds normal; no masses,  no organomegaly  Extremities: extremities normal, atraumatic, no cyanosis or edema  Neurologic: Awake, moves all extremities    I&O:      Intake/Output Summary (Last 24 hours) at 12/9/2020 1605  Last data filed at 12/9/2020 1500  Gross per 24 hour   Intake 3389.07 ml   Output 2000 ml   Net 1389.07 ml       Data Review:  Chest X-Ray: Bilateral infiltrates    CBC:   Lab Results   Component Value Date    WBC 12.6 (H) 12/09/2020    WBC 4.8 12/16/2014    RBC 3.93 12/09/2020     Serum Glucose range:Invalid input(s): GLUCOSEPOCT    By:  Jeramie Crespo, 12/9/2020, 11:05 AM    Primary Care Physician:  Provider, No Primary Care                   "

## 2021-06-13 NOTE — PROGRESS NOTES
Progress Note    Assessment/Plan    Principal Problem:    Pneumonia  Active Problems:    Hypokalemia    Acute respiratory failure with hypoxia (H)    Pneumonia due to 2019 novel coronavirus    Severe protein-calorie malnutrition (H)    Acute respiratory failure (H)    ARDS (adult respiratory distress syndrome) (H)    Encephalopathy    61 y/o Nepali speaking female with a past history of vertigo, DCIS right breast status post mastectomy and radiation treatment in 2018, admitted for COVID and acute hypoxic respiratory failure on 11/13. She was on high flow NC and Bipap initially in the ICU, then answered to the floor. On 11/30 she was transferred back to ICU for worsening hypoxia and was intubated on 12/1/20 and as of 12/7/20 remains intubated with ARDS     ARDS/acute hypoxic respiratory failure due to COVID infection  Remains intubated/vent  -ICU managing  - weaning trials-slow improvement, not tolerate much of a wean today, chest x-ray unchanged, diuresing, FiO2 levels relatively low.  Question how much is complicated by anxiety, situational.  No past history of anxiety   Increasing scheduled lorazepam, scheduled Zyprexa, already on Precedex.    Consider psychiatry consultation tomorrow, will discuss with tomorrow's intensivist, discussed with today's intensivist     COVID with septic and circulatory shock  Weaned off pressors on 12/4, but back on again late 12/7/20, back on pressors again likely secondary to sedation medication, doubt sepsis currently.    - Has completed Remdesivir and Dexamethasone courses  - Lovenox two times a day for VTE proph  - Received Rocephin and Azithro earlier in stay, completed empiric course of Zosyn 12/6/20  - Blood and sputum cultures NGTD    Hematuria-started 12/10, has Arteaga catheter, mild but worse with movement.  Renal ultrasound shows nonobstructive nephrolithiasis, UA positive leukocyte esterase but also positive skin cells, Arteaga catheter sample.  Lovenox restarted,  hemoglobin stable, gross hematuria nearly resolved.  Awaiting urine culture results.  Creatinine normal.    Malnutrition  - Tube feeding and FW flushes per RD  On furosemide IV.  Weight is stable, net small positive I's and O's     4. Leukocytosis  Had resolved, slightly elevated the past 2 days, 11.1.  Normal lactic acid.  Afebrile.  Continue to trend.     5. Hypokalemia  -- Replaced     6.DVT prevent-restarted Lovenox q 12, held for hematuria    Hyperglycemia-reactive secondary to tube feeding.  On low-dose Lantus and sliding scale.  Blood sugars fairly controlled.    Anxiety-likely situational, no past history of anxiety or depression.  On scheduled Zyprexa, scheduled lorazepam , also on Precedex.  Consider psychiatric consultation tomorrow if no improvement with wean.     7.GI proph- ppi  Discussed with intensivist  Full code     subjective  Patient intubated, interactive, still seems to get anxious with wean.  Hematuria improved.  Past Medical History  Past Medical History:   Diagnosis Date     Malignant neoplasm of right female breast (H)        Current Medications  Scheduled Meds:    chlorhexidine  15 mL Topical Q12H     enoxaparin ANTICOAGULANT  0.5 mg/kg Subcutaneous BID     influenza vaccine quad (PF) (age 50-64 YR) 2020-21  0.5 mL Intramuscular Prior to Discharge     insulin aspart (NovoLOG) injection   Subcutaneous Q6H FIXED TIMES     insulin glargine  6 Units Subcutaneous QAM     LORazepam (ATIVAN) conc solution 2 mg/mL  1 mg Oral Q6H     OLANZapine  5 mg Oral Q6H     omeprazole  20 mg Oral QAM AC    Or     omeprazole  20 mg Enteral Tube QAM AC     senna-docusate  1 tablet Oral BID    Or     senna (SENOKOT) syrup  8.8 mg Enteral Tube BID     sodium chloride  10-30 mL Intravenous Q8H FIXED TIMES     Continuous Infusions:    dexmedetomidine infusion orderable (PRECEDEX) 0.8 mcg/kg/hr (12/13/20 1786)     norepinephrine 0.07 mcg/kg/min (12/13/20 6712)     sodium chloride 0.9% Stopped (12/10/20 9271)     PRN  Meds:.acetaminophen, acetaminophen, bacitracin, benzocaine-menthoL, bisacodyL, calcium (as carbonate), dextrose 50 % (D50W), fentaNYL pf, glucagon (human recombinant), hydrALAZINE, lipase-protease-amylase **AND** sodium bicarbonate, LORazepam, magnesium hydroxide, midazolam, naloxone **OR** naloxone **OR** naloxone **OR** naloxone, ondansetron **OR** ondansetron, polyethylene glycol, polyvinyl alcohol, sodium chloride bacteriostatic, sodium chloride, sodium chloride, sodium chloride, traZODone  No Known Allergies    Objective  Vital signs in last 24 hours  Temp:  [98.5  F (36.9  C)-99.3  F (37.4  C)] 98.8  F (37.1  C)  Heart Rate:  [67-94] 76  Resp:  [20-49] 20  BP: (102)/(64) 102/64  Arterial Line BP: ()/(41-77) 123/67  FiO2 (%):  [30 %-50 %] 35 %  Weight:   136 lb 9.6 oz (62 kg)      Wt Readings from Last 2 Encounters:   12/13/20 0330 136 lb 9.6 oz (62 kg)   12/11/20 2355 136 lb 3.2 oz (61.8 kg)   12/10/20 0000 136 lb (61.7 kg)   12/09/20 0000 129 lb (58.5 kg)   12/08/20 0100 133 lb 9.6 oz (60.6 kg)   12/06/20 0230 135 lb 5.8 oz (61.4 kg)   12/05/20 0028 136 lb 7.4 oz (61.9 kg)   12/04/20 0000 136 lb 0.4 oz (61.7 kg)   12/03/20 0030 136 lb 14.5 oz (62.1 kg)   12/01/20 0400 130 lb 12.8 oz (59.3 kg)   11/28/20 0602 134 lb 6.4 oz (61 kg)   11/27/20 0000 136 lb 7.4 oz (61.9 kg)   11/26/20 2013 136 lb 7.4 oz (61.9 kg)   11/26/20 0030 137 lb 5.6 oz (62.3 kg)   11/24/20 0400 137 lb 9.1 oz (62.4 kg)   11/23/20 0400 137 lb 9.1 oz (62.4 kg)   11/22/20 0425 136 lb 11 oz (62 kg)   11/21/20 0452 137 lb 12.6 oz (62.5 kg)   11/20/20 0627 138 lb 0.1 oz (62.6 kg)   11/19/20 0733 141 lb 15.6 oz (64.4 kg)   11/13/20 2013 145 lb 8 oz (66 kg)   12/16/14 1223 130 lb (59 kg)   Weight change: 6.4 oz (0.181 kg)    Intake/Output last 3 shifts  I/O last 3 completed shifts:  In: 2562.8 [I.V.:541.8; NG/GT:2021]  Out: 2875 [Urine:2875]  Intake/Output this shift:  I/O this shift:  In: -   Out: 175 [Urine:175]    Review of Systems   Review  "of systems not obtained due to inability to communicate with the patient.     Physical Exam    Temp:  [98.5  F (36.9  C)-99.3  F (37.4  C)] 98.8  F (37.1  C)  Heart Rate:  [67-94] 76  Resp:  [20-49] 20  BP: (102)/(64) 102/64  Arterial Line BP: ()/(41-77) 123/67  FiO2 (%):  [30 %-50 %] 35 %    /64 (Patient Position: Lying)   Pulse 76   Temp 98.8  F (37.1  C) (Axillary)   Resp 20   Ht 5' 5\" (1.651 m)   Wt 136 lb 9.6 oz (62 kg)   SpO2 100%   BMI 22.73 kg/m    General appearance: no distress  Awake, alert  Remainder physical exam deferred to intensivist in order to preserve PPE  Pertinent Labs   Lab Results: personally reviewed.   Labs from the previous 24 hours  Recent Results (from the past 24 hour(s))   POCT Glucose    Specimen: Blood   Result Value Ref Range    Glucose 154 (H) 70 - 139 mg/dL   Hemoglobin   Result Value Ref Range    Hemoglobin 11.7 (L) 12.0 - 16.0 g/dL   Lactic Acid   Result Value Ref Range    Lactic Acid 0.4 (L) 0.7 - 2.0 mmol/L   POCT Glucose    Specimen: Blood   Result Value Ref Range    Glucose 109 70 - 139 mg/dL   Basic Metabolic Panel   Result Value Ref Range    Sodium 137 136 - 145 mmol/L    Potassium 4.1 3.5 - 5.0 mmol/L    Chloride 102 98 - 107 mmol/L    CO2 27 22 - 31 mmol/L    Anion Gap, Calculation 8 5 - 18 mmol/L    Glucose 166 (H) 70 - 125 mg/dL    Calcium 8.5 8.5 - 10.5 mg/dL    BUN 12 8 - 22 mg/dL    Creatinine 0.50 (L) 0.60 - 1.10 mg/dL    GFR MDRD Af Amer >60 >60 mL/min/1.73m2    GFR MDRD Non Af Amer >60 >60 mL/min/1.73m2   HM2(CBC W/O DIFF)   Result Value Ref Range    WBC 11.1 (H) 4.0 - 11.0 thou/uL    RBC 4.15 3.80 - 5.40 mill/uL    Hemoglobin 11.7 (L) 12.0 - 16.0 g/dL    Hematocrit 36.1 35.0 - 47.0 %    MCV 87 80 - 100 fL    MCH 28.2 27.0 - 34.0 pg    MCHC 32.4 32.0 - 36.0 g/dL    RDW 14.1 11.0 - 14.5 %    Platelets 547 (H) 140 - 440 thou/uL    MPV 9.7 8.5 - 12.5 fL   POCT Glucose    Specimen: Blood   Result Value Ref Range    Glucose 151 (H) 70 - 139 mg/dL "   POCT Glucose    Specimen: Blood   Result Value Ref Range    Glucose 128 70 - 139 mg/dL     Labs from the previous 7 days:   Results from last 7 days   Lab Units 12/13/20  0406 12/12/20  2224 12/12/20  0418 12/11/20  0359   LN-WHITE BLOOD CELL COUNT thou/uL 11.1*  --  11.9* 10.7   LN-HEMOGLOBIN g/dL 11.7* 11.7* 11.4* 11.4*   LN-PLATELET COUNT thou/uL 547*  --  513* 452*     Results from last 7 days   Lab Units 12/13/20  0406 12/12/20  0418 12/11/20  1415 12/11/20  0359 12/10/20  0216 12/09/20  0257 12/08/20  0348 12/07/20  0642   LN-SODIUM mmol/L 137 138  --  138 138 138 138 140   LN-POTASSIUM mmol/L 4.1 3.6 4.2 3.5 3.8 3.7 3.9 4.3   LN-CHLORIDE mmol/L 102 104  --  105 106 101 101 102   LN-CO2 mmol/L 27 23  --  23 26 29 31 31   LN-BLOOD UREA NITROGEN mg/dL 12 15  --  11 12 10 14 16   LN-CREATININE mg/dL 0.50* 0.52*  --  0.48* 0.44* 0.44* 0.48* 0.45*   LN-CALCIUM mg/dL 8.5 8.3*  --  8.7 8.4* 8.2* 8.1* 8.3*               Invalid input(s): LABALBU        Other labs:   Lab Results   Component Value Date    INR 0.96 11/17/2020    INR 0.95 11/16/2020    INR 0.91 11/15/2020       Pertinent Radiology   Personally reviewed radiologists report and impressions  Xr Chest 1 View Portable    Result Date: 12/13/2020  EXAM: XR CHEST 1 VIEW PORTABLE LOCATION: [facility] DATE/TIME: 12/13/2020 10:59 AM INDICATION: resp failure. COVID COMPARISON: 12/04/2020     Endotracheal tube tip at the clavicle heads. Right PICC tip projects over the lower SVC. Nasogastric tube tip below the film. Heart size normal. Stable bilateral lung infiltrates. No pneumothorax. No effusion.    Xr Chest 1 View Portable    Result Date: 12/4/2020  EXAM: XR CHEST 1 VIEW PORTABLE LOCATION: [facility] DATE/TIME: 12/4/2020 1:24 PM INDICATION: desaturation, covid ARDS eval for interval change COMPARISON: 12/01/2020 and older studies.     Endotracheal tube is in good position. NG tube is coursing in the stomach. Right  upper extremity PICC line catheter overlies the mid SVC. There has been slight decrease in the diffuse, asymmetric interstitial opacities. No hydropneumothorax. Heart and pulmonary vascularity are normal.    Xr Chest 1 View Portable    Result Date: 12/1/2020  EXAM: XR CHEST 1 VIEW PORTABLE LOCATION: [facility] DATE/TIME: 12/1/2020 12:36 PM INDICATION: ETT placement, worsening condition. COMPARISON: 12/01/2020.     Extensive bilateral opacities are present, similar to same-day radiograph. No substantial pleural effusion. No pneumothorax. Normal heart size. ET tube tip appropriately positioned, roughly 23 mm above the fede. Enteric tube courses into the stomach and off the field of view. Right PICC tip over the distal SVC.    Xr Chest 1 View Portable    Result Date: 12/1/2020  EXAM: XR CHEST 1 VIEW PORTABLE LOCATION: [facility] DATE/TIME: 12/1/2020 6:26 AM INDICATION: confirm ET Tube position COMPARISON: 11/29/2020.     The patient has been intubated. The endotracheal tube terminates 1.9 cm above the fede. Nasogastric tube passes below the diaphragm and the inferior margin of the radiograph. Extensive bilateral pulmonary infiltrates are little changed from  11/29/2020 and consistent with infectious/inflammatory pneumonitis. No significant pleural effusion. No pneumothorax. Normal heart size.    Xr Chest 1 View Portable    Result Date: 11/29/2020  EXAM: XR CHEST 1 VIEW PORTABLE LOCATION: [facility] DATE/TIME: 11/29/2020 3:44 PM INDICATION: Hypoxia COMPARISON: 11/19/2020     Previously seen PICC line has been removed. Bilateral pulmonary infiltrates have significantly increased when compared to previous. No pleural effusion or pneumothorax.    Xr Chest 1 View Portable    Result Date: 11/19/2020  EXAM: XR CHEST 1 VIEW PORTABLE LOCATION: [facility] DATE/TIME: 11/19/2020 8:30 AM INDICATION: covid ARDS COMPARISON:  Portable AP view of the chest 11/13/2020     Right PICC terminates at the SVC right atrial junction. Cardiac silhouette is normal in size. Unchanged mediastinal interfaces. Patchy bilateral lung airspace opacities are present which are slightly better defined compared to 6 days earlier suggesting evolving organizing pneumonia, the typical pattern of lung injury in the setting of COVID pneumonia. No pleural fluid or pneumothorax is present.    Xr Chest 1 View Portable    Result Date: 11/13/2020  EXAM: XR CHEST 1 VIEW PORTABLE LOCATION: [facility] DATE/TIME: 11/13/2020 6:20 PM INDICATION: Chest Pain. Suspected COVID. COMPARISON: 11/11/2020     No pleural fluid or pneumothorax. Heterogeneous pulmonary opacities are concerning for multifocal atypical infection and have increased from the prior study. Normal size of the heart.     Xr Abdomen Ap Portable    Result Date: 12/2/2020  EXAM: XR ABDOMEN AP PORTABLE LOCATION: [facility] DATE/TIME: 12/2/2020 1:05 PM INDICATION: og placement COMPARISON: Chest x-ray 12/01/2020     Enteric tube with the tip in satisfactory position in the gastric antrum. Nonobstructive bowel gas pattern. No definite free air. Stable bilateral pulmonary airspace opacities.     Us Kidney Bilateral    Result Date: 12/11/2020  EXAM: US KIDNEY BILATERAL WITH BLADDER LOCATION: [facility] DATE/TIME: 12/11/2020 5:19 PM INDICATION: hematuria COMPARISON: None. TECHNIQUE: Routine Bilateral Renal and Bladder Ultrasound. FINDINGS: RIGHT KIDNEY: 12.3 x 5.6 x 4.7 cm. No hydronephrosis. Inferior renal 5 mm shadowing echogenic focus suggests a nonobstructive stone. LEFT KIDNEY: 12.4 x 5.6 x 5.1 cm. No hydronephrosis. Several small echogenic foci in the renal sinus could represent nonobstructive stones. BLADDER: Decompressed with Arteaga.     1.  No hydronephrosis. 2.  Probable right renal 5 mm nonobstructive stone. 3.  Possible left renal  "smaller nonobstructive stones.    Poc Us 3cg Picc Placement Guidance    Result Date: 12/1/2020  Exam was performed as guidance for PICC line insertion.  Click \"PACS images\" hyperlink below to view any stored images.  For specific procedure details, view procedure note authored by PICC/Vascular Access Nurse.    Poc Us 3cg Picc Placement Guidance    Result Date: 11/15/2020  Exam was performed as guidance for PICC line insertion.  Click \"PACS images\" hyperlink below to view any stored images.  For specific procedure details, view procedure note authored by PICC/Vascular Access Nurse.          Advanced Care Planning  Discharge Planning : Anticipate discharge in days to weeks  Factors affecting discharge: Hypoxia/respiratory failure  Discussed care with intensivist, staff, for total time 10 minutes with greater than 50% of total time spent in counseling and coordination of care.    Austyn Ricardo MD  [facility]    Office: [phone]      "

## 2021-06-13 NOTE — PROGRESS NOTES
[facility] Pulmonary/Critical Care Consult Team Note    Hafsa Robles,  1960, MRN [id number]  Admitting Dx: Hypoxia [R09.02]  Multifocal pneumonia [J18.9]  Date / Time of Admission:  2020  5:17 PM    Overnight Events:  Intake/Output last 3 shifts:  I/O last 3 completed shifts:  In: 3852.9 [I.V.:658.9; NG/GT:2994; IV Piggyback:200]  Out: 3620 [Urine:3620]   Fever overnight 101.2  Had a weaning trial that last ed 10min with high RR and low Tv    Assessment/Plan: Hafsa Robles is a 60 y.o. female with PMHx of  breast cancer s/p radiation/mastectomy.  Presented to ER  with COVID-19. She decompensated and O2 went from 4 liters to HFNC 80% on 60 L, transferred to the ICU and intubated on 2020.    PULM/ID: Acute resp failure due to COVID PNA  - LAST VENT SETTINGS: Vent Mode: PCV/VG  FiO2 (%):  [40 %-100 %] 40 %  S RR:  [20] 20  S VT:  [350 mL-375 mL] 350 mL  PEEP/CPAP (cm H2O):  [5 cm H2O] 5 cm H2O  Minute Ventilation (L/min):  [7.1 L/min-11.7 L/min] 9.1 L/min  PIP:  [18 cm H2O-42 cm H2O] 42 cm H2O  CA SUP:  [8 cm H20] 8 cm H20  MAP (cm H2O):  [9-21] 21  - ABGs:  Lab Results   Component Value Date    PHART 7.50 (H) 2020    SVF5SQC 41 2020    PO2ART 65 (L) 2020   - daily sedation vacations and SBT  - completed a course of dex and remdesivir  - on vancomycin and cefepime- Klebsiella sensitive to Cefepime, will discontinue vanc  - Urine with Klebsiella     CV: Hx of HTN, now with shock  - MAP >65, wean NE as able. On and off low dose NE last few days  - Lasix 20mg IV two times a day   - Monitor on tele    GI: NPO  - on tube feeds  - GI proph    RENAL:   Lab Results   Component Value Date    CREATININE 0.46 (L) 2020   - Follow BUN/Creatinine  - strict I/O's    NEURO: Severe anxiety  - Cont seroquel 75mg four times a day  - Cont trazodone 25mg four times a day prn for agitation and at bedtime prn for sleep  - Cont 1mg po ativan q6h  - Cont seroquel 25mg four times a day prn for  agitation  - Cont depakote sprinkles 125mg three times a day  - on precedex    ENDO:-   Lab Results   Component Value Date/Time     (H) 12/18/2020 05:41 AM     (H) 12/17/2020 05:48 AM     (H) 12/15/2020 04:42 AM   - Critical Care hyperglycemic protocol  - Accuchecks and sliding scale q6hrs  - lantus 6 units daily    Pt has critical illness and impairs breathing on vent such as there is high probability of imminent of life threatening deterioration in the patient's condition.    Code Status: FULL CODE    ICU DAILY CHECKLIST           Can patient transfer out of MICU? no  FAST HUG:  Feeding:  Feeding: Yes.    Arteaga:Yes  Analgesia/Sedation:Yes  Thromboembolic prophylaxis:Lovenox  HOB>30:  Yes  Stress Ulcer Protocol Active: yes; Mode: PPI/H2 Antagonist  Glycemic Control:   Lab Results   Component Value Date/Time     (H) 12/18/2020 05:41 AM     (H) 12/17/2020 05:48 AM     (H) 12/15/2020 04:42 AM    Any glucose > 180 no; Mode of Insulin Therapy: Sliding Scale Insulin  INTUBATED:  Can patient have daily waking:  yes  Can patient have spontaneous breathing trial:  yes  Does pt have restraints: MD RESTRAINT FOR NON-VIOLENT BEHAVIOR FACE TO FACE EVALUATION Patient's Immediate Situation: Patient demonstrated the following behaviors: Impulsive behavior, grabbing at support tubes/lines.  Patient's Reaction to the intervention Does patient understand the reason for restraint/seclusion? Unable to Express Medical Condition Is there any evidence of compromise of Skin integrity, Respiratory, Cardiovascular, Musculoskeletal, Hydration? No Behavioral ConditionIn consultation with the RN, is there a need to continue this restraint or seclusion? Yes See Restraint Flowsheet for complete restraint documentation and assessment.  PHYSICAL THERAPY AND MOBILITY: Can patient have PT and mobility trial: no Activity: ICU mobility protocol    Critical Care Time excluding procedures and family discussions  "greater than: 45 Minutes    Sweetie Hogue DO  Pulmonary and Critical Care Attending  pgr [phone]    No Known Allergies    Meds: See MAR    Physical Exam:  BP 90/54 (Patient Position: Semi-cedeño)   Pulse 94   Temp (!) 101.2  F (38.4  C) (Axillary)   Resp (!) 30   Ht 5' 5\" (1.651 m)   Wt 138 lb 7.2 oz (62.8 kg)   SpO2 94%   BMI 23.04 kg/m    Intake/Output this shift:  No intake/output data recorded.  GEN: Intubated and sedated, NAD  HEENT: Pupils reactive, et tube in place, OG tube in place  CVS: regular rhythm, no murmurs  RESP: Coarse rhonchi geo, no wheezing  ABD: Soft, No abdominal pain with palpation, no guarding, no rigidity  EXT: Warm, well perfused, no rashes, no edema  Vasc: geo radial pulses intact  NEURO:  sedated  PSYCH: sedated    Pertinent Labs: Latest lab results in EHR personally reviewed.   Results from last 7 days   Lab Units 12/18/20  0541 12/17/20  0547 12/16/20  0513 12/15/20  0442   LN-WHITE BLOOD CELL COUNT thou/uL 12.5* 7.9  --  11.3*   LN-HEMOGLOBIN g/dL 9.8* 9.2*  --  10.3*   LN-HEMATOCRIT % 30.9* 29.8*  --  32.9*   LN-MEAN CORPUSCULAR VOLUME fL 88 90  --  89   LN-PLATELET COUNT thou/uL 443* 425 466* 496*    and   Results from last 7 days   Lab Units 12/18/20  0541 12/17/20  0548 12/16/20  1623 12/15/20  0442   LN-SODIUM mmol/L 137 141  --  137   LN-POTASSIUM mmol/L 4.0 3.8 3.9 3.9   LN-CHLORIDE mmol/L 100 104  --  103   LN-CO2 mmol/L 28 29  --  25   LN-BLOOD UREA NITROGEN mg/dL 16 11  --  11   LN-CREATININE mg/dL 0.46* 0.42*  --  0.46*   LN-CALCIUM mg/dL 8.7 8.1*  --  8.1*       Cultures: personally reviewed.   Culture   Date Value Ref Range Status   12/15/2020 Usual Charlette  Final     Gram Stain Result   Date Value Ref Range Status   12/15/2020 4+ Polymorphonuclear leukocytes  Final   12/15/2020 1+ Gram positive cocci in pairs  Final       personally reviewed images:   Imaging results from past 30 days: Xr Chest 1 View Portable    Result Date: 12/15/2020  EXAM: XR CHEST 1 VIEW " PORTABLE LOCATION: [facility] DATE/TIME: 12/15/2020 2:29 PM INDICATION: Fever, respiratory failure, evaluate for interval changes. COMPARISON: 12/13/2020.     1.  No change in right upper extremity PICC line, enteric tube, or endotracheal tube. Heart and mediastinal size are stable. Lung volumes are diminished. There has been progression of bilateral interstitial and alveolar infiltrate from comparison study. No definite pleural effusion or pneumothorax.    Xr Chest 1 View Portable    Result Date: 12/13/2020  EXAM: XR CHEST 1 VIEW PORTABLE LOCATION: [facility] DATE/TIME: 12/13/2020 10:59 AM INDICATION: resp failure. COVID COMPARISON: 12/04/2020     Endotracheal tube tip at the clavicle heads. Right PICC tip projects over the lower SVC. Nasogastric tube tip below the film. Heart size normal. Stable bilateral lung infiltrates. No pneumothorax. No effusion.    Xr Chest 1 View Portable    Result Date: 12/4/2020  EXAM: XR CHEST 1 VIEW PORTABLE LOCATION: [facility] DATE/TIME: 12/4/2020 1:24 PM INDICATION: desaturation, covid ARDS eval for interval change COMPARISON: 12/01/2020 and older studies.     Endotracheal tube is in good position. NG tube is coursing in the stomach. Right upper extremity PICC line catheter overlies the mid SVC. There has been slight decrease in the diffuse, asymmetric interstitial opacities. No hydropneumothorax. Heart and pulmonary vascularity are normal.    Xr Chest 1 View Portable    Result Date: 12/1/2020  EXAM: XR CHEST 1 VIEW PORTABLE LOCATION: [facility] DATE/TIME: 12/1/2020 12:36 PM INDICATION: ETT placement, worsening condition. COMPARISON: 12/01/2020.     Extensive bilateral opacities are present, similar to same-day radiograph. No substantial pleural effusion. No pneumothorax. Normal heart size. ET tube tip appropriately positioned, roughly 23 mm above the fede. Enteric tube courses into  the stomach and off the field of view. Right PICC tip over the distal SVC.    Xr Chest 1 View Portable    Result Date: 12/1/2020  EXAM: XR CHEST 1 VIEW PORTABLE LOCATION: [facility] DATE/TIME: 12/1/2020 6:26 AM INDICATION: confirm ET Tube position COMPARISON: 11/29/2020.     The patient has been intubated. The endotracheal tube terminates 1.9 cm above the fede. Nasogastric tube passes below the diaphragm and the inferior margin of the radiograph. Extensive bilateral pulmonary infiltrates are little changed from  11/29/2020 and consistent with infectious/inflammatory pneumonitis. No significant pleural effusion. No pneumothorax. Normal heart size.    Xr Chest 1 View Portable    Result Date: 11/29/2020  EXAM: XR CHEST 1 VIEW PORTABLE LOCATION: [facility] DATE/TIME: 11/29/2020 3:44 PM INDICATION: Hypoxia COMPARISON: 11/19/2020     Previously seen PICC line has been removed. Bilateral pulmonary infiltrates have significantly increased when compared to previous. No pleural effusion or pneumothorax.    Xr Chest 1 View Portable    Result Date: 11/19/2020  EXAM: XR CHEST 1 VIEW PORTABLE LOCATION: [facility] DATE/TIME: 11/19/2020 8:30 AM INDICATION: covid ARDS COMPARISON: Portable AP view of the chest 11/13/2020     Right PICC terminates at the SVC right atrial junction. Cardiac silhouette is normal in size. Unchanged mediastinal interfaces. Patchy bilateral lung airspace opacities are present which are slightly better defined compared to 6 days earlier suggesting evolving organizing pneumonia, the typical pattern of lung injury in the setting of COVID pneumonia. No pleural fluid or pneumothorax is present.    Xr Abdomen Ap Portable    Result Date: 12/2/2020  EXAM: XR ABDOMEN AP PORTABLE LOCATION: [facility] DATE/TIME: 12/2/2020 1:05 PM INDICATION: og placement COMPARISON: Chest x-ray 12/01/2020     Enteric tube with the tip  "in satisfactory position in the gastric antrum. Nonobstructive bowel gas pattern. No definite free air. Stable bilateral pulmonary airspace opacities.     Us Kidney Bilateral    Result Date: 12/11/2020  EXAM: US KIDNEY BILATERAL WITH BLADDER LOCATION: [facility] DATE/TIME: 12/11/2020 5:19 PM INDICATION: hematuria COMPARISON: None. TECHNIQUE: Routine Bilateral Renal and Bladder Ultrasound. FINDINGS: RIGHT KIDNEY: 12.3 x 5.6 x 4.7 cm. No hydronephrosis. Inferior renal 5 mm shadowing echogenic focus suggests a nonobstructive stone. LEFT KIDNEY: 12.4 x 5.6 x 5.1 cm. No hydronephrosis. Several small echogenic foci in the renal sinus could represent nonobstructive stones. BLADDER: Decompressed with Arteaga.     1.  No hydronephrosis. 2.  Probable right renal 5 mm nonobstructive stone. 3.  Possible left renal smaller nonobstructive stones.    Us Venous Legs Bilateral    Result Date: 12/15/2020  EXAM: US VENOUS LEGS BILATERAL LOCATION: [facility] DATE/TIME: 12/15/2020 5:25 PM INDICATION: Bilateral lower extremity edema COMPARISON: None. TECHNIQUE: Venous Duplex ultrasound of bilateral lower extremities with and without compression, augmentation and duplex. Color flow and spectral Doppler with waveform analysis performed. FINDINGS: Exam includes the common femoral, femoral, popliteal veins as well as segmentally visualized deep calf veins and greater saphenous vein. RIGHT: No deep vein thrombosis. No superficial thrombophlebitis. No popliteal cyst. LEFT: No deep vein thrombosis. No superficial thrombophlebitis. No popliteal cyst.     1.  No deep venous thrombosis in the bilateral lower extremities.    Poc Us 3cg Picc Placement Guidance    Result Date: 12/1/2020  Exam was performed as guidance for PICC line insertion.  Click \"PACS images\" hyperlink below to view any stored images.  For specific procedure details, view procedure note authored by PICC/Vascular Access " Nurse.      Patient Active Problem List   Diagnosis     Pneumonia     Hypokalemia     Acute respiratory failure with hypoxia (H)     Pneumonia due to 2019 novel coronavirus     Severe protein-calorie malnutrition (H)     Acute respiratory failure (H)     ARDS (adult respiratory distress syndrome) (H)     Encephalopathy     Agitation     Anxiety     Shock circulatory (H)     Fever, unspecified fever cause     UTI due to Klebsiella species       Sweetie Hogue DO  Pulmonary and Critical Care Attending  pgr [phone]

## 2021-06-13 NOTE — PROGRESS NOTES
Clinical Nutrition Therapy Follow Up Note      Current Nutrition Prescription:   Diet: Regular  Diet Supplements: none  IV dextrose or Fluids: none    Current Nutrition Intake:  The patient's current meal intake is good > 75%   Total nutrient intake from all sources meets 89% estimated energy needs and 78% estimated protein needs.     Patient has been eating % of meals per nursing records and receiving on average 1760kcals and 69g protein daily.     Anthropometrics:  Admission weight: 145 lb 8 oz (66 kg) bed  Weight: 137 lb 9.1 oz (62.4 kg)     GI Status/Output:   GI symptoms include: WDL per nurse  Bowel Sounds present per nurse  Last BM: 11/24/20 per nurse    Skin/Wound:  Bruising on abdomen/forearm and excoriation on perineum was noted.    Medications:  Medications reviewed: prilosec, k-dur/klor-con     Labs:  Labs reviewed: Cr 0.51    Estimated Nutrition Needs:  Assessment weight is 66kg, current weight     Energy Needs: 0590-3762 kcals daily, 25-30 kcal/kg  Protein Needs: 79-99 g daily, 1.2-1.5 g/kg.  Fluid Needs: 1980 mls daily, 30 mls/kg    Malnutrition: Noted previously as severe    Nutrition Risk Level: stable-moderate risk    Nutrition dx:  Inadequate oral intake r/t acute respiratory failure as evidenced by Bipap/HFNC dependence     Malnutrition r/t COVID-19 as evidenced by prolonged poor intake of < 50% estimated nutrition needs for 2 weeks and 5.2% wt loss in 1 week.    Goal Status:  Maintain weight-not progresing  Meet estimated nutrition needs-progressing  Diet advance-met    Intervention:  Ordered Boost Plus daily w/ meals dt contintued weight loss.      Monitoring/Evaluation:  PO intake, supplement tolerance, wt labs

## 2021-06-13 NOTE — PROGRESS NOTES
PULMONARY / CRITICAL CARE PROGRESS NOTE    Date / Time of Admission:  11/13/2020  5:17 PM    Assessment:   Principal Problem:    Pneumonia  Active Problems:    Hypokalemia    Acute respiratory failure with hypoxia (H)    Pneumonia due to 2019 novel coronavirus    Severe protein-calorie malnutrition (H)    Acute respiratory failure (H)    ARDS (adult respiratory distress syndrome) (H)    Encephalopathy    60 y.o. year old female with PMH Significant for breast cancer s/p radiation/mastectomy.  Presented to ER 11/13 and dg with COVID-19. She decompensated and O2 went from 4 liters to HFNC 80% on 60 L, transferred to the ICU and intubated on 12/1/2020.    Plan:   Neuro:  On Precedex.  Fentanyl weaned off and stopped  On olanzapine (Zyprexa) 5 mg q6h for agitation  Added seroquel and hydroxyzine  Increase ativan to 1mg PO q6h    Cardiovascular:  Drops in her blood pressure due to vasoplegia from sedation.  I's and O's equal  Remains on NE ggt as of this am  EKG in am to check QT interval given pt is on on zyprexa and seroquel    Respiratory:  Acute hypoxic respiratory failure secondary to COVID-19 pneumonia.  Developed ARDS requiring intubation on 12/1.  Completed dexamethasone and remdesivir.  Completed 7 days of empiric Zosyn.  Sputum culture negative  Paralyzed until 12/4.    On LPMV.   ml, PEEP of 5.   Peak/Plat 32/24  SBT - failed today, RR increased and became tachycardic. ? Anxiety playing a role.  CXR shows stable bilateral infiltrates  Advance ETT 2 cm      GI:  Loose bowel movements.  Otherwise tolerating tube feeds.    :  -Noted to have hematuria and Lovenox was stopped.  Renal U/S with stones but no masses.  -Restarted lovenox 30 mg two times a day as hematuria resolved.  Monitor for now  -F/u urine cultures    ID:  COVID-19 pneumonia complicated by ARDS.  Completed remdesivir and dexamethasone  Completed 7 days of empiric Zosyn.  Cultures remain negative    Endocrine:  Sliding scale insulin, lantus      Heme/Onc:  On lovenox for covid dvt prophylaxis  hgb and lactates have been unremarkable    Advance Directives: Full Code    Oren Vásquez MD    Critical Care Time greater than: 40 min    Updated , Barry.     ICU DAILY CHECKLIST                        Can patient transfer out of MICU? no    FAST HUG:    Feeding:  Feeding: Yes.  Patient is receiving TUBE FEEDS    Arteaga:Yes  Analgesia/Sedation:Yes, see above  Thromboembolic prophylaxis: yes; Mode:  Lovenox resumed.   HOB>30:  Yes  Stress Ulcer Protocol Active: yes; Mode: PPI  Glycemic Control: Any glucose > 180 no; Mode of Insulin Therapy: Sliding Scale Insulin    INTUBATED:  Can patient have daily waking:  yes  Can patient have spontaneous breathing trial:  yes    Restraints? yes    PHYSICAL THERAPY AND MOBILITY:  Can patient have PT and mobility trial: no  Activity: Bed Rest      Subjective:   HPI:  Hafsa Robles is a 60 y.o. lady with a past medical history significant for breast cancer s/p partial mastectomy and adjuvant radiation in 2017, who presented to the hospital on 11/13/2020  5:17 PM for acute hypoxemic respiratory failure.  She was subsequently diagnosed with Covid-19 infection and spent several days in the intensive care unit alternating between high flow and BiPAP.  On 11/30 she was transferred back to the intensive care unit worsening hypoxia and was emergently intubated on the morning of 12/1.    Interval history:  No evidence of bleeding, hemoglobin has been stable.  Patient i is coming down on norepinephrine.  RT reported small tan secretions. Hematuria resolved    Principal Problem:    Pneumonia  Active Problems:    Hypokalemia    Acute respiratory failure with hypoxia (H)    Pneumonia due to 2019 novel coronavirus    Severe protein-calorie malnutrition (H)    Acute respiratory failure (H)    ARDS (adult respiratory distress syndrome) (H)    Encephalopathy      Allergies: Patient has no known allergies.     MEDS:  Scheduled Meds:     "chlorhexidine  15 mL Topical Q12H     enoxaparin ANTICOAGULANT  0.5 mg/kg Subcutaneous BID     furosemide  20 mg Intravenous DAILY     influenza vaccine quad (PF) (age 50-64 YR) 2020-21  0.5 mL Intramuscular Prior to Discharge     insulin aspart (NovoLOG) injection   Subcutaneous Q6H FIXED TIMES     insulin glargine  6 Units Subcutaneous QAM     LORazepam (ATIVAN) conc solution 2 mg/mL  0.5 mg Oral Q6H     OLANZapine  5 mg Oral Q8H     omeprazole  20 mg Oral QAM AC    Or     omeprazole  20 mg Enteral Tube QAM AC     senna-docusate  1 tablet Oral BID    Or     senna (SENOKOT) syrup  8.8 mg Enteral Tube BID     sodium chloride  10-30 mL Intravenous Q8H FIXED TIMES     Continuous Infusions:    dexmedetomidine infusion orderable (PRECEDEX) 1 mcg/kg/hr (12/13/20 2221)     norepinephrine 0.08 mcg/kg/min (12/13/20 7997)     sodium chloride 0.9% Stopped (12/10/20 6384)     PRN Meds:.acetaminophen, acetaminophen, bacitracin, benzocaine-menthoL, bisacodyL, calcium (as carbonate), dextrose 50 % (D50W), fentaNYL pf, glucagon (human recombinant), hydrALAZINE, lipase-protease-amylase **AND** sodium bicarbonate, LORazepam, magnesium hydroxide, midazolam, naloxone **OR** naloxone **OR** naloxone **OR** naloxone, ondansetron **OR** ondansetron, polyethylene glycol, polyvinyl alcohol, sodium chloride bacteriostatic, sodium chloride, sodium chloride, sodium chloride, traZODone      Objective:   VITALS:  /64 (Patient Position: Lying)   Pulse 79   Temp 98.5  F (36.9  C) (Axillary)   Resp (!) 31   Ht 5' 5\" (1.651 m)   Wt 136 lb 9.6 oz (62 kg)   SpO2 98%   BMI 22.73 kg/m    EXAM:  General appearance: Intubated   Lungs: clear to auscultation bilaterally  Heart: regular rate and rhythm, S1, S2 normal, no murmur, click, rub or gallop  Abdomen: soft, non-tender; bowel sounds normal; no masses,  no organomegaly  Extremities: extremities normal, atraumatic, no cyanosis or edema  Neurologic: Awake, moves all extremities    I&O:  "     Intake/Output Summary (Last 24 hours) at 12/13/2020 0800  Last data filed at 12/13/2020 0549  Gross per 24 hour   Intake 2344.8 ml   Output 2360 ml   Net -15.2 ml       Data Review:  Chest X-Ray: Bilateral infiltrates    CBC:   Lab Results   Component Value Date    WBC 11.1 (H) 12/13/2020    WBC 4.8 12/16/2014    RBC 4.15 12/13/2020     Serum Glucose range:Invalid input(s): GLUCOSEPOCT    By:  Oren Vásquez, 12/13/2020, 11:05 AM    Primary Care Physician:  Provider, No Primary Care

## 2021-06-13 NOTE — PROGRESS NOTES
Clinical Nutrition Therapy Follow Up Note    Transferred to higher level of care, now requiring BiPAP as of early this am    Current Nutrition Prescription:   Diet: Regular  Diet Supplements: none  IV dextrose or Fluids: none    Current Nutrition Intake:  The patient's current meal intake varies %    Anthropometrics:  Admission weight: 145 lb 8 oz (66 kg) bed  Weight: 134 lb 6.4 oz (61 kg)  (11/28)    GI Status/Output:   GI symptoms include: WDL per nurse  Bowel Sounds present per nurse  Last BM: 11/27/20 per nurse    Skin/Wound:  No wound noted    Medications:  reviewed    Labs:  reviewed    Estimated Nutrition Needs:  Assessment weight is 66kg, current weight     Energy Needs: 9059-2808 kcals daily, 25-30 kcal/kg  Protein Needs: 79-99 g daily, 1.2-1.5 g/kg.  Fluid Needs: 1980 mls daily, 30 mls/kg    Malnutrition: Noted previously as severe    Nutrition Risk Level: high risk now that pt requiring BiPAP    Nutrition dx:  Inadequate oral intake r/t acute respiratory failure as evidenced by Bipap/HFNC dependence     Malnutrition r/t COVID-19 as evidenced by prolonged poor intake of < 50% estimated nutrition needs for 2 weeks and 5.2% wt loss in 1 week.    Goal Status:  Maintain weight-not progressing  Meet estimated nutrition needs-progressing  Diet advance-met    Intervention:  Continue supplement in case pt off BiPAP long enough to eat/drink    Monitoring/Evaluation:  POC, PO intake, supplement tolerance, wt labs

## 2021-06-13 NOTE — PROGRESS NOTES
"60yr F, admitted on 11/13/20 for low SaO2%.  She transferred to ICU and intubated on 12/1/20 for failed BiPAP, patient remains intubated, also was proned and on Veletri.  Patient remains intubated, BS decreased.  Vent Day #8.    Weaning today on CPAP+5, Ps +15, FiO2 40% x 25 minutes  RR 40, Vt 322, VE 9.8L, RSBI 125, HR 87, /87, SO2% 96    /56 (Patient Position: Lying)   Pulse 74   Temp 99.5  F (37.5  C) (Axillary)   Resp 14   Ht 5' 5\" (1.651 m)   Wt 133 lb 9.6 oz (60.6 kg)   SpO2 96%   BMI 22.23 kg/m      Vent Mode: VCV  FiO2 (%):  [40 %-100 %] 40 %  S RR:  [14] 14  S VT:  [350 mL-400 mL] 400 mL  PEEP/CPAP (cm H2O):  [5 cm H2O] 5 cm H2O  Minute Ventilation (L/min):  [5 L/min-12.6 L/min] 12.1 L/min  PIP:  [12 cm H2O-44 cm H2O] 39 cm H2O  NM SUP:  [5 cm H20-15 cm H20] 15 cm H20  MAP (cm H2O):  [6-13] 12    CXR 12/4/20  Slight decrease in the diffuse, asymmetric interstitial opacities     Continue current treatment, wean when indicated, & suction PRN.   "

## 2021-06-13 NOTE — PLAN OF CARE
SAO2 94% on 4L via nc. Strong dry cough, increases when she is talking. Cough syrup and tessalon pearls ordered as needed. Denies any shortness of breath. Decadron and IV remdesivir administered as ordered. COVID-19 precautions continue.   Elisa Lopez RN

## 2021-06-13 NOTE — PROGRESS NOTES
"RESPIRATORY CARE NOTE    Arterial Blood Gas result:  pH 7.46; pCO2 40; pO2 80; HCO3 29, %O2 Sat 97.  7.5 ETT secure 21 cm at teeth, tolerating bellow ventilator settings:   Vent Mode: VCV  FiO2 (%):  [35 %] 35 %  S RR:  [20] 20  S VT:  [375 mL-400 mL] 375 mL  PEEP/CPAP (cm H2O):  [5 cm H2O] 5 cm H2O  Minute Ventilation (L/min):  [8 L/min-12.5 L/min] 9 L/min  PIP:  [13 cm H2O-36 cm H2O] 36 cm H2O  MI SUP:  [5 cm H20-15 cm H20] 5 cm H20  MAP (cm H2O):  [8-13] 13   PIP 36, plateau 33, compliance 16    /64 (Patient Position: Lying)   Pulse 67   Temp 99.3  F (37.4  C) (Axillary)   Resp 20   Ht 5' 5\" (1.651 m)   Wt 136 lb 3.2 oz (61.8 kg)   SpO2 99%   BMI 22.66 kg/m    Weaned for 4 hrs and 18 min on 5/5 during day shift ( for details see day shift T note)  BS diminished with scatter course crackles, Sxn small tan thick, Pt tolerated well.  RT will follow per protocol.      GUSTAVO SoriaT    "

## 2021-06-13 NOTE — ACP (ADVANCE CARE PLANNING)
Pt noted to have T-wave inverson in lead II--looked back on ECG strips and noted such as of 12/5/20 0700 strip--ICU NP notifed--will obtain ECG.

## 2021-06-13 NOTE — PROGRESS NOTES
RESPIRATORY CARE NOTE    Vent Mode: PCV/VG  FiO2 (%):  [40 %-100 %] 40 %  S RR:  [20] 20  S VT:  [350 mL] 350 mL  PEEP/CPAP (cm H2O):  [5 cm H2O] 5 cm H2O  Minute Ventilation (L/min):  [6.8 L/min-11.2 L/min] 8 L/min  PIP:  [23 cm H2O-38 cm H2O] 35 cm H2O  LA SUP:  [12 cm H20] 12 cm H20  MAP (cm H2O):  [10-22] 13    Pt. remains on full vent support, settings above. Day 21, ETT 7.5, 23@ teeth, BS diminished, suctioning small amount of white/tan secretions via ETT, failed wean today RR40-50, RSBI 200's, MD aware. PIP 35, plats 30, titrate oxygen as tolerated, RT following    Maria E Kenny LRT

## 2021-06-13 NOTE — PROGRESS NOTES
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 She was on high flow NC and Bipap initially in the ICU, then answered to the floor. On 11/30 she was transferred back to ICU for worsening hypoxia and was intubated on 12/1/20 and as of 12/7/20 remains intubated with ARDS.  Primary issue for the past several days has been significant anxiety/tachypnea with any wean, however today became overtly febrile and more hypotensive with concern for sepsis.  Patient is being followed by pulmonary/critical care, psychiatry     ARDS/acute hypoxic respiratory failure due to COVID infection  Remains intubated/vent  -ICU managing  - weaning trials-slow improvement, not tolerating much of a wean thought to be due more to anxiety.  Chest x-ray shows increased infiltrates today with fever though FiO2 needs not worsening.    Sputum culture initially unremarkable, reobtaining sputum culture now.    Intermittently getting IV furosemide, holding today due to worsening hypotension     psychiatry consulted, starting Depakote scheduled, continuing scheduled Seroquel, lorazepam.  Zyprexa discontinued.  Continuing Precedex.       COVID with septic and circulatory shock  Weaned off pressors on 12/4, but back on again late 12/7/20, back on pressors again likely secondary to sedation medication,   However more hypotensive today with fever, question sepsis,  - Has completed Remdesivir and Dexamethasone courses  -  Lovenox two times a day for VTE proph  - Received Rocephin and Azithro earlier in stay, completed empiric course of Zosyn 12/6/20  - Blood and sputum cultures NGTD    New fever and worsening hypotension-had mild hypotension for the past several days intermittently thought to be due to sedation.  However overt fever today with worsening hypotension.  Reculturing, repeat UA pending, repeat sputum culture and blood cultures.  Procalcitonin normal, lactic acid normal.  Tachycardic.  Chest x-ray shows worsening infiltrates, FiO2 relatively stable at 40%.  Lower extremity Doppler ultrasound shows no DVT.  Empiric cefepime and vancomycin started by intensivist.  On increased norepinephrine.    Hematuria-started 12/10, has Aretaga catheter.  Renal ultrasound shows nonobstructive nephrolithiasis, urine culture positive for yeast, 50,000, holding treatment..  Lovenox restarted, hematuria resolved..  Hemoglobin stable.  Continue to monitor.    Malnutrition  - Tube feeding and FW flushes per RD  On furosemide IV intermittently, holding today due to worsening hypotension and signs of sepsis..  Weight is stable, net small positive I's and O's     Hypokalemia  -- Replaced     DVT prevent-restarted Lovenox q 12, held for hematuria    Hyperglycemia-reactive secondary to tube feeding.  On low-dose Lantus and sliding scale.  Blood sugars fairly controlled.    Anxiety-likely situational, no past history of anxiety or depression.  Suspect inhibiting wean.  Psychiatry consulted, started scheduled Depakote, increasing today.  Discontinued Zyprexa, continue scheduled Seroquel and as needed, scheduled lorazepam, also on Precedex.  As needed trazodone added.  Now difficult to assess given fever, tachycardia with possible sepsis.     7.GI proph- ppi  Discussed with intensivist  Full code     subjective  Patient intubated, interactive, still seems to get anxious with wean.  Hematuria solved.  Today more tachycardic, hypotensive and anxious,  improved with Seroquel.  Also febrile today overtly..  Past Medical History  Past Medical History:   Diagnosis Date     Malignant neoplasm of right female breast (H)        Current Medications  Scheduled Meds:    cefepime  1 g Intravenous Q8H     chlorhexidine  15 mL Topical Q12H     enoxaparin ANTICOAGULANT  0.5 mg/kg Subcutaneous BID     influenza vaccine quad (PF) (age 50-64 YR) 2020-21  0.5 mL Intramuscular Prior to Discharge     insulin aspart (NovoLOG) injection   Subcutaneous Q6H FIXED TIMES     insulin glargine  6 Units Subcutaneous QAM     LORazepam (ATIVAN) conc solution 2 mg/mL  1 mg Oral Q6H     omeprazole  20 mg Oral QAM AC    Or     omeprazole  20 mg Enteral Tube QAM AC     QUEtiapine  75 mg Oral QID     senna-docusate  1 tablet Oral BID    Or     senna (SENOKOT) syrup  8.8 mg Enteral Tube BID     sodium chloride  10-30 mL Intravenous Q8H FIXED TIMES     valproate  250 mg Oral Q8H FIXED TIMES     vancomycin  1,250 mg Intravenous Once    Followed by     [START ON 12/16/2020] vancomycin  1,000 mg Intravenous Q12H     Continuous Infusions:    dexmedetomidine infusion orderable (PRECEDEX) Stopped (12/15/20 1430)     norepinephrine 0.09 mcg/kg/min (12/15/20 1325)     sodium chloride 0.9% 10 mL/hr (12/14/20 1200)     PRN Meds:.acetaminophen, acetaminophen, bacitracin, benzocaine-menthoL, bisacodyL, calcium (as carbonate), codeine-guaiFENesin, dextrose 50 % (D50W), fentaNYL pf, glucagon (human recombinant), hydrALAZINE, lipase-protease-amylase **AND** sodium bicarbonate, LORazepam, magnesium hydroxide, midazolam, naloxone **OR** naloxone **OR** naloxone **OR** naloxone, ondansetron **OR** ondansetron, polyethylene glycol, polyvinyl alcohol, QUEtiapine, QUEtiapine, sodium chloride bacteriostatic, sodium chloride, sodium chloride, sodium chloride, traZODone  No Known Allergies    Objective  Vital signs in last 24 hours  Temp:  [98.4  F (36.9  C)-101.5  F (38.6  C)] 100.6  F (38.1  C)  Heart Rate:  []  "116  Resp:  [20-42] 33  BP: ()/(52-78) 91/58  Arterial Line BP: ()/(44-83) 105/55  FiO2 (%):  [40 %] 40 %  Weight:   135 lb 11.2 oz (61.6 kg)      Wt Readings from Last 2 Encounters:   12/15/20 0400 135 lb 11.2 oz (61.6 kg)   12/14/20 0430 133 lb (60.3 kg)   12/13/20 0330 136 lb 9.6 oz (62 kg)   12/11/20 2355 136 lb 3.2 oz (61.8 kg)   12/10/20 0000 136 lb (61.7 kg)   12/09/20 0000 129 lb (58.5 kg)   12/08/20 0100 133 lb 9.6 oz (60.6 kg)   12/06/20 0230 135 lb 5.8 oz (61.4 kg)   12/05/20 0028 136 lb 7.4 oz (61.9 kg)   12/04/20 0000 136 lb 0.4 oz (61.7 kg)   12/03/20 0030 136 lb 14.5 oz (62.1 kg)   12/01/20 0400 130 lb 12.8 oz (59.3 kg)   11/28/20 0602 134 lb 6.4 oz (61 kg)   11/27/20 0000 136 lb 7.4 oz (61.9 kg)   11/26/20 2013 136 lb 7.4 oz (61.9 kg)   11/26/20 0030 137 lb 5.6 oz (62.3 kg)   11/24/20 0400 137 lb 9.1 oz (62.4 kg)   11/23/20 0400 137 lb 9.1 oz (62.4 kg)   11/22/20 0425 136 lb 11 oz (62 kg)   11/21/20 0452 137 lb 12.6 oz (62.5 kg)   11/20/20 0627 138 lb 0.1 oz (62.6 kg)   11/19/20 0733 141 lb 15.6 oz (64.4 kg)   11/13/20 2013 145 lb 8 oz (66 kg)   12/16/14 1223 130 lb (59 kg)   Weight change: 2 lb 11.2 oz (1.225 kg)    Intake/Output last 3 shifts  I/O last 3 completed shifts:  In: 2653.4 [I.V.:589.4; NG/GT:2064]  Out: 2190 [Urine:2190]  Intake/Output this shift:  I/O this shift:  In: 358 [I.V.:158; NG/GT:200]  Out: 160 [Urine:160]    Review of Systems   Review of systems not obtained due to inability to communicate with the patient.     Physical Exam    Temp:  [98.4  F (36.9  C)-101.5  F (38.6  C)] 100.6  F (38.1  C)  Heart Rate:  [] 116  Resp:  [20-42] 33  BP: ()/(52-78) 91/58  Arterial Line BP: ()/(44-83) 105/55  FiO2 (%):  [40 %] 40 %    BP 91/58   Pulse (!) 116   Temp (!) 100.6  F (38.1  C) (Oral)   Resp (!) 33   Ht 5' 5\" (1.651 m)   Wt 135 lb 11.2 oz (61.6 kg)   SpO2 95%   BMI 22.58 kg/m    General appearance: no distress  Awake, alert  Remainder physical exam " deferred to intensivist in order to preserve PPE  Pertinent Labs   Lab Results: personally reviewed.   Labs from the previous 24 hours  Recent Results (from the past 24 hour(s))   POCT Glucose    Specimen: Blood   Result Value Ref Range    Glucose 110 70 - 139 mg/dL   POCT Glucose    Specimen: Blood   Result Value Ref Range    Glucose 138 70 - 139 mg/dL   Basic Metabolic Panel   Result Value Ref Range    Sodium 137 136 - 145 mmol/L    Potassium 3.9 3.5 - 5.0 mmol/L    Chloride 103 98 - 107 mmol/L    CO2 25 22 - 31 mmol/L    Anion Gap, Calculation 9 5 - 18 mmol/L    Glucose 175 (H) 70 - 125 mg/dL    Calcium 8.1 (L) 8.5 - 10.5 mg/dL    BUN 11 8 - 22 mg/dL    Creatinine 0.46 (L) 0.60 - 1.10 mg/dL    GFR MDRD Af Amer >60 >60 mL/min/1.73m2    GFR MDRD Non Af Amer >60 >60 mL/min/1.73m2   HM2(CBC W/O DIFF)   Result Value Ref Range    WBC 11.3 (H) 4.0 - 11.0 thou/uL    RBC 3.71 (L) 3.80 - 5.40 mill/uL    Hemoglobin 10.3 (L) 12.0 - 16.0 g/dL    Hematocrit 32.9 (L) 35.0 - 47.0 %    MCV 89 80 - 100 fL    MCH 27.8 27.0 - 34.0 pg    MCHC 31.3 (L) 32.0 - 36.0 g/dL    RDW 14.3 11.0 - 14.5 %    Platelets 496 (H) 140 - 440 thou/uL    MPV 9.6 8.5 - 12.5 fL   POCT Glucose    Specimen: Blood   Result Value Ref Range    Glucose 137 70 - 139 mg/dL   POCT Glucose    Specimen: Blood   Result Value Ref Range    Glucose 169 (H) 70 - 139 mg/dL   Urinalysis   Result Value Ref Range    Color, UA Yellow Colorless, Yellow, Straw, Light Yellow    Clarity, UA Cloudy (!) Clear    Glucose, UA Negative Negative    Bilirubin, UA Negative Negative    Ketones, UA Negative Negative, 60 mg/dL    Specific Gravity, UA 1.008 1.001 - 1.030    Blood, UA Large (!) Negative    pH, UA 7.0 4.5 - 8.0    Protein, UA 70 mg/dL (!) Negative mg/dL    Urobilinogen, UA <2.0 E.U./dL <2.0 E.U./dL, 2.0 E.U./dL    Nitrite, UA Negative Negative    Leukocytes, UA Large (!) Negative    Bacteria, UA Many (!) None Seen hpf    RBC, UA >100 (!) None Seen, 0-2 hpf    WBC, UA 10-25 (!)  None Seen, 0-5 hpf    Squam Epithel, UA 10-25 (!) None Seen, 0-5 lpf    Mucus, UA Moderate (!) None Seen lpf    Hyaline Casts, UA 0-5 0-5, None Seen lpf   Procalcitonin   Result Value Ref Range    Procalcitonin 0.38 0.00 - 0.49 ng/mL     Labs from the previous 7 days:   Results from last 7 days   Lab Units 12/15/20  0442 12/14/20  0429 12/13/20  0406   LN-WHITE BLOOD CELL COUNT thou/uL 11.3* 12.6* 11.1*   LN-HEMOGLOBIN g/dL 10.3* 11.3* 11.7*   LN-PLATELET COUNT thou/uL 496* 553* 547*     Results from last 7 days   Lab Units 12/15/20  0442 12/14/20  0429 12/13/20  0406 12/12/20  0418 12/11/20  1415 12/11/20  0359 12/10/20  0216 12/09/20  0257   LN-SODIUM mmol/L 137 137 137 138  --  138 138 138   LN-POTASSIUM mmol/L 3.9 3.7 4.1 3.6 4.2 3.5 3.8 3.7   LN-CHLORIDE mmol/L 103 101 102 104  --  105 106 101   LN-CO2 mmol/L 25 26 27 23  --  23 26 29   LN-BLOOD UREA NITROGEN mg/dL 11 12 12 15  --  11 12 10   LN-CREATININE mg/dL 0.46* 0.52* 0.50* 0.52*  --  0.48* 0.44* 0.44*   LN-CALCIUM mg/dL 8.1* 8.3* 8.5 8.3*  --  8.7 8.4* 8.2*               Invalid input(s): LABALBU        Other labs:   Lab Results   Component Value Date    INR 0.96 11/17/2020    INR 0.95 11/16/2020    INR 0.91 11/15/2020       Pertinent Radiology   Personally reviewed radiologists report and impressions  Xr Chest 1 View Portable    Result Date: 12/15/2020  EXAM: XR CHEST 1 VIEW PORTABLE LOCATION: [facility] DATE/TIME: 12/15/2020 2:29 PM INDICATION: Fever, respiratory failure, evaluate for interval changes. COMPARISON: 12/13/2020.     1.  No change in right upper extremity PICC line, enteric tube, or endotracheal tube. Heart and mediastinal size are stable. Lung volumes are diminished. There has been progression of bilateral interstitial and alveolar infiltrate from comparison study. No definite pleural effusion or pneumothorax.    Xr Chest 1 View Portable    Result Date: 12/13/2020  EXAM: XR CHEST 1 VIEW PORTABLE LOCATION: [facility] DATE/TIME: 12/13/2020 10:59 AM INDICATION: resp failure. COVID COMPARISON: 12/04/2020     Endotracheal tube tip at the clavicle heads. Right PICC tip projects over the lower SVC. Nasogastric tube tip below the film. Heart size normal. Stable bilateral lung infiltrates. No pneumothorax. No effusion.    Xr Chest 1 View Portable    Result Date: 12/4/2020  EXAM: XR CHEST 1 VIEW PORTABLE LOCATION: [facility] DATE/TIME: 12/4/2020 1:24 PM INDICATION: desaturation, covid ARDS eval for interval change COMPARISON: 12/01/2020 and older studies.     Endotracheal tube is in good position. NG tube is coursing in the stomach. Right upper extremity PICC line catheter overlies the mid SVC. There has been slight decrease in the diffuse, asymmetric interstitial opacities. No hydropneumothorax. Heart and pulmonary vascularity are normal.    Xr Chest 1 View Portable    Result Date: 12/1/2020  EXAM: XR CHEST 1 VIEW PORTABLE LOCATION: [facility] DATE/TIME: 12/1/2020 12:36 PM INDICATION: ETT placement, worsening condition. COMPARISON: 12/01/2020.     Extensive bilateral opacities are present, similar to same-day radiograph. No substantial pleural effusion. No pneumothorax. Normal heart size. ET tube tip appropriately positioned, roughly 23 mm above the fede. Enteric tube courses into the stomach and off the field of view. Right PICC tip over the distal SVC.    Xr Chest 1 View Portable    Result Date: 12/1/2020  EXAM: XR CHEST 1 VIEW PORTABLE LOCATION: [facility] DATE/TIME: 12/1/2020 6:26 AM INDICATION: confirm ET Tube position COMPARISON: 11/29/2020.     The patient has been intubated. The endotracheal tube terminates 1.9 cm above the fede. Nasogastric tube passes below the diaphragm and the inferior margin of the radiograph. Extensive bilateral pulmonary infiltrates are little changed from  11/29/2020 and consistent with  infectious/inflammatory pneumonitis. No significant pleural effusion. No pneumothorax. Normal heart size.    Xr Chest 1 View Portable    Result Date: 11/29/2020  EXAM: XR CHEST 1 VIEW PORTABLE LOCATION: [facility] DATE/TIME: 11/29/2020 3:44 PM INDICATION: Hypoxia COMPARISON: 11/19/2020     Previously seen PICC line has been removed. Bilateral pulmonary infiltrates have significantly increased when compared to previous. No pleural effusion or pneumothorax.    Xr Chest 1 View Portable    Result Date: 11/19/2020  EXAM: XR CHEST 1 VIEW PORTABLE LOCATION: [facility] DATE/TIME: 11/19/2020 8:30 AM INDICATION: covid ARDS COMPARISON: Portable AP view of the chest 11/13/2020     Right PICC terminates at the SVC right atrial junction. Cardiac silhouette is normal in size. Unchanged mediastinal interfaces. Patchy bilateral lung airspace opacities are present which are slightly better defined compared to 6 days earlier suggesting evolving organizing pneumonia, the typical pattern of lung injury in the setting of COVID pneumonia. No pleural fluid or pneumothorax is present.    Xr Chest 1 View Portable    Result Date: 11/13/2020  EXAM: XR CHEST 1 VIEW PORTABLE LOCATION: [facility] DATE/TIME: 11/13/2020 6:20 PM INDICATION: Chest Pain. Suspected COVID. COMPARISON: 11/11/2020     No pleural fluid or pneumothorax. Heterogeneous pulmonary opacities are concerning for multifocal atypical infection and have increased from the prior study. Normal size of the heart.     Xr Abdomen Ap Portable    Result Date: 12/2/2020  EXAM: XR ABDOMEN AP PORTABLE LOCATION: [facility] DATE/TIME: 12/2/2020 1:05 PM INDICATION: og placement COMPARISON: Chest x-ray 12/01/2020     Enteric tube with the tip in satisfactory position in the gastric antrum. Nonobstructive bowel gas pattern. No definite free air. Stable bilateral pulmonary airspace opacities.     Us  "Kidney Bilateral    Result Date: 12/11/2020  EXAM: US KIDNEY BILATERAL WITH BLADDER LOCATION: [facility] DATE/TIME: 12/11/2020 5:19 PM INDICATION: hematuria COMPARISON: None. TECHNIQUE: Routine Bilateral Renal and Bladder Ultrasound. FINDINGS: RIGHT KIDNEY: 12.3 x 5.6 x 4.7 cm. No hydronephrosis. Inferior renal 5 mm shadowing echogenic focus suggests a nonobstructive stone. LEFT KIDNEY: 12.4 x 5.6 x 5.1 cm. No hydronephrosis. Several small echogenic foci in the renal sinus could represent nonobstructive stones. BLADDER: Decompressed with Arteaga.     1.  No hydronephrosis. 2.  Probable right renal 5 mm nonobstructive stone. 3.  Possible left renal smaller nonobstructive stones.    Us Venous Legs Bilateral    Result Date: 12/15/2020  EXAM: US VENOUS LEGS BILATERAL LOCATION: [facility] DATE/TIME: 12/15/2020 5:25 PM INDICATION: Bilateral lower extremity edema COMPARISON: None. TECHNIQUE: Venous Duplex ultrasound of bilateral lower extremities with and without compression, augmentation and duplex. Color flow and spectral Doppler with waveform analysis performed. FINDINGS: Exam includes the common femoral, femoral, popliteal veins as well as segmentally visualized deep calf veins and greater saphenous vein. RIGHT: No deep vein thrombosis. No superficial thrombophlebitis. No popliteal cyst. LEFT: No deep vein thrombosis. No superficial thrombophlebitis. No popliteal cyst.     1.  No deep venous thrombosis in the bilateral lower extremities.    Poc Us 3cg Picc Placement Guidance    Result Date: 12/1/2020  Exam was performed as guidance for PICC line insertion.  Click \"PACS images\" hyperlink below to view any stored images.  For specific procedure details, view procedure note authored by PICC/Vascular Access Nurse.    Poc Us 3cg Picc Placement Guidance    Result Date: 11/15/2020  Exam was performed as guidance for PICC line insertion.  Click \"PACS images\" hyperlink below to " view any stored images.  For specific procedure details, view procedure note authored by PICC/Vascular Access Nurse.          Advanced Care Planning  Discharge Planning : Anticipate discharge in days to weeks  Factors affecting discharge: Hypoxia/respiratory failure  Discussed care with intensivist, psychiatry, staff, for total time 10 minutes with greater than 50% of total time spent in counseling and coordination of care.    Austyn Ricardo MD  [facility]    Office: [phone]

## 2021-06-13 NOTE — PROGRESS NOTES
"Received PT on BIPAP with the settings of ST 15/8 R20 80% with a small mask and gelpad over bridge of nose to prevent redness and breakdown. No breakdown noted. Non Tidal volumes: 438  MV 11.3 RR 25 PIP 15.   RT will continue to follow.       BP (!) 88/53   Pulse (!) 56   Temp 98.6  F (37  C) (Oral)   Resp 25   Ht 5' 5\" (1.651 m)   Wt 145 lb 8 oz (66 kg)   SpO2 95%   BMI 24.21 kg/m      Wilson Armando  11/18/2020    "

## 2021-06-14 NOTE — PLAN OF CARE
Problem: Daily Care  Goal: Daily care needs are met  Outcome: Progressing     Problem: Safety  Goal: Patient will be injury free during hospitalization  Outcome: Progressing     Problem: Psychosocial Needs  Goal: Demonstrates ability to cope with hospitalization/illness  Outcome: Progressing     Pt able to follow some commands.     Titration of levophed to maintain MAP >65. Levophed at 0.02 with BP of 95/55 with MAP of 72. Precedix at 0.3mcg. Rass goal maintained.     Turning and repositioning to maintain skin integrity and comfort.     Vent settings no change.      and 133. No coverage given.     Low grade temp of 100.1. Pt complaint of feeling hot. Ice pack given and wet cloth to forehead.     Tolerating suctioning well. Continue to inline and oral suctioning to maintain airway patency.     Bed alarm on. Hourly rounding for pt safety.     Galina Wilson RN

## 2021-06-14 NOTE — PROGRESS NOTES
Attempted to wean patient. Patient's RSBI increased to 155-185, RR 38, MV 8. Patient lasted 2 minutes today.

## 2021-06-14 NOTE — PROGRESS NOTES
Care Management Follow Up Note    Length of Stay (days) 46     Patient plan of care discussed at Interdisciplinary Rounds: yes     Pt/Rep Education / Interventions: Review d/c options, Discuss d/c, Discussed readmission risk    Expected Discharge Date: 12/29/20  Concerns to be Addressed / Barriers to Discharge:      Anticipated Discharge Disposition:  [facility]  Anticipated Discharge Services:      Selected Continued Care - Admitted Since 11/13/2020     Destination Coordination complete    Service Provider Selected Services Address Phone Fax Patient Preferred Last Updated    [facility] [address] [id number] [phone] -- Naz Bravo RN 12/26/2020 0817       Internal Comment last updated by Beatriz Poon SW 12/25/2020 1322    12/25- [facility] has reviewed (Josefa Herrera), has tentatively accepted, pending insurance confirmation, progression of care and progression of care.                          Anticipated Discharge DME:      Plan:  Pt to discharge to [facility] at 1400. Dr Hogue provided with phone number for doctor to doctor call. PCS form filled out. Bedside RN, Dr Hogue, [facility] , notifed and provided with needed items for discharge. MAR, Discharge summary, discharge/readmit order. Josefa [facility] updated family.    DIEUDONNE Roberts

## 2021-06-14 NOTE — PROGRESS NOTES
Occupational Therapy       12/26/20 1341   Visit Specifics   Eval Type Re-eval   Inital OT Consult  11/30/20   General   Onset date 12/13/20   Chart Reviewed Yes   PT/OT Patient/Caregiver Stated Goals pt unable to state due to trach tube   Family/Caregiver Present No   Precautions   General Precautions Other (Comment)  (multile lines/tubes-trach, vent, recovered COVID, hands rest)   Home Living   Additional Comments see previous eval for information   Prior Status   Comments see previous eval for information   ADL   ADL Comments pt unable to complete ADLs today,-fatigue   Activity Tolerance   Endurance Tolerates less than 10 min activity, no significant change in vital signs   Balance   Sitting Balance Max assist;Assist x2   Standing Balance   (N/T too many tubes)   Bed Mobility   Bed mobility comments N/T   Functional Transfers   Comments pt not ready to stand today-multiple tubes/lines   Ambulation   Comments N/T multiple lines/tubes   Cognition   Overall Cognitive Status WFL   Behaviors During Session Cooperative   Behavior    Behavior During Session Cooperative   Vision-Basic Assessment   Current Vision No visual deficits   Vision/Perception   Vision/Perception Intact   RUE Assessment   RUE Assessment WFL   LUE Assessment   LUE Assessment WFL   Hand Function   Gross Grasp Functional   Fine Motor Coordination Functional   Sensation   Light Touch No apparent deficits   Assessment   Assessment Decreased ADL status;Decreased funtional mobility   Prognosis Good   Treatment/Interventions ADL training;Functional transfer training   OT Frequency Daily   Goal Formulation Patient   Recommendations   OT Discharge Recommendation TCU   Treatment Suggestions for Next Session Co-tx, bed mobility, sit EOB, stand EOB, transfers, G/H, LB dress   OT Care Plan REVIEWED DAILY Yes, goals remain appropriate

## 2021-06-14 NOTE — PROGRESS NOTES
BP 85/52 with Map of 62. Levophed restarted at 0.01mcg. will monitor and titrate to maintain map >65.     Galina Wilson RN

## 2021-06-14 NOTE — PROGRESS NOTES
12/23/20 1334   Patient Data   PIP Observed (cm H2O) 36 cm H2O   MAP (cm H2O) 11   Auto/Intrinsic PEEP Observed (cm H2O) 0 cm H2O   Plateau Pressure (cm H2O) 28 cm H2O   Dynamic Compliance (L/cm H2O) 16 L/cm H2O   Airway Resistance 12   SpO2 99 %   Heart Rate 87

## 2021-06-14 NOTE — PROGRESS NOTES
COVID RT PROGRESS NOTE    DATA:    VENT DAY#  25    CURRENT SETTINGS:  VENT SETTINGS   VCV 20, 350, 5        FIO2:   45%    PATIENT PARAMETERS:    PIP 38-41  Pplat:  16-25  Pmean:  16  SBT: Attempted.   SECRETIONS:  Small amount thick white  02 SATS:  96-98%    Trach in place        Respiratory Medications:None     ABG: No recent      NOTE / PLAN:   Patient continues to be chronically trached and on ventilator. Attempted to wean today on 12/5 but patient does not tolerate. RR increased, RSBI was over 200, and Oxygen saturation was 92%. Continue to wean patient as tolerated.

## 2021-06-14 NOTE — PROGRESS NOTES
Received oxygen intake at 11:11am, reviewed chart; no documentation was present.  11:20am- Spoke to Austyn, whom was already aware of the missing documentation; rt was working on it  11:44am- All documentation was present in the chart including a valid signed order for oxygen.  11:45am- Spoke to Barry, patients spouse to offer choice and discuss equipment options. They are ok with [facility] setting them up, and he was informed that we will deliver to bedside within 1-2 hours.   11:55am- Spoke to Austyn to confirm I had received everything that I needed, and that we would deliver within 2 hours.

## 2021-06-14 NOTE — PROGRESS NOTES
RT PROGRESS NOTE    VENT DAY#  23    CURRENT SETTINGS:  VENT SETTINGS   VCV 20 350 +5        FIO2:   40%    PATIENT PARAMETERS:  PIP 36-38  Pplat:  28-31  Pmean:  11-20  SBT: attempted  SECRETIONS:  Small amounts of thickish yellow/tan  02 SATS:  99%    Trach SIZE #6 Neville    NOTE / PLAN:   Patient was placed on wean this am, PS of 15, TV in low 200's, RR increased from low 30's to mid 40's.  Patient when encourage to perform VC only was able to get 270 at most.  MD informed.  Patient placed I chair midday today, tolerated well.  No other changes or events from a respiratory stand point this shift.    Jennifer Jimenez, LRT

## 2021-06-14 NOTE — PROGRESS NOTES
CRITICAL CARE PROGRESS NOTE:    Assessment/Plan:  60 year old female never smoker with a history of right breast cancer s/p right lumpectomy and radiation, admitted 11/13/20 with acute hypoxemic respiratory failure due to COVID-19 infection, ARDS, intubated on 12/1/20, now with prolonged mechanical ventilation, failure to wean.    RESP:  Acute respiratory distress syndrome, COVID-19 infection, prolonged mechanical ventilation: Likely significant respiratory muscle atrophy, critical illness neuromyopathy. Continues to do poorly on pressure support trials this is likely related to her severe critical illness neuropathy.    Continue to wean down FiO2 as able.    PEEP 5    Perc trach 12/22, #6 shiley    Phase 1 weaning    Needs LTACH transfer, qualifies for Covid recovered status and have entered this in her chart.    CV:  Shock: Sedation, sepsis. On low-dose norepinephrine.     Have increased midodrine to 10 mg q 8 hrs in attempt to get off norepinephrine.    NEURO:  Acute encephalopathy: Sedated, will wake and respond off sedation. Agitated with anxiety, possible delirium. Previously started on quetiapine, valproate, and schedule enteral lorazepam. Nonfocal exam.    On scheduled quetiapine, valproate and enteral lorazepam    Analgesia as needed    Wean off dexmedetomidine as able    GI:  Severe protein-calorie malnutrition:    G-J placed by IR 12/22; on replete @65ml/h.    Bowel regimen: senna-docusate    Free water 200 mL q 4 hrs; stable Na    RENAL:  Hypervolemia: Due to initial resuscitation, almost euvolemic now.    Continue furosemide 20 mg IV two times a day and monitor response    ID:  COVID-19 infection, Klebsiella UTI: Through acute phase of COVID-19. Completing treatment of Klebsiella UTI.    Completed dexamethasone and remdesivir    Completed 7-day course of cefepime through 12/22    Was initially diagnosed with Covid-19 on 11/13/20. Has been stable and shows no active signs of infection. Will switch to  Covid recovered status.    HEMATOLOGIC:  Anemia, thrombocytosis: Anemia of acute inflammation, no evidence of active hemorrhage. Thrombocytosis likely reactive.    Monitor with restrictive transfusion threshold    ENDOCRINE:  No acute issues. Glucose creeping up.    Continue glargine 6 units daily    FSBG with sliding scale aspart    ICU PROPHYLAXIS:    Enoxaparin 0.5 mg/kg two times a day    PPI    HOB elevation    chlorhexidine    CODE STATUS, DISPOSITION, FAMILY COMMUNICATION: Full code. Critically ill requiring invasive mechanical ventilation and continuous vasopressors.     Lines/Drains/Tubes:  Central line (RUE PICC) placed on 12/1/20  Arterial line (L axillary) placed on 12/4/20  Feeding tube (OG tube) placed on 12/1/20  Arteaga catheter placed on 12/15/20  Percutaneous tracheostomy (#6 shiley) placed on 12/22/20    Restraints  Indicated and ordered    Progress Note  Restraint Application    I recognize that restraints are physical and/or chemical interventions intended to restrict a person's movements. Restraints are currently needed to ensure the safety of this patient and/or others. My clinical rationale appears below.    Category/Type of Restraint     Non Violent:  Soft limb restraint x2  --  Behavior  Pulling at tubes/lines  --  Root Cause of the Behavior  Sedation/intubation  --  Less-Restrictive Measures that Failed  Non Violent Measures:  Close Observation  --  Response to the Restraint  Patient unable to pull at tubes/lines  --  Criteria for Release from the Restraint  Patient calm and off sedation    Total CC time 40 minutes.    Sherri Garcia MD  Pulmonary and Critical Care  (P) [phone]      Overnight events:  No new events. Voluntary VC <300 mL.    Subjective:  unable    Objective:  Physical Exam:  Vent settings for last 24 hours:  Vent Mode: VCV  FiO2 (%):  [45 %] 45 %  S RR:  [20] 20  S VT:  [350 mL] 350 mL  PEEP/CPAP (cm H2O):  [5 cm H2O] 5 cm H2O  Minute Ventilation (L/min):  [6.8  "L/min-11.4 L/min] 11.4 L/min  PIP:  [36 cm H2O-41 cm H2O] 41 cm H2O  MAP (cm H2O):  [10-16] 16    /64   Pulse 90   Temp 98.3  F (36.8  C) (Axillary)   Resp (!) 32   Ht 5' 5\" (1.651 m)   Wt 133 lb 2.5 oz (60.4 kg)   SpO2 100%   BMI 22.16 kg/m      Intake/Output last 3 shifts:  I/O last 3 completed shifts:  In: 1680.2 [I.V.:294.2; NG/GT:1386]  Out: 3750 [Urine:3450; Stool:300]  Intake/Output this shift:  I/O this shift:  In: 1710.6 [I.V.:64.6; NG/GT:1646]  Out: 1060 [Urine:960; Stool:100]    Physical Exam   Constitutional: She appears well-developed and well-nourished.   HENT:   Head: Normocephalic and atraumatic.   Eyes: Pupils are equal, round, and reactive to light.   Neck: Normal range of motion.   Cardiovascular: Normal rate and regular rhythm.   Pulmonary/Chest: Effort normal.   Coarse BS BL.   Abdominal: Soft.   Musculoskeletal:         General: No edema.   Neurological:   No focal deficits.   Skin: Skin is warm.         LAB:  Results from last 7 days   Lab Units 12/25/20  0513 12/23/20 0416 12/23/20 0416   LN-WHITE BLOOD CELL COUNT thou/uL  --   --  8.4   LN-HEMOGLOBIN g/dL  --   --  10.8*   LN-HEMATOCRIT %  --   --  34.2*   LN-PLATELET COUNT thou/uL 495*   < > 505*    < > = values in this interval not displayed.     Results from last 7 days   Lab Units 12/25/20  0513 12/24/20  0824 12/23/20  1621 12/23/20 0416 12/22/20 0417   LN-SODIUM mmol/L 140  --   --  138 138   LN-POTASSIUM mmol/L 4.0  4.0 3.7 4.0 3.5 3.9   LN-CHLORIDE mmol/L 99  --   --  98 99   LN-CO2 mmol/L 29  --   --  30 30   LN-BLOOD UREA NITROGEN mg/dL 15  --   --  17 15   LN-CREATININE mg/dL 0.50*  --   --  0.48* 0.49*   LN-CALCIUM mg/dL 8.8  --   --  8.6 8.7       Current Facility-Administered Medications   Medication Dose Route Frequency Provider Last Rate Last Admin     acetaminophen solution 650 mg (TYLENOL)  650 mg Enteral Tube Q4H PRN Tish Adkins CNP   650 mg at 12/18/20 0648     acetaminophen tablet 650 mg (TYLENOL)  " 650 mg Oral Q4H PRN Ramon Haro MD   650 mg at 11/30/20 0058     bacitracin ointment packet 1 packet  1 packet Topical Once PRN Tish Adkins CNP         benzocaine-menthoL lozenge 1 lozenge (CEPACOL)  1 lozenge Oral Q1H PRN Eben Butterfield MD         bisacodyL suppository 10 mg (DULCOLAX)  10 mg Rectal Daily PRN Eben Butterfield MD   10 mg at 12/16/20 1631     calcium (as carbonate) chewable tablet 400 mg (TUMS)  400 mg Oral TID PRN Ramon Haro MD   400 mg at 11/18/20 1222     chlorhexidine 0.12 % solution 15 mL (PERIDEX)  15 mL Topical Q12H Tish Adkins CNP   15 mL at 12/25/20 0609     codeine-guaiFENesin  mg/5 mL liquid 5 mL (GUAIFENESIN AC)  5 mL Per NG tube Q4H PRN Gregg Sarabia MD   5 mL at 12/23/20 2106     dexmedetomidine (PRECEDEX) 1000 mcg/250 mL in NS (4 mcg/mL) infusion  0.1-1.5 mcg/kg/hr Intravenous Continuous IvJeramie bruner MD 3.05 mL/hr at 12/25/20 1224 0.2 mcg/kg/hr at 12/25/20 1224     dextrose 50 % (D50W) syringe 20-50 mL  20-50 mL Intravenous Q15 Min PRN Ramon Haro MD         docusate sodium 50 mg/5 mL oral liquid 100 mg (COLACE)  100 mg Enteral Tube BID Gregg Sarabia MD   100 mg at 12/25/20 1001     enoxaparin ANTICOAGULANT syringe 30 mg (LOVENOX)  30 mg Subcutaneous BID Gregg Sarabia MD   30 mg at 12/25/20 1001     fentaNYL pf injection 25-50 mcg (SUBLIMAZE)  25-50 mcg Intravenous Q1H PRN Jennifer Gunter CNP   50 mcg at 12/24/20 0332     furosemide injection 20 mg (LASIX)  20 mg Intravenous BID - diuretic Bashir De La Torre MD   20 mg at 12/25/20 1000     glucagon (human recombinant) injection 1 mg  1 mg Subcutaneous Q15 Min PRN Ramon Haro MD         hydrALAZINE injection 10 mg (APRESOLINE)  10 mg Intravenous Q4H PRN Ramon Haro MD         influenza vaccine quad (PF) (age 50-64 YR) 2020-21 injection 0.5 mL (FLUBLOK)  0.5 mL Intramuscular Prior to Discharge Jeramie Crespo MD         insulin  aspart U-100 injection pen (NovoLOG)   Subcutaneous Q6H FIXED TIMES Gregg Sarabia MD         insulin glargine injection 6 Units (LANTUS)  6 Units Subcutaneous Oren Albert MD   6 Units at 12/25/20 1001     lipase-protease-amylase 5,000-17,000- 24,000 unit capsule 2 capsule (ZENPEP)  2 capsule Enteral Tube PRN Eben Butterfield MD        And     sodium bicarbonate tablet 325 mg  325 mg Enteral Tube PRN Eben Butterfiled MD         LORazepam 1 mg/0.5 mL concentrated solution 1 mg (ATIVAN)  1 mg Enteral Tube Q6H Gregg Sarabia MD   1 mg at 12/25/20 1225     magnesium hydroxide suspension 30 mL (MILK OF MAG)  30 mL Enteral Tube Daily PRN Gregg Sarabia MD         midodrine tablet 10 mg (PROAMATINE)  10 mg Enteral Tube Q8H Mellisa Abebe CNP   10 mg at 12/25/20 1000     naloxone injection 0.2 mg (NARCAN)  0.2 mg Intravenous Q2 Min PRN Ramon Haro MD        Or     naloxone injection 0.2 mg (NARCAN)  0.2 mg Intramuscular Q2 Min PRN Ramon Haro MD        Or     naloxone injection 0.4 mg (NARCAN)  0.4 mg Intravenous Q2 Min PRN Ramon Haro MD        Or     naloxone injection 0.4 mg (NARCAN)  0.4 mg Intramuscular Q2 Min PRN Ramon Haro MD         norepinephrine 4 mg/250 mL in NS (16 mcg/mL) - CENTRAL  0.01-0.4 mcg/kg/min Intravenous Continuous Bashir De La Torre MD 2.3 mL/hr at 12/25/20 1218 0.01 mcg/kg/min at 12/25/20 1218     omeprazole capsule 20 mg (PriLOSEC)  20 mg Oral QAM Tish Gonzalez CNP        Or     omeprazole suspension 20 mg (PriLOSEC)  20 mg Enteral Tube QAM Tish Gonzalez CNP   20 mg at 12/25/20 0609     ondansetron injection 4 mg (ZOFRAN)  4 mg Intravenous Q4H PRN Eben Butterfield MD   4 mg at 12/06/20 2219    Or     ondansetron tablet 8 mg (ZOFRAN)  8 mg Oral Q8H PRN Eben Butterfield MD         polyethylene glycol packet 17 g (MIRALAX)  17 g Oral Daily PRN Ramon Haro MD   17 g at 12/16/20 2826      polyvinyl alcohol 1.4 % ophthalmic solution 1-2 drop (LIQUIFILM TEARS)  1-2 drop Both Eyes Q1H PRN Eben Butterfield MD         QUEtiapine tablet 50 mg (SEROquel)  50 mg Enteral Tube Q6H PRN Gregg Sarabia MD   50 mg at 12/24/20 0809     QUEtiapine tablet 75 mg (SEROquel)  75 mg Enteral Tube Q6H Gregg Sarabia MD   75 mg at 12/25/20 1000     sennosides syrup 8.8 mg (for SENOKOT)  8.8 mg Enteral Tube BID Eben Butterfield MD   8.8 mg at 12/25/20 1000     sodium chloride 0.9%  10 mL/hr Intravenous Continuous Eben Butterfield MD   Stopped at 12/23/20 2100     sodium chloride bacteriostatic 0.9 % injection 0.1-0.3 mL  0.1-0.3 mL Subcutaneous PRN Ramon Haro MD         sodium chloride flush 10-20 mL (NS)  10-20 mL Intravenous PRN Tish Adkins, CNP   10 mL at 12/23/20 0926     sodium chloride flush 10-30 mL (NS)  10-30 mL Intravenous PRN Tish Adkins CNP   10 mL at 12/23/20 2153     sodium chloride flush 10-30 mL (NS)  10-30 mL Intravenous Q8H FIXED TIMES Tish Adkins CNP   20 mL at 12/25/20 1356     sodium chloride flush 20 mL (NS)  20 mL Intravenous PRN Tish Adkins, CNP         traZODone tablet 25 mg (DESYREL)  25 mg Enteral Tube Q6H PRN Gregg Sarabia MD         valproic acid (as sodium salt) solution 250 mg (DEPAKENE)  250 mg Enteral Tube Q8H FIXED TIMES Gregg Sarabia MD   250 mg at 12/25/20 1224       Total Critical Care Time: 40 Minutes

## 2021-06-14 NOTE — PROGRESS NOTES
12/24/20 0759   Patient Data   PIP Observed (cm H2O) 43 cm H2O   MAP (cm H2O) 13   Auto/Intrinsic PEEP Observed (cm H2O) 1 cm H2O   Plateau Pressure (cm H2O) 36 cm H2O   Dynamic Compliance (L/cm H2O) 11 L/cm H2O   Airway Resistance 13   SpO2 98 %   Heart Rate 100   suction small amount of thickish tan

## 2021-06-14 NOTE — PLAN OF CARE
Problem: Impaired Gas Exchange  Goal: Demonstrate improved ventilation and adequate oxygenation of tissues as evidenced by absence of respiratory distress  Outcome: Progressing  Titrating FiO2 decreased to 35%.     Problem: Breathing  Goal: Patient will maintain patent airway  Outcome: Progressing     Problem: Mechanical Ventilation  Goal: Patient will maintain patent airway  Outcome: Progressing  Goal: Respiratory status - ventilation  Description: Movement of air in and out of the lungs.    Liberate from ventilator  Outcome: Progressing  Goal: Tracheostomy will be managed safely  Outcome: Progressing  Weaned today CPAP+5, PS +18,FiO2 35%, see progress notes for weaning today.

## 2021-06-14 NOTE — PROGRESS NOTES
CRITICAL CARE PROGRESS NOTE:    Assessment/Plan:  60 year old female never smoker with a history of right breast cancer s/p right lumpectomy and radiation, admitted 11/13/20 with acute hypoxemic respiratory failure due to COVID-19 infection, ARDS, intubated on 12/1/20, now with prolonged mechanical ventilation, failure to wean.    RESP:  Acute respiratory distress syndrome, COVID-19 infection, prolonged mechanical ventilation: Likely significant respiratory muscle atrophy, critical illness neuromyopathy. Even on sedation and opiates, RSBI increases to 200s within a couple of minutes on PS 12. Voluntary vital capacity unable to get up to 300 mL    Wean down FiO2    PEEP 5    Perc trach 12/22, #6 shiley    Phase 1 weaning    Needs LTACH bed    Up in chair for 1.5 hours today; continue to work on being up in chair, PT    CV:  Shock: Sedation, sepsis. On low-dose norepinephrine. When on MAP in 90s, when off MAP low 60s.    Start midodrine 5 mg q 8 hrs in attempt to get off norepinephrine    NEURO:  Acute encephalopathy: Sedated, will wake and respond off sedation. Agitated with anxiety, possible delirium. Previously started on quetiapine, valproate, and schedule enteral lorazepam. Nonfocal exam.    On scheduled quetiapine, valproate and enteral lorazepam    Analgesia as needed    Wean off dexmedetomidine as able    GI:  Severe protein-calorie malnutrition:    G-J placed by IR 12/22    Bowel regimen: senna-docusate    Free water 200 mL q 4 hrs; stable Na    RENAL:  Hypervolemia: Due to initial resuscitation. Responding well to furosemide.    Continue furosemide 20 mg IV two times a day and monitor response    ID:  COVID-19 infection, Klebsiella UTI: Through acute phase of COVID-19. Completing treatment of Klebsiella UTI.    Completed dexamethasone and remdesivir    Completed 7-day course of cefepime through 12/22    HEMATOLOGIC:  Anemia, thrombocytosis: Anemia of acute inflammation, no evidence of active hemorrhage.  Thrombocytosis likely reactive.    Monitor with restrictive transfusion threshold    ENDOCRINE:  No acute issues. Glucose creeping up.    Restart glargine 6 units daily    FSBG with sliding scale aspart    ICU PROPHYLAXIS:    Enoxaparin 0.5 mg/kg two times a day    PPI    HOB elevation    chlorhexidine    CODE STATUS, DISPOSITION, FAMILY COMMUNICATION: Full code. Critically ill requiring invasive mechanical ventilation and continuous vasopressors. Communicated with the patient's , Arturo, by telephone.    Lines/Drains/Tubes:  Central line (RUE PICC) placed on 12/1/20  Arterial line (L axillary) placed on 12/4/20  Feeding tube (OG tube) placed on 12/1/20  Arteaga catheter placed on 12/15/20  Percutaneous tracheostomy (#6 shiley) placed on 12/22/20    Restraints  Indicated and ordered    Progress Note  Restraint Application    I recognize that restraints are physical and/or chemical interventions intended to restrict a person's movements. Restraints are currently needed to ensure the safety of this patient and/or others. My clinical rationale appears below.    Category/Type of Restraint     Non Violent:  Soft limb restraint x2  --  Behavior  Pulling at tubes/lines  --  Root Cause of the Behavior  Sedation/intubation  --  Less-Restrictive Measures that Failed  Non Violent Measures:  Close Observation  --  Response to the Restraint  Patient unable to pull at tubes/lines  --  Criteria for Release from the Restraint  Patient calm and off sedation    Gregg Sarabia MD  Pulmonary and Critical Care Medicine  [facility]  Cell [phone]  Office [phone]  Pager [phone]    Overnight events:  No new events. Voluntary VC <300 mL.    Subjective:  unable    Objective:  Physical Exam:  Vent settings for last 24 hours:  Vent Mode: VCV  FiO2 (%):  [45 %-50 %] 45 %  S RR:  [20] 20  S VT:  [350 mL] 350 mL  PEEP/CPAP (cm H2O):  [5 cm H2O] 5 cm H2O  Minute Ventilation (L/min):  [7 L/min-10.3 L/min] 8.6 L/min  PIP:   "[24 cm H2O-45 cm H2O] 36 cm H2O  OR SUP:  [15 cm H20] 15 cm H20  MAP (cm H2O):  [10-20] 11    /75 (Patient Position: Lying)   Pulse 87   Temp 99.3  F (37.4  C) (Oral)   Resp 24   Ht 5' 5\" (1.651 m)   Wt 133 lb 2.5 oz (60.4 kg)   SpO2 99%   BMI 22.16 kg/m      Intake/Output last 3 shifts:  I/O last 3 completed shifts:  In: 2122.6 [I.V.:626.6; NG/GT:1496]  Out: 2755 [Urine:2755]  Intake/Output this shift:  I/O this shift:  In: 1170.7 [I.V.:218.7; NG/GT:952]  Out: 720 [Urine:720]    Physical Exam  Gen: intubated, alert, a bit agitated  HEENT: no OP lesions, no DOT  CV: RRR, no m/g/r  Resp: bibasilar fine crackles  Abd: soft, BS+  Neuro: PERRL, nonfocal, awake, a bit agitated  Ext: no edema    LAB:  Results from last 7 days   Lab Units 12/23/20  0416   LN-WHITE BLOOD CELL COUNT thou/uL 8.4   LN-HEMOGLOBIN g/dL 10.8*   LN-HEMATOCRIT % 34.2*   LN-PLATELET COUNT thou/uL 505*     Results from last 7 days   Lab Units 12/23/20  0416 12/22/20  0417 12/21/20  0357   LN-SODIUM mmol/L 138 138 140   LN-POTASSIUM mmol/L 3.5 3.9 3.8   LN-CHLORIDE mmol/L 98 99 101   LN-CO2 mmol/L 30 30 30   LN-BLOOD UREA NITROGEN mg/dL 17 15 16   LN-CREATININE mg/dL 0.48* 0.49* 0.43*   LN-CALCIUM mg/dL 8.6 8.7 8.3*       Current Facility-Administered Medications   Medication Dose Route Frequency Provider Last Rate Last Admin     acetaminophen solution 650 mg (TYLENOL)  650 mg Enteral Tube Q4H PRN Tish Adkins CNP   650 mg at 12/18/20 0648     acetaminophen tablet 650 mg (TYLENOL)  650 mg Oral Q4H PRN Ramon Haro MD   650 mg at 11/30/20 0058     bacitracin ointment packet 1 packet  1 packet Topical Once PRN Tish Adkins CNP         benzocaine-menthoL lozenge 1 lozenge (CEPACOL)  1 lozenge Oral Q1H PRN Eben Butterfield MD         bisacodyL suppository 10 mg (DULCOLAX)  10 mg Rectal Daily PRN Eben Butterfield MD   10 mg at 12/16/20 1631     calcium (as carbonate) chewable tablet 400 mg (TUMS)  400 mg " Oral TID PRN Ramon Haro MD   400 mg at 11/18/20 1222     chlorhexidine 0.12 % solution 15 mL (PERIDEX)  15 mL Topical Q12H Tish Adkins CNP   15 mL at 12/23/20 0632     codeine-guaiFENesin  mg/5 mL liquid 5 mL (GUAIFENESIN AC)  5 mL Per NG tube Q4H PRN Gregg Sarabia MD         dexmedetomidine (PRECEDEX) 1000 mcg/250 mL in NS (4 mcg/mL) infusion  0.1-1.5 mcg/kg/hr Intravenous Continuous Jeramie Crespo MD 4.6 mL/hr at 12/23/20 0442 0.3 mcg/kg/hr at 12/23/20 0442     dextrose 50 % (D50W) syringe 20-50 mL  20-50 mL Intravenous Q15 Min PRN Ramon Haro MD         docusate sodium 50 mg/5 mL oral liquid 100 mg (COLACE)  100 mg Enteral Tube BID Gregg Sarabia MD         enoxaparin ANTICOAGULANT syringe 30 mg (LOVENOX)  30 mg Subcutaneous BID Gregg Sarabia MD   30 mg at 12/23/20 0859     fentaNYL pf injection 25-50 mcg (SUBLIMAZE)  25-50 mcg Intravenous Q1H PRN Jennifer Gunter, CNP   25 mcg at 12/23/20 1221     furosemide injection 20 mg (LASIX)  20 mg Intravenous BID - diuretic Bashir De La Torre MD   20 mg at 12/23/20 0856     glucagon (human recombinant) injection 1 mg  1 mg Subcutaneous Q15 Min PRN Ramon Haro MD         hydrALAZINE injection 10 mg (APRESOLINE)  10 mg Intravenous Q4H PRN Ramon Haro MD         influenza vaccine quad (PF) (age 50-64 YR) 2020-21 injection 0.5 mL (FLUBLOK)  0.5 mL Intramuscular Prior to Discharge Jeramie Crespo MD         insulin aspart U-100 injection pen (NovoLOG)   Subcutaneous Q6H FIXED TIMES Gregg Sarabia MD         insulin glargine injection 6 Units (LANTUS)  6 Units Subcutaneous KATHARINEM Oren Vásquez MD   6 Units at 12/23/20 0921     lipase-protease-amylase 5,000-17,000- 24,000 unit capsule 2 capsule (ZENPEP)  2 capsule Enteral Tube PRN Eben Butterfield MD        And     sodium bicarbonate tablet 325 mg  325 mg Enteral Tube PRN Eben Butterfield MD         LORazepam 1 mg/0.5 mL concentrated  solution 1 mg (ATIVAN)  1 mg Enteral Tube Q6H Gregg Sarabia MD   1 mg at 12/23/20 1206     magnesium hydroxide suspension 30 mL (MILK OF MAG)  30 mL Enteral Tube Daily PRN Gregg Sarabia MD         midodrine tablet 5 mg (PROAMATINE)  5 mg Enteral Tube Q8H Gregg Sarabia MD   5 mg at 12/23/20 0901     naloxone injection 0.2 mg (NARCAN)  0.2 mg Intravenous Q2 Min PRN Ramon Haro MD        Or     naloxone injection 0.2 mg (NARCAN)  0.2 mg Intramuscular Q2 Min PRN Ramon Haro MD        Or     naloxone injection 0.4 mg (NARCAN)  0.4 mg Intravenous Q2 Min PRN Ramon Haro MD        Or     naloxone injection 0.4 mg (NARCAN)  0.4 mg Intramuscular Q2 Min PRN Ramon Haro MD         norepinephrine 4 mg/250 mL in NS (16 mcg/mL) - CENTRAL  0.01-0.4 mcg/kg/min Intravenous Continuous Bashir De La Torre MD 9.2 mL/hr at 12/23/20 1238 0.04 mcg/kg/min at 12/23/20 1238     omeprazole capsule 20 mg (PriLOSEC)  20 mg Oral QAM  Tish Adkins CNP        Or     omeprazole suspension 20 mg (PriLOSEC)  20 mg Enteral Tube QAM  Tish Adkins CNP   20 mg at 12/23/20 0855     ondansetron injection 4 mg (ZOFRAN)  4 mg Intravenous Q4H PRN Eben Butterfield MD   4 mg at 12/06/20 2219    Or     ondansetron tablet 8 mg (ZOFRAN)  8 mg Oral Q8H PRN Eben Butterfield MD         polyethylene glycol packet 17 g (MIRALAX)  17 g Oral Daily PRN Ramon Haro MD   17 g at 12/16/20 1630     polyvinyl alcohol 1.4 % ophthalmic solution 1-2 drop (LIQUIFILM TEARS)  1-2 drop Both Eyes Q1H PRN Eben Butterfield MD         QUEtiapine tablet 50 mg (SEROquel)  50 mg Enteral Tube Q6H PRN Gregg Sarabia MD         QUEtiapine tablet 75 mg (SEROquel)  75 mg Enteral Tube Q6H Gregg Sarabia MD   75 mg at 12/23/20 0901     sennosides syrup 8.8 mg (for SENOKOT)  8.8 mg Enteral Tube BID Eben Butterfield MD   8.8 mg at 12/21/20 0811     sodium chloride 0.9%  10 mL/hr  Intravenous Continuous Eben Butterfield MD 10 mL/hr at 12/23/20 1212 10 mL/hr at 12/23/20 1212     sodium chloride bacteriostatic 0.9 % injection 0.1-0.3 mL  0.1-0.3 mL Subcutaneous PRN Ramon Haro MD         sodium chloride flush 10-20 mL (NS)  10-20 mL Intravenous PRN Tish Adkins CNP   10 mL at 12/23/20 0926     sodium chloride flush 10-30 mL (NS)  10-30 mL Intravenous PRN Tish Adkins CNP         sodium chloride flush 10-30 mL (NS)  10-30 mL Intravenous Q8H FIXED TIMES Tish Adkins CNP   10 mL at 12/23/20 1305     sodium chloride flush 20 mL (NS)  20 mL Intravenous PRN Tish Adkins CNP         traZODone tablet 25 mg (DESYREL)  25 mg Enteral Tube Q6H PRN Gregg Sarabia MD         valproic acid (as sodium salt) solution 250 mg (DEPAKENE)  250 mg Enteral Tube Q8H FIXED TIMES Gregg Sarabia MD   250 mg at 12/23/20 1307       Total Critical Care Time: 45 Minutes

## 2021-06-14 NOTE — PROGRESS NOTES
CRITICAL CARE PROGRESS NOTE:    Assessment/Plan:  60 year old female never smoker with a history of right breast cancer s/p right lumpectomy and radiation, admitted 11/13/20 with acute hypoxemic respiratory failure due to COVID-19 infection, ARDS, intubated on 12/1/20, now with prolonged mechanical ventilation, failure to wean.    RESP:  Acute respiratory distress syndrome, COVID-19 infection, prolonged mechanical ventilation: Likely significant respiratory muscle atrophy, critical illness neuromyopathy. Continues to do poorly on pressure support trials this is likely related to her severe critical illness neuropathy.    Continue VCV 20/350/5/45.    Perc trach 12/22, #6 shiley    Phase 1 weaning--continues to do very poorly.    Needs LTACH transfer, qualifies for Covid recovered status and this has been entered this in her chart.    CV:  Shock: Sedation, sepsis.      Continue increased dose of midodrine to 10 mg q 8 hrs.    Discontinue Norepinephrine.     Follow-up on Cortisol level.    NEURO:  Acute encephalopathy: Is fairly redirectable although still tries to remove her tracheostomy. Previously started on quetiapine, valproate, and schedule enteral lorazepam. Nonfocal exam.    On scheduled quetiapine, valproate and enteral lorazepam    Use PO anxiolytics.    Have discontinued Precedex.    GI:  Severe protein-calorie malnutrition:    G-J placed by IR 12/22; on replete @65ml/h.    Bowel regimen: senna-docusate    Free water 200 mL q 4 hrs; stable Na    RENAL:  Hypervolemia: Due to initial resuscitation, almost euvolemic now.    Continue furosemide 20 mg IV two times a day.    ID:  COVID-19 infection, Klebsiella UTI: Through acute phase of COVID-19. Completing treatment of Klebsiella UTI.    Completed dexamethasone and remdesivir    Completed 7-day course of cefepime through 12/22    Was initially diagnosed with Covid-19 on 11/13/20. Has been stable and shows no active signs of infection.  Has been switched to Covid  recovered status.    Have discontinued Lovenox and switched to Eliquis per Covid outpatient regimen.    HEMATOLOGIC:  Anemia, thrombocytosis: Anemia of acute inflammation, no evidence of active hemorrhage. Thrombocytosis likely reactive.    Monitor with restrictive transfusion threshold    ENDOCRINE:  No acute issues. Glucose creeping up.    Continue glargine 6 units daily    FSBG with sliding scale aspart    ICU PROPHYLAXIS:    Eliquis.    PPI    HOB elevation    chlorhexidine    CODE STATUS, DISPOSITION, FAMILY COMMUNICATION: Full code. Critically ill requiring invasive mechanical ventilation and continuous vasopressors.     Lines/Drains/Tubes:  Central line (RUE PICC) placed on 12/1/20  Arterial line (L axillary) placed on 12/4/20  Feeding tube (OG tube) placed on 12/1/20  Arteaga catheter placed on 12/15/20  Percutaneous tracheostomy (#6 shiley) placed on 12/22/20    Restraints  Indicated and ordered    Progress Note  Restraint Application    I recognize that restraints are physical and/or chemical interventions intended to restrict a person's movements. Restraints are currently needed to ensure the safety of this patient and/or others. My clinical rationale appears below.    Category/Type of Restraint     Non Violent:  Soft limb restraint x2  --  Behavior  Pulling at tubes/lines  --  Root Cause of the Behavior  Sedation/intubation  --  Less-Restrictive Measures that Failed  Non Violent Measures:  Close Observation  --  Response to the Restraint  Patient unable to pull at tubes/lines  --  Criteria for Release from the Restraint  Patient calm and off sedation    Total CC time 40 minutes.    Sherri Garcia MD  Pulmonary and Critical Care  (P) [phone]      Overnight events:  No new events. Voluntary VC <300 mL.    Subjective:  unable    Objective:  Physical Exam:  Vent settings for last 24 hours:  Vent Mode: VCV  FiO2 (%):  [45 %] 45 %  S RR:  [20] 20  S VT:  [350 mL] 350 mL  PEEP/CPAP (cm H2O):  [5 cm H2O-50  "cm H2O] 5 cm H2O  Minute Ventilation (L/min):  [7 L/min-13.4 L/min] 7 L/min  PIP:  [38 cm H2O-44 cm H2O] 38 cm H2O  MAP (cm H2O):  [9-15] 9    BP 90/53   Pulse (!) 111   Temp 99.2  F (37.3  C) (Oral)   Resp 23   Ht 5' 5\" (1.651 m)   Wt 132 lb 7.9 oz (60.1 kg)   SpO2 100%   BMI 22.05 kg/m      Intake/Output last 3 shifts:  I/O last 3 completed shifts:  In: 2406.1 [I.V.:142.1; NG/GT:2264]  Out: 3290 [Urine:3290]  Intake/Output this shift:  No intake/output data recorded.    Physical Exam   Constitutional: She appears well-developed and well-nourished.   HENT:   Head: Normocephalic and atraumatic.   Eyes: Pupils are equal, round, and reactive to light.   Neck: Normal range of motion.   Cardiovascular: Normal rate and regular rhythm.   Pulmonary/Chest: Effort normal.   Coarse BS BL.   Abdominal: Soft.   Musculoskeletal:         General: No edema.   Neurological:   No focal deficits.   Skin: Skin is warm.         LAB:  Results from last 7 days   Lab Units 12/27/20  0348 12/26/20  0405   LN-WHITE BLOOD CELL COUNT thou/uL  --  7.6   LN-HEMOGLOBIN g/dL  --  11.5*   LN-HEMATOCRIT %  --  37.5   LN-PLATELET COUNT thou/uL 495* 503*     Results from last 7 days   Lab Units 12/27/20  1414 12/27/20  0348 12/26/20  0405 12/25/20  0513 12/23/20  0416 12/23/20  0416   LN-SODIUM mmol/L  --   --  142 140  --  138   LN-POTASSIUM mmol/L 4.3 3.4* 3.7 4.0  4.0   < > 3.5   LN-CHLORIDE mmol/L  --   --  97* 99  --  98   LN-CO2 mmol/L  --   --  32* 29  --  30   LN-BLOOD UREA NITROGEN mg/dL  --   --  20 15  --  17   LN-CREATININE mg/dL  --   --  0.55* 0.50*  --  0.48*   LN-CALCIUM mg/dL  --   --  8.9 8.8  --  8.6    < > = values in this interval not displayed.       Current Facility-Administered Medications   Medication Dose Route Frequency Provider Last Rate Last Admin     acetaminophen solution 650 mg (TYLENOL)  650 mg Enteral Tube Q4H PRN Tish Adkins CNP   650 mg at 12/27/20 1356     acetaminophen tablet 650 mg (TYLENOL)  650 mg " Oral Q4H PRN Ramon Haro MD   650 mg at 11/30/20 0058     alteplase injection 2 mg (CATHFLO; TPA)  2 mg Intracatheter PRN Sherri Garcia MD   2 mg at 12/26/20 1438     apixaban ANTICOAGULANT tablet 2.5 mg (ELIQUIS)  2.5 mg Enteral Tube BID Sherri Garcia MD   2.5 mg at 12/27/20 1120     bacitracin ointment packet 1 packet  1 packet Topical Once PRN Tish Adkins CNP         benzocaine-menthoL lozenge 1 lozenge (CEPACOL)  1 lozenge Oral Q1H PRN Eben Butterfield MD         bisacodyL suppository 10 mg (DULCOLAX)  10 mg Rectal Daily PRN Eben Butterfield MD   10 mg at 12/16/20 1631     calcium (as carbonate) chewable tablet 400 mg (TUMS)  400 mg Oral TID PRN Ramon Haro MD   400 mg at 11/18/20 1222     chlorhexidine 0.12 % solution 15 mL (PERIDEX)  15 mL Topical Q12H Tish Adkins CNP   15 mL at 12/27/20 0553     codeine-guaiFENesin  mg/5 mL liquid 5 mL (GUAIFENESIN AC)  5 mL Per NG tube Q4H PRN Gregg Sarabia MD   5 mL at 12/27/20 1356     dextrose 50 % (D50W) syringe 20-50 mL  20-50 mL Intravenous Q15 Min PRN Ramon Haro MD         docusate sodium 50 mg/5 mL oral liquid 100 mg (COLACE)  100 mg Enteral Tube BID Gregg Sarabia MD   100 mg at 12/27/20 0814     fentaNYL pf injection 25-50 mcg (SUBLIMAZE)  25-50 mcg Intravenous Q1H PRN Jennifer Gunter CNP   50 mcg at 12/24/20 0332     furosemide injection 20 mg (LASIX)  20 mg Intravenous BID - diuretic Bashir De La Torre MD   20 mg at 12/27/20 0958     glucagon (human recombinant) injection 1 mg  1 mg Subcutaneous Q15 Min PRN Ramon Haro MD         hydrALAZINE injection 10 mg (APRESOLINE)  10 mg Intravenous Q4H PRN Ramon Haro MD         influenza vaccine quad (PF) (age 50-64 YR) 2020-21 injection 0.5 mL (FLUBLOK)  0.5 mL Intramuscular Prior to Discharge Jeramie Crespo MD         insulin glargine injection 6 Units (LANTUS)  6 Units Subcutaneous [facility] Oren Vásquez MD   6  Units at 12/27/20 0815     lipase-protease-amylase 5,000-17,000- 24,000 unit capsule 2 capsule (ZENPEP)  2 capsule Enteral Tube PRN Eben Butterfield MD        And     sodium bicarbonate tablet 325 mg  325 mg Enteral Tube PRN Eben Butterfield MD         LORazepam 1 mg/0.5 mL concentrated solution 1 mg (ATIVAN)  1 mg Enteral Tube Q6H Gregg Sarabia MD   1 mg at 12/27/20 1121     magnesium hydroxide suspension 30 mL (MILK OF MAG)  30 mL Enteral Tube Daily PRN Gregg Sarabia MD         midodrine tablet 10 mg (PROAMATINE)  10 mg Enteral Tube Q8H Mellisa Abebe CNP   10 mg at 12/27/20 0958     naloxone injection 0.2 mg (NARCAN)  0.2 mg Intravenous Q2 Min PRN Ramon Haro MD        Or     naloxone injection 0.2 mg (NARCAN)  0.2 mg Intramuscular Q2 Min PRN Ramon Haro MD        Or     naloxone injection 0.4 mg (NARCAN)  0.4 mg Intravenous Q2 Min PRN Ramon Haro MD        Or     naloxone injection 0.4 mg (NARCAN)  0.4 mg Intramuscular Q2 Min PRN Ramon Haro MD         OLANZapine tablet 5 mg (zyPREXA)  5 mg Enteral Tube TID PRN Mellisa Abebe CNP   5 mg at 12/27/20 1357     omeprazole capsule 20 mg (PriLOSEC)  20 mg Oral QAM Tish Gonzalez CNP        Or     omeprazole suspension 20 mg (PriLOSEC)  20 mg Enteral Tube QAM Tish Gonzalez CNP   20 mg at 12/27/20 0555     ondansetron injection 4 mg (ZOFRAN)  4 mg Intravenous Q4H PRN Eben Butterfield MD   4 mg at 12/06/20 2219    Or     ondansetron tablet 8 mg (ZOFRAN)  8 mg Oral Q8H PRN Eben Butterfield MD         polyethylene glycol packet 17 g (MIRALAX)  17 g Oral Daily PRN Ramon Haro MD   17 g at 12/16/20 1630     polyvinyl alcohol 1.4 % ophthalmic solution 1-2 drop (LIQUIFILM TEARS)  1-2 drop Both Eyes Q1H PRN Eben Butterfield MD         QUEtiapine tablet 50 mg (SEROquel)  50 mg Enteral Tube Q6H PRN Gregg Sarabia MD   50 mg at 12/27/20 1121     QUEtiapine  tablet 75 mg (SEROquel)  75 mg Enteral Tube Q6H Gregg Sarabia MD   75 mg at 12/27/20 0958     sennosides syrup 8.8 mg (for SENOKOT)  8.8 mg Enteral Tube BID Eben Butterfield MD   8.8 mg at 12/26/20 2053     sodium chloride 0.9%  10 mL/hr Intravenous Continuous Eben Butterfield MD   Stopped at 12/23/20 2100     sodium chloride bacteriostatic 0.9 % injection 0.1-0.3 mL  0.1-0.3 mL Subcutaneous PRN Ramon Haro MD         sodium chloride flush 10-20 mL (NS)  10-20 mL Intravenous PRN Tish Adkins, CNP   10 mL at 12/23/20 0926     sodium chloride flush 10-30 mL (NS)  10-30 mL Intravenous PRN Tish Adkins, CNP   10 mL at 12/23/20 2153     sodium chloride flush 10-30 mL (NS)  10-30 mL Intravenous Q8H FIXED TIMES Tish Adkins CNP   10 mL at 12/27/20 1357     sodium chloride flush 20 mL (NS)  20 mL Intravenous PRN Tish Adkins, CNP         traZODone tablet 25 mg (DESYREL)  25 mg Enteral Tube Q6H PRN Gregg Sarabia MD   25 mg at 12/27/20 0239     valproic acid (as sodium salt) solution 250 mg (DEPAKENE)  250 mg Enteral Tube Q8H FIXED TIMES Gregg Sarabia MD   250 mg at 12/27/20 1121       Total Critical Care Time: 40 Minutes

## 2021-06-14 NOTE — PLAN OF CARE
Problem: Discharge Barriers  Goal: Patient's discharge needs are met  Outcome: Not Progressing  Note: Pt remains on the ventilator and on levophed and precedex.         Pt had trach and peg tube placed today. Restarted tube feeding at 1700. Pt wants to use cell phone, but she is still not coordinated enough to do it.

## 2021-06-14 NOTE — PROGRESS NOTES
"Physical Therapy     12/26/20 1020   Visit Specifics   Eval Type Re-eval   Inital PT Consult 12/26/20    Language line   Restraint Reapplied  Yes  (B soft wrist restraints )   Bed/Tabs/Pad Alarm Applied Other (Comment)  (ICU supervision )   Subjective Patient Comments Pt trach and vented. pt trying to mouth/say words, PT unable to understand    General   Onset date 11/13/20   Chart Reviewed Yes   PT/OT Patient/Caregiver Stated Goals none stated    Treatment Time   Theraputic Exercise 10   Precautions   General Precautions Other (Comment)  (multiple lines,  trach/vented COVID recovered)   Home Living   Type of Home House   Additional Comments per chart    Prior Status   Independent With All ADL's/IADL's   Lives With Spouse   Comments per chart    Cognition   Overall Cognitive Status   (pt able to follow directions and participated with ex )   RLE Assessment   RLE Assessment   (able to move I\"ly )   LLE Assessment   LLE Assessment   (able to move I'ly )   Bed Mobility   Bed Mobility Comments up in chair, hoyered by nursing    Endurance Deficit   Endurance Deficit Yes   Endurance Deficit Description vented, prolonged hospital stay   Balance   Sitting Balance   (pt able to change hip wt bearing in chair )   Other Comments   Comments B LE ex x10 B , CGA AP, heel slides, hip abd/add, SLR   Plan   Treatment/Interventions Home exercise program;Functional transfer training;Neuromuscular re-ed   PT Frequency Daily   Assessment   Prognosis Good   Problem List Decreased endurance;Decreased mobility   Barriers to Discharge Other (Comment)  (deconditioning, decreased mobility prolonged hosp stay )   Recommendation   PT Discharge Recommendation LTACH   PT Equipment Recommendation Mechanical lift   Treatment Suggestions for Next Session LE ex, bed mob, sitt EOB progress to standing    PT Care Plan REVIEWED DAILY Yes, goals remain appropriate     "

## 2021-06-14 NOTE — PROGRESS NOTES
Patient has # 6 Shiley trach in place, had uneventful night- tolerating bellow ventilator settings:    Vent Mode: VCV  FiO2 (%):  [45 %-50 %] 45 %  S RR:  [20] 20  S VT:  [350 mL] 350 mL  PEEP/CPAP (cm H2O):  [5 cm H2O] 5 cm H2O  Minute Ventilation (L/min):  [7.3 L/min-12 L/min] 11.3 L/min  PIP:  [24 cm H2O-44 cm H2O] 41 cm H2O  DC SUP:  [15 cm H20] 15 cm H20  MAP (cm H2O):  [9-20] 13    BS scatter course crackles that clear with suction, Sxn moderate small tan thick, Pt tolerated well.    RT will follow per protocol.      GUSTAVO SoriaT

## 2021-06-14 NOTE — PLAN OF CARE
Remains in ICU for ventilator support on trach. Inner cannula changed on trach. PRN cough syrup and tylenol given. Pt rating pain 2/10. Norepi off. Unable to wean dex. ST on monitor. Pt tearful at bedside and using call light to have nurse hold her hand at the bedside.       Problem: Discharge Barriers  Goal: Patient's discharge needs are met  Outcome: Progressing

## 2021-06-14 NOTE — PROGRESS NOTES
CRITICAL CARE PROGRESS NOTE:    Assessment/Plan:  60 year old female never smoker with a history of right breast cancer s/p right lumpectomy and radiation, admitted 11/13/20 with acute hypoxemic respiratory failure due to COVID-19 infection, ARDS, intubated on 12/1/20, now with prolonged mechanical ventilation, failure to wean.    RESP:  Acute respiratory distress syndrome, COVID-19 infection, prolonged mechanical ventilation: Likely significant respiratory muscle atrophy, critical illness neuromyopathy. Even on sedation and opiates, RSBI increases to 200s within a couple of minutes on PS 12.    Wean down FiO2    PEEP down to 5    Perc trach done today, #6 giuliana    Plan for eventual LTACH for vent weaning    CV:  Shock: Sedation, sepsis. On low-dose norepinephrine.    norepi to keep MAP >65, attempt to wean off    NEURO:  Acute encephalopathy: Sedated, will wake and respond off sedation. Agitated with anxiety, possible delirium. Previously started on quetiapine, valproate, and schedule enteral lorazepam. Nonfocal exam.    On scheduled quetiapine, valproate and enteral lorazepam    Analgesia as needed    Wean off dexmedetomidine as able    GI:  Severe protein-calorie malnutrition:    Tube feeding    G-J tube to be placed today    Bowel regimen: senna-docusate    Free water 200 mL q 4 hrs; stable Na    IR consult for G-J tube placement; appreciated    RENAL:  Hypervolemia: Due to initial resuscitation. Responding well to furosemide.    Continue furosemide 20 mg IV two times a day and monitor response    ID:  COVID-19 infection, Klebsiella UTI: Through acute phase of COVID-19. Completing treatment of Klebsiella UTI.    Completed dexamethasone and remdesivir    Continue cefepime through 12/22 (7 days)    HEMATOLOGIC:  Anemia, thrombocytosis: Anemia of acute inflammation, no evidence of active hemorrhage. Thrombocytosis likely reactive.    Monitor with restrictive transfusion threshold    ENDOCRINE:  No acute  issues.    glargine 6 units daily on hold; restart as needed    FSBG with sliding scale aspart, minimal need    ICU PROPHYLAXIS:    Enoxaparin 0.5 mg/kg two times a day    PPI    HOB elevation    chlorhexidine    CODE STATUS, DISPOSITION, FAMILY COMMUNICATION: Full code. Critically ill requiring invasive mechanical ventilation and continuous vasopressors. Communicated with the patient's , Arturo, by telephone.    Lines/Drains/Tubes:  Central line (RUE PICC) placed on 12/1/20  Arterial line (L axillary) placed on 12/4/20  Feeding tube (OG tube) placed on 12/1/20  Arteaga catheter placed on 12/15/20  Percutaneous tracheostomy (#6 shiley) placed on 12/22/20    Restraints  Indicated and ordered    Progress Note  Restraint Application    I recognize that restraints are physical and/or chemical interventions intended to restrict a person's movements. Restraints are currently needed to ensure the safety of this patient and/or others. My clinical rationale appears below.    Category/Type of Restraint     Non Violent:  Soft limb restraint x2  --  Behavior  Pulling at tubes/lines  --  Root Cause of the Behavior  Sedation/intubation  --  Less-Restrictive Measures that Failed  Non Violent Measures:  Close Observation  --  Response to the Restraint  Patient unable to pull at tubes/lines  --  Criteria for Release from the Restraint  Patient calm and off sedation    Gregg Sarabia MD  Pulmonary and Critical Care Medicine  [facility]  Cell [phone]  Office [phone]  Pager [phone]    Overnight events:  Attempted PS 12 with Kinyarwanda  by telephone to calm patient but RSBI up to 200 within a minute. Perc trach done today.    Subjective:  unable    Objective:  Physical Exam:  Vent settings for last 24 hours:  Vent Mode: PCV/VG  FiO2 (%):  [40 %-100 %] 100 %  S RR:  [20] 20  S VT:  [350 mL] 350 mL  PEEP/CPAP (cm H2O):  [5 cm H2O] 5 cm H2O  Minute Ventilation (L/min):  [3 L/min-9 L/min] 3 L/min  PIP:   "[34 cm H2O-37 cm H2O] 34 cm H2O  LA SUP:  [12 cm H20-15 cm H20] 15 cm H20  MAP (cm H2O):  [13-18] 13    /75 (Patient Position: Lying)   Pulse (!) 101   Temp 98  F (36.7  C) (Axillary)   Resp 20   Ht 5' 5\" (1.651 m)   Wt 136 lb 14.5 oz (62.1 kg)   SpO2 100%   BMI 22.78 kg/m      Intake/Output last 3 shifts:  I/O last 3 completed shifts:  In: 2958.2 [I.V.:604.2; NG/GT:2254; IV Piggyback:100]  Out: 2690 [Urine:2690]  Intake/Output this shift:  I/O this shift:  In: 227.9 [I.V.:227.9]  Out: 1000 [Urine:1000]    Physical Exam  Gen: intubated, alert, fidgeting and appears a bit agitated  HEENT: no OP lesions, no DOT  CV: tachy, regular, no m/g/r  Resp: CTAB  Abd: soft, BS+  Neuro: PERRL, nonfocal, a bit agitated, trying to gesture  Ext: no edema    LAB:  Results from last 7 days   Lab Units 12/22/20  0417   LN-WHITE BLOOD CELL COUNT thou/uL 11.3*   LN-HEMOGLOBIN g/dL 10.9*   LN-HEMATOCRIT % 34.5*   LN-PLATELET COUNT thou/uL 498*     Results from last 7 days   Lab Units 12/22/20  0417 12/21/20  0357 12/20/20  0549   LN-SODIUM mmol/L 138 140 139   LN-POTASSIUM mmol/L 3.9 3.8 3.7   LN-CHLORIDE mmol/L 99 101 102   LN-CO2 mmol/L 30 30 32*   LN-BLOOD UREA NITROGEN mg/dL 15 16 16   LN-CREATININE mg/dL 0.49* 0.43* 0.46*   LN-CALCIUM mg/dL 8.7 8.3* 8.4*       Current Facility-Administered Medications   Medication Dose Route Frequency Provider Last Rate Last Admin     acetaminophen solution 650 mg (TYLENOL)  650 mg Enteral Tube Q4H PRN Tish Adkins CNP   650 mg at 12/18/20 0648     acetaminophen tablet 650 mg (TYLENOL)  650 mg Oral Q4H PRN Ramon Haro MD   650 mg at 11/30/20 0058     bacitracin ointment packet 1 packet  1 packet Topical Once PRN Tish Adkins CNP         benzocaine-menthoL lozenge 1 lozenge (CEPACOL)  1 lozenge Oral Q1H PRN Eben Butterfield MD         bisacodyL suppository 10 mg (DULCOLAX)  10 mg Rectal Daily PRN Eben Butterfield MD   10 mg at 12/16/20 1631     " calcium (as carbonate) chewable tablet 400 mg (TUMS)  400 mg Oral TID PRN Ramon Haro MD   400 mg at 11/18/20 1222     chlorhexidine 0.12 % solution 15 mL (PERIDEX)  15 mL Topical Q12H Tish Adkins CNP   15 mL at 12/22/20 0523     codeine-guaiFENesin  mg/5 mL liquid 5 mL (GUAIFENESIN AC)  5 mL Oral Q4H PRN Bashir De La Torre MD   5 mL at 12/17/20 1435     dexmedetomidine (PRECEDEX) 1000 mcg/250 mL in NS (4 mcg/mL) infusion  0.1-1.5 mcg/kg/hr Intravenous Continuous Jeramie Crespo MD 4.6 mL/hr at 12/22/20 1439 0.3 mcg/kg/hr at 12/22/20 1439     dextrose 50 % (D50W) syringe 20-50 mL  20-50 mL Intravenous Q15 Min PRN Ramon Haro MD         enoxaparin ANTICOAGULANT syringe 30 mg (LOVENOX)  30 mg Subcutaneous BID Gregg Sarabia MD         fentaNYL pf injection 25-50 mcg (SUBLIMAZE)  25-50 mcg Intravenous Q1H PRN Jennifer Gunter CNP   50 mcg at 12/22/20 0543     furosemide injection 20 mg (LASIX)  20 mg Intravenous BID - diuretic Bashir De La Torre MD   20 mg at 12/22/20 0846     glucagon (human recombinant) injection 1 mg  1 mg Subcutaneous Q15 Min PRN Ramon Haro MD         hydrALAZINE injection 10 mg (APRESOLINE)  10 mg Intravenous Q4H PRN Ramon Haro MD         influenza vaccine quad (PF) (age 50-64 YR) 2020-21 injection 0.5 mL (FLUBLOK)  0.5 mL Intramuscular Prior to Discharge Jeramie Crespo MD         insulin aspart U-100 injection pen (NovoLOG)   Subcutaneous Q6H FIXED TIMES Gregg Sarabia MD         [Held by provider] insulin glargine injection 6 Units (LANTUS)  6 Units Subcutaneous Oren Albert MD   6 Units at 12/21/20 0812     lipase-protease-amylase 5,000-17,000- 24,000 unit capsule 2 capsule (ZENPEP)  2 capsule Enteral Tube PRN Eben Butterfield MD        And     sodium bicarbonate tablet 325 mg  325 mg Enteral Tube PRN Eben Butterfield MD         LORazepam 1 mg/0.5 mL concentrated solution 1 mg (ATIVAN)  1 mg Oral Q6H  Oren Vásquez MD   1 mg at 12/22/20 0518     magnesium hydroxide suspension 30 mL (MILK OF MAG)  30 mL Oral Daily PRN Eben Butterfield MD         naloxone injection 0.2 mg (NARCAN)  0.2 mg Intravenous Q2 Min PRN Ramon Haro MD        Or     naloxone injection 0.2 mg (NARCAN)  0.2 mg Intramuscular Q2 Min PRN Ramon Haro MD        Or     naloxone injection 0.4 mg (NARCAN)  0.4 mg Intravenous Q2 Min PRN Ramon Haro MD        Or     naloxone injection 0.4 mg (NARCAN)  0.4 mg Intramuscular Q2 Min PRN Ramon Haro MD         norepinephrine 4 mg/250 mL in NS (16 mcg/mL) - CENTRAL  0.01-0.4 mcg/kg/min Intravenous Continuous Bashir De La Torre MD 9.2 mL/hr at 12/22/20 1215 0.04 mcg/kg/min at 12/22/20 1215     omeprazole capsule 20 mg (PriLOSEC)  [facility] Tish Adkins CNP        Or     omeprazole suspension 20 mg (PriLOSEC)  20 mg Enteral Tube QA Tish Gonzalez CNP   20 mg at 12/22/20 0735     ondansetron injection 4 mg (ZOFRAN)  4 mg Intravenous Q4H PRN Eben Butterfield MD   4 mg at 12/06/20 2219    Or     ondansetron tablet 8 mg (ZOFRAN)  8 mg Oral Q8H PRN Eben Butterfield MD         polyethylene glycol packet 17 g (MIRALAX)  17 g Oral Daily PRN Ramon Haro MD   17 g at 12/16/20 1630     polyvinyl alcohol 1.4 % ophthalmic solution 1-2 drop (LIQUIFILM TEARS)  1-2 drop Both Eyes Q1H PRN Eben Butterfield MD         QUEtiapine tablet 25 mg (SEROquel)  25 mg Oral Q6H PRN Sweetie Hogue DO         QUEtiapine tablet 50 mg (SEROquel)  50 mg Oral Daily PRN Alma Mcmillan CNP   50 mg at 12/15/20 1004     QUEtiapine tablet 75 mg (SEROquel)  75 mg Oral Q6H Sweetie Hogue DO   75 mg at 12/22/20 0950     senna-docusate 8.6-50 mg tablet 1 tablet (PERICOLACE)  1 tablet Oral BID Eben Butterfield MD   Stopped at 11/30/20 2100    Or     sennosides syrup 8.8 mg (for SENOKOT)  8.8 mg Enteral Tube BID Pranav Elam,  Eben Alcantara MD   8.8 mg at 12/21/20 0811     sodium chloride 0.9%  10 mL/hr Intravenous Continuous ZeEben Mcknight MD 10 mL/hr at 12/22/20 1400 10 mL/hr at 12/22/20 1400     sodium chloride bacteriostatic 0.9 % injection 0.1-0.3 mL  0.1-0.3 mL Subcutaneous PRN Ramon Haro MD         sodium chloride flush 10-20 mL (NS)  10-20 mL Intravenous PRN Tish Adkins CNP   10 mL at 12/04/20 1736     sodium chloride flush 10-30 mL (NS)  10-30 mL Intravenous PRN Tish Adkins CNP         sodium chloride flush 10-30 mL (NS)  10-30 mL Intravenous Q8H FIXED TIMES Tish Adkins CNP   10 mL at 12/22/20 1439     sodium chloride flush 20 mL (NS)  20 mL Intravenous PRN Tish Adkins CNP         traZODone tablet 25 mg (DESYREL)  25 mg Oral Q6H PRN Sweetie Hogue DO         valproic acid (as sodium salt) solution 250 mg (DEPAKENE)  250 mg Oral Q8H FIXED TIMES Alma Mcmillan CNP   250 mg at 12/22/20 0518       Total Critical Care Time: 45 Minutes exclusive of procedures

## 2021-06-14 NOTE — PROGRESS NOTES
Patient now status post tracheostomy, still requiring ventilatory support after prolonged Covid infection.  Critical care managing all issues, patient will discharge directly from the ICU to LTAC.  Hospitalist service to sign off, discussed with Dr. Garcia who agrees with plan

## 2021-06-14 NOTE — CONSULTS
Care Management Follow Up Note    Length of Stay (days) 40     Patient plan of care discussed at Interdisciplinary Rounds: yes           Pt/Rep Education / Interventions: Review d/c options, Discuss d/c, Discussed readmission risk    Expected Discharge Date: 12/24/20  Concerns to be Addressed / Barriers to Discharge:   LTACH placement    Anticipated Discharge Disposition:  LTACH  Anticipated Discharge Services:    LTACH  Anticipated Discharge DME:  TBD    Plan: SWCM following and consulted for LTACH placement.   SWCM spoke to patient's  Barry Reddy in agreement to LTACH and referrals being placed to [facility]ACH and [facility]ACH. Referrals sent.  stated understanding, no further questions or concerns, will keep him updated on discharge plan.  CM will follow for discharge planning and disposition.     DIEUDONNE Laguna  12/23/2020  10:06 AM

## 2021-06-14 NOTE — PLAN OF CARE
Problem: Breathing  Goal: Patient will maintain patent airway  Outcome: Progressing     Problem: Mechanical Ventilation  Goal: Patient will maintain patent airway  Outcome: Progressing  Goal: Respiratory status - ventilation  Description: Movement of air in and out of the lungs.    Liberate from ventilator  Outcome: Progressing     GUSTAVO Jj

## 2021-06-14 NOTE — PROGRESS NOTES
Patient up to chair without incident. Patient tolerated well with no adverse reaction. Also tried to place patient on ventilator wean of 12/5. Patient failed wean her RSBI was 251-280, RR 50, Oxygen saturation 92%.

## 2021-06-14 NOTE — PROGRESS NOTES
RT PROGRESS NOTE    VENT DAY#  24    CURRENT SETTINGS:  VENT SETTINGS   VCV 20 350 +5        FIO2:   45%    PATIENT PARAMETERS:  PIP 43  Pplat:  36  Pmean:    SBT: yes  SECRETIONS:  Small amount of thickish tan  02 SATS:  98-99%    Trach: #6 Neville    NOTE / PLAN:   SBT attempted, patient did not tolerate at all, PS 15, +5, RR very quickly went to the 40's and TV upper 100's to low 200's.  Plan is to attempt weaning two times a day.  No other issues or changes from a respiratory stand point this shift.    Jennifer Jimenez, LRT

## 2021-06-14 NOTE — PROGRESS NOTES
12/26/20 0820   Patient Data   Vt (observed, mL) 353 mL   Vt Exp (mL) 348 mL   Minute Ventilation (L/min) 8.3 L/min   Total Resp Rate  23 BPM   PIP Observed (cm H2O) 38 cm H2O   MAP (cm H2O) 11   Auto/Intrinsic PEEP Observed (cm H2O) 2 cm H2O   Plateau Pressure (cm H2O) 30 cm H2O   Static Compliance (L/cm H2O) 15   Dynamic Compliance (L/cm H2O) 12 L/cm H2O   Airway Resistance 13   SpO2 98 %

## 2021-06-14 NOTE — PLAN OF CARE
Problem: Discharge Barriers  Goal: Patient's discharge needs are met  Outcome: Progressing  Note: LTACH discharge pending per care manager     Problem: Potential for Compromised Skin Integrity  Goal: Nutritional status is improving  Outcome: Progressing

## 2021-06-14 NOTE — PLAN OF CARE
Problem: Activity Intolerance/Impaired Mobility  Goal: Mobility/activity is maintained at optimum level for patient  Outcome: Progressing     Problem: Breathing  Goal: Patient will maintain patent airway  Outcome: Progressing     Problem: Mechanical Ventilation  Goal: Mobility/activity is maintained at optimum level for patient  Outcome: Progressing  Goal: Patient will maintain patent airway  Outcome: Progressing  Goal: Oral health is maintained or improved  Outcome: Progressing  Goal: Respiratory status - ventilation  Description: Movement of air in and out of the lungs.    Liberate from ventilator  Outcome: Progressing  Goal: Ability to express needs and understand communication  Outcome: Progressing  Goal: Tracheostomy will be managed safely  Outcome: Progressing

## 2021-06-14 NOTE — PLAN OF CARE
Problem: Inadequate Gas Exchange  Goal: Patient will achieve/maintain normal respiratory rate/effort  Outcome: Progressing    Problem: Mechanical Ventilation  Goal: Tracheostomy will be managed safely  Outcome: Progressing

## 2021-06-14 NOTE — PROGRESS NOTES
"RESPIRATORY CARE NOTE    Patient has # 6 Shiley in place, tolerating bellow settings:    Vent Mode: VCV  FiO2 (%):  [35 %] 35 %  S RR:  [20] 20  S VT:  [350 mL] 350 mL  PEEP/CPAP (cm H2O):  [5 cm H2O] 5 cm H2O  Minute Ventilation (L/min):  [7 L/min-12.4 L/min] 12.4 L/min  PIP:  [27 cm H2O-43 cm H2O] 43 cm H2O  LA SUP:  [18 cm H20] 18 cm H20  MAP (cm H2O):  [10-15] 13     /68   Pulse (!) 133   Temp 98.7  F (37.1  C) (Axillary)   Resp (!) 39   Ht 5' 5\" (1.651 m)   Wt 132 lb 7.9 oz (60.1 kg)   SpO2 94%   BMI 22.05 kg/m    BS diminished with scatter course crackles,  Sxn small white thick, Pt tolerated well.    RT will follow per protocol.      GUSTAVO SoriaT    "

## 2021-06-14 NOTE — PLAN OF CARE
Problem: Activity Intolerance/Impaired Mobility  Goal: Mobility/activity is maintained at optimum level for patient  Outcome: Progressing     Problem: Breathing  Goal: Patient will maintain patent airway  Outcome: Progressing     Problem: Mechanical Ventilation  Goal: Mobility/activity is maintained at optimum level for patient  Outcome: Progressing  Goal: Patient will maintain patent airway  Outcome: Progressing  Goal: Oral health is maintained or improved  Outcome: Progressing  Goal: Respiratory status - ventilation  Description: Movement of air in and out of the lungs.    Liberate from ventilator  Outcome: Progressing  Goal: Ability to express needs and understand communication  Outcome: Progressing

## 2021-06-14 NOTE — PROGRESS NOTES
12/27/20 1343   Patient Data   Vt (observed, mL) 345 mL   Vt Exp (mL) 349 mL   Minute Ventilation (L/min) 7 L/min   Total Resp Rate  20 BPM   PIP Observed (cm H2O) 38 cm H2O   MAP (cm H2O) 9   Auto/Intrinsic PEEP Observed (cm H2O) 1 cm H2O   Plateau Pressure (cm H2O) 21 cm H2O   Static Compliance (L/cm H2O) 12   Airway Resistance 13

## 2021-06-14 NOTE — PLAN OF CARE
Problem: Safety  Goal: Patient will be injury free during hospitalization  Outcome: Progressing     Problem: Daily Care  Goal: Daily care needs are met  Outcome: Progressing     Problem: Psychosocial Needs  Goal: Demonstrates ability to cope with hospitalization/illness  Outcome: Progressing  MAP fluctuates. Current RASS 0 to -1. Titrated Levophed to 0.03 mcg/kg/m to keep MAP > 65. Precedex was titrated to 0.3mcg/kg/h.  All daily needs met.  Will continue to monitor.

## 2021-06-14 NOTE — PLAN OF CARE
Problem: Risk of Injury Due to Unsafe Behavior  Goal: Patient will remain safe while in restraint; physical/psychological needs will be met  Outcome: Completed  Goal: Alternatives to restraint will be continually assessed with use of least restrictive device and discontinuation as soon as possible  Outcome: Completed  Goal: Patient/Family will be able to communicate reason for restraint and steps for restraint application and removal  Outcome: Completed   Restraints d/c'ed yesterday. Pt not pulling at anything overnight. Will continue to assess behavior.      Problem: Inadequate Gas Exchange  Goal: Patient will achieve/maintain normal respiratory rate/effort  Outcome: Progressing   Pt is tachypneic at times with a rate in the 30's but when at rest rate in the 20's. Pt coughs with turns. Suctioned frequently for small amounts of white secretions.      Problem: Safety  Goal: Patient will be injury free during hospitalization  Outcome: Progressing     Problem: Activity Intolerance/Impaired Mobility  Goal: Mobility/activity is maintained at optimum level for patient  Outcome: Progressing    Pt got up to the chair yesterday x 2. Found pt trying to put her legs over the side of the bed once. Unsure of what she was trying to do. Trying to speak but unable to understand. Got her back in bed and no other instances of this throughout the night.

## 2021-06-14 NOTE — PROGRESS NOTES
Hafsa P Travis is s/p percutaneous tracheostomy.  No acute overnight.        Vitals:    12/23/20 0630 12/23/20 0645 12/23/20 0700 12/23/20 0801   BP:       Patient Position:       Pulse: 91 93 89 98   Resp: (!) 36 (!) 30 (!) 35    Temp:       TempSrc:       SpO2: 97% 96% 98% 97%   Weight:       Height:           PHYSICAL EXAM:  Tracheostomy site-clean and intact with no evidence of bleeding        Results from last 7 days   Lab Units 12/23/20  0416   LN-WHITE BLOOD CELL COUNT thou/uL 8.4   LN-HEMOGLOBIN g/dL 10.8*   LN-HEMATOCRIT % 34.2*   LN-PLATELET COUNT thou/uL 505*       Results from last 7 days   Lab Units 12/23/20  0416   LN-SODIUM mmol/L 138   LN-POTASSIUM mmol/L 3.5   LN-CHLORIDE mmol/L 98   LN-CO2 mmol/L 30   LN-BLOOD UREA NITROGEN mg/dL 17   LN-CREATININE mg/dL 0.48*   LN-CALCIUM mg/dL 8.6         A/P:  Hafsa P Fastner is s/p percutaneous tracheostomy  -No tracheostomy issues at present  -Call with any questions or concerns.  Surgery will sign off.    Dre Issa D.O., FACS  [phone]  [facility] Department of Surgery

## 2021-06-14 NOTE — PLAN OF CARE
Problem: Mechanical Ventilation  Goal: ET tube will be managed safely  Outcome: Completed  Patient trached & vented, still on Precedex drip to keep RASS score 0 to -1 and on Norepinephrine drip to keep MAP >65. PRN Fentanyl were given for pain. Tolerating tube feedings. Latest K was 4.     Problem: Discharge Barriers  Goal: Patient's discharge needs are met  Outcome: Not Applicable this Shift     Problem: Breathing  Goal: Patient will utilize incentive spirometer  Outcome: Not Applicable this Shift  No order for Incentive Spirometer.     Problem: Knowlegde Deficit  Goal: Demonstrate technique for CPAP/BiPAP  Outcome: Not Applicable this Shift

## 2021-06-14 NOTE — PROGRESS NOTES
"RESPIRATORY CARE NOTE    Patient has #6 Shiley in place, tolerating bellow settings:   Vent Mode: VCV  FiO2 (%):  [45 %] 45 %  S RR:  [20] 20  S VT:  [350 mL] 350 mL  PEEP/CPAP (cm H2O):  [5 cm H2O] 5 cm H2O  Minute Ventilation (L/min):  [7.8 L/min-11.4 L/min] 9.2 L/min  PIP:  [36 cm H2O-41 cm H2O] 36 cm H2O  MAP (cm H2O):  [9-16] 9   PIP 36, plateau 33   BP 95/51   Pulse 94   Temp 98.4  F (36.9  C) (Axillary)   Resp 27   Ht 5' 5\" (1.651 m)   Wt 134 lb 11.2 oz (61.1 kg)   SpO2 97%   BMI 22.42 kg/m    BS diminished with scatter course crackles, Sxn small tan thick, Pt tolerated well.    RT will follow per protocol.      GUSTAVO SoriaT    "

## 2021-06-14 NOTE — PLAN OF CARE
Up to the chair for 2 hours X2. PT and OT helped patient stand at chair X2. Pt Unable to wean today. Remained off Norepi and Precedex. Using PRN Seroquel and Zyprexa. Pt tearful at bedside. Encouraged pt to continue to get up to chair and stay strong. Awaiting possible LTACH placement.       Problem: Discharge Barriers  Goal: Patient's discharge needs are met  Outcome: Progressing

## 2021-06-14 NOTE — PROGRESS NOTES
12/25/20 0803   Patient Data   Vt (observed, mL) 346 mL   Vt Exp (mL) 347 mL   Minute Ventilation (L/min) 11.2 L/min   Total Resp Rate  32 BPM   PIP Observed (cm H2O) 38 cm H2O   MAP (cm H2O) 13   Auto/Intrinsic PEEP Observed (cm H2O) 1 cm H2O   Plateau Pressure (cm H2O) 25 cm H2O   Static Compliance (L/cm H2O) 17   Dynamic Compliance (L/cm H2O) 13 L/cm H2O   Airway Resistance 18   SpO2 99 %

## 2021-06-14 NOTE — PROGRESS NOTES
After pt GJ tubed procedure completed pt noted to be more arousable.  We went to titrate the precedex and found the pump to be completely off.  No audible alarms were ever heard, no one turned the pump off, and it was running prior to unplugging while in ICU.  We are unsure how long the pump was off Dr Cortés made aware and orders received for Versed and Fentanyl to be given prior to transport back to ICU.  We also restarted her precedex.  ICU nurse made aware as well.

## 2021-06-14 NOTE — PROGRESS NOTES
"RESPIRATORY CARE NOTE     Patient Name: Hafsa Robles  Today's Date: 12/27/2020     Pt continues on the following settings:  Vent Mode: VCV  FiO2 (%):  [45 %-100 %] 45 %  S RR:  [20] 20  S VT:  [350 mL] 350 mL  PEEP/CPAP (cm H2O):  [5 cm H2O] 5 cm H2O  Minute Ventilation (L/min):  [8 L/min-12.2 L/min] 8 L/min  PIP:  [24 cm H2O-41 cm H2O] 41 cm H2O  N[place] SUP:  [12 cm H20] 12 cm H20  MAP (cm H2O):  [10-14] 10   Plateau pressure: 30 cm H2O     Pt is intubated with  #6 shiley.  BS are coarse. Pt has a good cough with suction. RT suctioned pt for thin small clear. Pt's respiratory status is stable. RT will continue to follow per MD's orders.     BP 97/57   Pulse 75   Temp 98.7  F (37.1  C) (Axillary)   Resp 22   Ht 5' 5\" (1.651 m)   Wt 132 lb 7.9 oz (60.1 kg)   SpO2 98%   BMI 22.05 kg/m        Cristine Marie LRT  "

## 2021-06-14 NOTE — PROGRESS NOTES
12/24/20 1923   Patient Data   PIP Observed (cm H2O) 37 cm H2O   MAP (cm H2O) 10   Auto/Intrinsic PEEP Observed (cm H2O)   (failed)   Plateau Pressure (cm H2O) 31 cm H2O   Static Compliance (L/cm H2O) 14   Dynamic Compliance (L/cm H2O) 12 L/cm H2O   Airway Resistance 15   SpO2 100 %   Heart Rate 86     GUSTAVO JjT

## 2021-06-14 NOTE — PROGRESS NOTES
Respiratory Care Note     Patient is on full vent support. Breath sound diminished. Suction small amount of thick secretions. Will continue to monitor.       12/25/20 2005   Patient Data   PIP Observed (cm H2O) 36 cm H2O   MAP (cm H2O) 9   Plateau Pressure (cm H2O) 28 cm H2O

## 2021-06-14 NOTE — PROGRESS NOTES
Safety maintained throughout shift. Turned and repositioned q 2 hours and PRN. Pt had large soft BM overnight. Bed bath/linen change/skin care/chidi care performed. Pt tolerated well. Trach care, small amount bloody drainage around site. All meds tolerated well. Report to Nurse at [facility]. Belongings sent with patient. Pt safely transferred to [facility] with RN and EMS. Pt on travel monitor and ventilator, VSS. Pt given PRN pain meds before transfer.

## 2021-06-14 NOTE — PLAN OF CARE
Problem: Potential for Compromised Skin Integrity  Goal: Skin integrity is maintained or improved  Outcome: Progressing     Problem: Mechanical Ventilation  Goal: Mobility/activity is maintained at optimum level for patient  Outcome: Progressing     Problem: Inadequate Gas Exchange  Goal: Patient will achieve/maintain normal respiratory rate/effort  Outcome: Not Progressing     Will continue to monitor.

## 2021-06-14 NOTE — PROCEDURES
FIBEROPTIC BRONCHOSCOPY PROCEDURE NOTE (NON-OR)  Date of Procedure: 12/22/2020  Performing Physician: Gregg Sarabia  Pre-Procedure Diagnosis: acute respiratory failure, prolonged mechanical ventilation, need for percutaneous tracheostomy  Post-Procedure Diagnosis: same as pre-procedure diagnosis  Procedure: diagnostic flexible fiberoptic bronchoscopy   Indications: acute respiratory failure, prolonged mechanical ventilation, need for percutaneous tracheostomy  Preop evaluation:  Procedural Sedation: intravenous sedation   Expected level: moderate sedation  ASA Class: ASA 4 - Patient with severe systemic disease that is a constant threat to life  Mallampati: NA  Respiratory Precautions: The patient was evaluated for TB risk prior to the procedure. The risk was judged to be low, but special respiratory isolation was observed based on pandemic policy.  Anesthesia:  midazolam 10 mg IV  fentanyl 100 mcg IV  Rocuronium 50 mg IV  Specimen: none  Estimated Blood Loss: minimal  Complications: none immediate     Procedure Details and Findings:   The patient was seen and the risks, benefits, complications, treatment options, and expected outcomes were discussed with the patient's . The risks and potential complications of their problem and proposed treatment include but are not limited to infection, bleeding, pain, adverse drug reaction, pulmonary aspiration, the need for additional procedures, failure to diagnose a condition, creating a complication requiring transfusion or operation, and complication secondary to the anesthetic. The patient's  concurred with the proposed plan, giving informed consent. The patient was identified as Hafsa Robles with date of birth 1960 and the procedure verified as diagnostic flexible fiberoptic bronchoscopy. A time out was held and the above information confirmed.    After application of anesthesia, the patient was placed in the supine position and the bronchoscope was  passed through the orotracheal tube. Visualization of the trachea was provided during percutaneous tracheostomy (see separate procedure note from Dr. Issa) following slow withdrawal of the orotracheal tube. Following percutaneous tracheostomy, the bronchoscope was removed and passed through the tracheostomy tube, which was noted to be in good position within the tracheal lumen. Minimal bloody secretions were cleared from the bronchial tree. The bronchoscope was removed and the procedure concluded. The orotracheal tube was removed.    Treatment Plan Based on Findings:  - continue with ventilator weaning as tolerated    Gregg Sarabia MD  Pulmonary and Critical Care Medicine  [facility]  Office [phone]

## 2021-06-14 NOTE — PROGRESS NOTES
[facility] Pulmonary/Critical Care Consult Team Note    Hafsa Robles,  1960, MRN [id number]  Admitting Dx: Hypoxia [R09.02]  Multifocal pneumonia [J18.9]  Date / Time of Admission:  2020  5:17 PM    Overnight Events:  Intake/Output last 3 shifts:  I/O last 3 completed shifts:  In: 2280 [I.V.:70; NG/GT:2210]  Out: 2700 [Urine:2700]  She weaned /5 for less then a minute yesterday      Assessment/Plan:  60 year old female never smoker with a history of right breast cancer s/p right lumpectomy and radiation, admitted 20 with acute hypoxemic respiratory failure due to COVID-19 infection, ARDS, intubated on 20, now with prolonged mechanical ventilation, failure to wean.     RESP:  Acute respiratory distress syndrome, COVID-19 infection, prolonged mechanical ventilation: Likely significant respiratory muscle atrophy, critical illness neuromyopathy. Continues to do poorly on pressure support trials this is likely related to her severe critical illness neuropathy.  Vent Mode: VCV  FiO2 (%):  [35 %-45 %] 35 %  S RR:  [20] 20  S VT:  [350 mL] 350 mL  PEEP/CPAP (cm H2O):  [5 cm H2O] 5 cm H2O  Minute Ventilation (L/min):  [7 L/min-11.9 L/min] 9.6 L/min  PIP:  [23 cm H2O-40 cm H2O] 40 cm H2O  AK SUP:  [12 cm H20] 12 cm H20  MAP (cm H2O):  [8-13] 13    [facility] , [facility]    Phase 1 weaning--daily    Needs LTACH transfer, qualifies for Covid recovered status and this has been entered this in her chart.     CV:  Shock: Sedation, sepsis.      Continue midodrine to 10 mg q 8 hrs.    Cortisol level 3.9, will add steroids if she has more hypotension     NEURO:  Acute encephalopathy: Is fairly redirectable although still tries to remove her tracheostomy. Previously started on quetiapine, valproate, and schedule enteral lorazepam. Nonfocal exam.    On scheduled quetiapine, valproate and enteral lorazepam    Use PO anxiolytics.    Have discontinued Precedex.     GI:  Severe protein-calorie  malnutrition:    G-J placed by IR 12/22; on replete @65ml/h.    Bowel regimen: senna-docusate    Free water 200 mL q 4 hrs; stable Na     RENAL:  Hypervolemia: Due to initial resuscitation, almost euvolemic now.    Continue furosemide 20 mg IV two times a day.     ID:  COVID-19 infection, Klebsiella UTI: Through acute phase of COVID-19. Completing treatment of Klebsiella UTI.    Completed dexamethasone and remdesivir    Completed 7-day course of cefepime through 12/22    Was initially diagnosed with Covid-19 on 11/13/20. Has been stable and shows no active signs of infection.  Has been switched to Covid recovered status.    Have discontinued Lovenox and switched to Eliquis per Covid outpatient regimen.     HEMATOLOGIC:  Anemia, thrombocytosis: Anemia of acute inflammation, no evidence of active hemorrhage. Thrombocytosis likely reactive.    Monitor with restrictive transfusion threshold     ENDOCRINE:  No acute issues. Hyperglycemia     Continue glargine 6 units daily    FSBG with sliding scale aspart     ICU PROPHYLAXIS:    Eliquis.    PPI    HOB elevation    chlorhexidine     CODE STATUS, DISPOSITION, FAMILY COMMUNICATION: Full code. Critically ill requiring invasive mechanical ventilation and continuous vasopressors.      Lines/Drains/Tubes:  Central line (RUE PICC) placed on 12/1/20  Arterial line (L axillary) placed on 12/4/20  Feeding tube (OG tube) placed on 12/1/20  Arteaga catheter placed on 12/15/20  Percutaneous tracheostomy (#6 shiley) placed on 12/22/20    Pt has critical illness and impairs breathing on vent such as there is high probability of imminent of life threatening deterioration in the patient's condition.    Code Status: FULL CODE    ICU DAILY CHECKLIST           Can patient transfer out of MICU? no  FAST HUG:  Feeding:  Feeding: Yes.    Arteaga:Yes  Analgesia/Sedation:Yes  Thromboembolic prophylaxis:Elliquis  HOB>30:  Yes  Stress Ulcer Protocol Active: yes; Mode: PPI/H2 Antagonist  Glycemic  "Control:   Lab Results   Component Value Date/Time     (H) 12/26/2020 04:05 AM     12/25/2020 05:13 AM     (H) 12/23/2020 04:16 AM    Any glucose > 180 yes; Mode of Insulin Therapy: Sliding Scale Insulin and Long Acting Insulin  INTUBATED:  Can patient have daily waking:  not applicable  Can patient have spontaneous breathing trial:  not applicable  Does pt have restraints: MD RESTRAINT FOR NON-VIOLENT BEHAVIOR FACE TO FACE EVALUATION Patient's Immediate Situation: Patient demonstrated the following behaviors: Impulsive behavior, grabbing at support tubes/lines.  Patient's Reaction to the intervention Does patient understand the reason for restraint/seclusion? Unable to Express Medical Condition Is there any evidence of compromise of Skin integrity, Respiratory, Cardiovascular, Musculoskeletal, Hydration? No Behavioral ConditionIn consultation with the RN, is there a need to continue this restraint or seclusion? Yes See Restraint Flowsheet for complete restraint documentation and assessment.  PHYSICAL THERAPY AND MOBILITY: Can patient have PT and mobility trial: yes Activity: ICU mobility protocol    Critical Care Time excluding procedures and family discussions greater than: 45 Minutes    Sweetie Hogue DO  Pulmonary and Critical Care Attending  pgr [phone]    No Known Allergies    Meds: See MAR    Physical Exam:  /76   Pulse (!) 113   Temp 99.1  F (37.3  C) (Oral)   Resp 28   Ht 5' 5\" (1.651 m)   Wt 132 lb 7.9 oz (60.1 kg)   SpO2 99%   BMI 22.05 kg/m    Intake/Output this shift:  I/O this shift:  In: 200 [NG/GT:200]  Out: 225 [Urine:225]  GEN: Intubated and NAD  HEENT: trach in place  CVS: regular rhythm, no murmurs  RESP: Coarse bilateral rhonchi, no wheezing  ABD: G-tube in place, Soft, No abdominal pain with palpation, no guarding, no rigidity  EXT: Warm, well perfused, no rashes, no edema  NEURO:  CN2-12 grossly intact, moving all extremities    Pertinent Labs: Latest " lab results in EHR personally reviewed.   Results from last 7 days   Lab Units 12/28/20  0514 12/27/20  0348 12/26/20 0405 12/23/20 0416 12/23/20 0416 12/22/20 0417   LN-WHITE BLOOD CELL COUNT thou/uL  --   --  7.6  --  8.4 11.3*   LN-HEMOGLOBIN g/dL  --   --  11.5*  --  10.8* 10.9*   LN-HEMATOCRIT %  --   --  37.5  --  34.2* 34.5*   LN-MEAN CORPUSCULAR VOLUME fL  --   --  89  --  88 87   LN-PLATELET COUNT thou/uL 510* 495* 503*   < > 505* 498*    < > = values in this interval not displayed.    and   Results from last 7 days   Lab Units 12/28/20  0514 12/27/20  1414 12/27/20 0348 12/26/20 0405 12/25/20 0513 12/23/20 0416 12/23/20 0416   LN-SODIUM mmol/L  --   --   --  142 140  --  138   LN-POTASSIUM mmol/L 3.6 4.3 3.4* 3.7 4.0  4.0   < > 3.5   LN-CHLORIDE mmol/L  --   --   --  97* 99  --  98   LN-CO2 mmol/L  --   --   --  32* 29  --  30   LN-BLOOD UREA NITROGEN mg/dL  --   --   --  20 15  --  17   LN-CREATININE mg/dL  --   --   --  0.55* 0.50*  --  0.48*   LN-CALCIUM mg/dL  --   --   --  8.9 8.8  --  8.6    < > = values in this interval not displayed.       Cultures: personally reviewed.   Culture   Date Value Ref Range Status   12/15/2020 Usual Char[place]  Final     Gram Stain Result   Date Value Ref Range Status   12/15/2020 4+ Polymorphonuclear leukocytes  Final   12/15/2020 1+ Gram positive cocci in pairs  Final       personally reviewed images:   Imaging results from past 30 days: Xr Chest 1 View Portable    Result Date: 12/22/2020  EXAM: XR CHEST 1 VIEW PORTABLE LOCATION: [facility] DATE/TIME: 12/22/2020 10:26 AM INDICATION: respiratory failure COMPARISON: 12/15/2020.     Endotracheal tube is 2.6 cm above the fede. Right PICC catheter projects over the junction of superior vena cava and right atrium unchanged. Enteric tube tip is below the diaphragm and not visualized. Again noted are diffuse bilateral patchy airspace and interstitial opacities consistent with Covid 19  pneumonia. There has been some slight clearing from the prior study. Heart size is normal. No pneumothorax or pleural effusion.    Xr Chest 1 View Portable    Result Date: 12/15/2020  EXAM: XR CHEST 1 VIEW PORTABLE LOCATION: [facility] DATE/TIME: 12/15/2020 2:29 PM INDICATION: Fever, respiratory failure, evaluate for interval changes. COMPARISON: 12/13/2020.     1.  No change in right upper extremity PICC line, enteric tube, or endotracheal tube. Heart and mediastinal size are stable. Lung volumes are diminished. There has been progression of bilateral interstitial and alveolar infiltrate from comparison study. No definite pleural effusion or pneumothorax.    Xr Chest 1 View Portable    Result Date: 12/13/2020  EXAM: XR CHEST 1 VIEW PORTABLE LOCATION: [facility] DATE/TIME: 12/13/2020 10:59 AM INDICATION: resp failure. COVID COMPARISON: 12/04/2020     Endotracheal tube tip at the clavicle heads. Right PICC tip projects over the lower SVC. Nasogastric tube tip below the film. Heart size normal. Stable bilateral lung infiltrates. No pneumothorax. No effusion.    Xr Chest 1 View Portable    Result Date: 12/4/2020  EXAM: XR CHEST 1 VIEW PORTABLE LOCATION: [facility] DATE/TIME: 12/4/2020 1:24 PM INDICATION: desaturation, covid ARDS eval for interval change COMPARISON: 12/01/2020 and older studies.     Endotracheal tube is in good position. NG tube is coursing in the stomach. Right upper extremity PICC line catheter overlies the mid SVC. There has been slight decrease in the diffuse, asymmetric interstitial opacities. No hydropneumothorax. Heart and pulmonary vascularity are normal.    Xr Chest 1 View Portable    Result Date: 12/1/2020  EXAM: XR CHEST 1 VIEW PORTABLE LOCATION: [facility] DATE/TIME: 12/1/2020 12:36 PM INDICATION: ETT placement, worsening condition. COMPARISON: 12/01/2020.     Extensive bilateral opacities are  present, similar to same-day radiograph. No substantial pleural effusion. No pneumothorax. Normal heart size. ET tube tip appropriately positioned, roughly 23 mm above the fede. Enteric tube courses into the stomach and off the field of view. Right PICC tip over the distal SVC.    Xr Chest 1 View Portable    Result Date: 12/1/2020  EXAM: XR CHEST 1 VIEW PORTABLE LOCATION: [facility] DATE/TIME: 12/1/2020 6:26 AM INDICATION: confirm ET Tube position COMPARISON: 11/29/2020.     The patient has been intubated. The endotracheal tube terminates 1.9 cm above the fede. Nasogastric tube passes below the diaphragm and the inferior margin of the radiograph. Extensive bilateral pulmonary infiltrates are little changed from  11/29/2020 and consistent with infectious/inflammatory pneumonitis. No significant pleural effusion. No pneumothorax. Normal heart size.    Xr Chest 1 View Portable    Result Date: 11/29/2020  EXAM: XR CHEST 1 VIEW PORTABLE LOCATION: [facility] DATE/TIME: 11/29/2020 3:44 PM INDICATION: Hypoxia COMPARISON: 11/19/2020     Previously seen PICC line has been removed. Bilateral pulmonary infiltrates have significantly increased when compared to previous. No pleural effusion or pneumothorax.    Xr Abdomen Ap Portable    Result Date: 12/2/2020  EXAM: XR ABDOMEN AP PORTABLE LOCATION: [facility] DATE/TIME: 12/2/2020 1:05 PM INDICATION: og placement COMPARISON: Chest x-ray 12/01/2020     Enteric tube with the tip in satisfactory position in the gastric antrum. Nonobstructive bowel gas pattern. No definite free air. Stable bilateral pulmonary airspace opacities.     Us Kidney Bilateral    Result Date: 12/11/2020  EXAM: US KIDNEY BILATERAL WITH BLADDER LOCATION: [facility] DATE/TIME: 12/11/2020 5:19 PM INDICATION: hematuria COMPARISON: None. TECHNIQUE: Routine Bilateral Renal and Bladder Ultrasound. FINDINGS: RIGHT  KIDNEY: 12.3 x 5.6 x 4.7 cm. No hydronephrosis. Inferior renal 5 mm shadowing echogenic focus suggests a nonobstructive stone. LEFT KIDNEY: 12.4 x 5.6 x 5.1 cm. No hydronephrosis. Several small echogenic foci in the renal sinus could represent nonobstructive stones. BLADDER: Decompressed with Arteaga.     1.  No hydronephrosis. 2.  Probable right renal 5 mm nonobstructive stone. 3.  Possible left renal smaller nonobstructive stones.    Ir Gj Tube Insertion    Result Date: 12/22/2020  LOCATION: [facility] DATE: 12/22/2020 PROCEDURE: PERCUTANEOUS GASTROJEJUNOSTOMY PLACEMENT ATTENDING: Shcuyler Cortés MD, INDICATION: COVID 19 infection. Respiratory failure. Recent tracheostomy. CONSENT: The procedure, risks and benefits of gastrojejunostomy tube placement were discussed with the patient's family in detail. All questions were answered. Informed consent was given to proceed with the procedure. MODERATE SEDATION: None CONTRAST: 15 mL Omnipaque. ANTIBIOTICS: Patient currently on cefepime ADDITIONAL MEDICATIONS: Fentanyl 50 mcg IV. FLUOROSCOPIC TIME: 3.3 minutes RADIATION DOSE: Air Kerma: 27 mGy COMPLICATIONS: No immediate complications. PROCEDURE: The upper abdomen was prepped and draped in sterile fashion. Ultrasound of the epigastric region was performed to localize the left hepatic edge. Stomach was insufflated with air, through an NG tube. Two Neda-Stan T fasteners were deployed in the standard fashion. Between these, a third puncture was performed with an 18 gauge access needle. Intraluminal position confirmed with contrast injection. Through this, a Doculogy guidewire was advanced into the stomach. Using a 5 [place] KMP  catheter, the guidewire was manipulated into the proximal jejunum. The tract was dilated with the telescoping dilator/peel-a-way sheath, of the [place] set. An 18 [place], 45 cm JEROME gastrojejunostomy tube was placed with its tip in the proximal jejunum. The retention balloon  "was inflated with 10 mL sterile normal saline. Adequate position confirmed with contrast injection.     1.  Successful percutaneous gastrojejunostomy tube placement.     Us Venous Legs Bilateral    Result Date: 12/15/2020  EXAM: US VENOUS LEGS BILATERAL LOCATION: [facility] DATE/TIME: 12/15/2020 5:25 PM INDICATION: Bilateral lower extremity edema COMPARISON: None. TECHNIQUE: Venous Duplex ultrasound of bilateral lower extremities with and without compression, augmentation and duplex. Color flow and spectral Doppler with waveform analysis performed. FINDINGS: Exam includes the common femoral, femoral, popliteal veins as well as segmentally visualized deep calf veins and greater saphenous vein. RIGHT: No deep vein thrombosis. No superficial thrombophlebitis. No popliteal cyst. LEFT: No deep vein thrombosis. No superficial thrombophlebitis. No popliteal cyst.     1.  No deep venous thrombosis in the bilateral lower extremities.    Poc Us 3cg Picc Placement Guidance    Result Date: 12/1/2020  Exam was performed as guidance for PICC line insertion.  Click \"PACS images\" hyperlink below to view any stored images.  For specific procedure details, view procedure note authored by PICC/Vascular Access Nurse.      Patient Active Problem List   Diagnosis     Pneumonia     Hypokalemia     Acute respiratory failure with hypoxia (H)     Pneumonia due to 2019 novel coronavirus     Severe protein-calorie malnutrition (H)     Acute respiratory failure (H)     ARDS (adult respiratory distress syndrome) (H)     Encephalopathy     Agitation     Anxiety     Shock circulatory (H)     Fever, unspecified fever cause     UTI due to Klebsiella species     Permanent atrial fibrillation with RVR (H)     Acute upper GI bleed     Single subsegmental pulmonary embolism without acute cor pulmonale (H)     Acute on chronic systolic heart failure (H)       Sweetie Hogue DO  Pulmonary and Critical Care Attending  pgr " [phone]

## 2021-06-14 NOTE — PLAN OF CARE
Physical Therapy Discharge Summary    Date of PT Discharge: 12/29/2020   Recommended Equipment: 12/29/2020   Discharge Destination: LTACH  Discharge Comments: Patient is making progress towards established goals, which is documented in flowsheet notes. Further recommended therapy is related to documented deficits, and is necessary to maximize functional independence in order for patient to return to prior level of function.      12/29/2020 by Jessika Lerner, PT       Problem: Physical Therapy  Goal: PT Goals  Description: Patient will demonstrate the following by 1/2/2020, in order to maximize independence with functional mobility to facilitate safe discharge:   -pt able to participate 20 reps LE ex SBAto increased strength for transfers  -Supine<>sit with head of bed elevated and  rail, modAX2  Tolerate sitting edge of bed x5 minutes for preparation for functional reaching  -Sit<>stand with arm in arm assistive device, modAx2 to prepare for transfers       Goals entered on 12/26/2020 by Georgia Lloyd, PT   Outcome: Completed

## 2021-06-14 NOTE — PLAN OF CARE
Up to chair X2 today. PT and OT working with patient. Pt remains anxious with movement. Rested well mid morning in bed after PT. Attempted weaning, unable to wean. Norepi restarted to maintain MAP >60 and SBP >90. Precedex remains at 0.3.       Problem: Discharge Barriers  Goal: Patient's discharge needs are met  Outcome: Progressing

## 2021-06-14 NOTE — PROGRESS NOTES
RT PROGRESS NOTE    DATA:    VENT DAY#  27    CURRENT SETTINGS:  VENT SETTINGS   VCV 20, 350, 5        FIO2:   45%    PATIENT PARAMETERS:    PIP 38/  Pplat:  21  Pmean:  9  SBT: Attempted to wean patient lasted 2 minutes  SECRETIONS:  Small amount thick white  02 SATS:  100%    Shiley 6.0     ABG: No recent    NOTE / PLAN:  Patient chronically trached and on full ventilatory support. Attempted to wean today 12/5. Patient only tolerated for 2 minutes before RR and RSBI increased. Continue to attempt weaning daily.

## 2021-06-14 NOTE — PROGRESS NOTES
RESPIRATORY CARE NOTE    Vent Mode: VCV  FiO2 (%):  [45 %] 45 %  S RR:  [20] 20  S VT:  [350 mL] 350 mL  PEEP/CPAP (cm H2O):  [5 cm H2O] 5 cm H2O  Minute Ventilation (L/min):  [7.3 L/min-11.8 L/min] 8.9 L/min  PIP:  [6 cm H2O-43 cm H2O] 37 cm H2O  ND SUP:  [15 cm H20] 15 cm H20  MAP (cm H2O):  [9-13] 10    Pt. remains on full vent support, settings above. # 6 Neville, JOSEPH coarse, suctioning small to moderate amount of white secretions via trach. Pt. was placed on CPAP 5 PS 15 for <5min, RR 40-50, RSBI>180, RT following    GUSTAVO Jj

## 2021-06-14 NOTE — PROGRESS NOTES
12/26/20 1309   Patient Data   Vt (observed, mL) 356 mL   Vt Exp (mL) 351 mL   Minute Ventilation (L/min) 12.2 L/min   Total Resp Rate  42 BPM   PIP Observed (cm H2O) 36 cm H2O   MAP (cm H2O) 14   Auto/Intrinsic PEEP Observed (cm H2O)   (fail)   Plateau Pressure (cm H2O) 24 cm H2O   Static Compliance (L/cm H2O) 10   Dynamic Compliance (L/cm H2O) 15 L/cm H2O   Airway Resistance 11   SpO2 97 %

## 2021-06-14 NOTE — PROGRESS NOTES
Clinical Nutrition Therapy Follow Up Note    Current Nutrition Prescription:   Diet: Replete with Fiber at 65 mL/hour with 200 mL water flushes q 4 hours    Current Nutrition Intake:  Pt has a GJ tube which was placed 12/22, with correct placement verified.  The current TF regimen provides 1560 calories, 100 g protein, 193 g carb, 23 g fiber, 2498 mL free water.    TF meets needs.    Anthropometrics:  Admission weight: 133 lb-bed  Weight: 132 lb 7.9 oz (60.1 kg)-bed    GI Status/Output:   BM 12/26-loose  Loose stools noted earlier in admission as well. Dignicare discontinued yesterday per nursing.    Skin/Wound:  Left lip PU noted per chart. PU on neck also noted.    Medications:  Colace, lasix, lantus, PRN miralax and dulcolax    Labs:  Labs reviewed    Estimated Nutrition Needs:  Using current weight (12/2) of 59.3 kg.     Energy Needs: 9941-8059 kcals daily per 25-30 kcal/kg   Protein Needs:  g daily, 1.2-2 g/kg.  Fluid Needs: 4877-3127 mls daily, 25-30 mls/kg    Malnutrition: Noted previously-severe    Nutrition Risk Level: stable-high risk    Nutrition dx:  Inadequate oral intake r/t acute respiratory failure as evidenced by Bipap/HFNC dependence-not progressing, still needs TF to meet needs     Malnutrition r/t COVID-19 as evidenced by prolonged poor intake of < 50% estimated nutrition needs for 2 weeks and 5.2% wt loss in 1 week.-progressing    Goal Status:  Maintain weight-progressing  Meet estimated nutrition needs-met  Electrolytes WNL-met  Tolerate tube feeding-met    Intervention:  Continue current TF for pt's nutrition needs.    Monitoring/Evaluation:  Tube feeding tolerance, wt, labs, hydration    Shayy Avila, WANDA, LD

## 2021-06-14 NOTE — PROGRESS NOTES
12/24/20 1139   Patient Data   PIP Observed (cm H2O) 6 cm H2O   MAP (cm H2O) 10   Auto/Intrinsic PEEP Observed (cm H2O) 0 cm H2O   Plateau Pressure (cm H2O) 36 cm H2O   Dynamic Compliance (L/cm H2O) 10 L/cm H2O   Airway Resistance 12   SpO2 99 %   Heart Rate (!) 107

## 2021-06-14 NOTE — PROGRESS NOTES
12/23/20 0801   Patient Data   PIP Observed (cm H2O) 38 cm H2O   MAP (cm H2O) 20   Auto/Intrinsic PEEP Observed (cm H2O) 3 cm H2O   Plateau Pressure (cm H2O) 31 cm H2O   Dynamic Compliance (L/cm H2O) 11 L/cm H2O   Airway Resistance 13   SpO2 97 %   Heart Rate 98   suctioned small amount of thickish yellow/tan

## 2021-06-14 NOTE — PROGRESS NOTES
60yr F, admitted on 11/13/20 for low SaO2%.  She transferred to ICU and intubated on 12/1/20 for failed BiPAP, patient remains intubated, also was proned and on Veletri.  Patient remains intubated, BS coarse.  Vent Day #30.  Patient trached on 12/22/20 she has a Shiley #6 DICPatient weaned this AM from 10:48 to 11:15. Suctioning white sputum moderate amount.    Weaning CPAP+5, PS +18 FiO2 35% x 27 minutes   RSBI Score 148, RR 42, Vt 284ml, VE 9.6, , BP 96/63    Patient up in chair today.    Vent Mode: VCV  FiO2 (%):  [35 %] 35 %  S RR:  [20] 20  S VT:  [350 mL] 350 mL  PEEP/CPAP (cm H2O):  [5 cm H2O] 5 cm H2O  Minute Ventilation (L/min):  [6.3 L/min-12.4 L/min] 11.2 L/min  PIP:  [26 cm H2O-43 cm H2O] 29 cm H2O  UT SUP:  [18 cm H20] 18 cm H20  MAP (cm H2O):  [7-15] 10  Tracheosotmy Shiley #6 DIC    CXR 12/22/20  Diffuse bilateral patchy airspace and interstitial opacities consistent with Covid 19 pneumonia.    Continue current treatment and monitor for hypoxemia, respiratory distress, suctioing PRN and wean twice a day.    Patient transfer to [facility] unable to get a hold of an RT to give report.  Spoke with Unit coordinator unit [id number], and asked her to tell the RT to read writers notes.

## 2021-06-14 NOTE — PROGRESS NOTES
CRITICAL CARE PROGRESS NOTE:    Assessment/Plan:  60 year old female never smoker with a history of right breast cancer s/p right lumpectomy and radiation, admitted 11/13/20 with acute hypoxemic respiratory failure due to COVID-19 infection, ARDS, intubated on 12/1/20, now with prolonged mechanical ventilation, failure to wean.    RESP:  Acute respiratory distress syndrome, COVID-19 infection, prolonged mechanical ventilation: Likely significant respiratory muscle atrophy, critical illness neuromyopathy. Even on sedation and opiates, RSBI increases to 200s within a couple of minutes on PS 12. Voluntary vital capacity unable to get up to 300 mL    Continue to wean down FiO2 as able.    PEEP 5    Perc trach 12/22, #6 shiley    Phase 1 weaning    Needs LTACH bed--has to be Covid recovered status for this same. Will discuss with infection prevention.    CV:  Shock: Sedation, sepsis. On low-dose norepinephrine. When on MAP in 90s, when off MAP low 60s.    Start midodrine 5 mg q 8 hrs in attempt to get off norepinephrine.    Will remove arterial line today as this corelates with the cuff pressures and the latter has been in for >20 days    NEURO:  Acute encephalopathy: Sedated, will wake and respond off sedation. Agitated with anxiety, possible delirium. Previously started on quetiapine, valproate, and schedule enteral lorazepam. Nonfocal exam.    On scheduled quetiapine, valproate and enteral lorazepam    Analgesia as needed    Wean off dexmedetomidine as able    GI:  Severe protein-calorie malnutrition:    G-J placed by IR 12/22    Bowel regimen: senna-docusate    Free water 200 mL q 4 hrs; stable Na    RENAL:  Hypervolemia: Due to initial resuscitation.     Continue furosemide 20 mg IV two times a day and monitor response    ID:  COVID-19 infection, Klebsiella UTI: Through acute phase of COVID-19. Completing treatment of Klebsiella UTI.    Completed dexamethasone and remdesivir    Completed 7-day course of cefepime  through 12/22    HEMATOLOGIC:  Anemia, thrombocytosis: Anemia of acute inflammation, no evidence of active hemorrhage. Thrombocytosis likely reactive.    Monitor with restrictive transfusion threshold    ENDOCRINE:  No acute issues. Glucose creeping up.    Restart glargine 6 units daily    FSBG with sliding scale aspart    ICU PROPHYLAXIS:    Enoxaparin 0.5 mg/kg two times a day    PPI    HOB elevation    chlorhexidine    CODE STATUS, DISPOSITION, FAMILY COMMUNICATION: Full code. Critically ill requiring invasive mechanical ventilation and continuous vasopressors.     Lines/Drains/Tubes:  Central line (RUE PICC) placed on 12/1/20  Arterial line (L axillary) placed on 12/4/20  Feeding tube (OG tube) placed on 12/1/20  Arteaga catheter placed on 12/15/20  Percutaneous tracheostomy (#6 shiley) placed on 12/22/20    Restraints  Indicated and ordered    Progress Note  Restraint Application    I recognize that restraints are physical and/or chemical interventions intended to restrict a person's movements. Restraints are currently needed to ensure the safety of this patient and/or others. My clinical rationale appears below.    Category/Type of Restraint     Non Violent:  Soft limb restraint x2  --  Behavior  Pulling at tubes/lines  --  Root Cause of the Behavior  Sedation/intubation  --  Less-Restrictive Measures that Failed  Non Violent Measures:  Close Observation  --  Response to the Restraint  Patient unable to pull at tubes/lines  --  Criteria for Release from the Restraint  Patient calm and off sedation    Gregg Sarabia MD  Pulmonary and Critical Care Medicine  [facility]  Cell [phone]  Office [phone]  Pager [phone]    Overnight events:  No new events. Voluntary VC <300 mL.    Subjective:  unable    Objective:  Physical Exam:  Vent settings for last 24 hours:  Vent Mode: VCV  FiO2 (%):  [45 %] 45 %  S RR:  [20] 20  S VT:  [350 mL] 350 mL  PEEP/CPAP (cm H2O):  [5 cm H2O] 5 cm H2O  Minute  "Ventilation (L/min):  [7.3 L/min-12 L/min] 8.4 L/min  PIP:  [6 cm H2O-43 cm H2O] 6 cm H2O  DC SUP:  [15 cm H20] 15 cm H20  MAP (cm H2O):  [9-13] 10    BP 96/55   Pulse 81   Temp 99  F (37.2  C) (Axillary)   Resp 21   Ht 5' 5\" (1.651 m)   Wt 133 lb 2.5 oz (60.4 kg)   SpO2 100%   BMI 22.16 kg/m      Intake/Output last 3 shifts:  I/O last 3 completed shifts:  In: 3178.7 [I.V.:463.7; NG/GT:2715]  Out: 2170 [Urine:2170]  Intake/Output this shift:  I/O this shift:  In: 400 [NG/GT:400]  Out: 950 [Urine:950]    Physical Exam   Constitutional: She appears well-developed and well-nourished.   HENT:   Head: Normocephalic and atraumatic.   Eyes: Pupils are equal, round, and reactive to light.   Neck: Normal range of motion.   Cardiovascular: Normal rate and regular rhythm.   Pulmonary/Chest: Effort normal.   Coarse BS BL.   Abdominal: Soft.   Musculoskeletal:         General: No edema.   Neurological:   No focal deficits.   Skin: Skin is warm.         LAB:  Results from last 7 days   Lab Units 12/24/20  0538 12/23/20  0416   LN-WHITE BLOOD CELL COUNT thou/uL  --  8.4   LN-HEMOGLOBIN g/dL  --  10.8*   LN-HEMATOCRIT %  --  34.2*   LN-PLATELET COUNT thou/uL 461* 505*     Results from last 7 days   Lab Units 12/24/20  0824 12/23/20  1621 12/23/20  0416 12/22/20  0417 12/21/20  0357   LN-SODIUM mmol/L  --   --  138 138 140   LN-POTASSIUM mmol/L 3.7 4.0 3.5 3.9 3.8   LN-CHLORIDE mmol/L  --   --  98 99 101   LN-CO2 mmol/L  --   --  30 30 30   LN-BLOOD UREA NITROGEN mg/dL  --   --  17 15 16   LN-CREATININE mg/dL  --   --  0.48* 0.49* 0.43*   LN-CALCIUM mg/dL  --   --  8.6 8.7 8.3*       Current Facility-Administered Medications   Medication Dose Route Frequency Provider Last Rate Last Admin     acetaminophen solution 650 mg (TYLENOL)  650 mg Enteral Tube Q4H PRN Tish Adkins CNP   650 mg at 12/18/20 0648     acetaminophen tablet 650 mg (TYLENOL)  650 mg Oral Q4H PRN Ramon Haro MD   650 mg at 11/30/20 0058     " bacitracin ointment packet 1 packet  1 packet Topical Once PRN Tish Adkins CNP         benzocaine-menthoL lozenge 1 lozenge (CEPACOL)  1 lozenge Oral Q1H PRN Eben Butterfield MD         bisacodyL suppository 10 mg (DULCOLAX)  10 mg Rectal Daily PRN Eben Butterfield MD   10 mg at 12/16/20 1631     calcium (as carbonate) chewable tablet 400 mg (TUMS)  400 mg Oral TID PRN Ramon Haro MD   400 mg at 11/18/20 1222     chlorhexidine 0.12 % solution 15 mL (PERIDEX)  15 mL Topical Q12H Tish Adkins CNP   15 mL at 12/24/20 0505     codeine-guaiFENesin  mg/5 mL liquid 5 mL (GUAIFENESIN AC)  5 mL Per NG tube Q4H PRN Gregg Sarabia MD   5 mL at 12/23/20 2106     dexmedetomidine (PRECEDEX) 1000 mcg/250 mL in NS (4 mcg/mL) infusion  0.1-1.5 mcg/kg/hr Intravenous Continuous IvJeramie bruner MD 6.15 mL/hr at 12/24/20 1200 0.4 mcg/kg/hr at 12/24/20 1200     dextrose 50 % (D50W) syringe 20-50 mL  20-50 mL Intravenous Q15 Min PRN Ramon Haro MD         docusate sodium 50 mg/5 mL oral liquid 100 mg (COLACE)  100 mg Enteral Tube BID Gregg Sarabia MD         enoxaparin ANTICOAGULANT syringe 30 mg (LOVENOX)  30 mg Subcutaneous BID Gregg Sarabia MD   30 mg at 12/24/20 0810     fentaNYL pf injection 25-50 mcg (SUBLIMAZE)  25-50 mcg Intravenous Q1H PRN Jennifer Gunter CNP   50 mcg at 12/24/20 0332     furosemide injection 20 mg (LASIX)  20 mg Intravenous BID - diuretic Bashir De La Torre MD   20 mg at 12/24/20 0809     glucagon (human recombinant) injection 1 mg  1 mg Subcutaneous Q15 Min PRN Ramon Haro MD         hydrALAZINE injection 10 mg (APRESOLINE)  10 mg Intravenous Q4H PRN Ramon Haro MD         influenza vaccine quad (PF) (age 50-64 YR) 2020-21 injection 0.5 mL (FLUBLOK)  0.5 mL Intramuscular Prior to Discharge Jeramie Crespo MD         insulin aspart U-100 injection pen (NovoLOG)   Subcutaneous Q6H FIXED TIMES Gregg Sarabia MD          insulin glargine injection 6 Units (LANTUS)  6 Units Subcutaneous QAM Oren Vásquez MD   6 Units at 12/24/20 0810     lipase-protease-amylase 5,000-17,000- 24,000 unit capsule 2 capsule (ZENPEP)  2 capsule Enteral Tube PRN Eben Butterfield MD        And     sodium bicarbonate tablet 325 mg  325 mg Enteral Tube PRN Eben Butterfield MD         LORazepam 1 mg/0.5 mL concentrated solution 1 mg (ATIVAN)  1 mg Enteral Tube Q6H Gregg Sarabia MD   1 mg at 12/24/20 1206     magnesium hydroxide suspension 30 mL (MILK OF MAG)  30 mL Enteral Tube Daily PRN Gregg Sarabia MD         midodrine tablet 5 mg (PROAMATINE)  5 mg Enteral Tube Q8H Gregg Sarabia MD   5 mg at 12/24/20 0809     naloxone injection 0.2 mg (NARCAN)  0.2 mg Intravenous Q2 Min PRN Ramon Haro MD        Or     naloxone injection 0.2 mg (NARCAN)  0.2 mg Intramuscular Q2 Min PRN Ramon Haro MD        Or     naloxone injection 0.4 mg (NARCAN)  0.4 mg Intravenous Q2 Min PRN Ramon Haro MD        Or     naloxone injection 0.4 mg (NARCAN)  0.4 mg Intramuscular Q2 Min PRN Ramon Haro MD         norepinephrine 4 mg/250 mL in NS (16 mcg/mL) - CENTRAL  0.01-0.4 mcg/kg/min Intravenous Continuous Bashir De La Torre MD 4.6 mL/hr at 12/24/20 1211 0.02 mcg/kg/min at 12/24/20 1211     omeprazole capsule 20 mg (PriLOSEC)  20 mg Oral QAM Tish Gonzalez CNP        Or     omeprazole suspension 20 mg (PriLOSEC)  20 mg Enteral Tube QAM Tish Gonzalez CNP   20 mg at 12/24/20 0505     ondansetron injection 4 mg (ZOFRAN)  4 mg Intravenous Q4H PRN Eben Butterfield MD   4 mg at 12/06/20 2219    Or     ondansetron tablet 8 mg (ZOFRAN)  8 mg Oral Q8H PRN Eben Butterfield MD         polyethylene glycol packet 17 g (MIRALAX)  17 g Oral Daily PRN Ramon Haro MD   17 g at 12/16/20 1630     polyvinyl alcohol 1.4 % ophthalmic solution 1-2 drop (LIQUIFILM TEARS)  1-2 drop Both Eyes Q1H PRN  Eben Butterfield MD         QUEtiapine tablet 50 mg (SEROquel)  50 mg Enteral Tube Q6H PRN Gregg Sarabia MD   50 mg at 12/24/20 0809     QUEtiapine tablet 75 mg (SEROquel)  75 mg Enteral Tube Q6H Gregg Sarabia MD   75 mg at 12/24/20 1047     sennosides syrup 8.8 mg (for SENOKOT)  8.8 mg Enteral Tube BID Eben Butterfield MD   8.8 mg at 12/21/20 0811     sodium chloride 0.9%  10 mL/hr Intravenous Continuous Eben Butterfield MD   Stopped at 12/23/20 2100     sodium chloride bacteriostatic 0.9 % injection 0.1-0.3 mL  0.1-0.3 mL Subcutaneous PRN Ramon Haro MD         sodium chloride flush 10-20 mL (NS)  10-20 mL Intravenous PRN Tish Adkins CNP   10 mL at 12/23/20 0926     sodium chloride flush 10-30 mL (NS)  10-30 mL Intravenous PRN Tish Adkins CNP   10 mL at 12/23/20 2153     sodium chloride flush 10-30 mL (NS)  10-30 mL Intravenous Q8H FIXED TIMES Tish Adkins CNP   10 mL at 12/23/20 2108     sodium chloride flush 20 mL (NS)  20 mL Intravenous PRN Tish Adkins, CNP         traZODone tablet 25 mg (DESYREL)  25 mg Enteral Tube Q6H PRN Gregg Sarabia MD         valproic acid (as sodium salt) solution 250 mg (DEPAKENE)  250 mg Enteral Tube Q8H FIXED TIMES Gregg Sarabia MD   250 mg at 12/24/20 0507       Total Critical Care Time: 40 Minutes

## 2021-06-14 NOTE — PROGRESS NOTES
[facility] LTACH Unit        I'm continuing to follow the pt for admission to [facility]. Today I spoke with the pt's , Arturo,  regarding [employer] having another primary insurance listed for the pt. Arturo stated that the pt had insurance in 2020 from a previous employer for whom she no longer works. Arturo stated he would call [employer] immediately to clarify things with them. Once [employer] has processed this our financial counselor will need to re-verify the insurance so we can move forward with a pre-admission insurance authorization.        Bed availability will be pending discharges.         Josefa Herrera RN  LTACH Referral Specialist  [facility]  [address] [place] [id number]  [email or web address]    [email or web address]  Office: [phone]  Fax: [phone]     CONFIDENTIAL Protected under [place] Statute  145.61 et seq

## 2021-06-14 NOTE — PROGRESS NOTES
Respiratory Care Note     Patient is on full vent support. Breath sound is course at times. Patient was able to tolerate PS 18/5 for only 5 minutes. During weaning trial patient was tachypnic with RSBI 140. Will continue to monitor.

## 2021-06-14 NOTE — SIGNIFICANT EVENT
Crying and tapping siderail when she's awake. Wanted to have somebody with her all the time.Wanted to hold hands and would cry when nurse has to leave. Medicated with PRN Seroquel, trazadone and zyprexa. Slept from 04:00 until 06:00.Weaned off Precedex gtt and Levophed gtt.

## 2021-06-14 NOTE — DISCHARGE SUMMARY
[facility] Pulmonary/Critical Care Consult Discharge Summary Note    Hafsa Robles,  1960, MRN [id number]  Admitting Dx: Hypoxia [R09.02]  Multifocal pneumonia [J18.9]  Date / Time of Admission:  2020  5:17 PM    COVID RECOVERED    Hospital Course: She had been seen in outpt clinic for fevers, dyspnea and cough. Was sent to the ER on  for low O2 sats. By 11/15 evening she was on a non-rebreather and was transferred to the ICU and started on bipap. In the ICU for the next few days was on bipap alternating with high flow and doing some self proning until her O2 needs escalated and was intubated . She was proned, paralyzed, full strength veletri, and on vasopressors in the beginning of her ICU course. She was titrated off Veletri on 12/3 then off paralytic on . Her FiO2 needs were minimal by , however her tachypnea and agitation with weaning trials persisted and unable to liberate from the vent so she had trach placement  and transferred to [facility] on  for further care.    Overnight Events:  Intake/Output last 3 shifts:  I/O last 3 completed shifts:  In: 2606 [NG/GT:2606]  Out: 2300 [Urine:2300]  She weaned PS 18/5 for only 5 minutes  She gets very tachypnic and anxious    Assessment/Plan:  60 year old female never smoker with a history of right breast cancer s/p right lumpectomy and radiation, admitted 20 with acute hypoxemic respiratory failure due to COVID-19 infection, ARDS, intubated on 20, now with prolonged mechanical ventilation, failure to wean s/p trach .     RESP:  Acute respiratory distress syndrome, COVID-19 infection, prolonged mechanical ventilation: Likely significant respiratory muscle atrophy, critical illness neuromyopathy. Continues to do poorly on pressure support trials this is likely related to her severe critical illness neuropathy.  Vent Mode: VCV  FiO2 (%):  [35 %] 35 %  S RR:  [20] 20  S VT:  [350 mL] 350 mL  PEEP/CPAP (cm H2O):  [5 cm  H2O] 5 cm H2O  Minute Ventilation (L/min):  [7 L/min-12.4 L/min] 10.5 L/min  PIP:  [27 cm H2O-43 cm H2O] 35 cm H2O  ME SUP:  [18 cm H20] 18 cm H20  MAP (cm H2O):  [10-15] 12    Perc trach 12/22, #6 shiley    Phase 1 weaning--daily    Needs LTACH transfer, qualifies for Covid recovered status and this has been entered this in her chart.     CV:  Shock: Sedation, sepsis.      Continue midodrine to 10 mg q 8 hrs.    Cortisol level 3.9, having worsening hypotension, will add hydrocortisone 25mg two times a day starting today 12/29     NEURO:  Acute encephalopathy: Is fairly redirectable although still tries to remove her tracheostomy. Previously started on quetiapine, valproate, and schedule enteral lorazepam. Nonfocal exam.    On scheduled quetiapine, valproate and enteral lorazepam    trazedone at bedtime     Use PO anxiolytics.    May consider giving holiday from above medications as on a high dose has not been effective so far    Sending valproate level      GI:  Severe protein-calorie malnutrition:    G-J placed by IR 12/22; on replete @65ml/h.    Bowel regimen: senna-docusate    Free water 200 mL q 4 hrs; stable Na     RENAL: Keeping euvolemic    Continue furosemide 20 mg IV two times a day.     ID:  COVID-19 infection, Klebsiella UTI: Through acute phase of COVID-19. Completing treatment of Klebsiella UTI.    Completed dexamethasone and remdesivir    Completed 7-day course of cefepime through 12/22    Was initially diagnosed with Covid-19 on 11/13/20. Has been stable and shows no active signs of infection.  Has been switched to Covid recovered status.    Have discontinued Lovenox and switched to Eliquis per Covid outpatient regimen on 12/27 (should be on for a month)     HEMATOLOGIC:  Anemia, thrombocytosis: Anemia of acute inflammation, no evidence of active hemorrhage. Thrombocytosis likely reactive.    Monitor with restrictive transfusion threshold     ENDOCRINE:  No acute issues. Hyperglycemia     Continue  glargine 6 units daily    FSBG with sliding scale aspart q6hrs     ICU PROPHYLAXIS:    Eliquis.    PPI    HOB elevation    chlorhexidine     CODE STATUS, DISPOSITION, FAMILY COMMUNICATION: Full code. Critically ill requiring invasive mechanical ventilation      Lines/Drains/Tubes:  Central line (RUE PICC) placed on 12/1/20  Feeding tube (G tube) placed on 12/23  Arteaga catheter placed on 12/15/20  Percutaneous tracheostomy (#6 shiley) placed on 12/22/20    Pt has critical illness and impairs breathing on vent such as there is high probability of imminent of life threatening deterioration in the patient's condition.    Code Status: FULL CODE    ICU DAILY CHECKLIST           Can patient transfer out of MICU? no  FAST HUG:  Feeding:  Feeding: Yes.    Arteaga:Yes  Analgesia/Sedation:Yes  Thromboembolic prophylaxis:Elliquis  HOB>30:  Yes  Stress Ulcer Protocol Active: yes; Mode: PPI/H2 Antagonist  Glycemic Control:   Lab Results   Component Value Date/Time     (H) 12/29/2020 04:19 AM     (H) 12/26/2020 04:05 AM     12/25/2020 05:13 AM    Any glucose > 180 yes; Mode of Insulin Therapy: Sliding Scale Insulin and Long Acting Insulin  INTUBATED:  Can patient have daily waking:  not applicable  Can patient have spontaneous breathing trial:  not applicable  Does pt have restraints: MD RESTRAINT FOR NON-VIOLENT BEHAVIOR FACE TO FACE EVALUATION Patient's Immediate Situation: Patient demonstrated the following behaviors: Impulsive behavior, grabbing at support tubes/lines.  Patient's Reaction to the intervention Does patient understand the reason for restraint/seclusion? Unable to Express Medical Condition Is there any evidence of compromise of Skin integrity, Respiratory, Cardiovascular, Musculoskeletal, Hydration? No Behavioral ConditionIn consultation with the RN, is there a need to continue this restraint or seclusion? Yes See Restraint Flowsheet for complete restraint documentation and assessment.  PHYSICAL  "THERAPY AND MOBILITY: Can patient have PT and mobility trial: yes Activity: ICU mobility protocol    Critical Care Time excluding procedures and family discussions greater than: 45 Minutes    Sweetie Hogue DO  Pulmonary and Critical Care Attending  pgr [phone]    No Known Allergies    Meds: See MAR    Physical Exam:  BP (!) 84/50   Pulse 97   Temp 98.4  F (36.9  C) (Oral)   Resp (!) 35   Ht 5' 5\" (1.651 m)   Wt 132 lb 7.9 oz (60.1 kg)   SpO2 98%   BMI 22.05 kg/m    Intake/Output this shift:  No intake/output data recorded.  GEN: sitting up in a chair, no distress  HEENT: trach in place, clean  CVS: regular rhythm, no murmurs  RESP: Coarse bilateral rhonchi, no wheezing  ABD: G-tube in place, Soft, No abdominal pain with palpation, no guarding, no rigidity  EXT: Warm, well perfused, no rashes, no edema  NEURO:  CN2-12 grossly intact, moving all extremities (needs an  for interaction)    Pertinent Labs: Latest lab results in EHR personally reviewed.   Results from last 7 days   Lab Units 12/29/20 0419 12/28/20  0514 12/27/20  0348 12/26/20  0405 12/23/20  0416 12/23/20  0416   LN-WHITE BLOOD CELL COUNT thou/uL 7.0  --   --  7.6  --  8.4   LN-HEMOGLOBIN g/dL 11.1*  --   --  11.5*  --  10.8*   LN-HEMATOCRIT % 35.8  --   --  37.5  --  34.2*   LN-MEAN CORPUSCULAR VOLUME fL 89  --   --  89  --  88   LN-PLATELET COUNT thou/uL 462* 510* 495* 503*   < > 505*    < > = values in this interval not displayed.    and   Results from last 7 days   Lab Units 12/29/20 0419 12/28/20  0514 12/27/20  1414 12/26/20  0405 12/26/20  0405 12/25/20  0513   LN-SODIUM mmol/L 139  --   --   --  142 140   LN-POTASSIUM mmol/L 3.6 3.6 4.3   < > 3.7 4.0  4.0   LN-CHLORIDE mmol/L 95*  --   --   --  97* 99   LN-CO2 mmol/L 30  --   --   --  32* 29   LN-BLOOD UREA NITROGEN mg/dL 21  --   --   --  20 15   LN-CREATININE mg/dL 0.56*  --   --   --  0.55* 0.50*   LN-CALCIUM mg/dL 8.9  --   --   --  8.9 8.8    < > = values in this " interval not displayed.       Cultures: personally reviewed.   Culture   Date Value Ref Range Status   12/15/2020 Usual Charlette  Final     Gram Stain Result   Date Value Ref Range Status   12/15/2020 4+ Polymorphonuclear leukocytes  Final   12/15/2020 1+ Gram positive cocci in pairs  Final       personally reviewed images:   Imaging results from past 30 days: Xr Chest 1 View Portable    Result Date: 12/22/2020  EXAM: XR CHEST 1 VIEW PORTABLE LOCATION: [facility] DATE/TIME: 12/22/2020 10:26 AM INDICATION: respiratory failure COMPARISON: 12/15/2020.     Endotracheal tube is 2.6 cm above the fede. Right PICC catheter projects over the junction of superior vena cava and right atrium unchanged. Enteric tube tip is below the diaphragm and not visualized. Again noted are diffuse bilateral patchy airspace and interstitial opacities consistent with Covid 19 pneumonia. There has been some slight clearing from the prior study. Heart size is normal. No pneumothorax or pleural effusion.    Xr Chest 1 View Portable    Result Date: 12/15/2020  EXAM: XR CHEST 1 VIEW PORTABLE LOCATION: [facility] DATE/TIME: 12/15/2020 2:29 PM INDICATION: Fever, respiratory failure, evaluate for interval changes. COMPARISON: 12/13/2020.     1.  No change in right upper extremity PICC line, enteric tube, or endotracheal tube. Heart and mediastinal size are stable. Lung volumes are diminished. There has been progression of bilateral interstitial and alveolar infiltrate from comparison study. No definite pleural effusion or pneumothorax.    Xr Chest 1 View Portable    Result Date: 12/13/2020  EXAM: XR CHEST 1 VIEW PORTABLE LOCATION: [facility] DATE/TIME: 12/13/2020 10:59 AM INDICATION: resp failure. COVID COMPARISON: 12/04/2020     Endotracheal tube tip at the clavicle heads. Right PICC tip projects over the lower SVC. Nasogastric tube tip below the film. Heart size normal.  Stable bilateral lung infiltrates. No pneumothorax. No effusion.    Xr Chest 1 View Portable    Result Date: 12/4/2020  EXAM: XR CHEST 1 VIEW PORTABLE LOCATION: [facility] DATE/TIME: 12/4/2020 1:24 PM INDICATION: desaturation, covid ARDS eval for interval change COMPARISON: 12/01/2020 and older studies.     Endotracheal tube is in good position. NG tube is coursing in the stomach. Right upper extremity PICC line catheter overlies the mid SVC. There has been slight decrease in the diffuse, asymmetric interstitial opacities. No hydropneumothorax. Heart and pulmonary vascularity are normal.    Xr Chest 1 View Portable    Result Date: 12/1/2020  EXAM: XR CHEST 1 VIEW PORTABLE LOCATION: [facility] DATE/TIME: 12/1/2020 12:36 PM INDICATION: ETT placement, worsening condition. COMPARISON: 12/01/2020.     Extensive bilateral opacities are present, similar to same-day radiograph. No substantial pleural effusion. No pneumothorax. Normal heart size. ET tube tip appropriately positioned, roughly 23 mm above the fede. Enteric tube courses into the stomach and off the field of view. Right PICC tip over the distal SVC.    Xr Chest 1 View Portable    Result Date: 12/1/2020  EXAM: XR CHEST 1 VIEW PORTABLE LOCATION: [facility] DATE/TIME: 12/1/2020 6:26 AM INDICATION: confirm ET Tube position COMPARISON: 11/29/2020.     The patient has been intubated. The endotracheal tube terminates 1.9 cm above the feed. Nasogastric tube passes below the diaphragm and the inferior margin of the radiograph. Extensive bilateral pulmonary infiltrates are little changed from  11/29/2020 and consistent with infectious/inflammatory pneumonitis. No significant pleural effusion. No pneumothorax. Normal heart size.    Xr Chest 1 View Portable    Result Date: 11/29/2020  EXAM: XR CHEST 1 VIEW PORTABLE LOCATION: [facility] DATE/TIME: 11/29/2020 3:44 PM  INDICATION: Hypoxia COMPARISON: 11/19/2020     Previously seen PICC line has been removed. Bilateral pulmonary infiltrates have significantly increased when compared to previous. No pleural effusion or pneumothorax.    Xr Abdomen Ap Portable    Result Date: 12/2/2020  EXAM: XR ABDOMEN AP PORTABLE LOCATION: [facility] DATE/TIME: 12/2/2020 1:05 PM INDICATION: og placement COMPARISON: Chest x-ray 12/01/2020     Enteric tube with the tip in satisfactory position in the gastric antrum. Nonobstructive bowel gas pattern. No definite free air. Stable bilateral pulmonary airspace opacities.     Us Kidney Bilateral    Result Date: 12/11/2020  EXAM: US KIDNEY BILATERAL WITH BLADDER LOCATION: [facility] DATE/TIME: 12/11/2020 5:19 PM INDICATION: hematuria COMPARISON: None. TECHNIQUE: Routine Bilateral Renal and Bladder Ultrasound. FINDINGS: RIGHT KIDNEY: 12.3 x 5.6 x 4.7 cm. No hydronephrosis. Inferior renal 5 mm shadowing echogenic focus suggests a nonobstructive stone. LEFT KIDNEY: 12.4 x 5.6 x 5.1 cm. No hydronephrosis. Several small echogenic foci in the renal sinus could represent nonobstructive stones. BLADDER: Decompressed with Arteaga.     1.  No hydronephrosis. 2.  Probable right renal 5 mm nonobstructive stone. 3.  Possible left renal smaller nonobstructive stones.    Ir Gj Tube Insertion    Result Date: 12/22/2020  LOCATION: [facility] DATE: 12/22/2020 PROCEDURE: PERCUTANEOUS GASTROJEJUNOSTOMY PLACEMENT ATTENDING: Schuyler Cortés MD, INDICATION: COVID 19 infection. Respiratory failure. Recent tracheostomy. CONSENT: The procedure, risks and benefits of gastrojejunostomy tube placement were discussed with the patient's family in detail. All questions were answered. Informed consent was given to proceed with the procedure. MODERATE SEDATION: None CONTRAST: 15 mL Omnipaque. ANTIBIOTICS: Patient currently on cefepime ADDITIONAL MEDICATIONS: Fentanyl 50  mcg IV. FLUOROSCOPIC TIME: 3.3 minutes RADIATION DOSE: Air Kerma: 27 mGy COMPLICATIONS: No immediate complications. PROCEDURE: The upper abdomen was prepped and draped in sterile fashion. Ultrasound of the epigastric region was performed to localize the left hepatic edge. Stomach was insufflated with air, through an NG tube. Two [employer] T fasteners were deployed in the standard fashion. Between these, a third puncture was performed with an 18 gauge access needle. Intraluminal position confirmed with contrast injection. Through this, a tuQuejaSuma guidewire was advanced into the stomach. Using a 5 Romansh KMP  catheter, the guidewire was manipulated into the proximal jejunum. The tract was dilated with the telescoping dilator/peel-a-way sheath, of the [employer] set. An 18 Romansh, 45 cm JEROME gastrojejunostomy tube was placed with its tip in the proximal jejunum. The retention balloon was inflated with 10 mL sterile normal saline. Adequate position confirmed with contrast injection.     1.  Successful percutaneous gastrojejunostomy tube placement.     Us Venous Legs Bilateral    Result Date: 12/15/2020  EXAM: US VENOUS LEGS BILATERAL LOCATION: [facility] DATE/TIME: 12/15/2020 5:25 PM INDICATION: Bilateral lower extremity edema COMPARISON: None. TECHNIQUE: Venous Duplex ultrasound of bilateral lower extremities with and without compression, augmentation and duplex. Color flow and spectral Doppler with waveform analysis performed. FINDINGS: Exam includes the common femoral, femoral, popliteal veins as well as segmentally visualized deep calf veins and greater saphenous vein. RIGHT: No deep vein thrombosis. No superficial thrombophlebitis. No popliteal cyst. LEFT: No deep vein thrombosis. No superficial thrombophlebitis. No popliteal cyst.     1.  No deep venous thrombosis in the bilateral lower extremities.    Poc Us 3cg Picc Placement Guidance    Result Date: 12/1/2020  Exam was performed as  "guidance for PICC line insertion.  Click \"PACS images\" hyperlink below to view any stored images.  For specific procedure details, view procedure note authored by PICC/Vascular Access Nurse.      Patient Active Problem List   Diagnosis     Pneumonia     Hypokalemia     Acute respiratory failure with hypoxia (H)     Pneumonia due to 2019 novel coronavirus     Severe protein-calorie malnutrition (H)     Acute respiratory failure (H)     ARDS (adult respiratory distress syndrome) (H)     Encephalopathy     Agitation     Anxiety     Shock circulatory (H)     Fever, unspecified fever cause     UTI due to Klebsiella species     Permanent atrial fibrillation with RVR (H)     Acute upper GI bleed     Single subsegmental pulmonary embolism without acute cor pulmonale (H)     Acute on chronic systolic heart failure (H)     Attention to tracheostomy (H)       Sweetie Hogue,   Pulmonary and Critical Care Attending  pgr [phone]    "

## 2021-06-14 NOTE — PLAN OF CARE
Problem: Occupational Therapy  Goal: OT Goals  Description: Patient will demonstrate the following by 12/29/2020, in order to maximize independence with ADL/IADL performance:    -Demonstrate safety with Bed/Chair/Toilet transfer with min assist   -Complete LB dressing with min assist using appropriate adaptive equipment PRN   -Participate in grooming/hygiene tasks with SBA    -Patient will complete toileting task with min assist    Goals entered on 12/26/2020 by Yvonne Goetz OT      Outcome: Completed   Occupational Therapy Discharge Summary    Date of OT Discharge: 12/29/2020    Refer to daily doc flowsheet for equipment issued and current functional status.  Discharge Destination: LTACH  Discharge Comments: pt would benefit from further therapy to increase function for ultimate return to home.    Please note that if goals are not fully met the patient is making progress towards established goals, which is documented in flowsheet notes. If further therapy is recommended it is related to documented deficits, and is necessary to maximize functional independence in order for patient to return to previous level of function.      12/29/2020 by Kia Barajas, OT

## 2021-06-14 NOTE — PLAN OF CARE
Problem: Mechanical Ventilation  Goal: Mobility/activity is maintained at optimum level for patient  Outcome: Progressing  Goal: Patient will maintain patent airway  Outcome: Progressing  Goal: Oral health is maintained or improved  Outcome: Progressing  Goal: Tracheostomy will be managed safely  Outcome: Progressing

## 2021-06-14 NOTE — PROGRESS NOTES
CRITICAL CARE PROGRESS NOTE:    Assessment/Plan:  60 year old female never smoker with a history of right breast cancer s/p right lumpectomy and radiation, admitted 11/13/20 with acute hypoxemic respiratory failure due to COVID-19 infection, ARDS, intubated on 12/1/20, now with prolonged mechanical ventilation, failure to wean.    RESP:  Acute respiratory distress syndrome, COVID-19 infection, prolonged mechanical ventilation: Likely significant respiratory muscle atrophy, critical illness neuromyopathy. Continues to do poorly on pressure support trials this is likely related to her severe critical illness neuropathy.    Continue to wean down FiO2 as able.    PEEP 5    Perc trach 12/22, #6 shiley    Phase 1 weaning    Needs LTACH transfer, qualifies for Covid recovered status and this has been entered this in her chart.    CV:  Shock: Sedation, sepsis. On low-dose norepinephrine.     Have increased midodrine to 10 mg q 8 hrs in attempt to get off norepinephrine.    Likely has an element of insufficiency especially given she is normotensive at baseline.  We will check a cortisol level today    NEURO:  Acute encephalopathy: Is fairly redirectable although still tries to remove her tracheostomy. Previously started on quetiapine, valproate, and schedule enteral lorazepam. Nonfocal exam.    On scheduled quetiapine, valproate and enteral lorazepam    Analgesia as needed    Wean off dexmedetomidine as able.    GI:  Severe protein-calorie malnutrition:    G-J placed by IR 12/22; on replete @65ml/h.    Bowel regimen: senna-docusate    Free water 200 mL q 4 hrs; stable Na    RENAL:  Hypervolemia: Due to initial resuscitation, almost euvolemic now.    Continue furosemide 20 mg IV two times a day and monitor response    ID:  COVID-19 infection, Klebsiella UTI: Through acute phase of COVID-19. Completing treatment of Klebsiella UTI.    Completed dexamethasone and remdesivir    Completed 7-day course of cefepime through  12/22    Was initially diagnosed with Covid-19 on 11/13/20. Has been stable and shows no active signs of infection.  Has been switched to Covid recovered status.    HEMATOLOGIC:  Anemia, thrombocytosis: Anemia of acute inflammation, no evidence of active hemorrhage. Thrombocytosis likely reactive.    Monitor with restrictive transfusion threshold    ENDOCRINE:  No acute issues. Glucose creeping up.    Continue glargine 6 units daily    FSBG with sliding scale aspart    ICU PROPHYLAXIS:    Enoxaparin 0.5 mg/kg two times a day    PPI    HOB elevation    chlorhexidine    CODE STATUS, DISPOSITION, FAMILY COMMUNICATION: Full code. Critically ill requiring invasive mechanical ventilation and continuous vasopressors.     Lines/Drains/Tubes:  Central line (RUE PICC) placed on 12/1/20  Arterial line (L axillary) placed on 12/4/20  Feeding tube (OG tube) placed on 12/1/20  Arteaga catheter placed on 12/15/20  Percutaneous tracheostomy (#6 shiley) placed on 12/22/20    Restraints  Indicated and ordered    Progress Note  Restraint Application    I recognize that restraints are physical and/or chemical interventions intended to restrict a person's movements. Restraints are currently needed to ensure the safety of this patient and/or others. My clinical rationale appears below.    Category/Type of Restraint     Non Violent:  Soft limb restraint x2  --  Behavior  Pulling at tubes/lines  --  Root Cause of the Behavior  Sedation/intubation  --  Less-Restrictive Measures that Failed  Non Violent Measures:  Close Observation  --  Response to the Restraint  Patient unable to pull at tubes/lines  --  Criteria for Release from the Restraint  Patient calm and off sedation    Total CC time 40 minutes.    Sherri Garcia MD  Pulmonary and Critical Care  P) [phone]      Overnight events:  No new events. Voluntary VC <300 mL.    Subjective:  unable    Objective:  Physical Exam:  Vent settings for last 24 hours:  Vent Mode: VCV  FiO2  "(%):  [45 %] 45 %  S RR:  [20] 20  S VT:  [350 mL] 350 mL  PEEP/CPAP (cm H2O):  [5 cm H2O] 5 cm H2O  Minute Ventilation (L/min):  [7.8 L/min-11.4 L/min] 8.3 L/min  PIP:  [36 cm H2O-41 cm H2O] 38 cm H2O  MAP (cm H2O):  [9-16] 11    /62   Pulse 95   Temp (!) 100.7  F (38.2  C) (Oral)   Resp 28   Ht 5' 5\" (1.651 m)   Wt 134 lb 11.2 oz (61.1 kg)   SpO2 97%   BMI 22.42 kg/m      Intake/Output last 3 shifts:  I/O last 3 completed shifts:  In: 3480.5 [I.V.:188.5; NG/GT:3292]  Out: 2360 [Urine:2010; Stool:350]  Intake/Output this shift:  No intake/output data recorded.    Physical Exam   Constitutional: She appears well-developed and well-nourished.   HENT:   Head: Normocephalic and atraumatic.   Eyes: Pupils are equal, round, and reactive to light.   Neck: Normal range of motion.   Cardiovascular: Normal rate and regular rhythm.   Pulmonary/Chest: Effort normal.   Coarse BS BL.   Abdominal: Soft.   Musculoskeletal:         General: No edema.   Neurological:   No focal deficits.   Skin: Skin is warm.         LAB:  Results from last 7 days   Lab Units 12/26/20  0405   LN-WHITE BLOOD CELL COUNT thou/uL 7.6   LN-HEMOGLOBIN g/dL 11.5*   LN-HEMATOCRIT % 37.5   LN-PLATELET COUNT thou/uL 503*     Results from last 7 days   Lab Units 12/26/20  0405 12/25/20  0513 12/24/20  0824 12/23/20  0416 12/23/20  0416   LN-SODIUM mmol/L 142 140  --   --  138   LN-POTASSIUM mmol/L 3.7 4.0  4.0 3.7   < > 3.5   LN-CHLORIDE mmol/L 97* 99  --   --  98   LN-CO2 mmol/L 32* 29  --   --  30   LN-BLOOD UREA NITROGEN mg/dL 20 15  --   --  17   LN-CREATININE mg/dL 0.55* 0.50*  --   --  0.48*   LN-CALCIUM mg/dL 8.9 8.8  --   --  8.6    < > = values in this interval not displayed.       Current Facility-Administered Medications   Medication Dose Route Frequency Provider Last Rate Last Admin     acetaminophen solution 650 mg (TYLENOL)  650 mg Enteral Tube Q4H PRN Tish Adkins CNP   650 mg at 12/25/20 1616     acetaminophen tablet 650 mg " (TYLENOL)  650 mg Oral Q4H PRN Ramon Haro MD   650 mg at 11/30/20 0058     bacitracin ointment packet 1 packet  1 packet Topical Once PRN Tish Adkins CNP         benzocaine-menthoL lozenge 1 lozenge (CEPACOL)  1 lozenge Oral Q1H PRN Eben Butterfield MD         bisacodyL suppository 10 mg (DULCOLAX)  10 mg Rectal Daily PRN Eben Butterfield MD   10 mg at 12/16/20 1631     calcium (as carbonate) chewable tablet 400 mg (TUMS)  400 mg Oral TID PRN Ramon Haro MD   400 mg at 11/18/20 1222     chlorhexidine 0.12 % solution 15 mL (PERIDEX)  15 mL Topical Q12H Tish Adkins CNP   15 mL at 12/26/20 0536     codeine-guaiFENesin  mg/5 mL liquid 5 mL (GUAIFENESIN AC)  5 mL Per NG tube Q4H PRN Gregg Sarabia MD   5 mL at 12/25/20 1616     dexmedetomidine (PRECEDEX) 1000 mcg/250 mL in NS (4 mcg/mL) infusion  0.1-1.5 mcg/kg/hr Intravenous Continuous IvJeramie bruner MD 4.6 mL/hr at 12/26/20 0400 0.3 mcg/kg/hr at 12/26/20 0400     dextrose 50 % (D50W) syringe 20-50 mL  20-50 mL Intravenous Q15 Min PRN Ramon Haro MD         docusate sodium 50 mg/5 mL oral liquid 100 mg (COLACE)  100 mg Enteral Tube BID Gregg Sarabia MD   100 mg at 12/25/20 2106     enoxaparin ANTICOAGULANT syringe 30 mg (LOVENOX)  30 mg Subcutaneous BID Gregg Sarabia MD   30 mg at 12/26/20 0910     fentaNYL pf injection 25-50 mcg (SUBLIMAZE)  25-50 mcg Intravenous Q1H PRN Jennifer Gunter CNP   50 mcg at 12/24/20 0332     furosemide injection 20 mg (LASIX)  20 mg Intravenous BID - diuretic Bashir De La Torre MD   20 mg at 12/26/20 0909     glucagon (human recombinant) injection 1 mg  1 mg Subcutaneous Q15 Min PRN Ramon Haro MD         hydrALAZINE injection 10 mg (APRESOLINE)  10 mg Intravenous Q4H PRN Ramon Haro MD         influenza vaccine quad (PF) (age 50-64 YR) 2020-21 injection 0.5 mL (FLUBLOK)  0.5 mL Intramuscular Prior to Discharge Jeramie Crespo MD          insulin aspart U-100 injection pen (NovoLOG)   Subcutaneous Q6H FIXED TIMES Gregg Sarabia MD         insulin glargine injection 6 Units (LANTUS)  6 Units Subcutaneous QAM Oren Vásquez MD   6 Units at 12/26/20 0910     lipase-protease-amylase 5,000-17,000- 24,000 unit capsule 2 capsule (ZENPEP)  2 capsule Enteral Tube PRN Eben Butterfield MD        And     sodium bicarbonate tablet 325 mg  325 mg Enteral Tube PRN Eben Butterfield MD         LORazepam 1 mg/0.5 mL concentrated solution 1 mg (ATIVAN)  1 mg Enteral Tube Q6H Gregg Sarabia MD   1 mg at 12/26/20 0536     magnesium hydroxide suspension 30 mL (MILK OF MAG)  30 mL Enteral Tube Daily PRN Gregg Sarabia MD         midodrine tablet 10 mg (PROAMATINE)  10 mg Enteral Tube Q8H Mellisa Abebe CNP   10 mg at 12/26/20 0910     naloxone injection 0.2 mg (NARCAN)  0.2 mg Intravenous Q2 Min PRN Ramon Haro MD        Or     naloxone injection 0.2 mg (NARCAN)  0.2 mg Intramuscular Q2 Min PRN Ramon Haro MD        Or     naloxone injection 0.4 mg (NARCAN)  0.4 mg Intravenous Q2 Min PRN Ramon Haro MD        Or     naloxone injection 0.4 mg (NARCAN)  0.4 mg Intramuscular Q2 Min PRN Ramon Haro MD         norepinephrine 4 mg/250 mL in NS (16 mcg/mL) - CENTRAL  0.01-0.4 mcg/kg/min Intravenous Continuous Bashir De La Torre MD 4.6 mL/hr at 12/26/20 0445 0.02 mcg/kg/min at 12/26/20 0445     OLANZapine tablet 5 mg (zyPREXA)  5 mg Enteral Tube TID PRN Mellisa Abebe CNP         omeprazole capsule 20 mg (PriLOSEC)  20 mg Oral QAM Tish Gonzalez CNP        Or     omeprazole suspension 20 mg (PriLOSEC)  20 mg Enteral Tube QAM Tish Gonzalez CNP   20 mg at 12/26/20 0909     ondansetron injection 4 mg (ZOFRAN)  4 mg Intravenous Q4H PRN Eben Butterfield MD   4 mg at 12/06/20 2210    Or     ondansetron tablet 8 mg (ZOFRAN)  8 mg Oral Q8H PRN Eben Butterfield MD         polyethylene  glycol packet 17 g (MIRALAX)  17 g Oral Daily PRN Ramon Hrao MD   17 g at 12/16/20 1630     polyvinyl alcohol 1.4 % ophthalmic solution 1-2 drop (LIQUIFILM TEARS)  1-2 drop Both Eyes Q1H PRN Eben Butterfield MD         QUEtiapine tablet 50 mg (SEROquel)  50 mg Enteral Tube Q6H PRN Gregg Sarabia MD   50 mg at 12/24/20 0809     QUEtiapine tablet 75 mg (SEROquel)  75 mg Enteral Tube Q6H Gregg Sarabia MD   75 mg at 12/26/20 0913     sennosides syrup 8.8 mg (for SENOKOT)  8.8 mg Enteral Tube BID Eben Butterfield MD   8.8 mg at 12/25/20 2106     sodium chloride 0.9%  10 mL/hr Intravenous Continuous Eben Butterfield MD   Stopped at 12/23/20 2100     sodium chloride bacteriostatic 0.9 % injection 0.1-0.3 mL  0.1-0.3 mL Subcutaneous PRN Ramon Haro MD         sodium chloride flush 10-20 mL (NS)  10-20 mL Intravenous PRN Tish Adkins CNP   10 mL at 12/23/20 0926     sodium chloride flush 10-30 mL (NS)  10-30 mL Intravenous PRN Tish Adkins CNP   10 mL at 12/23/20 2153     sodium chloride flush 10-30 mL (NS)  10-30 mL Intravenous Q8H FIXED TIMES Tish Adkins CNP   20 mL at 12/26/20 0536     sodium chloride flush 20 mL (NS)  20 mL Intravenous PRN Tish Adkins CNP         traZODone tablet 25 mg (DESYREL)  25 mg Enteral Tube Q6H PRN Gregg Sarabia MD         valproic acid (as sodium salt) solution 250 mg (DEPAKENE)  250 mg Enteral Tube Q8H FIXED TIMES Gregg Sarabia MD   250 mg at 12/26/20 0344       Total Critical Care Time: 40 Minutes

## 2021-06-14 NOTE — PROGRESS NOTES
Attempted wean on patient, PS15, PEEP 5, TVin the low to mid 200's, RR in the low to mid 40's, even when patient would attempt a big breath she would only get up to 270.  Jennifer Jimenez, LRT

## 2021-06-14 NOTE — PROGRESS NOTES
12/25/20 1236   Patient Data   Vt (observed, mL) 454 mL   Vt Exp (mL) 370 mL   Minute Ventilation (L/min) 11.4 L/min   Total Resp Rate  35 BPM   PIP Observed (cm H2O) 41 cm H2O   MAP (cm H2O) 16   Auto/Intrinsic PEEP Observed (cm H2O)   (FAIL)   Plateau Pressure (cm H2O) 16 cm H2O   Static Compliance (L/cm H2O) 13   Dynamic Compliance (L/cm H2O) 18 L/cm H2O   Airway Resistance 8

## 2021-06-14 NOTE — PROGRESS NOTES
12/27/20 1007   Patient Data   Vt (observed, mL) 358 mL   Vt Exp (mL) 291 mL   Minute Ventilation (L/min) 13.4 L/min   Total Resp Rate  39 BPM   PIP Observed (cm H2O) 44 cm H2O   MAP (cm H2O) 15   Auto/Intrinsic PEEP Observed (cm H2O) 0 cm H2O   Plateau Pressure (cm H2O) 28 cm H2O

## 2021-06-14 NOTE — PLAN OF CARE
Problem: Inadequate Gas Exchange  Goal: Patient will achieve/maintain normal respiratory rate/effort  Outcome: Progressing   Pt tolerating trach. Able to decrease FiO2 DOWN TO 45%. Clear thin secretion's orally and thicker white secretion via inline suctioning. Pt up in chair today.    Problem: Activity Intolerance/Impaired Mobility  Goal: Mobility/activity is maintained at optimum level for patient  Outcome: Progressing   Pt tolerated being up in chair for 1.5 hours. used eh lift to get pt up to chair. Cont to monitor

## 2021-06-14 NOTE — PROGRESS NOTES
Problem: Mechanical Ventilation  Goal: Patient will maintain patent airway  Outcome: Progressing   Problem: Mechanical Ventilation  Goal: Patient will maintain patent airway  Outcome: Progressing  Goal: Respiratory status - ventilation  Outcome: Progressing     Problem: Tracheostomy Tube Management  Goal: Tracheostomy will be managed safely  Outcome: Progressing            12/24/20 2330   Vent Information   Interface Invasive   Vent ID [id number]   Vent Mode VCV   Patient Ventilator Status On   Flowmeter at Bedside Yes   Ambu-Bag With Mask at Bedside Yes   Respiratory Assessment   Assessment Type Assess only   Respiratory Pattern Regular   Chest Assessment Chest expansion symmetrical   Bilateral Breath Sounds Diminished   Vent Settings   FiO2 (%) 45 %   Heater Temperature 96.8  F (36  C)   Resp Rate (Set) 20   Vt (Set, mL) 350 mL   PEEP/CPAP (cm H2O) 5 cm H2O   Insp Flow (L/min) 40 L/min   Trigger Sensitivity Flow (L/min) 2 L/min   Humidification Heater   Patient Data   Vt Exp (mL) 328 mL   Minute Ventilation (L/min) 6.8 L/min   Total Resp Rate  21 BPM   PIP Observed (cm H2O) 40 cm H2O   MAP (cm H2O) 10   Plateau Pressure (cm H2O) 30 cm H2O   SpO2 100 %   Heart Rate 80   Alarms   High Resp Rate 45   Low Resp Rate 14   Low PEEP 1 cm H2O   Insp Pressure High (cm H2O) 60 cm H2O   Insp Pressure Low (cm H2O) 14 cm H2O   MV High (L/min) 18 L/min   MV Low (L/min) 3 L/min   Vt High (mL) 850 mL   Vt Low (mL) 200 mL   Apnea Interval (sec) 30 seconds   Apnea Rate 20   Apnea Volume (mL) 350 mL   Alarm Functional and On Yes   Backup Mode Set Yes   Airways   Airway LDA Tracheostomy   Airway Suctioning/Secretions   Suction Device  Inline   Secretion Amount Small   Secretion Color White   Secretion Consistency Thin   Suction Tolerance Tolerated well   Suctioning Adverse Effects None   Surgical Airway Bivona 6 Cuffed   Placement Date/Time: 12/22/20 1230   Placed By: Other (Comment)  Brand: Bivona  Size : 6  Style: Cuffed   Status  "Sutured;Ventilator   Site Skin Assessment Clean;Dry   Site Care Gauze applied   Face Plate Assessment Inspected - Normal     BP 95/56   Pulse 90   Temp 98.9  F (37.2  C) (Axillary)   Resp 23   Ht 5' 5\" (1.651 m)   Wt 133 lb 2.5 oz (60.4 kg)   SpO2 100%   BMI 22.16 kg/m      "

## 2021-06-14 NOTE — PROGRESS NOTES
"Patient remained on full vent support. Suctioned moderate amount of thin secretions, some pink tinged secretion.  Status Post Percutaneous tracheostomy 6.0 Shiley cuffed. Tracheostomy tube placed.HOB approx 30 degrees. Current vent settings are below:  RT will continue to follow.    /75 (Patient Position: Lying)   Pulse 87   Temp 99  F (37.2  C) (Oral)   Resp 20   Ht 5' 5\" (1.651 m)   Wt 136 lb 14.5 oz (62.1 kg)   SpO2 100%   BMI 22.78 kg/m       12/22/20 1539   Vent Information   Interface Invasive   Patient Ventilator Status On   Flowmeter at Bedside Yes   Ambu-Bag With Mask at Bedside Yes   Respiratory Assessment   Assessment Type Assess only   Respiratory Pattern Regular   Chest Assessment Chest expansion symmetrical   Bilateral Breath Sounds Coarse   R Breath Sounds Clear   L Breath Sounds Clear   Vent Settings   Heater Temperature 98.6  F (37  C)   Waveform Square   PIP Set (cm H2O) 55 cm H2O   Trigger Sensitivity Flow (L/min) 2 L/min   Bias Flow (lpm)  5 L/min   Humidification Heater   Insp Time (sec) 0.9 sec   I:E Ratio 1:4.7   Patient Data   Auto/Intrinsic PEEP Observed (cm H2O) 0 cm H2O   Dynamic Compliance (L/cm H2O) 11 L/cm H2O   Airway Resistance 13   Alarms   Low Resp Rate 12   Vt High (mL) 900 mL   Apnea Interval (sec) 30 seconds   Apnea Rate 20   Apnea Volume (mL) 350 mL   Alarm Functional and On Yes   Backup Mode Set Yes   Airways   Airway LDA Tracheostomy   Airway Suctioning/Secretions   Suction Device  Inline   Secretion Amount Small   Secretion Color Pink tinged   Secretion Consistency Thin   Suction Tolerance Tolerated well   Suctioning Adverse Effects None     "

## 2021-06-14 NOTE — PROGRESS NOTES
"  [facility] LTACH Unit    Hafsa Robles has been accepted for admission to [facility] today.       Ride: 2:00 pick-up at [facility] by [facility] stretcher with vent.    [id number]: Please call [phone] and ask for \"LTACH\"  for nurse to nurse report.before the pt discharges.    Accepting MD: Nadia Ray. Please contact Dr. Ray at cell phone [phone] for MD to MD report before the pt discharges.    Paperwork needed prior to discharge: discharge summary and \"discharge/readmit\"  orders. An imaging CD is not needed.     Transfer packet: Will need only the discharge summary, discharge orders and MAR.     I have spoken with the pt's  and given him contact information for [facility] LTACH unit.    Josefa Herrera RN  LTACH Referral Specialist  [facility]  [address] [place] [id number]  [email or web address]    [email or web address]  Office: [phone]  Fax: [phone]     CONFIDENTIAL Protected under [place] Statute  145.61 et seq    "

## 2021-06-14 NOTE — PLAN OF CARE
Problem: Daily Care  Goal: Daily care needs are met  Outcome: Progressing    Increased agitation/restlessness with pain.  Patient is able to communicate if she is having pain by nodding or shaking her head when asked.  Given Fentanyl twice this shift for pain. Patient appears comfortable after pain medication given.    Denies nausea when asked.  Tolerating tube feeding at this time.      Problem: Potential for Compromised Skin Integrity  Goal: Skin integrity is maintained or improved  Outcome: Progressing    Had small loose incontinent stool at start of shift x2.  Coccyx area is pink.  Reposition in bed every 2 hours.  Patient is able to micro shift weight in bed.    Daniel Villagran RN

## 2021-06-14 NOTE — PLAN OF CARE
Problem: Inadequate Gas Exchange  Goal: Patient will achieve/maintain normal respiratory rate/effort  Outcome: Progressing  Oxygen saturation >96% on current vent settings via trach, no respiratory distress, small amount of secretions suctioned.   Vent Mode: VCV  FiO2 (%):  [45 %] 45 %  S RR:  [20] 20  S VT:  [350 mL] 350 mL  PEEP/CPAP (cm H2O):  [5 cm H2O] 5 cm H2O  Minute Ventilation (L/min):  [6.8 L/min-11.8 L/min] 6.8 L/min  PIP:  [6 cm H2O-43 cm H2O] 40 cm H2O  RI SUP:  [15 cm H20] 15 cm H20  MAP (cm H2O):  [10-13] 10     On levophed 0.03 mcg/kg/min   Vitals:    12/25/20 0500   BP: 95/56   Pulse: 90   Resp: 23   Temp:    SpO2: 100%     Problem: Urinary Incontinence  Goal: Perineal skin integrity is maintained or improved  Outcome: Progressing   Arteaga is patent     Problem: Glucose Imbalance  Goal: Achieve optimal glucose control  Outcome: Progressing   Results for AMERICO HALL (MRN [id number]) as of 12/25/2020 05:42   Ref. Range 12/24/2020 17:29 12/25/2020 00:27   Glucose Latest Ref Range: 70 - 139 mg/dL 123 101     Problem: Risk of Injury Due to Unsafe Behavior  Goal: Patient will remain safe while in restraint; physical/psychological needs will be met  Outcome: Progressing   Precedex drip titrated down, scheduled Seroquel given and soft restraints in place. Patient was awake during cares and followed commands, still reaches for tubes when restraints are loosened during cares.

## 2021-06-14 NOTE — PROGRESS NOTES
12/29/20 1228   Patient Data   PIP Observed (cm H2O) 29 cm H2O   MAP (cm H2O) 10   Auto/Intrinsic PEEP Observed (cm H2O)   (Failed)   Plateau Pressure (cm H2O) 26 cm H2O   Static Compliance (L/cm H2O) 23   Dynamic Compliance (L/cm H2O) 18 L/cm H2O   Airway Resistance 12   SpO2 98 %

## 2021-06-14 NOTE — PROGRESS NOTES
RT PROGRESS NOTE    DATA:    VENT DAY#  26    CURRENT SETTINGS:  VENT SETTINGS   VCV 20, 350, 5        FIO2:   45%    PATIENT PARAMETERS:    PIP 36  Pplat:  24  Pmean:  14  SBT: Attempted patient only lasted 1 minute RSBI 1550-230  SECRETIONS:  Small amount thick white  02 SATS:  98%    6.0 Shiley Trach          Respiratory Medications: None    ABG: No recent    NOTE / PLAN:   Patient remains chronically trached and on full ventilator support. No changes today. Will continue to wean as tolerated. Continue to follow closely.

## 2021-06-14 NOTE — PROGRESS NOTES
"Care Management Follow Up Note    Length of Stay (days) 45     Patient plan of care discussed at Interdisciplinary Rounds: yes           Pt/Rep Education / Interventions: Review d/c options, Discuss d/c, Discussed readmission risk    Expected Discharge Date: 12/29/20  Concerns to be Addressed / Barriers to Discharge:      Anticipated Discharge Disposition:    Anticipated Discharge Services:      Selected Continued Care - Admitted Since 11/13/2020     Destination Coordination complete    Service Provider Selected Services Address Phone Fax Patient Preferred Last Updated    [facility] [address] [address] [phone] -- Naz Bravo RN 12/26/2020 0817       Internal Comment last updated by Beatriz Poon SW 12/25/2020 1322    12/25- [facility] has reviewed (Josefa Herrera), has tentatively accepted, pending insurance confirmation, progression of care and progression of care.                          Anticipated Discharge DME:      Plan:  Spoke to Josefa at [facility] admissions. She will look at patient and call CM back to make final determination on acceptance. She says \"maybe\" about bed availability. Updated nurse Ehsan     12:55 PM  Spoke to Josefa. She is working on getting insurance sorted out for patient. See her note for additional details     Naz Bravo RN    "

## 2021-06-14 NOTE — PROGRESS NOTES
Vent Mode: VCV  FiO2 (%):  [40 %-100 %] 50 %  S RR:  [20] 20  S VT:  [350 mL] 350 mL  PEEP/CPAP (cm H2O):  [5 cm H2O] 5 cm H2O  Minute Ventilation (L/min):  [3 L/min-7 L/min] 7 L/min  PIP:  [34 cm H2O-43 cm H2O] 43 cm H2O  RI SUP:  [12 cm H20-15 cm H20] 15 cm H20  MAP (cm H2O):  [10-13] 10     PLAT: 32

## 2021-06-14 NOTE — PROGRESS NOTES
"RESPIRATORY CARE NOTE    Patient has # 6 Shiley in place, tolerating bellow ventilator settings:     Vent Mode: VCV  FiO2 (%):  [35 %-45 %] 35 %  S RR:  [20] 20  S VT:  [350 mL] 350 mL  PEEP/CPAP (cm H2O):  [5 cm H2O-50 cm H2O] 5 cm H2O  Minute Ventilation (L/min):  [7 L/min-13.4 L/min] 9.3 L/min  PIP:  [23 cm H2O-44 cm H2O] 32 cm H2O  LA SUP:  [12 cm H20] 12 cm H20  MAP (cm H2O):  [8-15] 9  PIP 32, plateau  25  /56   Pulse 90   Temp 98.8  F (37.1  C) (Oral)   Resp 20   Ht 5' 5\" (1.651 m)   Wt 132 lb 7.9 oz (60.1 kg)   SpO2 99%   BMI 22.05 kg/m      BS diminished with scatter course crackles, Sxn small tan thick from ETT, tolerated well.   RT will follow per protocol.    Sridhar Lopez, LRT    "

## 2021-06-14 NOTE — PROGRESS NOTES
"   [facility] LTACH Unit         At the request of DIEUDONNE Kelly, I [facility] and [facility].        The pt will likely meet criteria for admission given her current complex respiratory needs. Final determination for appropriateness will be made closer to the point of discharge.        Of note, review of the pt's insurance ([employer] [place]) indicates that [employer] shows that the pt has another primary coverage. This will need to be clarified. If that is not accurate the pt's  will need to have [employer] remove that. If the pt does have other primary coverage I will need to have the information regarding that so benefits can be verified.        The pt will need to documented as \"Recovered Covid\" and have special covid precautions removed prior to admission to [facility] LTACH unit.       I will continue to follow.         Josefa Herrera RN  LTACH Referral Specialist  [address]. [address] [place] [id number]  [email or web address]    [email or web address]  Office: [phone]  Fax: [phone]     CONFIDENTIAL Protected under [place] Statute  145.61 et seq    "

## 2021-06-14 NOTE — PROGRESS NOTES
Respiratory Care Note     Patient is on full vent support. Breath sound is course at times. Patient was able to tolerate PS 18/5 for only 5 minutes. During weaning trial patient was tachypnic with RSBI 140. Will continue to monitor.       12/28/20 2005   Patient Data   PIP Observed (cm H2O) 39 cm H2O   MAP (cm H2O) 10   Plateau Pressure (cm H2O) 26 cm H2O

## 2021-06-15 NOTE — PROGRESS NOTES
Hafsa Robles is a 61 y.o. female who is being evaluated via a billable video visit.      How would you like to obtain your AVS? MyChart.  If dropped from the video visit, the video invitation should be resent by: Text to cell phone: [phone]  Will anyone else be joining your video visit? Yes dolores Reddy      Video Start Time: 0814      Additional provider notes:  CCx:Post ICU syndrome    HPI:Ms. Robles is a 61 y.o. lady with a past medical history significant for breast cancer s/p lumpectomy and radiation who presented to [facility] on 11/13 with hypoxia, fevers and a cough.  She was subsequently hospitalized and diagnosed with Covid-19 and over the course of the next several days began to have worsening hypoxia requiring alternating bilevel ventilation and high flow combined with self proning from 11/13 through 12/1.  She was emergently intubated and required prone positioning with paralysis and epoprostenol for 4 days and subsequently had minimal oxygen requirements by 12/7.  She continued to be very tachypneic and agitated with weaning trials because of which she underwent placement of a tracheostomy on 12/22 and was transferred to the [facility] on 12/29.  She tells me today that she is still experiences shortness of breath with talking and also describes a sensation of fullness in her throat at all times.  While her symptoms have improved compared to a month and a half ago she continues to have the same sensation.  She describes shortness of breath at rest but particularly with activity especially things like talking but does check her oxygen saturations fairly regularly and is not hypoxic during the day.  She does note that when she gets up early in the morning and walks to the bathroom off her oxygen her saturations do drop to the low 80s but recovered fairly rapidly with supplemental oxygen.  It is noted interestingly that the patient does not desaturate with activity during the day.  She  denies any chest pain, chest tightness, wheezing or postnasal drip and states that she has some nighttime awakening with shortness of breath and coughing--- of note she was recently prescribed Afrin by her primary care provider but has not felt that this is significantly alleviated her symptoms.  She does sleep with her head elevated in a hospital bed.  She does not have any abdominal pain or urinary complaints.    ROS:  Pertinent positives alluded to in the HPI. Remainder of 10 point ROS is negative.     Recovery ROS:    Have you returned to work? No    Weight change/loss - motivated to keep losing if indicated? Has lost weight, poor appetite.    Tobacco abuse - prior, quit? Never smoker    Have you resumed driving? Not yet    Brain Fog? No    Memory issues? No    Vision issues? No    Dysphonia? No    Basic arithmetic? No issues    Writing difficulties? No issues    Paraesthesias? No    Dermatologic issues? No    ADL difficulties?  No help needed.    Anosmia, Ageusia? No    Headaches? No    Vaccinations required? Covid-19--has registered with the state.    Sleep issues? Yes; unable to sleep well (is scared)    Nightmares? Yes    DME needs? (trach care/supplies, oxygen, NIPPV)    Do you have an advanced directive on file?  o Were any changes made to advanced directives during ICU stay?    Additional referrals? (palliative care, MTM)    PMH:  1. Breast cancer    PSH:  1. Lumpectomy    Allergies:  Reviewed.     Family HX:  Is not aware of any family history of medical issues.    Social Hx:  Marital status:   Number of children: 1 daughter  Resides in a house, 61 yr old, no concern for mold.  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   service: No  Pets: No  Smoking history: 0 pack year history  Alcohol use: No   Recreational drug use: No  Hobbies: No  Recent Travel: None    Current Meds:  Current Outpatient Medications   Medication Sig Dispense Refill     ascorbic acid,  vitamin C, (VITAMIN C) 500 MG tablet Take 1 tablet (500 mg total) by mouth 2 (two) times a day. 60 tablet 0     b complex vitamins tablet Take 1 tablet by mouth daily. 30 tablet 0     benzonatate (TESSALON) 100 MG capsule Take 1 capsule (100 mg total) by mouth 2 (two) times a day as needed for cough (moderate to severe cough). 30 capsule 0     cholecalciferol, vitamin D3, 5,000 unit Tab Take 2 tablets (10,000 Units total) by mouth daily. 30 tablet 0     multivitamin with minerals (THERA-M) 9 mg iron-400 mcg Tab tablet Take 1 tablet by mouth daily. 30 tablet 0     phenylephrine (BRAD-SYNEPHRINE) 0.25 % nasal spray Apply 1 spray into each nostril every 4 (four) hours as needed for congestion.       apixaban ANTICOAGULANT (ELIQUIS) 2.5 mg Tab tablet Take 1 tablet (2.5 mg total) by mouth 2 (two) times a day. 60 tablet 0     No current facility-administered medications for this visit.          Labs:  Reviewed in epic.    Imaging studies:  CXR 1/18/21:   Removal of tracheostomy.Improved expansion of the lungs since 01/11/2021. Small amount of bilateral opacities persist. Continued follow-up could be performed to evaluate whether opacities improve versus residual scarring / fibrotic change. No pleural effusion or pneumothorax. Stable cardiomediastinal silhouette.    Assessment and Plan:Hafsa Robles is a 61 y.o. with a past medical history significant for breast cancer s/p lumpectomy and radiation who presents to clinic today for post ICU syndrome evaluation following a protracted hospitalization for Covid-19 related ARDS.  She continues to experience ongoing coughing which may be related to reactive airways disease that is often seen with Covid-19 (and other viral syndromes). For the present we would recommend;    1. Post ICU Syndrome:  o Sleep issues: Finds that her sleep is improving and that she is having fewer nightmares.  We will not intervene with any medical therapy at this point in time.    From Clinical  Psychology visit:    Patient is presenting with mental health symptoms. She may benefit from follow-up individual psychotherapy and/or psychiatric medication evaluation, should she have future interest. She declined to discuss referral options today.     Patient has been utilizing coping skills and would benefit from continued use of them. Specific strengths and coping skills identified today include: health behaviors (eg., walking), leveraging family supports, and engagement in enjoyable/recreational activities.  2. Shortness of breath/Cough: Likely secondary to some pulmonary fibrosis and post viral syndrome.  I have instructed her that should her symptoms worsen prior to her follow-up visit with me to contact us and we may consider initiating an inhaled steroid at the time.  While omeprazole does not show her up on her active medication list I do see a prescription for this at her discharge summary.    Continue to be as physically active as able.    Obtain complete pulmonary function testing.  3. HCM:    ADL difficulties?  Continuing to improve although admittedly she has not had too many baths without assistance since coming home.  She is working herself up to doing this more independently.    Vaccinations required?  Is up-to-date with her influenza pneumococcal vaccine and has signed up to receive the Covid-19 vaccine.    DME needs? (trach care/supplies, oxygen, NIPPV) no, was decannulated prior to discharge.    Do you have an advanced directive on file?  Full code.    Additional referrals? (palliative care, MTM) No  4. Labs/Studies:  o Any follow-up labs, or imaging required?  Full PFTs.  5. Follow-up: 4 weeks.      Sherri Jacobsonqvi  Pulmonary and Critical Care  [id number]      Video-Visit Details    Type of service:  Video Visit    Video End Time (time video stopped): 8:56 AM  Originating Location (pt. Location): Home    Distant Location (provider location):  [facility]     Platform used for  Video Visit: Christ

## 2021-06-15 NOTE — PATIENT INSTRUCTIONS - HE
It was a pleasure seeing you in the Post-Intensive Care Unit (ICU) Clinic today. You were seen by an ICU provider, and a psychologist. The following follow-up recommendations were made:    1. Please follow up with me in 4 weeks  2. If you have any questions or concerns, contact my nurse Jina Granados at [phone].       Your next medical follow-up will occur with your primary care provider. Please contact their office if you do not currently have an appointment scheduled. Please bring this visit summary to the visit.      If you would like more information on Post-ICU Syndrome (PICS), please visit the following websites:    Post Intensive Care Syndrome ([facility])   [email or web address] (search  post intensive care syndrome )     ICU information ([facility])   [email or web address] (search  intensive care )     After the ICU   [email or web address]        Sherri Garcia MD  Pulmonary and Critical Care Medicine  [facility]

## 2021-06-16 PROBLEM — E55.9 VITAMIN D DEFICIENCY: Status: ACTIVE | Noted: 2021-01-11

## 2021-06-16 PROBLEM — J96.00 ACUTE RESPIRATORY FAILURE DUE TO COVID-19 (H): Status: ACTIVE | Noted: 2020-12-29

## 2021-06-16 PROBLEM — U07.1 ACUTE HYPOXEMIC RESPIRATORY FAILURE DUE TO COVID-19 (H): Status: ACTIVE | Noted: 2020-12-30

## 2021-06-16 PROBLEM — J12.82 PNEUMONIA DUE TO 2019 NOVEL CORONAVIRUS: Status: ACTIVE | Noted: 2020-11-15

## 2021-06-16 PROBLEM — J18.9 PNEUMONIA: Status: ACTIVE | Noted: 2020-11-13

## 2021-06-16 PROBLEM — U07.1 ACUTE RESPIRATORY FAILURE DUE TO COVID-19 (H): Status: ACTIVE | Noted: 2020-12-29

## 2021-06-16 PROBLEM — E87.6 HYPOKALEMIA: Status: ACTIVE | Noted: 2020-11-15

## 2021-06-16 PROBLEM — E43 SEVERE PROTEIN-CALORIE MALNUTRITION (H): Status: ACTIVE | Noted: 2020-11-27

## 2021-06-16 PROBLEM — U07.1 PNEUMONIA DUE TO 2019 NOVEL CORONAVIRUS: Status: ACTIVE | Noted: 2020-11-15

## 2021-06-16 PROBLEM — J96.01 ACUTE HYPOXEMIC RESPIRATORY FAILURE DUE TO COVID-19 (H): Status: ACTIVE | Noted: 2020-12-30

## 2021-06-16 PROBLEM — J96.01 ACUTE RESPIRATORY FAILURE WITH HYPOXIA (H): Status: ACTIVE | Noted: 2020-11-15

## 2021-06-16 PROBLEM — D05.11 DUCTAL CARCINOMA IN SITU (DCIS) OF RIGHT BREAST: Status: ACTIVE | Noted: 2017-03-31

## 2021-06-16 PROBLEM — J96.00 ACUTE RESPIRATORY FAILURE (H): Status: ACTIVE | Noted: 2020-11-30

## 2021-06-30 NOTE — PROGRESS NOTES
"Progress Notes by Gretta Swan PA-C at 12/21/2020  1:23 PM     Author: Gretta Swan PA-C Service: Interventional Radiology Author Type: Physician Assistant    Filed: 12/23/2020 11:36 AM Date of Service: 12/21/2020  1:23 PM Status: Signed    : Gretta Swan PA-C (Physician Assistant)         Interventional Radiology - Pre-Procedure Note:  12/21/2020    Procedure Requested: GJ tube placement.  Requested by: Gregg Sarabia MD    Brief HPI: Hafsa Robles is a 60 y.o. old female admitted for COVID-19 infection and acute hypoxic respiratory failure on 11/13/20; pt with worsening hypoxia and was intubated on 12/01/20. Trach placement and GJ placement requested.     IMAGING:  No prior abd imaging at [facility]    NPO: will make NPO at midnight.  ANTICOAGULANTS: lovenox 30mg subcutaneous two times a day - hold in AM tomorrow   ANTIBIOTICS: cefepime.    ALLERGIES  Patient has no known allergies.    LABS:  INR ordered    Hemoglobin (g/dL)   Date Value   12/21/2020 10.2 (L)     Platelets (thou/uL)   Date Value   12/21/2020 467 (H)     Creatinine (mg/dL)   Date Value   12/21/2020 0.43 (L)     Potassium (mmol/L)   Date Value   12/21/2020 3.8       EXAM:  BP 92/55 (Patient Position: Lying)   Pulse 73   Temp 98.2  F (36.8  C) (Axillary)   Resp (!) 36   Ht 5' 5\" (1.651 m)   Wt 134 lb 14.7 oz (61.2 kg)   SpO2 96%   BMI 22.45 kg/m     Due to positive COVID19 status and in order to minimize exposures and preserve PPE, patient was observed from outside the room.    General: Stable. In no acute distress.  Neuro: sedated.   Resp: intubated.      Pre-Sedation Assessment:  Mallampati Airway Classification:intubated.  Previous reaction to anesthesia/sedation: no  Sedation plan based on assessment: Moderate  ASA Classification: ASA 3 - Patient with moderate systemic disease with functional limitations      ASSESSMENT/PLAN:   60 y.o. old female admitted for COVID-19 infection " and acute hypoxic respiratory failure on 11/13/20; pt with worsening hypoxia and was intubated on 12/01/20.    Percutaneous gastrojejunostomy tube placement with sedation - anticipate placement tomorrow 12/22/20.    Procedure, risk/benefits, and sedation reviewed with pt's  (Arturo Robles) via telephone at [phone]. All questions answered. OK to proceed with above radiology procedure.     INR ordered for AM.    Hold subcutaneous lovenox in AM - hold order placed.    NPO at midnight - ordered    Gretta Swan  Interventional Radiology

## 2021-06-30 NOTE — PROGRESS NOTES
Progress Notes by Lydia Dugan CNP at 12/22/2020  8:15 AM     Author: Ritchie, Lydia, CNP Service: Interventional Radiology Author Type: Nurse Practitioner    Filed: 12/22/2020  8:20 AM Date of Service: 12/22/2020  8:15 AM Status: Addendum    : Lydia Dugan CNP (Nurse Practitioner)    Related Notes: Original Note by Lydia Dugan CNP (Nurse Practitioner) filed at 12/22/2020  8:18 AM         Interventional Radiology  12/22/2020    GJ placement procedure requested by Gregg Sarabia MD.    NPO status reviewed; ordered for midnight.     Clinical notes reviewed. No history noted for abdominal surgery.     Pertinent medications reviewed; lovenox held for 24 hours. On IV Cefepime 1g Q8H.     LABS:  INR (no units)   Date Value   12/22/2020 0.99       Lab Results   Component Value Date    WBC 11.3 (H) 12/22/2020    HGB 10.9 (L) 12/22/2020     (H) 12/22/2020       Creatinine (mg/dL)   Date Value   12/22/2020 0.49 (L)       Potassium (mmol/L)   Date Value   12/22/2020 3.9     Imaging:   EXAM: XR ABDOMEN AP PORTABLE  LOCATION: [facility]  DATE/TIME: 12/2/2020 1:05 PM     INDICATION: og placement  COMPARISON: Chest x-ray 12/01/2020     IMPRESSION:   Enteric tube with the tip in satisfactory position in the gastric antrum. Nonobstructive bowel gas pattern. No definite free air. Stable bilateral pulmonary airspace opacities.     Chart reviewed today and patient appropriate to proceed with above procedure.    Lydia Dugan  Interventional Radiology  [phone]

## 2021-07-01 NOTE — PROCEDURES
"Procedures by Arti Jimenez RN at 12/1/2020  7:58 AM     Author: Arti Jimenez RN Service: -- Author Type: Registered Nurse    Filed: 12/1/2020  8:00 AM Date of Service: 12/1/2020  7:58 AM Status: Signed    : Arti Jimenez RN (Registered Nurse)     Procedure Orders    1. Insert PICC [[id number]] ordered by Tish Adkins CNP at 12/01/20 0550           Procedures    1. PICC INSERTION - TRIPLE LUMEN [[id number] (Custom)]             PICC Line Insertion Procedure Note  Pt. Name: Hafsa Robles  MRN:        [id number]    Procedure: Insertion of a  triple Lumen  5 fr  Bard SOLO (valved) Power PICC, Lot number [id number]    Indications: vascular access    Contraindications : none    Procedure Details   Patient identified with 2 identifiers and \"Time Out\" conducted.  .     Central line insertion bundle followed: hand hygeine performed prior to procedure, site cleansed with cholraprep, hat, mask, sterile gloves,sterile gown worn, patient draped with maximum barrier head to toe drape, sterile field maintained.    The vein was assessed and found to be compressible and of adequate size. 2 ml 1% Lidocaine administered sq to the insertion site. A 5 Fr PICC was inserted into the basilic vein of the right arm with ultrasound guidance. 1 attempt(s) required to access vein.   Catheter threaded without difficulty. Good blood return noted.    Modified Seldinger Technique used for insertion.    The 8 sharps that are included in the PICC insertion kit were accounted for and disposed of in the sharps container prior to breakdown of the sterile field.    Catheter secured with Statlock, biopatch and Tegaderm dressing applied.    Findings:  Total catheter length  38 cm, with 2 cm exposed. Mid upper arm circumference is 29 cm. Catheter was flushed with 30 cc NS. Patient  tolerated procedure well.    Tip placement verified by ecg. Tip placement in the SVC near CAJ.    CLABSI prevention brochure left at " bedside.    Patient's primary RN notified PICC is ready for use.    Comments:          Arti Jimenez, RN,BSN  [facility] Vascular Access

## 2021-07-03 NOTE — ADDENDUM NOTE
Addendum Note by Yves Cerna CRNA at 12/1/2020  6:40 AM     Author: Yves Cerna CRNA Service: -- Author Type: Nurse Anesthetist    Filed: 12/1/2020  6:40 AM Date of Service: 12/1/2020  6:40 AM Status: Signed    : Yves Cerna CRNA (Nurse Anesthetist)       Addendum  created 12/01/20 0640 by Yves Cerna CRNA    Clinical Note Signed, Diagnosis association updated, Flowsheet accepted, Intraprocedure Blocks edited, Intraprocedure Flowsheets edited

## 2021-08-05 ENCOUNTER — TELEPHONE (OUTPATIENT)
Dept: PULMONOLOGY | Facility: OTHER | Age: 61
End: 2021-08-05

## 2021-10-11 ENCOUNTER — HEALTH MAINTENANCE LETTER (OUTPATIENT)
Age: 61
End: 2021-10-11

## 2021-11-09 NOTE — PROGRESS NOTES
"Received PT on full vent support with ETT 7.5 secured 21 @ Teeth.  BS diminished Suctioning small  amounts of scant from ET.  ETT was moved from side-to-side to prevent skin breakdown on lips. HOB approx 30 degrees. Current vent settings.  RT will continue to follow.        Vent Mode: VCV  FiO2 (%):  [40 %-100 %] 40 %  S RR:  [30-35] 35  S VT:  [230 mL-250 mL] 250 mL  PEEP/CPAP (cm H2O):  [12 cm H2O-14 cm H2O] 12 cm H2O  Minute Ventilation (L/min):  [7.3 L/min-9 L/min] 7.3 L/min  PIP:  [33 cm H2O-40 cm H2O] 35 cm H2O  MAP (cm H2O):  [16-20] 20  Plat: 22 cm H2O    BP 97/69   Pulse (!) 117   Temp 99.4  F (37.4  C) (Axillary)   Resp (!) 35   Ht 5' 5\" (1.651 m)   Wt 136 lb 14.5 oz (62.1 kg)   SpO2 92%   BMI 22.78 kg/m      Wilson Armando, LRT  12/3/2020    " No

## 2022-05-22 ENCOUNTER — HEALTH MAINTENANCE LETTER (OUTPATIENT)
Age: 62
End: 2022-05-22

## 2022-09-25 ENCOUNTER — HEALTH MAINTENANCE LETTER (OUTPATIENT)
Age: 62
End: 2022-09-25

## 2022-10-04 ENCOUNTER — ANCILLARY PROCEDURE (OUTPATIENT)
Dept: MAMMOGRAPHY | Facility: CLINIC | Age: 62
End: 2022-10-04
Attending: NURSE PRACTITIONER
Payer: COMMERCIAL

## 2022-10-04 DIAGNOSIS — Z12.31 VISIT FOR SCREENING MAMMOGRAM: ICD-10-CM

## 2022-10-04 PROCEDURE — 77067 SCR MAMMO BI INCL CAD: CPT

## 2023-06-04 ENCOUNTER — HEALTH MAINTENANCE LETTER (OUTPATIENT)
Age: 63
End: 2023-06-04

## 2023-10-05 ENCOUNTER — ANCILLARY PROCEDURE (OUTPATIENT)
Dept: MAMMOGRAPHY | Facility: CLINIC | Age: 63
End: 2023-10-05
Attending: NURSE PRACTITIONER
Payer: COMMERCIAL

## 2023-10-05 DIAGNOSIS — Z12.31 SCREENING MAMMOGRAM, ENCOUNTER FOR: ICD-10-CM

## 2023-10-05 PROCEDURE — 77067 SCR MAMMO BI INCL CAD: CPT

## 2024-07-09 NOTE — PROGRESS NOTES
Respiratory Care Note     Patient is on full vent support. Breath sound is diminished and course. Patient failed weaning trial in less than 5 min. During trial patient became tachypnic, hypotensive and had increased heart rate. Suction moderate amount thick white secretions. Plan is wean daily.        12/19/20 2000   Patient Data   Total Resp Rate  24 BPM   PIP Observed (cm H2O) 33 cm H2O   MAP (cm H2O) 13   Plateau Pressure (cm H2O) 37 cm H2O      none

## 2024-07-20 ENCOUNTER — HEALTH MAINTENANCE LETTER (OUTPATIENT)
Age: 64
End: 2024-07-20

## 2024-09-04 NOTE — PROGRESS NOTES
Isidro Alvarez - your results show the skin cancer has been completely removed. No further treatment required. Continue skin checks every 6-12 months with your primary dermatologist. Please call/message us with any questions/concerns.  Pt, continues on HFNC 50lpm and 60%. Tolerating well. BIpap on stand by this shift

## 2024-10-22 ENCOUNTER — ANCILLARY PROCEDURE (OUTPATIENT)
Dept: MAMMOGRAPHY | Facility: CLINIC | Age: 64
End: 2024-10-22
Attending: NURSE PRACTITIONER
Payer: COMMERCIAL

## 2024-10-22 DIAGNOSIS — Z12.31 VISIT FOR SCREENING MAMMOGRAM: ICD-10-CM

## 2024-10-22 PROCEDURE — 77063 BREAST TOMOSYNTHESIS BI: CPT

## 2025-01-12 ENCOUNTER — HEALTH MAINTENANCE LETTER (OUTPATIENT)
Age: 65
End: 2025-01-12

## 2025-01-28 NOTE — PLAN OF CARE
Pt's breathing appears better tonight than last and pt agrees. Down to 65% on the bipap this morning. Pt states that is sleeping much better tonight than last. Up a few times in the night to void, continues to have loose stools.       Problem: Inadequate Gas Exchange  Goal: Patient will achieve/maintain normal respiratory rate/effort  Outcome: Progressing     Problem: Safety  Goal: Patient will be injury free during hospitalization  Outcome: Progressing      Hide Additional Notes?: No Detail Level: Generalized Detail Level: Detailed

## 2025-02-15 ENCOUNTER — HEALTH MAINTENANCE LETTER (OUTPATIENT)
Age: 65
End: 2025-02-15